# Patient Record
Sex: FEMALE | Race: WHITE | Employment: OTHER | URBAN - METROPOLITAN AREA
[De-identification: names, ages, dates, MRNs, and addresses within clinical notes are randomized per-mention and may not be internally consistent; named-entity substitution may affect disease eponyms.]

---

## 2017-03-16 ENCOUNTER — HOSPITAL ENCOUNTER (EMERGENCY)
Age: 82
Discharge: HOME | End: 2017-03-17
Attending: EMERGENCY MEDICINE
Payer: MEDICARE

## 2017-03-16 ENCOUNTER — APPOINTMENT (OUTPATIENT)
Dept: GENERAL RADIOLOGY | Age: 82
End: 2017-03-16
Attending: EMERGENCY MEDICINE
Payer: MEDICARE

## 2017-03-16 DIAGNOSIS — R42 DIZZINESS: ICD-10-CM

## 2017-03-16 DIAGNOSIS — R53.1 WEAKNESS: Primary | ICD-10-CM

## 2017-03-16 LAB
ANION GAP BLD CALC-SCNC: 9 MMOL/L (ref 3–18)
BASOPHILS # BLD AUTO: 0 K/UL (ref 0–0.06)
BASOPHILS # BLD: 1 % (ref 0–2)
BUN SERPL-MCNC: 14 MG/DL (ref 7–18)
BUN/CREAT SERPL: 13 (ref 12–20)
CALCIUM SERPL-MCNC: 8.7 MG/DL (ref 8.5–10.1)
CHLORIDE SERPL-SCNC: 104 MMOL/L (ref 100–108)
CK MB CFR SERPL CALC: NORMAL % (ref 0–4)
CK MB SERPL-MCNC: <1 NG/ML (ref 5–25)
CK SERPL-CCNC: 42 U/L (ref 26–192)
CO2 SERPL-SCNC: 27 MMOL/L (ref 21–32)
CREAT SERPL-MCNC: 1.05 MG/DL (ref 0.6–1.3)
DIFFERENTIAL METHOD BLD: ABNORMAL
EOSINOPHIL # BLD: 0 K/UL (ref 0–0.4)
EOSINOPHIL NFR BLD: 0 % (ref 0–5)
ERYTHROCYTE [DISTWIDTH] IN BLOOD BY AUTOMATED COUNT: 14.4 % (ref 11.6–14.5)
GLUCOSE SERPL-MCNC: 142 MG/DL (ref 74–99)
HCT VFR BLD AUTO: 36.4 % (ref 35–45)
HGB BLD-MCNC: 11.6 G/DL (ref 12–16)
LYMPHOCYTES # BLD AUTO: 36 % (ref 21–52)
LYMPHOCYTES # BLD: 2.5 K/UL (ref 0.9–3.6)
MAGNESIUM SERPL-MCNC: 1.9 MG/DL (ref 1.8–2.4)
MCH RBC QN AUTO: 29.2 PG (ref 24–34)
MCHC RBC AUTO-ENTMCNC: 31.9 G/DL (ref 31–37)
MCV RBC AUTO: 91.7 FL (ref 74–97)
MONOCYTES # BLD: 0.5 K/UL (ref 0.05–1.2)
MONOCYTES NFR BLD AUTO: 8 % (ref 3–10)
NEUTS SEG # BLD: 3.9 K/UL (ref 1.8–8)
NEUTS SEG NFR BLD AUTO: 55 % (ref 40–73)
PLATELET # BLD AUTO: 318 K/UL (ref 135–420)
PMV BLD AUTO: 9.4 FL (ref 9.2–11.8)
POTASSIUM SERPL-SCNC: 3.5 MMOL/L (ref 3.5–5.5)
RBC # BLD AUTO: 3.97 M/UL (ref 4.2–5.3)
SODIUM SERPL-SCNC: 140 MMOL/L (ref 136–145)
TROPONIN I SERPL-MCNC: <0.02 NG/ML (ref 0–0.04)
WBC # BLD AUTO: 6.9 K/UL (ref 4.6–13.2)

## 2017-03-16 PROCEDURE — 99285 EMERGENCY DEPT VISIT HI MDM: CPT

## 2017-03-16 PROCEDURE — 93005 ELECTROCARDIOGRAM TRACING: CPT

## 2017-03-16 PROCEDURE — 71010 XR CHEST PORT: CPT

## 2017-03-16 PROCEDURE — 85025 COMPLETE CBC W/AUTO DIFF WBC: CPT | Performed by: EMERGENCY MEDICINE

## 2017-03-16 PROCEDURE — 84484 ASSAY OF TROPONIN QUANT: CPT | Performed by: EMERGENCY MEDICINE

## 2017-03-16 PROCEDURE — 80048 BASIC METABOLIC PNL TOTAL CA: CPT | Performed by: EMERGENCY MEDICINE

## 2017-03-16 PROCEDURE — 81001 URINALYSIS AUTO W/SCOPE: CPT | Performed by: EMERGENCY MEDICINE

## 2017-03-16 PROCEDURE — 83735 ASSAY OF MAGNESIUM: CPT | Performed by: EMERGENCY MEDICINE

## 2017-03-17 ENCOUNTER — APPOINTMENT (OUTPATIENT)
Dept: CT IMAGING | Age: 82
End: 2017-03-17
Attending: EMERGENCY MEDICINE
Payer: MEDICARE

## 2017-03-17 VITALS
RESPIRATION RATE: 17 BRPM | HEART RATE: 80 BPM | DIASTOLIC BLOOD PRESSURE: 55 MMHG | OXYGEN SATURATION: 96 % | SYSTOLIC BLOOD PRESSURE: 166 MMHG | TEMPERATURE: 98.2 F

## 2017-03-17 LAB
APPEARANCE UR: CLEAR
ATRIAL RATE: 77 BPM
BACTERIA URNS QL MICRO: ABNORMAL /HPF
BILIRUB UR QL: NEGATIVE
CALCULATED P AXIS, ECG09: 69 DEGREES
CALCULATED R AXIS, ECG10: -6 DEGREES
CALCULATED T AXIS, ECG11: 42 DEGREES
COLOR UR: YELLOW
DIAGNOSIS, 93000: NORMAL
EPITH CASTS URNS QL MICRO: ABNORMAL /LPF (ref 0–5)
GLUCOSE UR STRIP.AUTO-MCNC: NEGATIVE MG/DL
HGB UR QL STRIP: ABNORMAL
KETONES UR QL STRIP.AUTO: NEGATIVE MG/DL
LEUKOCYTE ESTERASE UR QL STRIP.AUTO: ABNORMAL
NITRITE UR QL STRIP.AUTO: NEGATIVE
P-R INTERVAL, ECG05: 144 MS
PH UR STRIP: 6.5 [PH] (ref 5–8)
PROT UR STRIP-MCNC: NEGATIVE MG/DL
Q-T INTERVAL, ECG07: 416 MS
QRS DURATION, ECG06: 84 MS
QTC CALCULATION (BEZET), ECG08: 470 MS
RBC #/AREA URNS HPF: ABNORMAL /HPF (ref 0–5)
SP GR UR REFRACTOMETRY: 1.01 (ref 1–1.03)
UROBILINOGEN UR QL STRIP.AUTO: 0.2 EU/DL (ref 0.2–1)
VENTRICULAR RATE, ECG03: 77 BPM
WBC URNS QL MICRO: ABNORMAL /HPF (ref 0–4)

## 2017-03-17 PROCEDURE — 70450 CT HEAD/BRAIN W/O DYE: CPT

## 2017-03-17 NOTE — ROUTINE PROCESS
I have reviewed discharge instructions with the patient. The patient verbalized understanding. Patient armband removed and shredded. Pt. Rolled out of ED in wheelchair by Paulette Mukherjee RN. Police arrived to escort pt to her car at [place] on [address] [facility].

## 2017-03-17 NOTE — DISCHARGE INSTRUCTIONS
SPECIFIC PATIENT INSTRUCTIONS FROM THE PHYSICIAN WHO TREATED YOU IN THE ER TODAY:  1. Return if any concerns or worsening of condition(s). 2. FOLLOW UP APPOINTMENT: Your primary doctor in 1-2 days. 3. You will be contacted by a  to help with coordination of home care and transportation to doctor's office. Dizziness: Care Instructions  Your Care Instructions  Dizziness is the feeling of unsteadiness or fuzziness in your head. It is different than having vertigo, which is a feeling that the room is spinning or that you are moving or falling. It is also different from lightheadedness, which is the feeling that you are about to faint. It can be hard to know what causes dizziness. Some people feel dizzy when they have migraine headaches. Sometimes bouts of flu can make you feel dizzy. Some medical conditions, such as heart problems or high blood pressure, can make you feel dizzy. Many medicines can cause dizziness, including medicines for high blood pressure, pain, or anxiety. If a medicine causes your symptoms, your doctor may recommend that you stop or change the medicine. If it is a problem with your heart, you may need medicine to help your heart work better. If there is no clear reason for your symptoms, your doctor may suggest watching and waiting for a while to see if the dizziness goes away on its own. Follow-up care is a key part of your treatment and safety. Be sure to make and go to all appointments, and call your doctor if you are having problems. It's also a good idea to know your test results and keep a list of the medicines you take. How can you care for yourself at home? · If your doctor recommends or prescribes medicine, take it exactly as directed. Call your doctor if you think you are having a problem with your medicine. · Do not drive while you feel dizzy. · Try to prevent falls.  Steps you can take include:  ¨ Using nonskid mats, adding grab bars near the tub, and using night-lights. ¨ Clearing your home so that walkways are free of anything you might trip on. ¨ Letting family and friends know that you have been feeling dizzy. This will help them know how to help you. When should you call for help? Call 911 anytime you think you may need emergency care. For example, call if:  · You passed out (lost consciousness). · You have dizziness along with symptoms of a heart attack. These may include:  ¨ Chest pain or pressure, or a strange feeling in the chest.  ¨ Sweating. ¨ Shortness of breath. ¨ Nausea or vomiting. ¨ Pain, pressure, or a strange feeling in the back, neck, jaw, or upper belly or in one or both shoulders or arms. ¨ Lightheadedness or sudden weakness. ¨ A fast or irregular heartbeat. · You have symptoms of a stroke. These may include:  ¨ Sudden numbness, tingling, weakness, or loss of movement in your face, arm, or leg, especially on only one side of your body. ¨ Sudden vision changes. ¨ Sudden trouble speaking. ¨ Sudden confusion or trouble understanding simple statements. ¨ Sudden problems with walking or balance. ¨ A sudden, severe headache that is different from past headaches. Call your doctor now or seek immediate medical care if:  · You feel dizzy and have a fever, headache, or ringing in your ears. · You have new or increased nausea and vomiting. · Your dizziness does not go away or comes back. Watch closely for changes in your health, and be sure to contact your doctor if:  · You do not get better as expected. Where can you learn more? Go to http://zander-eden.info/. Enter Q229 in the search box to learn more about \"Dizziness: Care Instructions. \"  Current as of: May 27, 2016  Content Version: 11.1  © 8275-0136 [employer]. Care instructions adapted under license by [employer] (which disclaims liability or warranty for this information).  If you have questions about a medical condition or this instruction, always ask your healthcare professional. [address] any warranty or liability for your use of this information. Weakness: Care Instructions  Your Care Instructions  Weakness is a lack of physical or muscle strength. You may feel that you need to make extra effort to move your arms, legs, or other muscles. Generalized weakness means that you feel weak in most areas of your body. Another type of weakness may affect just one muscle or group of muscles. You may feel weak and tired after you have done too much activity, such as taking an extra-long hike. This is not a serious problem. It often goes away on its own. Feeling weak can also be caused by medical conditions like thyroid problems, depression, or a virus. Sometimes the cause can be serious. Your doctor may want to do more tests to try to find the cause of the weakness. The doctor has checked you carefully, but problems can develop later. If you notice any problems or new symptoms, get medical treatment right away. Follow-up care is a key part of your treatment and safety. Be sure to make and go to all appointments, and call your doctor if you are having problems. It's also a good idea to know your test results and keep a list of the medicines you take. How can you care for yourself at home? · Rest when you feel tired. · Be safe with medicines. If your doctor prescribed medicine, take it exactly as prescribed. Call your doctor if you think you are having a problem with your medicine. You will get more details on the specific medicines your doctor prescribes. · Do not skip meals. Eating a balanced diet may increase your energy level. · Get some physical activity every day, but do not get too tired. When should you call for help? Call your doctor now or seek immediate medical care if:  · You have new or worse weakness. · You are dizzy or lightheaded, or you feel like you may faint.   Watch closely for changes in your health, and be sure to contact your doctor if:  · You do not get better as expected. Where can you learn more? Go to http://zander-eden.info/. Enter 820 [id number] 5111 in the search box to learn more about \"Weakness: Care Instructions. \"  Current as of: May 27, 2016  Content Version: 11.1  © 3446-3283 [employer]. Care instructions adapted under license by [employer] (which disclaims liability or warranty for this information). If you have questions about a medical condition or this instruction, always ask your healthcare professional. [address] any warranty or liability for your use of this information. [employer] Activation    Thank you for requesting access to [employer]. Please follow the instructions below to securely access and download your online medical record. [employer] allows you to send messages to your doctor, view your test results, renew your prescriptions, schedule appointments, and more. How Do I Sign Up? 1. In your internet browser, go to [email or web address]. 2. Click on the First Time User? Click Here link in the Sign In box. You will see the New Member Sign Up page. 3. Enter your Z[employer]ocal Access Code exactly as it appears below. You will not need to use this code after youve completed the sign-up process. If you do not sign up before the expiration date, you must request a new code. Zipl[employer] Access Code: [email or web address]  Expires: 2017  9:36 PM (This is the date your Ziplocal access code will )    4. Enter the last four digits of your Social Security Number (xxxx) and Date of Birth (mm/dd/yyyy) as indicated and click Submit. You will be taken to the next sign-up page. 5. Create a Ziplocal ID. This will be your Zipl[employer] login ID and cannot be changed, so think of one that is secure and easy to remember. 6. Create a Ziplocal password. You can change your password at any time.   7. Enter your Password Reset Question and Answer. This can be used at a later time if you forget your password. 8. Enter your e-mail address. You will receive e-mail notification when new information is available in [address] 9. Click Sign Up. You can now view and download portions of your medical record. 10. Click the Download Summary menu link to download a portable copy of your medical information. Additional Information    If you have questions, please visit the Frequently Asked Questions section of the [facility] website at [email or web address] Remember, [facility] is NOT to be used for urgent needs. For medical emergencies, dial 911.

## 2017-03-17 NOTE — ED TRIAGE NOTES
Pt. Lisha Gant in by EMS from grocery store. Pt. Called EMS due to a dizziness and feeling weak and tired. Pt. States she has been feeling worse for last few weeks and has been trying not to drive but ran out of groceries and felt faint at the check out.

## 2017-03-17 NOTE — ED PROVIDER NOTES
[facility] EMERGENCY DEPT      80 y.o. female with noted past medical history who presents to the emergency department via EMS complaining of intermittent episodes of generalized weakness onset a year ago. Pt states The weakness has increased so much over the last 6 months that I cant walk much.  Pt was picked up from a local store after she became too weak to check out.  Pt did drive to the store. She lives home alone. She states her children and other family members are not local. Pt admits she has canceled her last few PCP appointments over the past several months. Pt also complains  dizziness described as feeling off balance.  She admits she has fallen several times over the past several months, but she denies any injuries. No other complaints. No current facility-administered medications for this encounter. Current Outpatient Prescriptions   Medication Sig    CHOLECALCIFEROL, VITAMIN D3, (VITAMIN D3 PO) Take  by mouth.  IRON, FERROUS SULFATE, PO Take  by mouth.  cyanocobalamin 1,000 mcg tablet Take 1,000 mcg by mouth daily.  raNITIdine (ZANTAC) 150 mg tablet Take 1 Tab by mouth two (2) times a day.  acetaminophen (TYLENOL EX STR ARTHRITIS PAIN) 500 mg tablet Take 1 Tab by mouth every six (6) hours as needed.  diphenhydrAMINE (BENADRYL ALLERGY) 25 mg tablet Take 1 Tab by mouth every six (6) hours as needed.  ondansetron hcl (ZOFRAN, AS HYDROCHLORIDE,) 4 mg tablet Take 2 Tabs by mouth every eight (8) hours as needed for Nausea.  albuterol (PROVENTIL, VENTOLIN) 90 mcg/actuation inhaler Take 2 Puffs by inhalation every six (6) hours as needed for Wheezing or Shortness of Breath (cough).  fluticasone (FLONASE) 50 mcg/actuation nasal spray 2 sprays in each nostril every night.  ergocalciferol (VITAMIN D2) 50,000 unit capsule Take 1 Cap by mouth every seven (7) days.  pravastatin (PRAVACHOL) 40 mg tablet Take 1 Tab by mouth nightly.        Past Medical History: Diagnosis Date    Anemia NEC     Asthma     Colitis     DDD (degenerative disc disease), cervical     Fibrocystic breast     GERD (gastroesophageal reflux disease)     Headache     HTN (hypertension)     Hyperlipidemia     OA (osteoarthritis)     Osteoporosis     Pneumonia     Thyroid nodule     Vitamin D deficiency 2/7/2011       Past Surgical History:   Procedure Laterality Date    BREAST SURGERY PROCEDURE UNLISTED      cysts removed    CARDIAC SURG PROCEDURE UNLIST      cardiac catherization    HX GYN      hysterectomy    HX HEENT      cataract surgery bilaterally    HX TOTAL ABDOMINAL HYSTERECTOMY         Family History   Problem Relation Age of Onset    Hypertension Other     Diabetes Other     Heart Disease Other     Cancer Other      stomach & colon    Diabetes Mother     Heart Attack Mother     Stroke Mother     Heart Attack Father     Heart Failure Father     Arthritis-rheumatoid Father        Social History     Social History    Marital status:      Spouse name: N/A    Number of children: N/A    Years of education: N/A     Occupational History    Not on file.      Social History Main Topics    Smoking status: Never Smoker    Smokeless tobacco: Never Used    Alcohol use No    Drug use: No    Sexual activity: Not on file     Other Topics Concern    Not on file     Social History Narrative       Allergies   Allergen Reactions    Latex, Natural Rubber Unknown (comments)    Asa-Acetaminophen-Caff-Potass Shortness of Breath    Erythromycin Other (comments)     bleeding    Iodine And Iodide Containing Products Unknown (comments)    Lemon Unknown (comments)    Other Medication Unknown (comments)     Pt not sure of allergies states \"I'm allergic to more but not sure of name\"    Pcn [Penicillins] Swelling    Shellfish Containing Products Unknown (comments)    Sulfa (Sulfonamide Antibiotics) Unknown (comments)       Patient's primary care provider (as noted in EPIC): Rocio Abbasi MD    REVIEW OF SYSTEMS:    Constitutional:  Negative for diaphoresis. HENT:  Negative for congestion. Respiratory:  Negative for cough and shortness of breath. Cardiovascular:  Negative for chest pain and palpitations. Gastrointestinal:  Negative for diarrhea. Genitourinary:  Negative for flank pain. Musculoskeletal:  Negative for back pain. Skin:  Negative for pallor. Neurological:  Negative for focal numbness or tingling. Negative for slurred speech. Visit Vitals    BP (!) 187/92 (BP Patient Position: Supine)    Temp 98.2 °F (36.8 °C)       PHYSICAL EXAM:    CONSTITUTIONAL:  Alert, in no apparent distress;  well developed;  well nourished. HEAD:  Normocephalic, atraumatic. EYES:  EOMI. Non-icteric sclera. Normal conjunctiva. ENTM:  Nose:  no rhinorrhea. Throat:  no erythema or exudate, mucous membranes moist.  NECK:  No JVD. Supple  RESPIRATORY:  Chest clear, equal breath sounds, good air movement. CARDIOVASCULAR:  Regular rate and rhythm. No murmurs, rubs, or gallops. GI:  Normal bowel sounds, abdomen soft and non-tender. No rebound or guarding. BACK:  Non-tender. UPPER EXT:  Normal inspection. LOWER EXT:  No edema, no calf tenderness. Distal pulses intact. NEURO:  Moves all four extremities. Normal motor exam and sensation in all four extremities. Normal CN II-XII exam.  Normal bilateral finger-to-nose exam.     SKIN:  No rashes;  Normal for age. PSYCH:  Alert and normal affect. DIFFERENTIAL DIAGNOSES/ MEDICAL DECISION MAKING:   Dehydration, hyperglycemia-induced weakness, electrolyte and/or endocrine imbalance, CVA, intracranial hemorrhage, sepsis, cardiac arrhythmia, central versus peripheral vertigo, illicit drug intoxication, alcohol intoxication, prescribed drug toxicity, pregnancy in females patients, anxiety disorder, versus other etiologies or a combination of the above.       ED COURSE:      Abnormal lab results from this emergency department encounter:  Labs Reviewed   CBC WITH AUTOMATED DIFF - Abnormal; Notable for the following:        Result Value    RBC 3.97 (*)     HGB 11.6 (*)     All other components within normal limits   METABOLIC PANEL, BASIC - Abnormal; Notable for the following:     Glucose 142 (*)     GFR est non-AA 50 (*)     All other components within normal limits   URINALYSIS W/ RFLX MICROSCOPIC - Abnormal; Notable for the following:     Blood SMALL (*)     Leukocyte Esterase LARGE (*)     All other components within normal limits   CARDIAC PANEL,(CK, CKMB & TROPONIN)   MAGNESIUM   URINE MICROSCOPIC ONLY       Lab values for this patient within approximately the last 12 hours:  Recent Results (from the past 12 hour(s))   CBC WITH AUTOMATED DIFF    Collection Time: 03/16/17  9:00 PM   Result Value Ref Range    WBC 6.9 4.6 - 13.2 K/uL    RBC 3.97 (L) 4.20 - 5.30 M/uL    HGB 11.6 (L) 12.0 - 16.0 g/dL    HCT 36.4 35.0 - 45.0 %    MCV 91.7 74.0 - 97.0 FL    MCH 29.2 24.0 - 34.0 PG    MCHC 31.9 31.0 - 37.0 g/dL    RDW 14.4 11.6 - 14.5 %    PLATELET 735 323 - 264 K/uL    MPV 9.4 9.2 - 11.8 FL    NEUTROPHILS 55 40 - 73 %    LYMPHOCYTES 36 21 - 52 %    MONOCYTES 8 3 - 10 %    EOSINOPHILS 0 0 - 5 %    BASOPHILS 1 0 - 2 %    ABS. NEUTROPHILS 3.9 1.8 - 8.0 K/UL    ABS. LYMPHOCYTES 2.5 0.9 - 3.6 K/UL    ABS. MONOCYTES 0.5 0.05 - 1.2 K/UL    ABS. EOSINOPHILS 0.0 0.0 - 0.4 K/UL    ABS.  BASOPHILS 0.0 0.0 - 0.06 K/UL    DF AUTOMATED     METABOLIC PANEL, BASIC    Collection Time: 03/16/17  9:00 PM   Result Value Ref Range    Sodium 140 136 - 145 mmol/L    Potassium 3.5 3.5 - 5.5 mmol/L    Chloride 104 100 - 108 mmol/L    CO2 27 21 - 32 mmol/L    Anion gap 9 3.0 - 18 mmol/L    Glucose 142 (H) 74 - 99 mg/dL    BUN 14 7.0 - 18 MG/DL    Creatinine 1.05 0.6 - 1.3 MG/DL    BUN/Creatinine ratio 13 12 - 20      GFR est AA >60 >60 ml/min/1.73m2    GFR est non-AA 50 (L) >60 ml/min/1.73m2    Calcium 8.7 8.5 - 10.1 MG/DL   CARDIAC PANEL,(CK, CKMB & TROPONIN)    Collection Time: 03/16/17  9:00 PM   Result Value Ref Range    CK 42 26 - 192 U/L    CK - MB <1.0 <3.6 ng/ml    CK-MB Index Cannot be calulated 0.0 - 4.0 %    Troponin-I, Qt. <0.02 0.0 - 0.045 NG/ML   MAGNESIUM    Collection Time: 03/16/17  9:00 PM   Result Value Ref Range    Magnesium 1.9 1.8 - 2.4 mg/dL   EKG, 12 LEAD, INITIAL    Collection Time: 03/16/17  9:11 PM   Result Value Ref Range    Ventricular Rate 77 BPM    Atrial Rate 77 BPM    P-R Interval 144 ms    QRS Duration 84 ms    Q-T Interval 416 ms    QTC Calculation (Bezet) 470 ms    Calculated P Axis 69 degrees    Calculated R Axis -6 degrees    Calculated T Axis 42 degrees    Diagnosis       Normal sinus rhythm  Minimal voltage criteria for LVH, may be normal variant  Nonspecific T wave abnormality  Prolonged QT  Abnormal ECG  When compared with ECG of 08-NOV-2016 17:09,  No significant change was found     URINALYSIS W/ RFLX MICROSCOPIC    Collection Time: 03/16/17 11:05 PM   Result Value Ref Range    Color YELLOW      Appearance CLEAR      Specific gravity 1.009 1.005 - 1.030      pH (UA) 6.5 5.0 - 8.0      Protein NEGATIVE  NEG mg/dL    Glucose NEGATIVE  NEG mg/dL    Ketone NEGATIVE  NEG mg/dL    Bilirubin NEGATIVE  NEG      Blood SMALL (A) NEG      Urobilinogen 0.2 0.2 - 1.0 EU/dL    Nitrites NEGATIVE  NEG      Leukocyte Esterase LARGE (A) NEG         Radiologist and cardiologist interpretations if available at time of this note:  XR CHEST PORT    (Results Pending)       Medication(s) ordered for patient during this emergency visit encounter:  Medications - No data to display    Initial EKG interpretation by attending emergency physician:  NSR about 75 bpm.    While unlikely, will assess the posterior fossa for hemorrhage with CT scan and will order routine labs seeking occult infection, metabolic derangement or evidence of anemia, acute worsening renal impairment, ACS/AMI (doubt), etc. My initial bedside impression is that this workup will likely be unremarkable and that the patient will ultimately be able to be discharged from the ED and treated for benign positional vertigo. No signs or complaints of vertigo component to patients weakness. CT head per radiologist's report:  XR CHEST PORT    (Results Pending)     CT head results:   IMPRESSION[de-identified]  1. No acute intracranial pathology. 2. Stable mild atrophy and presumed chronic small vessel ischemic changes. Portable (A-P view) CXR:  Preliminary review of x-rays by ED Physician. Interpretation of chest X-ray shows, no infiltrates, no pneumothorax, no CHF, no effusion. IMPRESSION AND MEDICAL DECISION MAKING:  Based upon the patient's presentation with noted HPI and PE, along with the work up done in the emergency department, I believe that the patient is having weakness of uncertain etiology. I am comfortable with discharge of the patient home and outpatient follow up with the patients primary care physician. DIAGNOSIS:  1. Weakness. 2. Dizziness. SPECIFIC PATIENT INSTRUCTIONS FROM THE PHYSICIAN WHO TREATED YOU IN THE ER TODAY:  1. Return if any concerns or worsening of condition(s). 2. FOLLOW UP APPOINTMENT:  Your primary doctor in 1-2 days. 3. You will be contacted by a  to help with coordination of home care and transportation to doctor's office. Virgel Ross L. Clifm Libman, M.D. Provider Attestation:  If a scribe was utilized in generation of this patient record, I personally performed the services described in the documentation, reviewed the documentation, as recorded by the scribe in my presence, and it accurately records the patient's history of presenting illness, review of systems, patient physical examination, and procedures performed by me as the attending physician. Virgel Ross L. Clifm Libman, M.D.   [facility] Board Certified Emergency Physician  3/16/2017.  12:13 AM    Scribe Attestation  Marquis Martin scribing for and in the presence of Judson Marie, MD (03/17/17/ 12:14 AM)    Physician Attestation  I personally performed the services described in this documentation, reviewed, and edited the documentation which was dictated to the scribe in my presence, and it accurately records my own words and actions.      Tin Hardwick MD (03/17/17/ 12:14 AM)    Signed by: Yuki Baumann, 03/17/17, 12:14 AM

## 2017-10-18 PROBLEM — D51.8 VITAMIN B12 DEFICIENCY (DIETARY) ANEMIA: Status: ACTIVE | Noted: 2017-10-18

## 2018-02-27 ENCOUNTER — OFFICE VISIT (OUTPATIENT)
Dept: FAMILY MEDICINE CLINIC | Age: 83
End: 2018-02-27

## 2018-02-27 VITALS
OXYGEN SATURATION: 96 % | HEIGHT: 61 IN | SYSTOLIC BLOOD PRESSURE: 133 MMHG | TEMPERATURE: 98.1 F | DIASTOLIC BLOOD PRESSURE: 72 MMHG | RESPIRATION RATE: 16 BRPM | WEIGHT: 129.6 LBS | HEART RATE: 86 BPM | BODY MASS INDEX: 24.47 KG/M2

## 2018-02-27 DIAGNOSIS — K92.0 HEMATEMESIS WITH NAUSEA: ICD-10-CM

## 2018-02-27 DIAGNOSIS — E78.5 HYPERLIPIDEMIA, UNSPECIFIED HYPERLIPIDEMIA TYPE: ICD-10-CM

## 2018-02-27 DIAGNOSIS — R73.09 ELEVATED GLUCOSE LEVEL: ICD-10-CM

## 2018-02-27 DIAGNOSIS — E55.9 VITAMIN D DEFICIENCY: ICD-10-CM

## 2018-02-27 DIAGNOSIS — K21.9 GASTROESOPHAGEAL REFLUX DISEASE, ESOPHAGITIS PRESENCE NOT SPECIFIED: ICD-10-CM

## 2018-02-27 DIAGNOSIS — R53.1 WEAKNESS GENERALIZED: Primary | ICD-10-CM

## 2018-02-27 DIAGNOSIS — H91.90 HEARING LOSS, UNSPECIFIED HEARING LOSS TYPE, UNSPECIFIED LATERALITY: ICD-10-CM

## 2018-02-27 DIAGNOSIS — Z00.00 LABORATORY EXAM ORDERED AS PART OF ROUTINE GENERAL MEDICAL EXAMINATION: ICD-10-CM

## 2018-02-27 DIAGNOSIS — D51.8 VITAMIN B12 DEFICIENCY (DIETARY) ANEMIA: ICD-10-CM

## 2018-02-27 DIAGNOSIS — R55 SYNCOPE AND COLLAPSE: ICD-10-CM
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
## 2018-02-27 NOTE — PATIENT INSTRUCTIONS
Preventing Falls: Care Instructions  Your Care Instructions    Getting around your home safely can be a challenge if you have injuries or health problems that make it easy for you to fall. Loose rugs and furniture in walkways are among the dangers for many older people who have problems walking or who have poor eyesight. People who have conditions such as arthritis, osteoporosis, or dementia also have to be careful not to fall. You can make your home safer with a few simple measures. Follow-up care is a key part of your treatment and safety. Be sure to make and go to all appointments, and call your doctor if you are having problems. It's also a good idea to know your test results and keep a list of the medicines you take. How can you care for yourself at home? Taking care of yourself  · You may get dizzy if you do not drink enough water. To prevent dehydration, drink plenty of fluids, enough so that your urine is light yellow or clear like water. Choose water and other caffeine-free clear liquids. If you have kidney, heart, or liver disease and have to limit fluids, talk with your doctor before you increase the amount of fluids you drink. · Exercise regularly to improve your strength, muscle tone, and balance. Walk if you can. Swimming may be a good choice if you cannot walk easily. · Have your vision and hearing checked each year or any time you notice a change. If you have trouble seeing and hearing, you might not be able to avoid objects and could lose your balance. · Know the side effects of the medicines you take. Ask your doctor or pharmacist whether the medicines you take can affect your balance. Sleeping pills or sedatives can affect your balance. · Limit the amount of alcohol you drink. Alcohol can impair your balance and other senses. · Ask your doctor whether calluses or corns on your feet need to be removed.  If you wear loose-fitting shoes because of calluses or corns, you can lose your balance and fall. · Talk to your doctor if you have numbness in your feet. Preventing falls at home  · Remove raised doorway thresholds, throw rugs, and clutter. Repair loose carpet or raised areas in the floor. · Move furniture and electrical cords to keep them out of walking paths. · Use nonskid floor wax, and wipe up spills right away, especially on ceramic tile floors. · If you use a walker or cane, put rubber tips on it. If you use crutches, clean the bottoms of them regularly with an abrasive pad, such as steel wool. · Keep your house well lit, especially [place], and outside walkways. Use night-lights in areas such as hallways and bathrooms. Add extra light switches or use remote switches (such as switches that go on or off when you clap your hands) to make it easier to turn lights on if you have to get up during the night. · Install sturdy handrails on stairways. · Move items in your cabinets so that the things you use a lot are on the lower shelves (about waist level). · Keep a cordless phone and a flashlight with new batteries by your bed. If possible, put a phone in each of the main rooms of your house, or carry a cell phone in case you fall and cannot reach a phone. Or, you can wear a device around your neck or wrist. You push a button that sends a signal for help. · Wear low-heeled shoes that fit well and give your feet good support. Use footwear with nonskid soles. Check the heels and soles of your shoes for wear. Repair or replace worn heels or soles. · Do not wear socks without shoes on wood floors. · Walk on the grass when the sidewalks are slippery. If you live in an area that gets snow and ice in the winter, sprinkle salt on slippery steps and sidewalks. Preventing falls in the bath  · Install grab bars and nonskid mats inside and outside your shower or tub and near the toilet and sinks. · Use shower chairs and bath benches.   · Use a hand-held shower head that will allow you to sit while showering. · Get into a tub or shower by putting the weaker leg in first. Get out of a tub or shower with your strong side first.  · Repair loose toilet seats and consider installing a raised toilet seat to make getting on and off the toilet easier. · Keep your bathroom door unlocked while you are in the shower. Where can you learn more? Go to http://zander-eden.info/. Enter [id number] [id number] in the search box to learn more about \"Preventing Falls: Care Instructions. \"  Current as of: May 12, 2017  Content Version: 11.4  © 5008-7906 [employer]. Care instructions adapted under license by [employer] (which disclaims liability or warranty for this information). If you have questions about a medical condition or this instruction, always ask your healthcare professional. [place] [address] any warranty or liability for your use of this information. How to Get Up Safely After a Fall: Care Instructions  Your Care Instructions    If you have injuries, health problems, or other reasons that may make it easy for you to fall at home, it is a good idea to learn how to get up safely after a fall. Learning how to get up correctly can help you avoid making an injury worse. Also, knowing what to do if you cannot get up can help you stay safe until help arrives. Follow-up care is a key part of your treatment and safety. Be sure to make and go to all appointments, and call your doctor if you are having problems. It's also a good idea to know your test results and keep a list of the medicines you take. How can you care for yourself after a fall? If you think you can get up  First lie still for a few minutes and think about how you feel. If your body feels okay and you think you can get up safely, follow the rest of the steps below:  1. Look for a chair or other piece of furniture that is close to you.   2. Roll onto your side and rest. Roll by turning your head in the direction you want to roll, move your shoulder and arm, then hip and leg in the same direction. 3. Lie still for a moment to let your blood pressure adjust.  4. Slowly push your upper body up, lift your head, and take a moment to rest.  5. Slowly get up on your hands and knees, and crawl to the chair or other stable piece of furniture. 6. Put your hands on the chair. 7. Move one foot forward, and place it flat on the floor. Your other leg should be bent with the knee on the floor. 8. Rise slowly, turn your body, and sit in the chair. Stay seated for a bit and think about how you feel. Call for help. Even if you feel okay, let someone know what happened to you. You might not know that you have a serious injury. If you cannot get up  1. If you think you are injured after a fall or you cannot get up, try not to panic. 2. Call out for help. 3. If you have a phone within reach or you have an emergency call device, use it to call for help. 4. If you do not have a phone within reach, try to slide yourself toward it. If you cannot get to the phone, try to slide toward a door or window or a place where you think you can be heard. 5. [place] or use an object to make noise so someone might hear you. 6. If you can reach something that you can use for a pillow, place it under your head. Try to stay warm by covering yourself with a blanket or clothing while you wait for help. When should you call for help? Call 911 anytime you think you may need emergency care. For example, call if:  ? · You passed out (lost consciousness). ? · You cannot get up after a fall. ? · You have severe pain. ?Call your doctor now or seek immediate medical care if:  ? · You have new or worse pain. ? · You are dizzy or lightheaded. ? · You hit your head. ? Watch closely for changes in your health, and be sure to contact your doctor if:  ? · You do not get better as expected. Where can you learn more?   Go to http://jennifer.info/. Enter V215 in the search box to learn more about \"How to Get Up Safely After a Fall: Care Instructions. \"  Current as of: May 12, 2017  Content Version: 11.4  © 2993-4411 [employer]. Care instructions adapted under license by [employer] (which disclaims liability or warranty for this information). If you have questions about a medical condition or this instruction, always ask your healthcare professional. [address] any warranty or liability for your use of this information. Fainting: Care Instructions  Your Care Instructions    When you faint, or pass out, you lose consciousness for a short time. A brief drop in blood flow to the brain often causes it. When you fall or lie down, more blood flows to your brain and you regain consciousness. Emotional stress, pain, or overheating-especially if you have been standing-can make you faint. In these cases, fainting is usually not serious. But fainting can be a sign of a more serious problem. Your doctor may want you to have more tests to rule out other causes. The treatment you need depends on the reason why you fainted. The doctor has checked you carefully, but problems can develop later. If you notice any problems or new symptoms, get medical treatment right away. Follow-up care is a key part of your treatment and safety. Be sure to make and go to all appointments, and call your doctor if you are having problems. It's also a good idea to know your test results and keep a list of the medicines you take. How can you care for yourself at home? · Drink plenty of fluids to prevent dehydration. If you have kidney, heart, or liver disease and have to limit fluids, talk with your doctor before you increase your fluid intake. When should you call for help? Call 911 anytime you think you may need emergency care.  For example, call if:  ? · You have symptoms of a heart problem. These may include:  ¨ Chest pain or pressure. ¨ Severe trouble breathing. ¨ A fast or irregular heartbeat. ¨ Lightheadedness or sudden weakness. ¨ Coughing up pink, foamy mucus. ¨ Passing out. After you call 911, the  may tell you to chew 1 adult-strength or 2 to 4 low-dose aspirin. Wait for an ambulance. Do not try to drive yourself. ? · You have symptoms of a stroke. These may include:  ¨ Sudden numbness, tingling, weakness, or loss of movement in your face, arm, or leg, especially on only one side of your body. ¨ Sudden vision changes. ¨ Sudden trouble speaking. ¨ Sudden confusion or trouble understanding simple statements. ¨ Sudden problems with walking or balance. ¨ A sudden, severe headache that is different from past headaches. ? · You passed out (lost consciousness) again. ? Watch closely for changes in your health, and be sure to contact your doctor if:  ? · You do not get better as expected. Where can you learn more? Go to http://zander-eden.info/. Enter U381 in the search box to learn more about \"Fainting: Care Instructions. \"  Current as of: March 20, 2017  Content Version: 11.4  © 7971-2585 [employer]. Care instructions adapted under license by [employer] (which disclaims liability or warranty for this information). If you have questions about a medical condition or this instruction, always ask your healthcare professional. [place] [address] any warranty or liability for your use of this information.

## 2018-02-27 NOTE — PROGRESS NOTES
Pt is here for to establish care but is not a new Pt. Last seen by Dr Kelly Vincent 11/16. Pt having near syncopal episodes & falls since August 2017. GERD    1. Have you been to the ER, urgent care clinic since your last visit? Hospitalized since your last visit? Yes When: [facility] ED Fall 3/16/17    2. Have you seen or consulted any other health care providers outside of the [address] [place] [address] since your last visit? Include any pap smears or colon screening.  Yes Dr Symone Boyd 1/18

## 2018-03-01 ENCOUNTER — TELEPHONE (OUTPATIENT)
Dept: CARDIOLOGY CLINIC | Age: 83
End: 2018-03-01

## 2018-03-06 ENCOUNTER — HOSPITAL ENCOUNTER (OUTPATIENT)
Dept: LAB | Age: 83
Discharge: HOME | End: 2018-03-06
Payer: MEDICARE

## 2018-03-06 ENCOUNTER — OFFICE VISIT (OUTPATIENT)
Dept: FAMILY MEDICINE CLINIC | Age: 83
End: 2018-03-06

## 2018-03-06 VITALS
WEIGHT: 127.6 LBS | HEART RATE: 74 BPM | SYSTOLIC BLOOD PRESSURE: 126 MMHG | OXYGEN SATURATION: 97 % | HEIGHT: 61 IN | TEMPERATURE: 98 F | BODY MASS INDEX: 24.09 KG/M2 | RESPIRATION RATE: 18 BRPM | DIASTOLIC BLOOD PRESSURE: 64 MMHG

## 2018-03-06 DIAGNOSIS — R68.2 MOUTH DRYNESS: ICD-10-CM

## 2018-03-06 DIAGNOSIS — K11.7 DROOLING: ICD-10-CM

## 2018-03-06 DIAGNOSIS — E55.9 VITAMIN D DEFICIENCY: ICD-10-CM

## 2018-03-06 DIAGNOSIS — R73.09 ELEVATED GLUCOSE LEVEL: ICD-10-CM

## 2018-03-06 DIAGNOSIS — R53.1 WEAKNESS GENERALIZED: ICD-10-CM

## 2018-03-06 DIAGNOSIS — D51.8 VITAMIN B12 DEFICIENCY (DIETARY) ANEMIA: ICD-10-CM

## 2018-03-06 DIAGNOSIS — Z00.00 LABORATORY EXAM ORDERED AS PART OF ROUTINE GENERAL MEDICAL EXAMINATION: ICD-10-CM

## 2018-03-06 DIAGNOSIS — E78.5 HYPERLIPIDEMIA, UNSPECIFIED HYPERLIPIDEMIA TYPE: ICD-10-CM

## 2018-03-06 DIAGNOSIS — Z00.00 INITIAL MEDICARE ANNUAL WELLNESS VISIT: Primary | ICD-10-CM

## 2018-03-06 LAB
ALBUMIN SERPL-MCNC: 3.9 G/DL (ref 3.4–5)
ALBUMIN/GLOB SERPL: 1.2 {RATIO} (ref 0.8–1.7)
ALP SERPL-CCNC: 119 U/L (ref 45–117)
ALT SERPL-CCNC: 13 U/L (ref 13–56)
ANION GAP SERPL CALC-SCNC: 9 MMOL/L (ref 3–18)
AST SERPL-CCNC: 11 U/L (ref 15–37)
BASOPHILS # BLD: 0.1 K/UL (ref 0–0.06)
BASOPHILS NFR BLD: 1 % (ref 0–2)
BILIRUB SERPL-MCNC: 0.2 MG/DL (ref 0.2–1)
BUN SERPL-MCNC: 29 MG/DL (ref 7–18)
BUN/CREAT SERPL: 29 (ref 12–20)
CALCIUM SERPL-MCNC: 9 MG/DL (ref 8.5–10.1)
CHLORIDE SERPL-SCNC: 103 MMOL/L (ref 100–108)
CHOLEST SERPL-MCNC: 238 MG/DL
CO2 SERPL-SCNC: 24 MMOL/L (ref 21–32)
CREAT SERPL-MCNC: 1 MG/DL (ref 0.6–1.3)
DIFFERENTIAL METHOD BLD: ABNORMAL
EOSINOPHIL # BLD: 0 K/UL (ref 0–0.4)
EOSINOPHIL NFR BLD: 0 % (ref 0–5)
ERYTHROCYTE [DISTWIDTH] IN BLOOD BY AUTOMATED COUNT: 14.4 % (ref 11.6–14.5)
GLOBULIN SER CALC-MCNC: 3.3 G/DL (ref 2–4)
GLUCOSE SERPL-MCNC: 97 MG/DL (ref 74–99)
HCT VFR BLD AUTO: 34.5 % (ref 35–45)
HDLC SERPL-MCNC: 48 MG/DL (ref 40–60)
HDLC SERPL: 5 {RATIO} (ref 0–5)
HGB BLD-MCNC: 10.4 G/DL (ref 12–16)
LDLC SERPL CALC-MCNC: 148.2 MG/DL (ref 0–100)
LIPID PROFILE,FLP: ABNORMAL
LYMPHOCYTES # BLD: 1.5 K/UL (ref 0.9–3.6)
LYMPHOCYTES NFR BLD: 21 % (ref 21–52)
MCH RBC QN AUTO: 28.2 PG (ref 24–34)
MCHC RBC AUTO-ENTMCNC: 30.1 G/DL (ref 31–37)
MCV RBC AUTO: 93.5 FL (ref 74–97)
MONOCYTES # BLD: 0.4 K/UL (ref 0.05–1.2)
MONOCYTES NFR BLD: 6 % (ref 3–10)
NEUTS SEG # BLD: 4.9 K/UL (ref 1.8–8)
NEUTS SEG NFR BLD: 72 % (ref 40–73)
PLATELET # BLD AUTO: 360 K/UL (ref 135–420)
PMV BLD AUTO: 9.7 FL (ref 9.2–11.8)
POTASSIUM SERPL-SCNC: 4.9 MMOL/L (ref 3.5–5.5)
PROT SERPL-MCNC: 7.2 G/DL (ref 6.4–8.2)
RBC # BLD AUTO: 3.69 M/UL (ref 4.2–5.3)
SODIUM SERPL-SCNC: 136 MMOL/L (ref 136–145)
TRIGL SERPL-MCNC: 209 MG/DL (ref ?–150)
TSH SERPL DL<=0.05 MIU/L-ACNC: 1.26 UIU/ML (ref 0.36–3.74)
VIT B12 SERPL-MCNC: 307 PG/ML (ref 211–911)
VLDLC SERPL CALC-MCNC: 41.8 MG/DL
WBC # BLD AUTO: 6.8 K/UL (ref 4.6–13.2)

## 2018-03-06 PROCEDURE — 82306 VITAMIN D 25 HYDROXY: CPT | Performed by: PHYSICIAN ASSISTANT

## 2018-03-06 PROCEDURE — 80061 LIPID PANEL: CPT | Performed by: PHYSICIAN ASSISTANT

## 2018-03-06 PROCEDURE — 82607 VITAMIN B-12: CPT | Performed by: PHYSICIAN ASSISTANT

## 2018-03-06 PROCEDURE — 36415 COLL VENOUS BLD VENIPUNCTURE: CPT | Performed by: PHYSICIAN ASSISTANT

## 2018-03-06 PROCEDURE — 83036 HEMOGLOBIN GLYCOSYLATED A1C: CPT | Performed by: PHYSICIAN ASSISTANT

## 2018-03-06 PROCEDURE — 84443 ASSAY THYROID STIM HORMONE: CPT | Performed by: PHYSICIAN ASSISTANT

## 2018-03-06 PROCEDURE — 85025 COMPLETE CBC W/AUTO DIFF WBC: CPT | Performed by: PHYSICIAN ASSISTANT

## 2018-03-06 PROCEDURE — 80053 COMPREHEN METABOLIC PANEL: CPT | Performed by: PHYSICIAN ASSISTANT

## 2018-03-06 NOTE — PATIENT INSTRUCTIONS
Gastroesophageal Reflux Disease (GERD): Care Instructions  Your Care Instructions    Gastroesophageal reflux disease (GERD) is the backward flow of stomach acid into the esophagus. The esophagus is the tube that leads from your throat to your stomach. A one-way valve prevents the stomach acid from moving up into this tube. When you have GERD, this valve does not close tightly enough. If you have mild GERD symptoms including heartburn, you may be able to control the problem with antacids or over-the-counter medicine. Changing your diet, losing weight, and making other lifestyle changes can also help reduce symptoms. Follow-up care is a key part of your treatment and safety. Be sure to make and go to all appointments, and call your doctor if you are having problems. It's also a good idea to know your test results and keep a list of the medicines you take. How can you care for yourself at home? · Take your medicines exactly as prescribed. Call your doctor if you think you are having a problem with your medicine. · Your doctor may recommend over-the-counter medicine. For mild or occasional indigestion, antacids, such as Tums, Gaviscon, Mylanta, or Maalox, may help. Your doctor also may recommend over-the-counter acid reducers, such as Pepcid AC, Tagamet HB, Zantac 75, or Prilosec. Read and follow all instructions on the label. If you use these medicines often, talk with your doctor. · Change your eating habits. ¨ It's best to eat several small meals instead of two or three large meals. ¨ After you eat, wait 2 to 3 hours before you lie down. ¨ Chocolate, mint, and alcohol can make GERD worse. ¨ Spicy foods, foods that have a lot of acid (like tomatoes and oranges), and coffee can make GERD symptoms worse in some people. If your symptoms are worse after you eat a certain food, you may want to stop eating that food to see if your symptoms get better.   · Do not smoke or chew tobacco. Smoking can make GERD worse. If you need help quitting, talk to your doctor about stop-smoking programs and medicines. These can increase your chances of quitting for good. · If you have GERD symptoms at night, raise the head of your bed 6 to 8 inches by putting the frame on blocks or placing a foam wedge under the head of your mattress. (Adding extra pillows does not work.)  · Do not wear tight clothing around your middle. · Lose weight if you need to. Losing just 5 to 10 pounds can help. When should you call for help? Call your doctor now or seek immediate medical care if:  ? · You have new or different belly pain. ? · Your stools are black and tarlike or have streaks of blood. ? Watch closely for changes in your health, and be sure to contact your doctor if:  ? · Your symptoms have not improved after 2 days. ? · Food seems to catch in your throat or chest.   Where can you learn more? Go to http://zander-eden.info/. Enter Q088 in the search box to learn more about \"Gastroesophageal Reflux Disease (GERD): Care Instructions. \"  Current as of: May 12, 2017  Content Version: 11.4  © 0194-1929 [employer]. Care instructions adapted under license by [employer] (which disclaims liability or warranty for this information). If you have questions about a medical condition or this instruction, always ask your healthcare professional. [place] [address] any warranty or liability for your use of this information. Medicare Wellness Visit, Female    The best way to live healthy is to have a healthy lifestyle by eating a well-balanced diet, exercising regularly, limiting alcohol and stopping smoking. Regular physical exams and screening tests are another way to keep healthy. Preventive exams provided by your health care provider can find health problems before they become diseases or illnesses.  Preventive services including immunizations, screening tests, monitoring and exams can help you take care of your own health. All people over age 72 should have a pneumovax  and and a prevnar shot to prevent pneumonia. These are once in a lifetime unless you and your provider decide differently. All people over 65 should have a yearly flu shot and a tetanus vaccine every 10 years. A bone mass density to screen for osteoporosis or thinning of the bones should be done every 2 years after 65. Screening for diabetes mellitus with a blood sugar test should be done every year. Glaucoma is a disease of the eye due to increased ocular pressure that can lead to blindness and it should be done every year by an eye professional.    Cardiovascular screening tests that check for elevated lipids (fatty part of blood) which can lead to heart disease and strokes should be done every 5 years. Colorectal screening that evaluates for blood or polyps in your colon should be done yearly as a stool test or every five years as a flexible sigmoidoscope or every 10 years as a colonoscopy up to age 76. Breast cancer screening with a mammogram is recommended biennially  for women age 54-69. Screening for cervical cancer with a pap smear and pelvic exam is recommended for women after age 72 years every 2 years up to age 79 or when the provider and patient decide to stop. If there is a history of cervical abnormalities or other increased risk for cancer then the test is recommended yearly. Hepatitis C screening is also recommended for anyone born between 80 through [address]. A shingles vaccine is also recommended once in a lifetime after age 61. Your Medicare Wellness Exam is recommended annually.     Here is a list of your current Health Maintenance items with a due date:  Health Maintenance Due   Topic Date Due    DTaP/Tdap/Td  (1 - Tdap) 11/22/1955    Shingles Vaccine  09/22/1994    Glaucoma Screening   11/22/1999    Pneumococcal Vaccine (1 of 2 - PCV13) 11/22/1999    Annual Well Visit  11/22/1999    Flu Vaccine  08/01/2017       Medicare Part B Preventive Services Limitations Recommendation Scheduled   Bone Mass Measurement  (age 72 & older, biennial) Requires diagnosis related to osteoporosis or estrogen deficiency. Biennial benefit unless patient has history of long-term glucocorticoid tx or baseline is needed because initial test was by other method Completed 8-5-2012    Cardiovascular Screening Blood Tests (every 5 years)  Total cholesterol, HDL, Triglycerides Order as a panel if possible 3-    Colorectal Cancer Screening  -Fecal occult blood test (annual)  -Flexible sigmoidoscopy (5y)  -Screening colonoscopy (10y)  -Barium Enema  N/A    Counseling to Prevent Tobacco Use (up to 8 sessions per year)  - Counseling greater than 3 and up to 10 minutes  - Counseling greater than 10 minutes Patients must be asymptomatic of tobacco-related conditions to receive as preventive service     Diabetes Screening Tests (at least every 3 years, Medicare covers annually or at 6-month intervals for prediabetic patients)    Fasting blood sugar (FBS) or glucose tolerance test (GTT) Patient must be diagnosed with one of the following:  -Hypertension, Dyslipidemia, obesity, previous impaired FBS or GTT  Or any two of the following: overweight, FH of diabetes, age ? 72, history of gestational diabetes, birth of baby weighing more than 9 pounds     Diabetes Self-Management Training (DSMT) (no USPSTF recommendation) Requires referral by treating physician for patient with diabetes or renal disease. 10 hours of initial DSMT session of no less than 30 minutes each in a continuous 12-month period. 2 hours of follow-up DSMT in subsequent years.      Glaucoma Screening (no USPSTF recommendation) Diabetes mellitus, family history, , age 48 or over,  American, age 72 or over 11-    Human Immunodeficiency Virus (HIV) Screening (annually for increased risk patients)  HIV-1 and HIV-2 by EIA, MICHELA, rapid antibody test, or oral mucosa transudate Patient must be at increased risk for HIV infection per USPSTF guidelines or pregnant. Tests covered annually for patients at increased risk. Pregnant patients may receive up to 3 test during pregnancy. Medical Nutrition Therapy (MNT) (for diabetes or renal disease not recommended schedule) Requires referral by treating physician for patient with diabetes or renal disease. Can be provided in same year as diabetes self-management training (DSMT), and CMS recommends medical nutrition therapy take place after DSMT. Up to 3 hours for initial year and 2 hours in subsequent years. Shingles Vaccination A shingles vaccine is also recommended once in a lifetime after age 61 Pt declined    Seasonal Influenza Vaccination (annually)  Pt. declined    Pneumococcal Vaccination (once after 72)  PT. declined    Hepatitis B Vaccinations (if medium/high risk) Medium/high risk factors:  End-stage renal disease,  Hemophiliacs who received Factor VIII or IX concentrates, Clients of institutions for the mentally retarded, Persons who live in the same house as a HepB virus carrier, Homosexual men, Illicit injectable drug abusers. Screening Mammography (biennial age 54-69) Annually (age 36 or over) N/A    Screening Pap Tests and Pelvic Examination (up to age 79 and after 79 if unknown history or abnormal study last 10 years) Every 25 months except high risk N/A    Ultrasound Screening for Abdominal Aortic Aneurysm (AAA) (once) Patient must be referred through [facility] and not have had a screening for abdominal aortic aneurysm before under Medicare.   Limited to patients who meet one of the following criteria:  - Men who are 73-68 years old and have smoked more than 100 cigarettes in their lifetime.  -Anyone with a FH of AAA  -Anyone recommended for screening by USPSTF           Medicare Wellness Visit, Female    The best way to live healthy is to have a healthy lifestyle by eating a well-balanced diet, exercising regularly, limiting alcohol and stopping smoking. Regular physical exams and screening tests are another way to keep healthy. Preventive exams provided by your health care provider can find health problems before they become diseases or illnesses. Preventive services including immunizations, screening tests, monitoring and exams can help you take care of your own health. All people over age 72 should have a pneumovax  and and a prevnar shot to prevent pneumonia. These are once in a lifetime unless you and your provider decide differently. All people over 65 should have a yearly flu shot and a tetanus vaccine every 10 years. A bone mass density to screen for osteoporosis or thinning of the bones should be done every 2 years after 65. Screening for diabetes mellitus with a blood sugar test should be done every year. Glaucoma is a disease of the eye due to increased ocular pressure that can lead to blindness and it should be done every year by an eye professional.    Cardiovascular screening tests that check for elevated lipids (fatty part of blood) which can lead to heart disease and strokes should be done every 5 years. Colorectal screening that evaluates for blood or polyps in your colon should be done yearly as a stool test or every five years as a flexible sigmoidoscope or every 10 years as a colonoscopy up to age 76. Breast cancer screening with a mammogram is recommended biennially  for women age 54-69. Screening for cervical cancer with a pap smear and pelvic exam is recommended for women after age 72 years every 2 years up to age 79 or when the provider and patient decide to stop. If there is a history of cervical abnormalities or other increased risk for cancer then the test is recommended yearly. Hepatitis C screening is also recommended for anyone born between [birth year] through [address].     A shingles vaccine is also recommended once in a lifetime after age 61. Your Medicare Wellness Exam is recommended annually.     Here is a list of your current Health Maintenance items with a due date:  Health Maintenance Due   Topic Date Due    Glaucoma Screening   11/22/1999

## 2018-03-06 NOTE — PROGRESS NOTES
Radha Ortiz is a  80 y.o. female presents today for office visit for a AWV. Patient still need labs drawn. Patient states that she drools although her mouth is dry and this has been going on for many years and she never went to see a neurologist.      1. Have you been to the ER, urgent care clinic or hospitalized since your last visit? NO     2. Have you seen or consulted any other health care providers outside of the [address] [place] [address] since your last visit (Include any pap smears or colon screening)? Yes Dr Osvaldo Gan 1/18     This is an Initial Medicare Annual Wellness Exam (AWV) (Performed 12 months after IPPE or effective date of Medicare Part B enrollment, Once in a lifetime)    I have reviewed the patient's medical history in detail and updated the computerized patient record. History     Past Medical History:   Diagnosis Date    Anemia NEC     Asthma     Colitis     DDD (degenerative disc disease), cervical     Fibrocystic breast     GERD (gastroesophageal reflux disease)     Headache     HTN (hypertension)     Hyperlipidemia     OA (osteoarthritis)     Osteoporosis     Pneumonia     Thyroid nodule     Vitamin D deficiency 2/7/2011      Past Surgical History:   Procedure Laterality Date    BREAST SURGERY PROCEDURE UNLISTED      cysts removed    CARDIAC SURG PROCEDURE UNLIST      cardiac catherization    HX GYN      hysterectomy    HX HEENT      cataract surgery bilaterally    HX TOTAL ABDOMINAL HYSTERECTOMY       Current Outpatient Prescriptions   Medication Sig Dispense Refill    raNITIdine (ZANTAC) 150 mg tablet Take 1 Tab by mouth two (2) times a day. 60 Tab 2    acetaminophen (TYLENOL EX STR ARTHRITIS PAIN) 500 mg tablet Take 1 Tab by mouth every six (6) hours as needed. 60 Tab 1    diphenhydrAMINE (BENADRYL ALLERGY) 25 mg tablet Take 1 Tab by mouth every six (6) hours as needed.  30 Tab 2     Allergies   Allergen Reactions    Latex, Natural Rubber Unknown (comments)    Asa-Acetaminophen-Caff-Potass Shortness of Breath    Erythromycin Other (comments)     bleeding    Iodine And Iodide Containing Products Unknown (comments)    Lemon Unknown (comments)    Other Medication Unknown (comments)     Pt not sure of allergies states \"I'm allergic to more but not sure of name\"    Pcn [Penicillins] Swelling    Shellfish Containing Products Unknown (comments)    Sulfa (Sulfonamide Antibiotics) Unknown (comments)     Family History   Problem Relation Age of Onset    Hypertension Other     Diabetes Other     Heart Disease Other     Cancer Other      stomach & colon    Diabetes Mother     Heart Attack Mother     Stroke Mother     Heart Attack Father     Heart Failure Father     Arthritis-rheumatoid Father      Social History   Substance Use Topics    Smoking status: Never Smoker    Smokeless tobacco: Never Used    Alcohol use No     Patient Active Problem List   Diagnosis Code    Thyroid nodule E04.1    Vitamin D deficiency E55.9    Hyperlipidemia E78.5    AR (allergic rhinitis) J30.9    GERD (gastroesophageal reflux disease) K21.9    SOB (shortness of breath) R06.02    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 Functional Ability and Level of Safety:     Hearing Loss    The patient needs further evaluation. Activities of Daily Living  The home contains: no safety equipment. Patient does total self care    Fall Risk  Fall Risk Assessment, last 12 mths 2/27/2018   Able to walk? Yes   Fall in past 12 months? Yes   Fall with injury?  No   Number of falls in past 12 months 7   Fall Risk Score 7       Abuse Screen  Patient is not abused    Cognitive Screening   Evaluation of Cognitive Function:  Has your family/caregiver stated any concerns about your memory: no  Normal    Patient Care Team   Patient Care Team:  Mallory Messer PA-C as PCP - General (Physician Assistant)  Kelvin Pisano RN as Nurse Navigator (Internal Medicine)      Assessment/Plan   Education and counseling provided:  Are appropriate based on today's review and evaluation    Diagnoses and all orders for this visit:    1. Initial Medicare annual wellness visit        Rio Jason MPA, PAYvonneC  [facility]    I reviewed the patient's medical history, the physician assistant's findings on physical examination, the patient's diagnoses, and treatment plan as documented in the progress note. I concur with the treatment plan as documented. There are no additional recommendations at this time.     Sangita Rico MD

## 2018-03-06 NOTE — PROGRESS NOTES
HPI:    Stan Tripathi  is a 80 y.o.  female  patient who comes in today for results of blood work, however, she did not complete labs. She adds new symptoms today of dry mouth and drooling x several years with patient stated history of TIAs. She states she has never had it worked up or seen a neurologist.  She denies facial droop, trouble with speech, numbness or tingling anywhere. Patient denies SOB, CP, dizziness, headache. Current Outpatient Prescriptions   Medication Sig Dispense Refill    raNITIdine (ZANTAC) 150 mg tablet Take 1 Tab by mouth two (2) times a day. 60 Tab 2    acetaminophen (TYLENOL EX STR ARTHRITIS PAIN) 500 mg tablet Take 1 Tab by mouth every six (6) hours as needed. 60 Tab 1    diphenhydrAMINE (BENADRYL ALLERGY) 25 mg tablet Take 1 Tab by mouth every six (6) hours as needed.  30 Tab 2      Allergies   Allergen Reactions    Latex, Natural Rubber Unknown (comments)    Asa-Acetaminophen-Caff-Potass Shortness of Breath    Erythromycin Other (comments)     bleeding    Iodine And Iodide Containing Products Unknown (comments)    Lemon Unknown (comments)    Other Medication Unknown (comments)     Pt not sure of allergies states \"I'm allergic to more but not sure of name\"    Pcn [Penicillins] Swelling    Shellfish Containing Products Unknown (comments)    Sulfa (Sulfonamide Antibiotics) Unknown (comments)      Past Medical History:   Diagnosis Date    Anemia NEC     Asthma     Colitis     DDD (degenerative disc disease), cervical     Fibrocystic breast     GERD (gastroesophageal reflux disease)     Headache     HTN (hypertension)     Hyperlipidemia     OA (osteoarthritis)     Osteoporosis     Pneumonia     Thyroid nodule     Vitamin D deficiency 2/7/2011      Family History   Problem Relation Age of Onset    Hypertension Other     Diabetes Other     Heart Disease Other     Cancer Other      stomach & colon    Diabetes Mother     Heart Attack Mother    [facility] Stroke Mother     Heart Attack Father     Heart Failure Father    Kyara Beauchamp Arthritis-rheumatoid Father       Patient Active Problem List   Diagnosis Code    Thyroid nodule E04.1    Vitamin D deficiency E55.9    Hyperlipidemia E78.5    AR (allergic rhinitis) J30.9    GERD (gastroesophageal reflux disease) K21.9    SOB (shortness of breath) R06.02    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 HENT:   Head: Normocephalic and atraumatic. Eyes: Conjunctivae are normal.   Neck: Normal range of motion. Neck supple. No thyromegaly present. Cardiovascular: Normal rate, regular rhythm, normal heart sounds and intact distal pulses. Pulmonary/Chest: Effort normal and breath sounds normal.   Abdominal: Soft. Bowel sounds are normal. She exhibits no distension and no mass. There is tenderness (left lumbar abdomen). Musculoskeletal: She exhibits no edema. Lymphadenopathy:     She has no cervical adenopathy. Neurological: She is alert and oriented to person, place, and time. She has normal sensation, normal strength, normal reflexes and intact cranial nerves. Gait normal.   Vitals reviewed. Health Maintenance Due   Topic Date Due    GLAUCOMA SCREENING Q2Y  11/22/1999       Assessment/Plan:    Diagnoses and all orders for this visit:    1. Initial Medicare annual wellness visit    2. Drooling follow up with GI, will schedule neuro visit. Beverley Markham Neuro ref [facility]    3.  Mouth dryness - may use biotene  Diagnoses and all orders for this visit:       Follow-up Disposition:  Return in about 3 weeks (around 3/27/2018) for lab work needed. Patient to get labs. Referrals were made but patient didn't answer the phone to schedule any. I have given the patient phone numbers to schedule these appointments, she agrees. Additional Notes: Discussed today's diagnosis, treatment plans. Discussed medication indications and side effects. After Visit Summary: Provided and discussed printed patient instructions. Answered all questions and patient acknowledged understanding. Fer Benavides PA-C   I reviewed the patient's medical history, the physician assistant's findings on physical examination, the patient's diagnoses, and treatment plan as documented in the progress note. I concur with the treatment plan as documented. There are no additional recommendations at this time.     Loida Krishna MD

## 2018-03-07 LAB
25(OH)D3 SERPL-MCNC: 20.8 NG/ML (ref 30–100)
HBA1C MFR BLD: 5.4 % (ref 4.2–5.6)

## 2018-03-20 ENCOUNTER — TELEPHONE (OUTPATIENT)
Dept: CARDIOLOGY CLINIC | Age: 83
End: 2018-03-20

## 2018-04-03 ENCOUNTER — OFFICE VISIT (OUTPATIENT)
Dept: FAMILY MEDICINE CLINIC | Age: 83
End: 2018-04-03

## 2018-04-03 VITALS
DIASTOLIC BLOOD PRESSURE: 60 MMHG | HEIGHT: 61 IN | RESPIRATION RATE: 18 BRPM | HEART RATE: 55 BPM | SYSTOLIC BLOOD PRESSURE: 158 MMHG | OXYGEN SATURATION: 95 % | WEIGHT: 127 LBS | TEMPERATURE: 98.3 F | BODY MASS INDEX: 23.98 KG/M2

## 2018-04-03 DIAGNOSIS — Z12.39 BREAST CANCER SCREENING: ICD-10-CM

## 2018-04-03 DIAGNOSIS — E78.5 HYPERLIPIDEMIA, UNSPECIFIED HYPERLIPIDEMIA TYPE: Primary | ICD-10-CM

## 2018-04-03 DIAGNOSIS — R03.0 ELEVATED BLOOD PRESSURE READING WITHOUT DIAGNOSIS OF HYPERTENSION: ICD-10-CM

## 2018-04-03 DIAGNOSIS — E04.1 THYROID NODULE: ICD-10-CM

## 2018-04-03 DIAGNOSIS — D51.8 VITAMIN B12 DEFICIENCY (DIETARY) ANEMIA: ICD-10-CM

## 2018-04-03 DIAGNOSIS — E55.9 VITAMIN D DEFICIENCY: ICD-10-CM

## 2018-04-03 DIAGNOSIS — K21.9 GASTROESOPHAGEAL REFLUX DISEASE, ESOPHAGITIS PRESENCE NOT SPECIFIED: ICD-10-CM

## 2018-04-03 PROBLEM — D64.9 ANEMIA: Status: ACTIVE | Noted: 2018-04-03

## 2018-04-03 RX ORDER — SIMVASTATIN 5 MG/1
5 TABLET, FILM COATED ORAL
Qty: 30 TAB | Refills: 2 | Status: SHIPPED | OUTPATIENT
Start: 2018-04-03 | End: 2018-09-27

## 2018-04-03 RX ORDER — ACETAMINOPHEN 500 MG
2000 TABLET ORAL DAILY
Qty: 90 CAP | Refills: 1 | Status: SHIPPED | OUTPATIENT
Start: 2018-04-03 | End: 2018-09-27

## 2018-04-03 RX ORDER — LANOLIN ALCOHOL/MO/W.PET/CERES
500 CREAM (GRAM) TOPICAL DAILY
Qty: 30 TAB | Refills: 2 | Status: SHIPPED | OUTPATIENT
Start: 2018-04-03 | End: 2018-09-27

## 2018-04-03 NOTE — PATIENT INSTRUCTIONS
Learning About Vitamin D  Why is it important to get enough vitamin D? Your body needs vitamin D to absorb calcium. Calcium keeps your bones and muscles, including your heart, healthy and strong. If your muscles don't get enough calcium, they can cramp, hurt, or feel weak. You may have long-term (chronic) muscle aches and pains. If you don't get enough vitamin D throughout life, you have an increased chance of having thin and brittle bones (osteoporosis) in your later years. Children who don't get enough vitamin D may not grow as much as others their age. They also have a chance of getting a rare disease called rickets. It causes weak bones. Vitamin D and calcium are added to many foods. And your body uses sunshine to make its own vitamin D. How much vitamin D do you need? The [place] [employer] recommends that people ages 3 through 79 get 600 IU (international units) every day. Adults 71 and older need 800 IU every day. Blood tests for vitamin D can check your vitamin D level. But there is no standard normal range used by all laboratories. The [place] [employer] recommends a blood level of 20 ng/mL of vitamin D for healthy bones. And most people in the [place] and [facility] meet this goal.  How can you get more vitamin D? Foods that contain vitamin D include:  · [place], tuna, and mackerel. These are some of the best foods to eat when you need to get more vitamin D.  · Cheese, egg yolks, and beef liver. These foods have vitamin D in small amounts. · Milk, soy drinks, orange juice, yogurt, margarine, and some kinds of cereal have vitamin D added to them. Some people don't make vitamin D as well as others. They may have to take extra care in getting enough vitamin D. Things that reduce how much vitamin D your body makes include:  · Dark skin, such as many  Americans have. · Age, especially if you are older than 72. · Digestive problems, such as Crohn's or celiac disease.   · Liver and kidney disease. Some people who do not get enough vitamin D may need supplements. Are there any risks from taking vitamin D?  · Too much vitamin D:  ¨ Can damage your kidneys. ¨ Can cause nausea and vomiting, constipation, and weakness. ¨ Raises the amount of calcium in your blood. If this happens, you can get confused or have an irregular heart rhythm. · Vitamin D may interact with other medicines. Tell your doctor about all of the medicines you take, including over-the-counter drugs, herbs, and pills. Tell your doctor about all of your current medical problems. Where can you learn more? Go to http://zanderSybarieden.info/. Enter [id number] in the search box to learn more about \"Learning About Vitamin D.\"  Current as of: May 12, 2017  Content Version: 11.4  © 0877-6035 [employer]. Care instructions adapted under license by [employer] (which disclaims liability or warranty for this information). If you have questions about a medical condition or this instruction, always ask your healthcare professional. [place] [address] any warranty or liability for your use of this information. Learning About the [address] Diet  What is the Mediterranean diet? The Mediterranean diet is a style of eating rather than a diet plan. It features foods eaten in [place], [place], [place] and [place], and other countries along the [facility]. It emphasizes eating foods like fish, fruits, vegetables, beans, high-fiber breads and whole grains, nuts, and olive oil. This style of eating includes limited red meat, cheese, and sweets. Why choose the Mediterranean diet? A Mediterranean-style diet may improve heart health. It contains more fat than other heart-healthy diets. But the fats are mainly from nuts, unsaturated oils (such as fish oils and olive oil), and certain nut or seed oils (such as canola, soybean, or flaxseed oil).  These fats may help protect the heart and blood vessels. How can you get started on the Mediterranean diet? Here are some things you can do to switch to a more Mediterranean way of eating. What to eat  · Eat a variety of fruits and vegetables each day, such as grapes, blueberries, tomatoes, broccoli, peppers, figs, olives, spinach, eggplant, beans, lentils, and chickpeas. · Eat a variety of whole-grain foods each day, such as oats, brown rice, and whole wheat bread, pasta, and couscous. · Eat fish at least 2 times a week. Try tuna, salmon, mackerel, lake trout, herring, or sardines. · Eat moderate amounts of low-fat dairy products, such as milk, cheese, or yogurt. · Eat moderate amounts of poultry and eggs. · Choose healthy (unsaturated) fats, such as nuts, olive oil, and certain nut or seed oils like canola, soybean, and flaxseed. · Limit unhealthy (saturated) fats, such as butter, palm oil, and coconut oil. And limit fats found in animal products, such as meat and dairy products made with whole milk. Try to eat red meat only a few times a month in very small amounts. · Limit sweets and desserts to only a few times a week. This includes sugar-sweetened drinks like soda. The Mediterranean diet may also include red wine with your meal-1 glass each day for women and up to 2 glasses a day for men. Tips for eating at home  · Use herbs, spices, garlic, lemon zest, and citrus juice instead of salt to add flavor to foods. · Add avocado slices to your sandwich instead of lin. · Have fish for lunch or dinner instead of red meat. Brush the fish with olive oil, and broil or grill it. · Sprinkle your salad with seeds or nuts instead of cheese. · Cook with olive or canola oil instead of butter or oils that are high in saturated fat. · Switch from 2% milk or whole milk to 1% or fat-free milk.   · Dip raw vegetables in a vinaigrette dressing or hummus instead of dips made from mayonnaise or sour cream.  · Have a piece of fruit for dessert instead of a piece of cake. Try baked apples, or have some dried fruit. Tips for eating out  · Try broiled, grilled, baked, or poached fish instead of having it fried or breaded. · Ask your  to have your meals prepared with olive oil instead of butter. · Order dishes made with marinara sauce or sauces made from olive oil. Avoid sauces made from cream or mayonnaise. · Choose whole-grain breads, whole wheat pasta and pizza crust, brown rice, beans, and lentils. · Cut back on butter or margarine on bread. Instead, you can dip your bread in a small amount of olive oil. · Ask for a side salad or grilled vegetables instead of french fries or chips. Where can you learn more? Go to http://zander-eden.info/. Enter [phone] in the search box to learn more about \"Learning About the Mediterranean Diet. \"  Current as of: May 12, 2017  Content Version: 11.4  © 1184-1303 [employer], [employer]. Care instructions adapted under license by [employer] (which disclaims liability or warranty for this information). If you have questions about a medical condition or this instruction, always ask your healthcare professional. [address] any warranty or liability for your use of this information.

## 2018-04-03 NOTE — PROGRESS NOTES
Patient is in the office today for a 3 week follow up. 1. Have you been to the ER, urgent care clinic since your last visit? Hospitalized since your last visit? No    2. Have you seen or consulted any other health care providers outside of the [address] [place] [address] since your last visit? Include any pap smears or colon screening.  No

## 2018-04-03 NOTE — PROGRESS NOTES
HPI:    Mee Roman  is a 80 y.o.  female  patient who comes in today for routine care and results of lab work. She states that she feels much better and is now drinking coffee with each meal which helps to give her energy and focus. She states that because of the coffee, she has had no further falls. She goes on May 8th for hearing aid fitting. 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 Patient states [place] exercise regularly, she states that she dances regularly. Patient states that  She does not follow a particular diet plan but she recently has started to eat healthier with fish and increasing vegetables and fruits. She states that she eats chocolate which doesn't increase GERD symptoms. She states the Zantac helps. She did not schedule visit with GI. She states she lost her phone and will need to re-schedule. Patient states that She is compliant with medications. She states that she is having chest pain at night but when she wakes up, it is gone. She holds her left breast when she denotes location of chest pain. She reports history of fibrocystic disease with history of cyst removal. She has had no recent MMG. Patient denies SOB, CP, dizziness, headache. Current Outpatient Prescriptions   Medication Sig Dispense Refill    cyanocobalamin (VITAMIN B12) 500 mcg tablet Take 1 Tab by mouth daily. Indications: PREVENTION OF VITAMIN B12 DEFICIENCY 30 Tab 2    simvastatin (ZOCOR) 5 mg tablet Take 1 Tab by mouth nightly. Indications: hypercholesterolemia 30 Tab 2    Cholecalciferol, Vitamin D3, (VITAMIN D3) 2,000 unit cap capsule Take 2,000 Units by mouth daily. Indications: Vitamin D Deficiency 90 Cap 1    raNITIdine (ZANTAC) 150 mg tablet Take 1 Tab by mouth two (2) times a day. 60 Tab 2    acetaminophen (TYLENOL EX STR ARTHRITIS PAIN) 500 mg tablet Take 1 Tab by mouth every six (6) hours as needed.  60 Tab 1    diphenhydrAMINE (BENADRYL ALLERGY) 25 mg tablet Take 1 Tab by mouth every six (6) hours as needed. 30 Tab 2      Allergies   Allergen Reactions    Latex, Natural Rubber Unknown (comments)    Asa-Acetaminophen-Caff-Potass Shortness of Breath     Patient is only allergic to ASA    Aspirin Shortness of Breath and Other (comments)     Patient states this caused her stomach to bleed internal    Erythromycin Other (comments)     bleeding    Iodine And Iodide Containing Products Unknown (comments)    Lemon Unknown (comments)    Other Medication Unknown (comments)     Pt not sure of allergies states \"I'm allergic to more but not sure of name\"    Pcn [Penicillins] Swelling    Shellfish Containing Products Unknown (comments)    Sulfa (Sulfonamide Antibiotics) Unknown (comments)      Past Medical History:   Diagnosis Date    Anemia NEC     Asthma     Colitis     DDD (degenerative disc disease), cervical     Fibrocystic breast     GERD (gastroesophageal reflux disease)     Headache     HTN (hypertension)     Hyperlipidemia     OA (osteoarthritis)     Osteoporosis     Pneumonia     Thyroid nodule     Vitamin D deficiency 2/7/2011      Family History   Problem Relation Age of Onset    Hypertension Other     Diabetes Other     Heart Disease Other     Cancer Other      stomach & colon    Diabetes Mother     Heart Attack Mother     Stroke Mother     Heart Attack Father     Heart Failure Father     Arthritis-rheumatoid Father       Patient Active Problem List   Diagnosis Code    Thyroid nodule E04.1    Vitamin D deficiency E55.9    Hyperlipidemia E78.5    AR (allergic rhinitis) J30.9    GERD (gastroesophageal reflux disease) K21.9    SOB (shortness of breath) R06.02    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     Respiratory: Negative for shortness of breath. Cardiovascular: Positive for PND. Negative for chest pain, palpitations and leg swelling. Gastrointestinal: Positive for heartburn and nausea. Negative for abdominal pain, constipation, diarrhea and vomiting. Neurological: Negative for dizziness, tingling and headaches. Visit Vitals    /60 (BP 1 Location: Left arm, BP Patient Position: Sitting)    Pulse (!) 55    Temp 98.3 °F (36.8 °C) (Oral)    Resp 18    Ht 5' 1\" (1.549 m)    Wt 127 lb (57.6 kg)    SpO2 95%    BMI 24 kg/m2        Physical Exam   Constitutional: She is oriented to person, place, and time and well-developed, well-nourished, and in no distress. HENT:   Head: Normocephalic and atraumatic. Neck: Normal range of motion. Neck supple. Thyromegaly present. Cardiovascular: Normal rate, regular rhythm and normal heart sounds. Pulmonary/Chest: Effort normal and breath sounds normal.   Abdominal: Soft. Bowel sounds are normal. She exhibits no distension and no mass. There is no tenderness. Musculoskeletal: She exhibits no edema. Lymphadenopathy:     She has no cervical adenopathy. Neurological: She is alert and oriented to person, place, and time. Gait normal.   Vitals reviewed. Health Maintenance Due   Topic Date Due    GLAUCOMA SCREENING Q2Y  11/22/1999       Assessment/Plan:    Diagnoses and all orders for this visit:    1. Hyperlipidemia, unspecified hyperlipidemia type- patient to start  simvastatin (ZOCOR) 5 mg tablet; Take 1 Tab by mouth nightly. Indications: hypercholesterolemia    2. Thyroid nodule - TSH normal patient states history of multiple nodules. Will get ultrasound. 3. Vitamin D deficiency  -     Cholecalciferol, Vitamin D3, (VITAMIN D3) 2,000 unit cap capsule; Take 2,000 Units by mouth daily. Indications: Vitamin D Deficiency    4. Vitamin B12 deficiency (dietary) anemia  -     cyanocobalamin (VITAMIN B12) 500 mcg tablet; Take 1 Tab by mouth daily.  Indications: PREVENTION OF VITAMIN B12 DEFICIENCY    5. Gastroesophageal reflux disease, esophagitis presence not specified - patient to re-schedule appointment    6. Breast cancer screening  -     [facility] MAMMO BI SCREENING INCL CAD; Future    7. Elevated blood pressure reading without diagnosis of hypertension - will see back in 2 weeks. Patient to keep appointment with cardiology. Follow-up Disposition:  Return in about 2 weeks (around 4/17/2018), or if symptoms worsen or fail to improve, for fu elevated blood pressure. Additional Notes: Discussed today's diagnosis, treatment plans. Discussed medication indications and side effects. Preventive Medicine:   Weight Management:  Mediterranean diet recommended as well as exercise for 30 minutes daily most days of the week. DASH diet info given. After Visit Summary: Provided and discussed printed patient instructions. Answered all questions and patient acknowledged understanding. Jcarlos Delacruz PA-C   [facility]    I reviewed the patient's medical history, the physician assistant's findings on physical examination, the patient's diagnoses, and treatment plan as documented in the progress note. I concur with the treatment plan as documented. There are no additional recommendations at this time.     Natali Galvan MD

## 2018-04-03 NOTE — MR AVS SNAPSHOT
[facility] 
 
 
 [address] [address] [address] [facility] [address] 
[phone] Patient: Colton Ma MRN: [id number] :1934 Visit Information Date & Time Provider Department Dept. Phone Encounter #  
 4/3/2018  3:30 PM [facility] [phone] [id number] Follow-up Instructions Return in about 2 weeks (around 2018), or if symptoms worsen or fail to improve, for fu elevated blood pressure. Your Appointments 2018  3:20 PM  
New Patient with Erik Mchugh MD  
[facility] ([address]) Appt Note: .; Rescheduling Appointment From 2018;  ref by Suzi Hogue;  dx  near syncope // notes in cc  
 [facility] [id number] [phone] [address] Upcoming Health Maintenance Date Due  
 GLAUCOMA SCREENING Q2Y 1999 Pneumococcal 65+ Low/Medium Risk (2 of 2 - PPSV23) 3/6/2019 MEDICARE YEARLY EXAM 3/7/2019 DTaP/Tdap/Td series (2 - Td) 3/6/2028 Allergies as of 4/3/2018  Review Complete On: 4/3/2018 By: Narcisa Rodriguez PA-C Severity Noted Reaction Type Reactions Latex, Natural Rubber  2012    Unknown (comments) Asa-acetaminophen-caff-potass  2011    Shortness of Breath Patient is only allergic to ASA Aspirin  2018    Shortness of Breath, Other (comments) Patient states this caused her stomach to bleed internal  
 Erythromycin  2011    Other (comments) bleeding Iodine And Iodide Containing Products  2012    Unknown (comments) Lemon  2012   Not Verified Unknown (comments) Other Medication  2012    Unknown (comments) Pt not sure of allergies states \"I'm allergic to more but not sure of name\" Pcn [Penicillins]  2011    Swelling Shellfish Containing Products  09/17/2012    Unknown (comments) Sulfa (Sulfonamide Antibiotics)  02/07/2011    Unknown (comments) Current Immunizations  Reviewed on 9/5/2012 No immunizations on file. Not reviewed this visit You Were Diagnosed With   
  
 Codes Comments Hyperlipidemia, unspecified hyperlipidemia type    -  Primary ICD-10-CM: E78.5 ICD-9-CM: 272.4 Thyroid nodule     ICD-10-CM: E04.1 ICD-9-CM: 241.0 Vitamin D deficiency     ICD-10-CM: E55.9 ICD-9-CM: 268.9 Vitamin B12 deficiency (dietary) anemia     ICD-10-CM: D51.8 ICD-9-CM: 281.1 Anemia, unspecified type     ICD-10-CM: D64.9 ICD-9-CM: 285.9 Gastroesophageal reflux disease, esophagitis presence not specified     ICD-10-CM: K21.9 ICD-9-CM: 530.81 Breast cancer screening     ICD-10-CM: Z12.31 
ICD-9-CM: V76.10 Elevated blood pressure reading without diagnosis of hypertension     ICD-10-CM: R03.0 ICD-9-CM: 796.2 Vitals BP Pulse Temp Resp Height(growth percentile) Weight(growth percentile) 158/60 (BP 1 Location: Left arm, BP Patient Position: Sitting) (!) 55 98.3 °F (36.8 °C) (Oral) 18 5' 1\" (1.549 m) 127 lb (57.6 kg) SpO2 BMI OB Status Smoking Status 95% 24 kg/m2 Hysterectomy Never Smoker BMI and BSA Data Body Mass Index Body Surface Area  
 24 kg/m 2 1.57 m 2 Preferred Pharmacy Pharmacy Name Phone [address], [address] [place] [address] [facility] [phone] Your Updated Medication List  
  
   
This list is accurate as of 4/3/18  4:32 PM.  Always use your most recent med list.  
  
  
  
  
 acetaminophen 500 mg tablet Commonly known as:  TYLENOL EX STR ARTHRITIS PAIN Take 1 Tab by mouth every six (6) hours as needed. Cholecalciferol (Vitamin D3) 2,000 unit Cap capsule Commonly known as:  VITAMIN D3 Take 2,000 Units by mouth daily. Indications: Vitamin D Deficiency cyanocobalamin 500 mcg tablet Commonly known as:  VITAMIN B12 Take 1 Tab by mouth daily. Indications: PREVENTION OF VITAMIN B12 DEFICIENCY  
  
 diphenhydrAMINE 25 mg tablet Commonly known as:  BENADRYL ALLERGY Take 1 Tab by mouth every six (6) hours as needed. raNITIdine 150 mg tablet Commonly known as:  ZANTAC Take 1 Tab by mouth two (2) times a day. simvastatin 5 mg tablet Commonly known as:  ZOCOR Take 1 Tab by mouth nightly. Indications: hypercholesterolemia Prescriptions Sent to Pharmacy Refills  
 cyanocobalamin (VITAMIN B12) 500 mcg tablet 2 Sig: Take 1 Tab by mouth daily. Indications: PREVENTION OF VITAMIN B12 DEFICIENCY Class: Normal  
 Pharmacy: [address] [place] [address] Ph #: [phone] Route: Oral  
 simvastatin (ZOCOR) 5 mg tablet 2 Sig: Take 1 Tab by mouth nightly. Indications: hypercholesterolemia Class: Normal  
 Pharmacy: [address] [place] [address] Ph #: [phone] Route: Oral  
 Cholecalciferol, Vitamin D3, (VITAMIN D3) 2,000 unit cap capsule 1 Sig: Take 2,000 Units by mouth daily. Indications: Vitamin D Deficiency Class: Normal  
 Pharmacy: [address] [place] [address] Ph #: [phone] Route: Oral  
  
Follow-up Instructions Return in about 2 weeks (around 4/17/2018), or if symptoms worsen or fail to improve, for fu elevated blood pressure. To-Do List   
 04/03/2018 Imaging:  [facility] MAMMO BI SCREENING INCL CAD   
  
 04/03/2018 Imaging:  US THYROID/PARATHYROID/SOFT TISS Patient Instructions Learning About Vitamin D Why is it important to get enough vitamin D? Your body needs vitamin D to absorb calcium.  Calcium keeps your bones and muscles, including your heart, healthy and strong. If your muscles don't get enough calcium, they can cramp, hurt, or feel weak. You may have long-term (chronic) muscle aches and pains. If you don't get enough vitamin D throughout life, you have an increased chance of having thin and brittle bones (osteoporosis) in your later years. Children who don't get enough vitamin D may not grow as much as others their age. They also have a chance of getting a rare disease called rickets. It causes weak bones. Vitamin D and calcium are added to many foods. And your body uses sunshine to make its own vitamin D. How much vitamin D do you need? The [employer] of [employer] recommends that people ages 3 through 79 get 600 IU (international units) every day. Adults 71 and older need 800 IU every day. Blood tests for vitamin D can check your vitamin D level. But there is no standard normal range used by all laboratories. The [employer] of [employer] recommends a blood level of 20 ng/mL of vitamin D for healthy bones. And most people in the [place] and [facility]) meet this goal. 
How can you get more vitamin D? Foods that contain vitamin D include: 
· [place], tuna, and mackerel. These are some of the best foods to eat when you need to get more vitamin D. 
· Cheese, egg yolks, and beef liver. These foods have vitamin D in small amounts. · Milk, soy drinks, orange juice, yogurt, margarine, and some kinds of cereal have vitamin D added to them. Some people don't make vitamin D as well as others. They may have to take extra care in getting enough vitamin D. Things that reduce how much vitamin D your body makes include: · Dark skin, such as many  Americans have. · Age, especially if you are older than 72. · Digestive problems, such as Crohn's or celiac disease. · Liver and kidney disease. Some people who do not get enough vitamin D may need supplements.  
Are there any risks from taking vitamin D? 
· Too much vitamin D: 
 ¨ Can damage your kidneys. ¨ Can cause nausea and vomiting, constipation, and weakness. ¨ Raises the amount of calcium in your blood. If this happens, you can get confused or have an irregular heart rhythm. · Vitamin D may interact with other medicines. Tell your doctor about all of the medicines you take, including over-the-counter drugs, herbs, and pills. Tell your doctor about all of your current medical problems. Where can you learn more? Go to http://zander-eden.info/. Enter [id number] in the search box to learn more about \"Learning About Vitamin D.\" 
Current as of: May 12, 2017 Content Version: 11.4 © 3951-1182 [employer]. Care instructions adapted under license by [employer] (which disclaims liability or warranty for this information). If you have questions about a medical condition or this instruction, always ask your healthcare professional. [address] any warranty or liability for your use of this information. Learning About the [address] Diet What is the Mediterranean diet? The Mediterranean diet is a style of eating rather than a diet plan. It features foods eaten in [place], [place], [place] and [place], and other countries along the [facility]. It emphasizes eating foods like fish, fruits, vegetables, beans, high-fiber breads and whole grains, nuts, and olive oil. This style of eating includes limited red meat, cheese, and sweets. Why choose the Mediterranean diet? A Mediterranean-style diet may improve heart health. It contains more fat than other heart-healthy diets. But the fats are mainly from nuts, unsaturated oils (such as fish oils and olive oil), and certain nut or seed oils (such as canola, soybean, or flaxseed oil). These fats may help protect the heart and blood vessels. How can you get started on the Mediterranean diet?  
Here are some things you can do to switch to a more Mediterranean way of eating. What to eat · Eat a variety of fruits and vegetables each day, such as grapes, blueberries, tomatoes, broccoli, peppers, figs, olives, spinach, eggplant, beans, lentils, and chickpeas. · Eat a variety of whole-grain foods each day, such as oats, brown rice, and whole wheat bread, pasta, and couscous. · Eat fish at least 2 times a week. Try tuna, salmon, mackerel, lake trout, herring, or sardines. · Eat moderate amounts of low-fat dairy products, such as milk, cheese, or yogurt. · Eat moderate amounts of poultry and eggs. · Choose healthy (unsaturated) fats, such as nuts, olive oil, and certain nut or seed oils like canola, soybean, and flaxseed. · Limit unhealthy (saturated) fats, such as butter, palm oil, and coconut oil. And limit fats found in animal products, such as meat and dairy products made with whole milk. Try to eat red meat only a few times a month in very small amounts. · Limit sweets and desserts to only a few times a week. This includes sugar-sweetened drinks like soda. The Mediterranean diet may also include red wine with your meal-1 glass each day for women and up to 2 glasses a day for men. Tips for eating at home · Use herbs, spices, garlic, lemon zest, and citrus juice instead of salt to add flavor to foods. · Add avocado slices to your sandwich instead of lin. · Have fish for lunch or dinner instead of red meat. Brush the fish with olive oil, and broil or grill it. · Sprinkle your salad with seeds or nuts instead of cheese. · Cook with olive or canola oil instead of butter or oils that are high in saturated fat. · Switch from 2% milk or whole milk to 1% or fat-free milk. · Dip raw vegetables in a vinaigrette dressing or hummus instead of dips made from mayonnaise or sour cream. 
· Have a piece of fruit for dessert instead of a piece of cake. Try baked apples, or have some dried fruit. Tips for eating out · Try broiled, grilled, baked, or poached fish instead of having it fried or breaded. · Ask your  to have your meals prepared with olive oil instead of butter. · Order dishes made with marinara sauce or sauces made from olive oil. Avoid sauces made from cream or mayonnaise. · Choose whole-grain breads, whole wheat pasta and pizza crust, brown rice, beans, and lentils. · Cut back on butter or margarine on bread. Instead, you can dip your bread in a small amount of olive oil. · Ask for a side salad or grilled vegetables instead of french fries or chips. Where can you learn more? Go to http://zander-eden.info/. Enter [phone] in the search box to learn more about \"Learning About the Mediterranean Diet. \" Current as of: May 12, 2017 Content Version: 11.4 © 8410-3739 [employer]. Care instructions adapted under license by [employer] (which disclaims liability or warranty for this information). If you have questions about a medical condition or this instruction, always ask your healthcare professional. [address] [address] any warranty or liability for your use of this information. Introducing [facility] [place] [facility]! [facility] introduces Cuyana patient portal. Now you can access parts of your medical record, email your doctor's office, and request medication refills online. 1. In your internet browser, go to [email or web address] 2. Click on the First Time User? Click Here link in the Sign In box. You will see the New Member Sign Up page. 3. Enter your Cuyana Access Code exactly as it appears below. You will not need to use this code after youve completed the sign-up process. If you do not sign up before the expiration date, you must request a new code. · Cuyana Access Code: [email or web address] Expires: 5/28/2018  2:38 PM 
 
4.  Enter the last four digits of your Social Security Number (xxxx) and Date of Birth (mm/dd/yyyy) as indicated and click Submit. You will be taken to the next sign-up page. 5. Create a Enterra Feed ID. This will be your Enterra Feed login ID and cannot be changed, so think of one that is secure and easy to remember. 6. Create a Enterra Feed password. You can change your password at any time. 7. Enter your Password Reset Question and Answer. This can be used at a later time if you forget your password. 8. Enter your e-mail address. You will receive e-mail notification when new information is available in [address] 9. Click Sign Up. You can now view and download portions of your medical record. 10. Click the Download Summary menu link to download a portable copy of your medical information. If you have questions, please visit the Frequently Asked Questions section of the Enterra Feed website. Remember, Enterra Feed is NOT to be used for urgent needs. For medical emergencies, dial 911. Now available from your iPhone and Android! Please provide this summary of care documentation to your next provider. Your primary care clinician is listed as Mateus Johnson. If you have any questions after today's visit, please call [phone].

## 2018-04-05 ENCOUNTER — HOSPITAL ENCOUNTER (OUTPATIENT)
Dept: MAMMOGRAPHY | Age: 83
Discharge: HOME | End: 2018-04-05
Payer: MEDICARE

## 2018-04-05 DIAGNOSIS — Z12.31 ENCOUNTER FOR SCREENING MAMMOGRAM FOR BREAST CANCER: ICD-10-CM

## 2018-04-05 PROCEDURE — 77067 SCR MAMMO BI INCL CAD: CPT

## 2018-04-06 NOTE — PROGRESS NOTES
Please let patient know that I have reviewed her mammogram and there are no suspicious findings at this time.

## 2018-04-10 ENCOUNTER — HOSPITAL ENCOUNTER (OUTPATIENT)
Dept: ULTRASOUND IMAGING | Age: 83
Discharge: HOME | End: 2018-04-10
Attending: PHYSICIAN ASSISTANT
Payer: MEDICARE

## 2018-04-10 DIAGNOSIS — E04.1 THYROID NODULE: ICD-10-CM

## 2018-04-10 PROCEDURE — 76536 US EXAM OF HEAD AND NECK: CPT

## 2018-04-17 ENCOUNTER — OFFICE VISIT (OUTPATIENT)
Dept: FAMILY MEDICINE CLINIC | Age: 83
End: 2018-04-17

## 2018-04-17 VITALS
RESPIRATION RATE: 16 BRPM | WEIGHT: 124.3 LBS | DIASTOLIC BLOOD PRESSURE: 61 MMHG | TEMPERATURE: 98.2 F | HEART RATE: 69 BPM | HEIGHT: 61 IN | OXYGEN SATURATION: 98 % | BODY MASS INDEX: 23.47 KG/M2 | SYSTOLIC BLOOD PRESSURE: 127 MMHG

## 2018-04-17 DIAGNOSIS — E04.1 THYROID NODULE: Primary | ICD-10-CM

## 2018-04-17 DIAGNOSIS — R03.0 ELEVATED BLOOD PRESSURE READING WITHOUT DIAGNOSIS OF HYPERTENSION: ICD-10-CM

## 2018-04-17 DIAGNOSIS — E78.5 HYPERLIPIDEMIA, UNSPECIFIED HYPERLIPIDEMIA TYPE: ICD-10-CM

## 2018-04-17 NOTE — MR AVS SNAPSHOT
[address] [facility] 
 
 
 [address] [place], [place] [address] [address] 
[phone] Patient: Deana Jain MRN: [id number] :1934 Visit Information Date & Time Provider Department Dept. Phone Encounter #  
 2018  1:30 PM Maritza Brewster PA-C [facility] [phone] [id number] Follow-up Instructions Return in about 4 months (around 2018) for labs one week prior. Your Appointments 2018  3:20 PM  
New Patient with Jacqueline Ballesteros MD  
[facility] Appt Note: .; Rescheduling Appointment From 2018;  ref by Julio You;  dx  near syncope // notes in cc; r/s provider out of office patient req appt before May. mg  
 [address] [facility] [address] [id number] [address] [address] [phone] [address]  
  
    
 2018  3:00 PM  
Office Visit with Maritza Brewster PA-C [facility] ([facility]) Appt Note: 1 m fu bp  
 [address], [facility] [address] [address]  
[phone]  
  
   
 [address] [place] [address] 5/15/2018  2:00 PM  
New Patient with Dmitriy Moore MD  
[facility] [address] at [address] [facility]) Appt Note: REHAN Haro/Aris/Ref and notes in cc  
 [address] [address] [address] [facility] 630 [facility] [address] 200 [facility] Upcoming Health Maintenance Date Due  
 GLAUCOMA SCREENING Q2Y 1999 Pneumococcal 65+ Low/Medium Risk (2 of 2 - PPSV23) 3/6/2019 MEDICARE YEARLY EXAM 3/7/2019 DTaP/Tdap/Td series (2 - Td) 3/6/2028 Allergies as of 2018  Review Complete On: 2018 By: Maritza Brewster PA-C Severity Noted Reaction Type Reactions Latex, Natural Rubber  2012    Unknown (comments) Asa-acetaminophen-caff-potass  02/07/2011    Shortness of Breath Patient is only allergic to ASA Aspirin  04/03/2018    Shortness of Breath, Other (comments) Patient states this caused her stomach to bleed internal  
 Erythromycin  02/07/2011    Other (comments) bleeding Iodine And Iodide Containing Products  08/05/2012    Unknown (comments) Lemon  09/17/2012   Not Verified Unknown (comments) Other Medication  08/05/2012    Unknown (comments) Pt not sure of allergies states \"I'm allergic to more but not sure of name\" Pcn [Penicillins]  02/07/2011    Swelling Shellfish Containing Products  09/17/2012    Unknown (comments) Sulfa (Sulfonamide Antibiotics)  02/07/2011    Unknown (comments) Current Immunizations  Reviewed on 9/5/2012 No immunizations on file. Not reviewed this visit You Were Diagnosed With   
  
 Codes Comments Thyroid nodule    -  Primary ICD-10-CM: E04.1 ICD-9-CM: 241.0 Elevated blood pressure reading without diagnosis of hypertension     ICD-10-CM: R03.0 ICD-9-CM: 796.2 Hyperlipidemia, unspecified hyperlipidemia type     ICD-10-CM: E78.5 ICD-9-CM: 272.4 Vitals BP Pulse Temp Resp Height(growth percentile) Weight(growth percentile) 127/61 (BP 1 Location: Left arm) 69 98.2 °F (36.8 °C) (Oral) 16 5' 1\" (1.549 m) 124 lb 4.8 oz (56.4 kg) SpO2 BMI OB Status Smoking Status 98% 23.49 kg/m2 Hysterectomy Never Smoker BMI and BSA Data Body Mass Index Body Surface Area  
 23.49 kg/m 2 1.56 m 2 Preferred Pharmacy Pharmacy Name Phone [address], [address] [place] [address] 22 [phone] [facility] [phone] Your Updated Medication List  
  
   
This list is accurate as of 4/17/18  2:14 PM.  Always use your most recent med list.  
  
  
  
  
 acetaminophen 500 mg tablet Commonly known as:  TYLENOL EX STR ARTHRITIS PAIN  
 Take 1 Tab by mouth every six (6) hours as needed. Cholecalciferol (Vitamin D3) 2,000 unit Cap capsule Commonly known as:  VITAMIN D3 Take 2,000 Units by mouth daily. Indications: Vitamin D Deficiency  
  
 cyanocobalamin 500 mcg tablet Commonly known as:  VITAMIN B12 Take 1 Tab by mouth daily. Indications: PREVENTION OF VITAMIN B12 DEFICIENCY  
  
 diphenhydrAMINE 25 mg tablet Commonly known as:  BENADRYL ALLERGY Take 1 Tab by mouth every six (6) hours as needed. raNITIdine 150 mg tablet Commonly known as:  ZANTAC Take 1 Tab by mouth two (2) times a day. simvastatin 5 mg tablet Commonly known as:  ZOCOR Take 1 Tab by mouth nightly. Indications: hypercholesterolemia We Performed the Following REFERRAL TO SURGERY [XJF533 Custom] Follow-up Instructions Return in about 4 months (around 8/17/2018) for labs one week prior. Referral Information Referral ID Referred By Referred To  
  
 [id number] KARINA, 607 Mani Jade MD   
   [address] [facility]   
   [address] [facility] [place], [address] Phone: [phone] Fax: [phone] Visits Status Start Date End Date 1 New Request 4/17/18 4/17/19 If your referral has a status of pending review or denied, additional information will be sent to support the outcome of this decision. Patient Instructions High Blood Pressure: Care Instructions Your Care Instructions If your blood pressure is usually above 140/90, you have high blood pressure, or hypertension. That means the top number is 140 or higher or the bottom number is 90 or higher, or both. Despite what a lot of people think, high blood pressure usually doesn't cause headaches or make you feel dizzy or lightheaded. It usually has no symptoms. But it does increase your risk for heart attack, stroke, and kidney or eye damage.  The higher your blood pressure, the more your risk increases. Your doctor will give you a goal for your blood pressure. Your goal will be based on your health and your age. An example of a goal is to keep your blood pressure below 140/90. Lifestyle changes, such as eating healthy and being active, are always important to help lower blood pressure. You might also take medicine to reach your blood pressure goal. 
Follow-up care is a key part of your treatment and safety. Be sure to make and go to all appointments, and call your doctor if you are having problems. It's also a good idea to know your test results and keep a list of the medicines you take. How can you care for yourself at home? Medical treatment · If you stop taking your medicine, your blood pressure will go back up. You may take one or more types of medicine to lower your blood pressure. Be safe with medicines. Take your medicine exactly as prescribed. Call your doctor if you think you are having a problem with your medicine. · Talk to your doctor before you start taking aspirin every day. Aspirin can help certain people lower their risk of a heart attack or stroke. But taking aspirin isn't right for everyone, because it can cause serious bleeding. · See your doctor regularly. You may need to see the doctor more often at first or until your blood pressure comes down. · If you are taking blood pressure medicine, talk to your doctor before you take decongestants or anti-inflammatory medicine, such as ibuprofen. Some of these medicines can raise blood pressure. · Learn how to check your blood pressure at home. Lifestyle changes · Stay at a healthy weight. This is especially important if you put on weight around the waist. Losing even 10 pounds can help you lower your blood pressure. · If your doctor recommends it, get more exercise. Walking is a good choice. Bit by bit, increase the amount you walk every day. Try for at least 30 minutes on most days of the week.  You also may want to swim, bike, or do other activities. · Avoid or limit alcohol. Talk to your doctor about whether you can drink any alcohol. · Try to limit how much sodium you eat to less than 2,300 milligrams (mg) a day. Your doctor may ask you to try to eat less than 1,500 mg a day. · Eat plenty of fruits (such as bananas and oranges), vegetables, legumes, whole grains, and low-fat dairy products. · Lower the amount of saturated fat in your diet. Saturated fat is found in animal products such as milk, cheese, and meat. Limiting these foods may help you lose weight and also lower your risk for heart disease. · Do not smoke. Smoking increases your risk for heart attack and stroke. If you need help quitting, talk to your doctor about stop-smoking programs and medicines. These can increase your chances of quitting for good. When should you call for help? Call 911 anytime you think you may need emergency care. This may mean having symptoms that suggest that your blood pressure is causing a serious heart or blood vessel problem. Your blood pressure may be over 180/110. ? For example, call 911 if: 
? · You have symptoms of a heart attack. These may include: ¨ Chest pain or pressure, or a strange feeling in the chest. 
¨ Sweating. ¨ Shortness of breath. ¨ Nausea or vomiting. ¨ Pain, pressure, or a strange feeling in the back, neck, jaw, or upper belly or in one or both shoulders or arms. ¨ Lightheadedness or sudden weakness. ¨ A fast or irregular heartbeat. ? · You have symptoms of a stroke. These may include: 
¨ Sudden numbness, tingling, weakness, or loss of movement in your face, arm, or leg, especially on only one side of your body. ¨ Sudden vision changes. ¨ Sudden trouble speaking. ¨ Sudden confusion or trouble understanding simple statements. ¨ Sudden problems with walking or balance. ¨ A sudden, severe headache that is different from past headaches. ? · You have severe back or belly pain. ?Do not wait until your blood pressure comes down on its own. Get help right away. ?Call your doctor now or seek immediate care if: 
? · Your blood pressure is much higher than normal (such as 180/110 or higher), but you don't have symptoms. ? · You think high blood pressure is causing symptoms, such as: ¨ Severe headache. ¨ Blurry vision. ? Watch closely for changes in your health, and be sure to contact your doctor if: 
? · Your blood pressure measures 140/90 or higher at least 2 times. That means the top number is 140 or higher or the bottom number is 90 or higher, or both. ? · You think you may be having side effects from your blood pressure medicine. ? · Your blood pressure is usually normal, but it goes above normal at least 2 times. Where can you learn more? Go to http://zander-eden.info/. Enter T488 in the search box to learn more about \"High Blood Pressure: Care Instructions. \" Current as of: September 21, 2016 Content Version: 11.4 © 0562-1847 [employer]. Care instructions adapted under license by [employer] (which disclaims liability or warranty for this information). If you have questions about a medical condition or this instruction, always ask your healthcare professional. [place] [address] any warranty or liability for your use of this information. DASH Diet: Care Instructions Your Care Instructions The DASH diet is an eating plan that can help lower your blood pressure. DASH stands for Dietary Approaches to Stop Hypertension. Hypertension is high blood pressure. The DASH diet focuses on eating foods that are high in calcium, potassium, and magnesium. These nutrients can lower blood pressure. The foods that are highest in these nutrients are fruits, vegetables, low-fat dairy products, nuts, seeds, and legumes.  But taking calcium, potassium, and magnesium supplements instead of eating foods that are high in those nutrients does not have the same effect. The DASH diet also includes whole grains, fish, and poultry. The DASH diet is one of several lifestyle changes your doctor may recommend to lower your high blood pressure. Your doctor may also want you to decrease the amount of sodium in your diet. Lowering sodium while following the DASH diet can lower blood pressure even further than just the DASH diet alone. Follow-up care is a key part of your treatment and safety. Be sure to make and go to all appointments, and call your doctor if you are having problems. It's also a good idea to know your test results and keep a list of the medicines you take. How can you care for yourself at home? Following the DASH diet · Eat 4 to 5 servings of fruit each day. A serving is 1 medium-sized piece of fruit, ½ cup chopped or canned fruit, 1/4 cup dried fruit, or 4 ounces (½ cup) of fruit juice. Choose fruit more often than fruit juice. · Eat 4 to 5 servings of vegetables each day. A serving is 1 cup of lettuce or raw leafy vegetables, ½ cup of chopped or cooked vegetables, or 4 ounces (½ cup) of vegetable juice. Choose vegetables more often than vegetable juice. · Get 2 to 3 servings of low-fat and fat-free dairy each day. A serving is 8 ounces of milk, 1 cup of yogurt, or 1 ½ ounces of cheese. · Eat 6 to 8 servings of grains each day. A serving is 1 slice of bread, 1 ounce of dry cereal, or ½ cup of cooked rice, pasta, or cooked cereal. Try to choose whole-grain products as much as possible. · Limit lean meat, poultry, and fish to 2 servings each day. A serving is 3 ounces, about the size of a deck of cards. · Eat 4 to 5 servings of nuts, seeds, and legumes (cooked dried beans, lentils, and split peas) each week. A serving is 1/3 cup of nuts, 2 tablespoons of seeds, or ½ cup of cooked beans or peas. · Limit fats and oils to 2 to 3 servings each day. A serving is 1 teaspoon of vegetable oil or 2 tablespoons of salad dressing. · Limit sweets and added sugars to 5 servings or less a week. A serving is 1 tablespoon jelly or jam, ½ cup sorbet, or 1 cup of lemonade. · Eat less than 2,300 milligrams (mg) of sodium a day. If you limit your sodium to 1,500 mg a day, you can lower your blood pressure even more. Tips for success · Start small. Do not try to make dramatic changes to your diet all at once. You might feel that you are missing out on your favorite foods and then be more likely to not follow the plan. Make small changes, and stick with them. Once those changes become habit, add a few more changes. · Try some of the following: ¨ Make it a goal to eat a fruit or vegetable at every meal and at snacks. This will make it easy to get the recommended amount of fruits and vegetables each day. ¨ Try yogurt topped with fruit and nuts for a snack or healthy dessert. ¨ Add lettuce, tomato, cucumber, and onion to sandwiches. ¨ Combine a ready-made pizza crust with low-fat mozzarella cheese and lots of vegetable toppings. Try using tomatoes, squash, spinach, broccoli, carrots, cauliflower, and onions. ¨ Have a variety of cut-up vegetables with a low-fat dip as an appetizer instead of chips and dip. ¨ Sprinkle sunflower seeds or chopped almonds over salads. Or try adding chopped walnuts or almonds to cooked vegetables. ¨ Try some vegetarian meals using beans and peas. Add garbanzo or kidney beans to salads. Make burritos and tacos with mashed plata beans or black beans. Where can you learn more? Go to http://zander-eden.info/. Enter B256 in the search box to learn more about \"DASH Diet: Care Instructions. \" Current as of: September 21, 2016 Content Version: 11.4 © 5567-6862 LC Style.[employer]. Care instructions adapted under license by [employer] (which disclaims liability or warranty for this information).  If you have questions about a medical condition or this instruction, always ask your healthcare professional. Danilo Goode any warranty or liability for your use of this information. Learning About the [address] Diet What is the Mediterranean diet? The Mediterranean diet is a style of eating rather than a diet plan. It features foods eaten in [place], [place], [place] and [place], and other countries along the [facility]. It emphasizes eating foods like fish, fruits, vegetables, beans, high-fiber breads and whole grains, nuts, and olive oil. This style of eating includes limited red meat, cheese, and sweets. Why choose the Mediterranean diet? A Mediterranean-style diet may improve heart health. It contains more fat than other heart-healthy diets. But the fats are mainly from nuts, unsaturated oils (such as fish oils and olive oil), and certain nut or seed oils (such as canola, soybean, or flaxseed oil). These fats may help protect the heart and blood vessels. How can you get started on the Mediterranean diet? Here are some things you can do to switch to a more Mediterranean way of eating. What to eat · Eat a variety of fruits and vegetables each day, such as grapes, blueberries, tomatoes, broccoli, peppers, figs, olives, spinach, eggplant, beans, lentils, and chickpeas. · Eat a variety of whole-grain foods each day, such as oats, brown rice, and whole wheat bread, pasta, and couscous. · Eat fish at least 2 times a week. Try tuna, salmon, mackerel, lake trout, herring, or sardines. · Eat moderate amounts of low-fat dairy products, such as milk, cheese, or yogurt. · Eat moderate amounts of poultry and eggs. · Choose healthy (unsaturated) fats, such as nuts, olive oil, and certain nut or seed oils like canola, soybean, and flaxseed. · Limit unhealthy (saturated) fats, such as butter, palm oil, and coconut oil.  And limit fats found in animal products, such as meat and dairy products made with whole milk. Try to eat red meat only a few times a month in very small amounts. · Limit sweets and desserts to only a few times a week. This includes sugar-sweetened drinks like soda. The Mediterranean diet may also include red wine with your meal-1 glass each day for women and up to 2 glasses a day for men. Tips for eating at home · Use herbs, spices, garlic, lemon zest, and citrus juice instead of salt to add flavor to foods. · Add avocado slices to your sandwich instead of lin. · Have fish for lunch or dinner instead of red meat. Brush the fish with olive oil, and broil or grill it. · Sprinkle your salad with seeds or nuts instead of cheese. · Cook with olive or canola oil instead of butter or oils that are high in saturated fat. · Switch from 2% milk or whole milk to 1% or fat-free milk. · Dip raw vegetables in a vinaigrette dressing or hummus instead of dips made from mayonnaise or sour cream. 
· Have a piece of fruit for dessert instead of a piece of cake. Try baked apples, or have some dried fruit. Tips for eating out · Try broiled, grilled, baked, or poached fish instead of having it fried or breaded. · Ask your  to have your meals prepared with olive oil instead of butter. · Order dishes made with marinara sauce or sauces made from olive oil. Avoid sauces made from cream or mayonnaise. · Choose whole-grain breads, whole wheat pasta and pizza crust, brown rice, beans, and lentils. · Cut back on butter or margarine on bread. Instead, you can dip your bread in a small amount of olive oil. · Ask for a side salad or grilled vegetables instead of french fries or chips. Where can you learn more? Go to http://zander-eden.info/. Enter 273-843-2819 in the search box to learn more about \"Learning About the Mediterranean Diet. \" Current as of: May 12, 2017 Content Version: 11.4 © 7627-2453 Healthwise, Incorporated.  Care instructions adapted under license by [address] (which disclaims liability or warranty for this information). If you have questions about a medical condition or this instruction, always ask your healthcare professional. [address] any warranty or liability for your use of this information. Introducing [facility]! [employer] introduces QikServe patient portal. Now you can access parts of your medical record, email your doctor's office, and request medication refills online. 1. In your internet browser, go to [email or web address] 2. Click on the First Time User? Click Here link in the Sign In box. You will see the New Member Sign Up page. 3. Enter your QikServe Access Code exactly as it appears below. You will not need to use this code after youve completed the sign-up process. If you do not sign up before the expiration date, you must request a new code. · QikServe Access Code: [email or web address] Expires: 5/28/2018  2:38 PM 
 
4. Enter the last four digits of your Social Security Number (xxxx) and Date of Birth (mm/dd/yyyy) as indicated and click Submit. You will be taken to the next sign-up page. 5. Create a QikServe ID. This will be your QikServe login ID and cannot be changed, so think of one that is secure and easy to remember. 6. Create a QikServe password. You can change your password at any time. 7. Enter your Password Reset Question and Answer. This can be used at a later time if you forget your password. 8. Enter your e-mail address. You will receive e-mail notification when new information is available in [address] 9. Click Sign Up. You can now view and download portions of your medical record. 10. Click the Download Summary menu link to download a portable copy of your medical information. If you have questions, please visit the Frequently Asked Questions section of the QikServe website.  Remember, QikServe is NOT to be used for urgent needs. For medical emergencies, dial 911. Now available from your iPhone and Android! Please provide this summary of care documentation to your next provider. Your primary care clinician is listed as Santiago Simon. If you have any questions after today's visit, please call [phone].

## 2018-04-17 NOTE — PROGRESS NOTES
Pt is here for 2 week F/U on BP. 1. Have you been to the ER, urgent care clinic since your last visit? Hospitalized since your last visit? No    2. Have you seen or consulted any other health care providers outside of the [address] [place] [address] since your last visit? Include any pap smears or colon screening.  No

## 2018-04-17 NOTE — PROGRESS NOTES
HPI:  Josefina Marrero. Don Needs   a 80 y.o. female   patient who comes in today for BP f/u. She does not check BPs at home. She states that she feels well today and has been sleeping better. States compliance with prescribed medications but admits to noncompliance with diet and activity. She is also here for results of thyroid ultrasound. She states that she has been taking Zocor as recommended. She is drinking caffeine three times daily, which she states helps with alertness but has not had any today. Patient denies SOB, CP, dizziness, headache. ROS:  taking medications as instructed, no medication side effects noted, no TIAs, no chest pain on exertion, no dyspnea on exertion, no swelling of ankles      Current Outpatient Prescriptions:     cyanocobalamin (VITAMIN B12) 500 mcg tablet, Take 1 Tab by mouth daily. Indications: PREVENTION OF VITAMIN B12 DEFICIENCY, Disp: 30 Tab, Rfl: 2    simvastatin (ZOCOR) 5 mg tablet, Take 1 Tab by mouth nightly. Indications: hypercholesterolemia, Disp: 30 Tab, Rfl: 2    Cholecalciferol, Vitamin D3, (VITAMIN D3) 2,000 unit cap capsule, Take 2,000 Units by mouth daily. Indications: Vitamin D Deficiency, Disp: 90 Cap, Rfl: 1    raNITIdine (ZANTAC) 150 mg tablet, Take 1 Tab by mouth two (2) times a day., Disp: 60 Tab, Rfl: 2    acetaminophen (TYLENOL EX STR ARTHRITIS PAIN) 500 mg tablet, Take 1 Tab by mouth every six (6) hours as needed. , Disp: 60 Tab, Rfl: 1    diphenhydrAMINE (BENADRYL ALLERGY) 25 mg tablet, Take 1 Tab by mouth every six (6) hours as needed.  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    Vitamin B12 deficiency (dietary) anemia D51.8    Anemia D64.9    Elevated blood pressure reading without diagnosis of hypertension R03.0     Past Medical History:   Diagnosis Date    Anemia NEC     Asthma     Colitis     DDD (degenerative disc disease), cervical     Fibrocystic breast     GERD (gastroesophageal reflux disease)     Headache     HTN (hypertension)     Hyperlipidemia     OA (osteoarthritis)     Osteoporosis     Pneumonia     Thyroid nodule     Vitamin D deficiency 2/7/2011         Physical Exam:  Visit Vitals    /61 (BP 1 Location: Left arm)    Pulse 69    Temp 98.2 °F (36.8 °C) (Oral)    Resp 16    Ht 5' 1\" (1.549 m)    Wt 124 lb 4.8 oz (56.4 kg)    SpO2 98%    BMI 23.49 kg/m2       General: a, a & o x 3, afebrile, well-nourished, interacting appropriately, in no acute distress  BP noted to be well controlled today in office, S1, S2 normal, no gallop, no murmur, chest clear, no JVD, no HSM, no edema  Abdomen: non-distended, normoactive bowel sounds x 4 quadrants, soft, non-tender to palpation, no guarding or rigidity, no organomegaly, no palpable mass, no abdominal bruits, no CVA tenderness  Psychiatry: a, a & o x 3, appropriate mood and affect, no thought disorder      Assessment/Plan:  Diagnoses and all orders for this visit:    1. Thyroid nodule  -     [facility]    2. Elevated blood pressure reading without diagnosis of hypertension - well controlled in office  3. Hyperlipidemia, unspecified hyperlipidemia type -continue with zocor as she denies side effects. Additional Notes: Discussed today's diagnosis, treatment plans. Discussed medication indications and side effects. Preventive Medicine:   Weight Management:information given  DASH diet: information given  After Visit Summary: Medication and side effects, including risk and signs of allergy were discussed. Patient given printed information with diagnoses and medication information.   Answered all questions and patient acknowledged understanding. Patient agrees to follow up with PCP as suggested or sooner if symptoms worsen. Follow-up Disposition:  Return in about 4 months (around 8/17/2018) for labs one week prior. Suzi Hogue MPA, PA-C  [facility] [place]    I reviewed the patient's medical history, the physician assistant's findings on physical examination, the patient's diagnoses, and treatment plan as documented in the progress note. I concur with the treatment plan as documented. There are no additional recommendations at this time.     Madiha Booth MD

## 2018-04-17 NOTE — PATIENT INSTRUCTIONS
High Blood Pressure: Care Instructions  Your Care Instructions    If your blood pressure is usually above 140/90, you have high blood pressure, or hypertension. That means the top number is 140 or higher or the bottom number is 90 or higher, or both. Despite what a lot of people think, high blood pressure usually doesn't cause headaches or make you feel dizzy or lightheaded. It usually has no symptoms. But it does increase your risk for heart attack, stroke, and kidney or eye damage. The higher your blood pressure, the more your risk increases. Your doctor will give you a goal for your blood pressure. Your goal will be based on your health and your age. An example of a goal is to keep your blood pressure below 140/90. Lifestyle changes, such as eating healthy and being active, are always important to help lower blood pressure. You might also take medicine to reach your blood pressure goal.  Follow-up care is a key part of your treatment and safety. Be sure to make and go to all appointments, and call your doctor if you are having problems. It's also a good idea to know your test results and keep a list of the medicines you take. How can you care for yourself at home? Medical treatment  · If you stop taking your medicine, your blood pressure will go back up. You may take one or more types of medicine to lower your blood pressure. Be safe with medicines. Take your medicine exactly as prescribed. Call your doctor if you think you are having a problem with your medicine. · Talk to your doctor before you start taking aspirin every day. Aspirin can help certain people lower their risk of a heart attack or stroke. But taking aspirin isn't right for everyone, because it can cause serious bleeding. · See your doctor regularly. You may need to see the doctor more often at first or until your blood pressure comes down.   · If you are taking blood pressure medicine, talk to your doctor before you take decongestants or anti-inflammatory medicine, such as ibuprofen. Some of these medicines can raise blood pressure. · Learn how to check your blood pressure at home. Lifestyle changes  · Stay at a healthy weight. This is especially important if you put on weight around the waist. Losing even 10 pounds can help you lower your blood pressure. · If your doctor recommends it, get more exercise. Walking is a good choice. Bit by bit, increase the amount you walk every day. Try for at least 30 minutes on most days of the week. You also may want to swim, bike, or do other activities. · Avoid or limit alcohol. Talk to your doctor about whether you can drink any alcohol. · Try to limit how much sodium you eat to less than 2,300 milligrams (mg) a day. Your doctor may ask you to try to eat less than 1,500 mg a day. · Eat plenty of fruits (such as bananas and oranges), vegetables, legumes, whole grains, and low-fat dairy products. · Lower the amount of saturated fat in your diet. Saturated fat is found in animal products such as milk, cheese, and meat. Limiting these foods may help you lose weight and also lower your risk for heart disease. · Do not smoke. Smoking increases your risk for heart attack and stroke. If you need help quitting, talk to your doctor about stop-smoking programs and medicines. These can increase your chances of quitting for good. When should you call for help? Call 911 anytime you think you may need emergency care. This may mean having symptoms that suggest that your blood pressure is causing a serious heart or blood vessel problem. Your blood pressure may be over 180/110. ? For example, call 911 if:  ? · You have symptoms of a heart attack. These may include:  ¨ Chest pain or pressure, or a strange feeling in the chest.  ¨ Sweating. ¨ Shortness of breath. ¨ Nausea or vomiting.   ¨ Pain, pressure, or a strange feeling in the back, neck, jaw, or upper belly or in one or both shoulders or arms.  ¨ Lightheadedness or sudden weakness. ¨ A fast or irregular heartbeat. ? · You have symptoms of a stroke. These may include:  ¨ Sudden numbness, tingling, weakness, or loss of movement in your face, arm, or leg, especially on only one side of your body. ¨ Sudden vision changes. ¨ Sudden trouble speaking. ¨ Sudden confusion or trouble understanding simple statements. ¨ Sudden problems with walking or balance. ¨ A sudden, severe headache that is different from past headaches. ? · You have severe back or belly pain. ?Do not wait until your blood pressure comes down on its own. Get help right away. ?Call your doctor now or seek immediate care if:  ? · Your blood pressure is much higher than normal (such as 180/110 or higher), but you don't have symptoms. ? · You think high blood pressure is causing symptoms, such as:  ¨ Severe headache. ¨ Blurry vision. ? Watch closely for changes in your health, and be sure to contact your doctor if:  ? · Your blood pressure measures 140/90 or higher at least 2 times. That means the top number is 140 or higher or the bottom number is 90 or higher, or both. ? · You think you may be having side effects from your blood pressure medicine. ? · Your blood pressure is usually normal, but it goes above normal at least 2 times. Where can you learn more? Go to http://zander-eden.info/. Enter P573 in the search box to learn more about \"High Blood Pressure: Care Instructions. \"  Current as of: September 21, 2016  Content Version: 11.4  © 9044-1080 [employer]. Care instructions adapted under license by [employer] (which disclaims liability or warranty for this information). If you have questions about a medical condition or this instruction, always ask your healthcare professional. [place] [address] any warranty or liability for your use of this information.        DASH Diet: Care Instructions  Your Care Instructions    The DASH diet is an eating plan that can help lower your blood pressure. DASH stands for Dietary Approaches to Stop Hypertension. Hypertension is high blood pressure. The DASH diet focuses on eating foods that are high in calcium, potassium, and magnesium. These nutrients can lower blood pressure. The foods that are highest in these nutrients are fruits, vegetables, low-fat dairy products, nuts, seeds, and legumes. But taking calcium, potassium, and magnesium supplements instead of eating foods that are high in those nutrients does not have the same effect. The DASH diet also includes whole grains, fish, and poultry. The DASH diet is one of several lifestyle changes your doctor may recommend to lower your high blood pressure. Your doctor may also want you to decrease the amount of sodium in your diet. Lowering sodium while following the DASH diet can lower blood pressure even further than just the DASH diet alone. Follow-up care is a key part of your treatment and safety. Be sure to make and go to all appointments, and call your doctor if you are having problems. It's also a good idea to know your test results and keep a list of the medicines you take. How can you care for yourself at home? Following the DASH diet  · Eat 4 to 5 servings of fruit each day. A serving is 1 medium-sized piece of fruit, ½ cup chopped or canned fruit, 1/4 cup dried fruit, or 4 ounces (½ cup) of fruit juice. Choose fruit more often than fruit juice. · Eat 4 to 5 servings of vegetables each day. A serving is 1 cup of lettuce or raw leafy vegetables, ½ cup of chopped or cooked vegetables, or 4 ounces (½ cup) of vegetable juice. Choose vegetables more often than vegetable juice. · Get 2 to 3 servings of low-fat and fat-free dairy each day. A serving is 8 ounces of milk, 1 cup of yogurt, or 1 ½ ounces of cheese. · Eat 6 to 8 servings of grains each day.  A serving is 1 slice of bread, 1 ounce of dry cereal, or ½ cup of cooked rice, pasta, or cooked cereal. Try to choose whole-grain products as much as possible. · Limit lean meat, poultry, and fish to 2 servings each day. A serving is 3 ounces, about the size of a deck of cards. · Eat 4 to 5 servings of nuts, seeds, and legumes (cooked dried beans, lentils, and split peas) each week. A serving is 1/3 cup of nuts, 2 tablespoons of seeds, or ½ cup of cooked beans or peas. · Limit fats and oils to 2 to 3 servings each day. A serving is 1 teaspoon of vegetable oil or 2 tablespoons of salad dressing. · Limit sweets and added sugars to 5 servings or less a week. A serving is 1 tablespoon jelly or jam, ½ cup sorbet, or 1 cup of lemonade. · Eat less than 2,300 milligrams (mg) of sodium a day. If you limit your sodium to 1,500 mg a day, you can lower your blood pressure even more. Tips for success  · Start small. Do not try to make dramatic changes to your diet all at once. You might feel that you are missing out on your favorite foods and then be more likely to not follow the plan. Make small changes, and stick with them. Once those changes become habit, add a few more changes. · Try some of the following:  ¨ Make it a goal to eat a fruit or vegetable at every meal and at snacks. This will make it easy to get the recommended amount of fruits and vegetables each day. ¨ Try yogurt topped with fruit and nuts for a snack or healthy dessert. ¨ Add lettuce, tomato, cucumber, and onion to sandwiches. ¨ Combine a ready-made pizza crust with low-fat mozzarella cheese and lots of vegetable toppings. Try using tomatoes, squash, spinach, broccoli, carrots, cauliflower, and onions. ¨ Have a variety of cut-up vegetables with a low-fat dip as an appetizer instead of chips and dip. ¨ Sprinkle sunflower seeds or chopped almonds over salads. Or try adding chopped walnuts or almonds to cooked vegetables. ¨ Try some vegetarian meals using beans and peas. Add garbanzo or kidney beans to salads. Make burritos and tacos with mashed plata beans or black beans. Where can you learn more? Go to http://zander-eden.info/. Enter U571 in the search box to learn more about \"DASH Diet: Care Instructions. \"  Current as of: September 21, 2016  Content Version: 11.4  © 3872-6047 [employer]. Care instructions adapted under license by [employer] (which disclaims liability or warranty for this information). If you have questions about a medical condition or this instruction, always ask your healthcare professional. [address] any warranty or liability for your use of this information. Learning About the [address] Diet  What is the Mediterranean diet? The Mediterranean diet is a style of eating rather than a diet plan. It features foods eaten in [place], [place], [place] and [place], and other countries along the [facility]. It emphasizes eating foods like fish, fruits, vegetables, beans, high-fiber breads and whole grains, nuts, and olive oil. This style of eating includes limited red meat, cheese, and sweets. Why choose the Mediterranean diet? A Mediterranean-style diet may improve heart health. It contains more fat than other heart-healthy diets. But the fats are mainly from nuts, unsaturated oils (such as fish oils and olive oil), and certain nut or seed oils (such as canola, soybean, or flaxseed oil). These fats may help protect the heart and blood vessels. How can you get started on the Mediterranean diet? Here are some things you can do to switch to a more Mediterranean way of eating. What to eat  · Eat a variety of fruits and vegetables each day, such as grapes, blueberries, tomatoes, broccoli, peppers, figs, olives, spinach, eggplant, beans, lentils, and chickpeas. · Eat a variety of whole-grain foods each day, such as oats, brown rice, and whole wheat bread, pasta, and couscous. · Eat fish at least 2 times a week. Try tuna, salmon, mackerel, lake trout, herring, or sardines. · Eat moderate amounts of low-fat dairy products, such as milk, cheese, or yogurt. · Eat moderate amounts of poultry and eggs. · Choose healthy (unsaturated) fats, such as nuts, olive oil, and certain nut or seed oils like canola, soybean, and flaxseed. · Limit unhealthy (saturated) fats, such as butter, palm oil, and coconut oil. And limit fats found in animal products, such as meat and dairy products made with whole milk. Try to eat red meat only a few times a month in very small amounts. · Limit sweets and desserts to only a few times a week. This includes sugar-sweetened drinks like soda. The Mediterranean diet may also include red wine with your meal-1 glass each day for women and up to 2 glasses a day for men. Tips for eating at home  · Use herbs, spices, garlic, lemon zest, and citrus juice instead of salt to add flavor to foods. · Add avocado slices to your sandwich instead of lin. · Have fish for lunch or dinner instead of red meat. Brush the fish with olive oil, and broil or grill it. · Sprinkle your salad with seeds or nuts instead of cheese. · Cook with olive or canola oil instead of butter or oils that are high in saturated fat. · Switch from 2% milk or whole milk to 1% or fat-free milk. · Dip raw vegetables in a vinaigrette dressing or hummus instead of dips made from mayonnaise or sour cream.  · Have a piece of fruit for dessert instead of a piece of cake. Try baked apples, or have some dried fruit. Tips for eating out  · Try broiled, grilled, baked, or poached fish instead of having it fried or breaded. · Ask your  to have your meals prepared with olive oil instead of butter. · Order dishes made with marinara sauce or sauces made from olive oil. Avoid sauces made from cream or mayonnaise. · Choose whole-grain breads, whole wheat pasta and pizza crust, brown rice, beans, and lentils.   · Cut back on butter or margarine on bread. Instead, you can dip your bread in a small amount of olive oil. · Ask for a side salad or grilled vegetables instead of french fries or chips. Where can you learn more? Go to http://zander-eden.info/. Enter 440-713-7585 in the search box to learn more about \"Learning About the Mediterranean Diet. \"  Current as of: May 12, 2017  Content Version: 11.4  © 0195-1702 [employer]. Care instructions adapted under license by [employer] (which disclaims liability or warranty for this information). If you have questions about a medical condition or this instruction, always ask your healthcare professional. [address] any warranty or liability for your use of this information.

## 2018-04-26 ENCOUNTER — OFFICE VISIT (OUTPATIENT)
Dept: CARDIOLOGY CLINIC | Age: 83
End: 2018-04-26

## 2018-04-26 VITALS
BODY MASS INDEX: 23.6 KG/M2 | HEART RATE: 67 BPM | WEIGHT: 125 LBS | HEIGHT: 61 IN | OXYGEN SATURATION: 95 % | DIASTOLIC BLOOD PRESSURE: 60 MMHG | SYSTOLIC BLOOD PRESSURE: 140 MMHG

## 2018-04-26 DIAGNOSIS — R07.9 CHEST PAIN, UNSPECIFIED TYPE: Primary | ICD-10-CM

## 2018-04-26 DIAGNOSIS — R06.02 SOB (SHORTNESS OF BREATH): ICD-10-CM

## 2018-04-26 DIAGNOSIS — I48.0 PAF (PAROXYSMAL ATRIAL FIBRILLATION) (HCC): ICD-10-CM

## 2018-04-26 DIAGNOSIS — R00.2 PALPITATIONS: ICD-10-CM

## 2018-04-26 DIAGNOSIS — R03.0 ELEVATED BLOOD PRESSURE READING WITHOUT DIAGNOSIS OF HYPERTENSION: ICD-10-CM

## 2018-04-26 NOTE — MR AVS SNAPSHOT
[address] [address] [address] [id number] [phone] Patient: Maria Esther Field MRN: [id number] :1934 Visit Information Date & Time Provider Department Dept. Phone Encounter #  
 2018  3:20 PM Augusto Little MD [facility] [address] [id number] Your Appointments 5/15/2018  2:00 PM  
New Patient with Alexsander Lantigua MD  
[facility] at [address] [facility]) Appt Note: REHAN Haro/Drooling/Ref and notes in cc  
 [address] [address] [address] [facility] 630 [facility] [address] 200 [facility] 2018  2:00 PM  
Office Visit with Carlos Tinsley MD  
[address] [place] [facility]) Appt Note: ref from Yas Carrillo PA-C-Thyroid Nodule-pt offered sooner appt-wants late afternoon appt [address] [facility] [address]  
[phone]  
  
   
 [address] [facility] [address] 2018  2:30 PM  
Office Visit with Yas Carrillo PA-C [facility] (JAMES Lawson) Appt Note: 1 m fu bp; 4 m fu bp  
 [address], [place] [address] [address]  
[phone]  
  
   
 [address] Upcoming Health Maintenance Date Due  
 GLAUCOMA SCREENING Q2Y 1999 Influenza Age 5 to Adult 2018 Pneumococcal 65+ Low/Medium Risk (2 of 2 - PPSV23) 3/6/2019 MEDICARE YEARLY EXAM 3/7/2019 DTaP/Tdap/Td series (2 - Td) 3/6/2028 Allergies as of 2018  Review Complete On: 2018 By: Yas Carrillo PA-C Severity Noted Reaction Type Reactions Latex, Natural Rubber  2012    Unknown (comments) Asa-acetaminophen-caff-potass  2011    Shortness of Breath Patient is only allergic to ASA Aspirin  04/03/2018    Shortness of Breath, Other (comments) Patient states this caused her stomach to bleed internal  
 Erythromycin  02/07/2011    Other (comments) bleeding Iodine And Iodide Containing Products  08/05/2012    Unknown (comments) Lemon  09/17/2012   Not Verified Unknown (comments) Other Medication  08/05/2012    Unknown (comments) Pt not sure of allergies states \"I'm allergic to more but not sure of name\" Pcn [Penicillins]  02/07/2011    Swelling Shellfish Containing Products  09/17/2012    Unknown (comments) Sulfa (Sulfonamide Antibiotics)  02/07/2011    Unknown (comments) Current Immunizations  Reviewed on 9/5/2012 No immunizations on file. Not reviewed this visit You Were Diagnosed With   
  
 Codes Comments Elevated blood pressure reading without diagnosis of hypertension    -  Primary ICD-10-CM: R03.0 ICD-9-CM: 796.2 Palpitations     ICD-10-CM: R00.2 ICD-9-CM: 785.1 PAF (paroxysmal atrial fibrillation) (HCC)     ICD-10-CM: I48.0 ICD-9-CM: 427.31   
 SOB (shortness of breath)     ICD-10-CM: R06.02 
ICD-9-CM: 786.05 Chest pain, unspecified type     ICD-10-CM: R07.9 ICD-9-CM: 786.50 Vitals BP Pulse Height(growth percentile) Weight(growth percentile) SpO2 BMI  
 140/60 67 5' 1\" (1.549 m) 125 lb (56.7 kg) 95% 23.62 kg/m2 OB Status Smoking Status Hysterectomy Never Smoker BMI and BSA Data Body Mass Index Body Surface Area  
 23.62 kg/m 2 1.56 m 2 Preferred Pharmacy Pharmacy Name Phone [address] [place] [address] [phone] [facility] [phone] Your Updated Medication List  
  
   
This list is accurate as of 4/26/18  4:39 PM.  Always use your most recent med list.  
  
  
  
  
 acetaminophen 500 mg tablet Commonly known as:  TYLENOL EX STR ARTHRITIS PAIN Take 1 Tab by mouth every six (6) hours as needed. Cholecalciferol (Vitamin D3) 2,000 unit Cap capsule Commonly known as:  VITAMIN D3 Take 2,000 Units by mouth daily. Indications: Vitamin D Deficiency  
  
 cyanocobalamin 500 mcg tablet Commonly known as:  VITAMIN B12 Take 1 Tab by mouth daily. Indications: PREVENTION OF VITAMIN B12 DEFICIENCY  
  
 diphenhydrAMINE 25 mg tablet Commonly known as:  BENADRYL ALLERGY Take 1 Tab by mouth every six (6) hours as needed. raNITIdine 150 mg tablet Commonly known as:  ZANTAC Take 1 Tab by mouth two (2) times a day. simvastatin 5 mg tablet Commonly known as:  ZOCOR Take 1 Tab by mouth nightly. Indications: hypercholesterolemia We Performed the Following AMB POC EKG ROUTINE W/ 12 LEADS, INTER & REP [70613 CPT(R)] To-Do List   
 04/26/2018 ECHO:  2D ECHO COMPLETE ADULT (TTE) W OR WO CONTR   
  
 04/26/2018 ECG:  CARDIAC SPECT REST AND STRESS   
  
 04/26/2018 ECG:  ECG HOLTER MONITOR, UP TO 48 HRS   
  
 04/26/2018 ECG:  NUCLEAR STRESS TEST Introducing [facility]! Heydi Alfredo introduces [employer] patient portal. Now you can access parts of your medical record, email your doctor's office, and request medication refills online. 1. In your internet browser, go to [email or web address] 2. Click on the First Time User? Click Here link in the Sign In box. You will see the New Member Sign Up page. 3. Enter your [employer] Access Code exactly as it appears below. You will not need to use this code after youve completed the sign-up process. If you do not sign up before the expiration date, you must request a new code. · [employer] Access Code: [email or web address] Expires: 5/28/2018  2:38 PM 
 
4. Enter the last four digits of your Social Security Number (xxxx) and Date of Birth (mm/dd/yyyy) as indicated and click Submit. You will be taken to the next sign-up page. 5. Create a [employer] ID. This will be your [employer] login ID and cannot be changed, so think of one that is secure and easy to remember. 6. Create a [employer] password. You can change your password at any time. 7. Enter your Password Reset Question and Answer. This can be used at a later time if you forget your password. 8. Enter your e-mail address. You will receive e-mail notification when new information is available in [address] 9. Click Sign Up. You can now view and download portions of your medical record. 10. Click the Download Summary menu link to download a portable copy of your medical information. If you have questions, please visit the Frequently Asked Questions section of the [employer] website. Remember, [employer] is NOT to be used for urgent needs. For medical emergencies, dial 911. Now available from your iPhone and Android! Please provide this summary of care documentation to your next provider. Your primary care clinician is listed as Ross Ramos. If you have any questions after today's visit, please call [phone].

## 2018-04-26 NOTE — PROGRESS NOTES
HISTORY OF PRESENT ILLNESS  Laura Arriaga is a 80 y.o. female. HPI    Patient presents for a new office visit. She has a remote history of paroxysmal atrial fibrillation greater than 20 years ago. She was following with another cardiologist, but has not been seen in over 7-8 years. She also has a history of borderline hypertension, dyslipidemia, and GERD. Patient reports that she did not see a physician and nearly 8-9 years and recently got reestablished with a primary care provider. She was referred here for her prior history of atrial fibrillation, but also evaluation of intermittent chest pain, shortness of breath, and heart palpitations. The patient states that she notices the chest pain most days of the week and this can be worse with exertion, but is also present at rest often. She describes it as a burning and pressure sensation in the middle of her chest and over the epigastric area. She also complains of daily heart palpitations which are worse when she is exerting herself. She denies any dizziness associated with these symptoms. She does report syncope in the past but nothing recent over the past several years. She also complains of shortness of breath with any exertional activity as well. This has been present for several years and she does not feel this is particularly worse. No leg swelling, no orthopnea, no PND. Past Medical History:   Diagnosis Date    Anemia NEC     Asthma     Colitis     DDD (degenerative disc disease), cervical     Fibrocystic breast     GERD (gastroesophageal reflux disease)     Headache     HTN (hypertension)     Hyperlipidemia     OA (osteoarthritis)     Osteoporosis     PAF [facility]oxysmal [facility] fibrillation) ([facility]) 1995    Pneumonia     Thyroid nodule     Vitamin D deficiency 2/7/2011     Current Outpatient Prescriptions   Medication Sig Dispense Refill    cyanocobalamin (VITAMIN B12) 500 mcg tablet Take 1 Tab by mouth daily.  Indications: PREVENTION OF VITAMIN B12 DEFICIENCY 30 Tab 2    simvastatin (ZOCOR) 5 mg tablet Take 1 Tab by mouth nightly. Indications: hypercholesterolemia 30 Tab 2    Cholecalciferol, Vitamin D3, (VITAMIN D3) 2,000 unit cap capsule Take 2,000 Units by mouth daily. Indications: Vitamin D Deficiency 90 Cap 1    raNITIdine (ZANTAC) 150 mg tablet Take 1 Tab by mouth two (2) times a day. 60 Tab 2    acetaminophen (TYLENOL EX STR ARTHRITIS PAIN) 500 mg tablet Take 1 Tab by mouth every six (6) hours as needed. (Patient taking differently: Take 500 mg by mouth every eight (8) hours as needed.) 60 Tab 1    diphenhydrAMINE (BENADRYL ALLERGY) 25 mg tablet Take 1 Tab by mouth every six (6) hours as needed.  (Patient taking differently: Take 50 mg by mouth every six (6) hours as needed.) 30 Tab 2     Allergies   Allergen Reactions    Latex, Natural Rubber Unknown (comments)    Asa-Acetaminophen-Caff-Potass Shortness of Breath     Patient is only allergic to ASA    Aspirin Shortness of Breath and Other (comments)     Patient states this caused her stomach to bleed internal    Erythromycin Other (comments)     bleeding    Iodine And Iodide Containing Products Unknown (comments)    Lemon Unknown (comments)    Other Medication Unknown (comments)     Pt not sure of allergies states \"I'm allergic to more but not sure of name\"    Pcn [Penicillins] Swelling    Shellfish Containing Products Unknown (comments)    Sulfa (Sulfonamide Antibiotics) Unknown (comments)      Social History   Substance Use Topics    Smoking status: Never Smoker    Smokeless tobacco: Never Used    Alcohol use No     Family History   Problem Relation Age of Onset    Hypertension Other     Diabetes Other     Heart Disease Other     Cancer Other      stomach & colon    Diabetes Mother     Heart Attack Mother     Stroke Mother     Heart Attack Father     Heart Failure Father     Arthritis-rheumatoid Father        Review of Systems   Constitutional: Negative for chills, fever and weight loss. HENT: Negative for nosebleeds. Eyes: Negative for blurred vision and double vision. Respiratory: Positive for shortness of breath. Negative for cough and wheezing. Cardiovascular: Positive for chest pain and palpitations. Negative for orthopnea, claudication, leg swelling and PND. Gastrointestinal: Negative for abdominal pain, heartburn, nausea and vomiting. Genitourinary: Negative for dysuria and hematuria. Musculoskeletal: Negative for falls and myalgias. Skin: Negative for rash. Neurological: Negative for dizziness, focal weakness and headaches. Endo/Heme/Allergies: Does not bruise/bleed easily. Psychiatric/Behavioral: Negative for substance abuse. Visit Vitals    /60    Pulse 67    Ht 5' 1\" (1.549 m)    Wt 56.7 kg (125 lb)    SpO2 95%    BMI 23.62 kg/m2       Physical Exam   Constitutional: She is oriented to person, place, and time. She appears well-developed and well-nourished. HENT:   Head: Normocephalic and atraumatic. Eyes: Conjunctivae are normal.   Neck: Neck supple. No JVD present. Carotid bruit is not present. Cardiovascular: Normal rate, regular rhythm, S1 normal, S2 normal and normal pulses. Exam reveals distant heart sounds. Exam reveals no gallop. No murmur heard. Pulmonary/Chest: Effort normal and breath sounds normal. She has no wheezes. She has no rales. Abdominal: Soft. Bowel sounds are normal. There is no tenderness. Musculoskeletal: She exhibits no edema, tenderness or deformity. Neurological: She is alert and oriented to person, place, and time. Skin: Skin is warm and dry. Psychiatric: She has a normal mood and affect. Her behavior is normal. Thought content normal.     EKG: Normal sinus rhythm, normal axis, borderline voltage criteria for LVH, no ST or T-wave abnormalities concerning for ischemia. No change compared to previous EKG from March 2017.     ASSESSMENT and PLAN  Encounter Diagnoses   Name Primary?  Chest pain, unspecified type Yes    Elevated blood pressure reading without diagnosis of hypertension     Palpitations     PAF (paroxysmal atrial fibrillation) (HCC)     SOB (shortness of breath)      Chest pain. Both typical and atypical features for angina. I have recommended a pharmacologic nuclear stress test for further risk stratification. Borderline hypertension. Patient's blood pressure is upper limits of normal today in the office. For now I would focus on lifestyle modification. Heart palpitations. Since his occurring daily, I recommended a 48 hour Holter monitor to evaluate for any significant arrhythmias. She does have a remote history of atrial fibrillation. Paroxysmal atrial fibrillation. This was diagnosed over 20 years ago. She is in sinus rhythm today. A Holter monitor will be arranged as described above. At this point I do not think she is been on any anticoagulation unless she has any documented episodes. I would also like to eval for any structural heart disease with an echocardiogram.    Shortness of breath. This will be evaluated with the echocardiogram.    Follow-up in 2-3 months, sooner if needed.

## 2018-05-10 ENCOUNTER — HOSPITAL ENCOUNTER (OUTPATIENT)
Dept: NON INVASIVE DIAGNOSTICS | Age: 83
Discharge: HOME | End: 2018-05-10
Attending: INTERNAL MEDICINE
Payer: MEDICARE

## 2018-05-10 DIAGNOSIS — R00.2 PALPITATIONS: ICD-10-CM

## 2018-05-10 DIAGNOSIS — R07.9 CHEST PAIN, UNSPECIFIED TYPE: ICD-10-CM

## 2018-05-10 DIAGNOSIS — I48.0 PAF (PAROXYSMAL ATRIAL FIBRILLATION) (HCC): ICD-10-CM

## 2018-05-10 DIAGNOSIS — R06.02 SOB (SHORTNESS OF BREATH): ICD-10-CM

## 2018-05-10 PROCEDURE — 93306 TTE W/DOPPLER COMPLETE: CPT

## 2018-05-10 PROCEDURE — 93017 CV STRESS TEST TRACING ONLY: CPT | Performed by: INTERNAL MEDICINE

## 2018-05-10 PROCEDURE — 74011250636 HC RX REV CODE- 250/636: Performed by: INTERNAL MEDICINE

## 2018-05-10 PROCEDURE — A9500 TC99M SESTAMIBI: HCPCS

## 2018-05-10 PROCEDURE — 74011000258 HC RX REV CODE- 258: Performed by: INTERNAL MEDICINE

## 2018-05-10 PROCEDURE — 93225 XTRNL ECG REC<48 HRS REC: CPT | Performed by: INTERNAL MEDICINE

## 2018-05-10 PROCEDURE — 78452 HT MUSCLE IMAGE SPECT MULT: CPT | Performed by: INTERNAL MEDICINE

## 2018-05-10 RX ORDER — SODIUM CHLORIDE 9 MG/ML
100 INJECTION, SOLUTION INTRAVENOUS ONCE
Status: COMPLETED | OUTPATIENT
Start: 2018-05-10 | End: 2018-05-10

## 2018-05-10 RX ADMIN — SODIUM CHLORIDE 100 ML/HR: 900 INJECTION, SOLUTION INTRAVENOUS at 09:55

## 2018-05-10 RX ADMIN — REGADENOSON 0.4 MG: 0.08 INJECTION, SOLUTION INTRAVENOUS at 09:55

## 2018-05-10 NOTE — PROGRESS NOTES
Patient was given 11.0 milliCuries of 99mTc-Sestamibi for the resting images. Patient was also given 33.0 milliCuries of 99mTc-Sestamibi for the stress images. Injected with 0.4mg Lexiscan while swinging legs.     Patient's armband was left on and sent to echo

## 2018-05-10 NOTE — PROCEDURES
[address]  MR#: [id number]  : 1934  ACCOUNT #: [de-identified]   DATE OF SERVICE: 05/10/2018    INDICATION:  Chest pain. Resting heart rate 69, blood pressure 152/60. Resting EKG shows sinus rhythm with nonspecific T-wave abnormality. PHARMACOLOGICAL STRESS PORTION:  The patient underwent Lexiscan infusion 0.4 mg intravenously per protocol via left antecubital IV and then swung her legs. The patient remained in sinus rhythm with the nonspecific T-wave changes making this a nondiagnostic EKG portion of the stress test.    PERFUSION IMAGING:  The patient received intravenous technetium 99 mTc sestamibi, 11.0 mCi intravenously per protocol via the left antecubital IV for resting images and then 33.3 mCi of the same IV an hour later for stress images. Tomographic views of the left ventricle obtained. Cavity size was normal during both rest and stress imaging, there is no transient ischemic dilatation present. There was a smaller size, mild perfusion imaging abnormality involving the distal portion of the inferior wall that did demonstrate some reversibility when comparing rest to stress imaging. There were no fixed perfusion defects concerning for myocardial infarction. The reversible defect was somewhat concerning for a small area of ischemia. Gated analysis demonstrates normal LV size, normal systolic function, normal LV systolic function, normal regional wall motion. Ejection fraction calculated at greater than 75%. CONCLUSIONS:   1.  Mildly abnormal, low to intermediate risk pharmacological nuclear stress test.  2.  Small area of mild ischemia involving the distal inferior wall. 3.  No fixed perfusion defects to indicate prior myocardial infarction. 4.  Normal left ventricular size, normal systolic function, normal left ventricular ejection fraction, EF calculated greater than 75%.       Shamar Valiente MD       MJR / HANG  D: 05/10/2018 13:00     T: 05/10/2018 13:51  JOB #: [id number]  CC: Anahi VAN

## 2018-05-11 LAB
ATTENDING PHYSICIAN, CST07: NORMAL
DIAGNOSIS, 93000: NORMAL
DUKE TM SCORE RESULT, CST14: NORMAL
DUKE TREADMILL SCORE, CST13: NORMAL
ECG INTERP BEFORE EX, CST11: NORMAL
ECG INTERP DURING EX, CST12: NORMAL
FUNCTIONAL CAPACITY, CST17: NORMAL
KNOWN CARDIAC CONDITION, CST08: NORMAL
MAX. DIASTOLIC BP, CST04: 60 MMHG
MAX. HEART RATE, CST05: 92 BPM
MAX. SYSTOLIC BP, CST03: 152 MMHG
OVERALL BP RESPONSE TO EXERCISE, CST16: NORMAL
OVERALL HR RESPONSE TO EXERCISE, CST15: NORMAL
PEAK EX METS, CST10: 1 METS
PROTOCOL NAME, CST01: NORMAL
TEST INDICATION, CST09: NORMAL

## 2018-05-11 NOTE — PROCEDURES
[address]  MR#: [id number]  : 1934  ACCOUNT #: [de-identified]   DATE OF SERVICE: 05/10/2018    INDICATION:  Chest pain. Resting heart rate 69, blood pressure 152/60. Resting EKG shows sinus rhythm with nonspecific T-wave abnormality. PHARMACOLOGICAL STRESS PORTION:  The patient underwent Lexiscan infusion 0.4 mg intravenously per protocol via left antecubital IV and then swung her legs. The patient remained in sinus rhythm with the nonspecific T-wave changes making this a nondiagnostic EKG portion of the stress test.    PERFUSION IMAGING:  The patient received intravenous technetium 99 mTc sestamibi, 11.0 mCi intravenously per protocol via the left antecubital IV for resting images and then 33.3 mCi of the same IV an hour later for stress images. Tomographic views of the left ventricle obtained. Cavity size was normal during both rest and stress imaging, there is no transient ischemic dilatation present. There was a smaller size, mild perfusion imaging abnormality involving the distal portion of the inferior wall that did demonstrate some reversibility when comparing rest to stress imaging. There were no fixed perfusion defects concerning for myocardial infarction. The reversible defect was somewhat concerning for a small area of ischemia. Gated analysis demonstrates normal LV size, normal systolic function, normal LV systolic function, normal regional wall motion. Ejection fraction calculated at greater than 75%. CONCLUSIONS:   1.  Mildly abnormal, low to intermediate risk pharmacological nuclear stress test.  2.  Small area of mild ischemia involving the distal inferior wall. 3.  No fixed perfusion defects to indicate prior myocardial infarction. 4.  Normal left ventricular size, normal systolic function, normal left ventricular ejection fraction, EF calculated greater than 75%.       MD BINU Sanchez / HANG  D: 05/10/2018 13:00     T: 05/10/2018 13:51  JOB #: [id number]  CC: Saad VAN

## 2018-05-11 NOTE — PROCEDURES
[address]  MR#: [id number]  : 1934  ACCOUNT #: [de-identified]   DATE OF SERVICE: 05/10/2018    INDICATION:  Chest pain. Resting heart rate 69, blood pressure 152/60. Resting EKG shows sinus rhythm with nonspecific T-wave abnormality. PHARMACOLOGICAL STRESS PORTION:  The patient underwent Lexiscan infusion 0.4 mg intravenously per protocol via left antecubital IV and then swung her legs. The patient remained in sinus rhythm with the nonspecific T-wave changes making this a nondiagnostic EKG portion of the stress test.    PERFUSION IMAGING:  The patient received intravenous technetium 99 mTc sestamibi, 11.0 mCi intravenously per protocol via the left antecubital IV for resting images and then 33.3 mCi of the same IV an hour later for stress images. Tomographic views of the left ventricle obtained. Cavity size was normal during both rest and stress imaging, there is no transient ischemic dilatation present. There was a smaller size, mild perfusion imaging abnormality involving the distal portion of the inferior wall that did demonstrate some reversibility when comparing rest to stress imaging. There were no fixed perfusion defects concerning for myocardial infarction. The reversible defect was somewhat concerning for a small area of ischemia. Gated analysis demonstrates normal LV size, normal systolic function, normal LV systolic function, normal regional wall motion. Ejection fraction calculated at greater than 75%. CONCLUSIONS:   1.  Mildly abnormal, low to intermediate risk pharmacological nuclear stress test.  2.  Small area of mild ischemia involving the distal inferior wall. 3.  No fixed perfusion defects to indicate prior myocardial infarction. 4.  Normal left ventricular size, normal systolic function, normal left ventricular ejection fraction, EF calculated greater than 75%.       MD BINU Collins / HANG  D: 05/10/2018 13:00     T: 05/10/2018 13:51  JOB #: [id number]  CC: Yariel VAN

## 2018-05-14 NOTE — PROGRESS NOTES
Paroxysmal atrial fibrillation. This was diagnosed over 20 years ago. She is in sinus rhythm today. A Holter monitor will be arranged as described above. At this point I do not think she is been on any anticoagulation unless she has any documented episodes.   I would also like to eval for any structural heart disease with an echocardiogram.

## 2018-05-14 NOTE — PROGRESS NOTES
Chest pain. Both typical and atypical features for angina.   I have recommended a pharmacologic nuclear stress test for further risk stratification.

## 2018-05-15 ENCOUNTER — OFFICE VISIT (OUTPATIENT)
Dept: NEUROLOGY | Age: 83
End: 2018-05-15

## 2018-05-15 VITALS
BODY MASS INDEX: 23.26 KG/M2 | DIASTOLIC BLOOD PRESSURE: 74 MMHG | SYSTOLIC BLOOD PRESSURE: 122 MMHG | HEART RATE: 75 BPM | RESPIRATION RATE: 17 BRPM | OXYGEN SATURATION: 98 % | WEIGHT: 123.2 LBS | HEIGHT: 61 IN | TEMPERATURE: 97.9 F

## 2018-05-15 DIAGNOSIS — R55 VASOVAGAL SYNCOPE: ICD-10-CM

## 2018-05-15 DIAGNOSIS — R26.9 GAIT ABNORMALITY: Primary | ICD-10-CM

## 2018-05-15 DIAGNOSIS — R41.89 COGNITIVE DECLINE: ICD-10-CM

## 2018-05-15 NOTE — MR AVS SNAPSHOT
[address] [facility] 
 
 
 [address] [facility] 
[phone] Patient: Wilbert Courser MRN: [id number] :1934 Visit Information Date & Time Provider Department Dept. Phone Encounter #  
 5/15/2018  3:40 PM Cindy Ng MD [facility] at [address] [facility] [id number] Follow-up Instructions Return in about 4 weeks (around 2018). Follow-up and Disposition History Your Appointments 2018  2:00 PM  
Office Visit with Yusuf Walton MD  
[address] [place] [facility]) Appt Note: ref from Damon Pena PA-C-Thyroid Nodule-pt offered sooner appt-wants late afternoon appt [address] [facility] [address] [phone]  
  
   
 [address] [place] [address]  
  
    
 7/10/2018  9:00 AM  
Follow Up with Cindy Ng MD  
[facility] at [address] [facility]) [address] [facility] [address] Count includes the [facility] [address] [facility] [address] [facility] [address] [address] [address] [address]  
  
    
 2018  3:40 PM  
Follow Up with Cale Francois MD  
[facility] ([facility]) Appt Note: 2-3 month follow up [place] [address] [address] [address] [phone] 12[address] [facility] [address] 2018  2:30 PM  
Office Visit with Damon Pena PA-C [facility] ([facility]) Appt Note: 1 m fu bp; 4 m fu bp  
 [address], [place] [address] [address]  
[phone]  
  
   
 [address] Upcoming Health Maintenance Date Due  
 GLAUCOMA SCREENING Q2Y 1999 Influenza Age 5 to Adult 2018 Pneumococcal 65+ Low/Medium Risk (2 of 2 - PPSV23) 3/6/2019 MEDICARE YEARLY EXAM 3/7/2019 DTaP/Tdap/Td series (2 - Td) 3/6/2028 Allergies as of 5/15/2018  Review Complete On: 5/15/2018 By: Nadir Holder LPN Severity Noted Reaction Type Reactions Latex, Natural Rubber  09/17/2012    Unknown (comments) Asa-acetaminophen-caff-potass  02/07/2011    Shortness of Breath Patient is only allergic to ASA Aspirin  04/03/2018    Shortness of Breath, Other (comments) Patient states this caused her stomach to bleed internal  
 Erythromycin  02/07/2011    Other (comments) bleeding Iodine And Iodide Containing Products  08/05/2012    Unknown (comments) Lemon  09/17/2012   Not Verified Unknown (comments) Other Medication  08/05/2012    Unknown (comments) Pt not sure of allergies states \"I'm allergic to more but not sure of name\" Pcn [Penicillins]  02/07/2011    Swelling Shellfish Containing Products  09/17/2012    Unknown (comments) Sulfa (Sulfonamide Antibiotics)  02/07/2011    Unknown (comments) Current Immunizations  Reviewed on 9/5/2012 No immunizations on file. Not reviewed this visit You Were Diagnosed With   
  
 Codes Comments Gait abnormality    -  Primary ICD-10-CM: R26.9 ICD-9-CM: 386. 2 Vasovagal syncope     ICD-10-CM: R55 
ICD-9-CM: 780.2 Cognitive decline     ICD-10-CM: R41.89 ICD-9-CM: 294.9 Vitals BP Pulse Temp Resp Height(growth percentile) Weight(growth percentile) 122/74 (BP 1 Location: Right arm, BP Patient Position: Sitting) 75 97.9 °F (36.6 °C) (Oral) 17 5' 1\" (1.549 m) 123 lb 3.2 oz (55.9 kg) SpO2 BMI OB Status Smoking Status 98% 23.28 kg/m2 Hysterectomy Never Smoker Vitals History BMI and BSA Data Body Mass Index Body Surface Area  
 23.28 kg/m 2 1.55 m 2 Preferred Pharmacy Pharmacy Name Phone [address], Benny Otero [address] [facility] 22 [address] [facility] [phone] Your Updated Medication List  
  
   
 This list is accurate as of 5/15/18  4:46 PM.  Always use your most recent med list.  
  
  
  
  
 acetaminophen 500 mg tablet Commonly known as:  TYLENOL EX STR ARTHRITIS PAIN Take 1 Tab by mouth every six (6) hours as needed. Cholecalciferol (Vitamin D3) 2,000 unit Cap capsule Commonly known as:  VITAMIN D3 Take 2,000 Units by mouth daily. Indications: Vitamin D Deficiency  
  
 cyanocobalamin 500 mcg tablet Commonly known as:  VITAMIN B12 Take 1 Tab by mouth daily. Indications: PREVENTION OF VITAMIN B12 DEFICIENCY  
  
 diphenhydrAMINE 25 mg tablet Commonly known as:  BENADRYL ALLERGY Take 1 Tab by mouth every six (6) hours as needed. raNITIdine 150 mg tablet Commonly known as:  ZANTAC Take 1 Tab by mouth two (2) times a day. simvastatin 5 mg tablet Commonly known as:  ZOCOR Take 1 Tab by mouth nightly. Indications: hypercholesterolemia We Performed the Following REFERRAL TO PHYSICAL THERAPY [GMK42 Custom] Follow-up Instructions Return in about 4 weeks (around 6/12/2018). To-Do List   
 05/15/2018 Neurology:  EEG AWAKE AND ASLEEP   
  
 05/15/2018 Imaging:  MRI BRAIN WO CONT Referral Information Referral ID Referred By Referred To  
  
 [id number] PAN GIB[facility]ONS IN [facility].   
   [address] [place] [address] [address] [place] [address] Phone: [phone] Visits Status Start Date End Date 1 Authorized 5/15/18 5/15/19 If your referral has a status of pending review or denied, additional information will be sent to support the outcome of this decision. Introducing [facility]! Ligia Dewey introduces [employer] patient portal. Now you can access parts of your medical record, email your doctor's office, and request medication refills online. 1. In your internet browser, go to [email or web address] 2. Click on the First Time User? Click Here link in the Sign In box. You will see the New Member Sign Up page. 3. Enter your [employer] Access Code exactly as it appears below. You will not need to use this code after youve completed the sign-up process. If you do not sign up before the expiration date, you must request a new code. · [employer] Access Code: [email or web address] Expires: 5/28/2018  2:38 PM 
 
4. Enter the last four digits of your Social Security Number (xxxx) and Date of Birth (mm/dd/yyyy) as indicated and click Submit. You will be taken to the next sign-up page. 5. Create a [employer] ID. This will be your [employer] login ID and cannot be changed, so think of one that is secure and easy to remember. 6. Create a [employer] password. You can change your password at any time. 7. Enter your Password Reset Question and Answer. This can be used at a later time if you forget your password. 8. Enter your e-mail address. You will receive e-mail notification when new information is available in [address] 9. Click Sign Up. You can now view and download portions of your medical record. 10. Click the Download Summary menu link to download a portable copy of your medical information. If you have questions, please visit the Frequently Asked Questions section of the [employer] website. Remember, [employer] is NOT to be used for urgent needs. For medical emergencies, dial 911. Now available from your iPhone and Android! Please provide this summary of care documentation to your next provider. Your primary care clinician is listed as Alba Pate. If you have any questions after today's visit, please call [phone].

## 2018-05-15 NOTE — PROGRESS NOTES
Patient is here for a new patient appt. 1. Have you been to the ER, urgent care clinic since your last visit? Hospitalized since your last visit?no    2. Have you seen or consulted any other health care providers outside of the [facility] since your last visit? Include any pap smears or colon screening. No     PHQ over the last two weeks 5/15/2018   Little interest or pleasure in doing things Not at all   Feeling down, depressed or hopeless Not at all   Total Score PHQ 2 0     Fall Risk Assessment, last 12 mths 5/15/2018   Able to walk? Yes   Fall in past 12 months? Yes   Fall with injury? No   Number of falls in past 12 months 1   Fall Risk Score 1               Abuse Screening Questionnaire 5/15/2018   Do you ever feel afraid of your partner? N   Are you in a relationship with someone who physically or mentally threatens you? N   Is it safe for you to go home? Y       .

## 2018-05-15 NOTE — COMMUNICATION BODY
Laura Arriaga is a 80 y.o., right handed female, with no history of diabetes or hypertension, who comes in with difficulty with walking. Please note she's a very difficult historian. She's fallen 3-4 times at home and in public 3 times. She seems to get a warm feeling around her waist while she's walking and then she gets a warm feeling in her head. She then feels as if she's going to pass out and go down to the ground. She has no recollection of passing out, but has found herself on the floor being attended to. She's also had 2 other episodes where she feels as if she's going to pass out. She doesn't realy go out, she just feels as if might pass out. She does continue to drive in spite of these spells. Her last spell was back in January. At home she's noticed she tends to fall after getting off the commode. She's been on the floor and has had to drag herself around her house in order to get herself up. She's had weakness episodes when she just can't walk. She had a period of time between 2017 and 2018 when she couldn't walk. She was vomiting on a regular basis and couldn't walk well enough to even get to the mailbox. During this time she was ordering food into the house and she was essentially house bound. Of note is that she was having significant nausea and vomiting during the time that she couldn't walk. She's had much less in the way of vomiting and has felt stronger and hd less falling. She's been having chronic vomiting since 2004. She's been feeling weak chronically for decades. She also has noticed some intermittent drooling. She's done this for many years, but it seems worse recently. Social History; she's , lives alone. She doesn't smoke, drink nor use illicit drugs. Worked as a teenager, never as a adult. Family History; mother  from heart disease, had diabetes. Father had heart disease.   Siblings had aneurysm, Parkinson's, traumatic falls, many had some type of walking trouble. Current Outpatient Prescriptions   Medication Sig Dispense Refill    cyanocobalamin (VITAMIN B12) 500 mcg tablet Take 1 Tab by mouth daily. Indications: PREVENTION OF VITAMIN B12 DEFICIENCY 30 Tab 2    simvastatin (ZOCOR) 5 mg tablet Take 1 Tab by mouth nightly. Indications: hypercholesterolemia 30 Tab 2    Cholecalciferol, Vitamin D3, (VITAMIN D3) 2,000 unit cap capsule Take 2,000 Units by mouth daily. Indications: Vitamin D Deficiency 90 Cap 1    raNITIdine (ZANTAC) 150 mg tablet Take 1 Tab by mouth two (2) times a day. 60 Tab 2    acetaminophen (TYLENOL EX STR ARTHRITIS PAIN) 500 mg tablet Take 1 Tab by mouth every six (6) hours as needed. (Patient taking differently: Take 500 mg by mouth every eight (8) hours as needed.) 60 Tab 1    diphenhydrAMINE (BENADRYL ALLERGY) 25 mg tablet Take 1 Tab by mouth every six (6) hours as needed.  (Patient taking differently: Take 50 mg by mouth every six (6) hours as needed.) 30 Tab 2       Past Medical History:   Diagnosis Date    Anemia NEC     Asthma     Colitis     DDD (degenerative disc disease), cervical     Fibrocystic breast     GERD (gastroesophageal reflux disease)     Headache     HTN (hypertension)     Hyperlipidemia     OA (osteoarthritis)     Osteoporosis     PAF (paroxysmal atrial fibrillation) ([facility] [address] 1995    Pneumonia     Thyroid nodule     Vitamin D deficiency 2/7/2011       Past Surgical History:   Procedure Laterality Date    BREAST SURGERY PROCEDURE UNLISTED      cysts removed    CARDIAC SURG PROCEDURE UNLIST      cardiac catherization    HX GYN      hysterectomy    HX HEENT      cataract surgery bilaterally    HX TOTAL ABDOMINAL HYSTERECTOMY         Allergies   Allergen Reactions    Latex, Natural Rubber Unknown (comments)    Asa-Acetaminophen-Caff-Potass Shortness of Breath     Patient is only allergic to ASA    Aspirin Shortness of Breath and Other (comments) Patient states this caused her stomach to bleed internal    Erythromycin Other (comments)     bleeding    Iodine And Iodide Containing Products Unknown (comments)    Lemon Unknown (comments)    Other Medication Unknown (comments)     Pt not sure of allergies states \"I'm allergic to more but not sure of name\"    Pcn [Penicillins] Swelling    Shellfish Containing Products Unknown (comments)    Sulfa (Sulfonamide Antibiotics) Unknown (comments)       Patient Active Problem List   Diagnosis Code    Thyroid nodule E04.1    Vitamin D deficiency E55.9    Hyperlipidemia E78.5    AR (allergic rhinitis) J30.9    GERD (gastroesophageal reflux disease) K21.9    SOB (shortness of breath) R06.02    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 She says that her optometrist says she has some sort of tumor blocking her vision in her eyes  Respiratory denies shortness of breath  Cardiovascular denies chest pain, dyspnea on exertion  Gastrointestinal she has chronic nausea and vomiting for years.   Genitourinary denies incontinence  Musculoskeletal denies joint pain or swelling  Endocrine she has had significant recent weight loss from her vomiting  Hematology denies easy bruising or bleeding   Neurological as above in HPI      PHYSICAL EXAMINATION:      VITAL SIGNS:    Visit Vitals    /74 (BP 1 Location: Right arm, BP Patient Position: Sitting)    Pulse 75    Temp 97.9 °F (36.6 °C) (Oral)    Resp 17    Ht 5' 1\" (1.549 m)    Wt 55.9 kg (123 lb 3.2 oz)    SpO2 98%    BMI 23.28 kg/m2    BP laying down was 150/74    BP standing was 180/70    GENERAL: The patient is well developed, well nourished, and in no apparent distress. EXTREMITIES: No clubbing, cyanosis, or edema is identified. Pulses 2+ and symmetrical.  Muscle tone is normal.  HEAD:   Ear, nose, and throat appear to be without trauma. The patient is normocephalic. NEUROLOGIC EXAMINATION    MENTAL STATUS: The patient is awake, alert, and oriented x 4. Fund of knowledge is adequate. Speech is fluent and memory appears to be intact, both long and short term. She gives a very disjointed history, thorn from subject to subject in a very loose fashion  CRANIAL NERVES: II - Visual fields are full to confrontation. Funduscopic examination reveals flat disks bilaterally. Pupils are both 3 mm and briskly reactive to light and accommodation. III, IV, VI - Extraocular movements are intact and there is no nystagmus. V - Facial sensation is intact to pinprick and light touch. VII - Face is symmetrical.   VIII - Hearing is depressed but present. IX, X, XII- Palate rises symmetrically. Gag is present. Tongue is in the midline. XI - Shoulder shrugging and head turning intact  MOTOR:  The patient is 5-/5 in all four limbs without any drift. Fine finger movements are symmetrical.  Isolated motor group testing reveals no focal abnormalities. Tone is normal.  Sensory examination is intact to pinprick, light touch and position sense testing. Reflexes are 2+ and symmetrical. Plantars are down going. Cerebellar examination reveals no gross ataxia or dysmetria. Gait is abnormal, she has a narrow based gait, at times she staggers and almost falls. She doesn't use an assist device.        CBC:   Lab Results   Component Value Date/Time    WBC 6.8 03/06/2018 12:15 PM    RBC 3.69 (L) 03/06/2018 12:15 PM    HGB 10.4 (L) 03/06/2018 12:15 PM    HCT 34.5 (L) 03/06/2018 12:15 PM    PLATELET 679 66/96/2186 12:15 PM     BMP:   Lab Results   Component Value Date/Time    Glucose 97 03/06/2018 12:15 PM    Sodium 136 03/06/2018 12:15 PM    Potassium 4.9 03/06/2018 12:15 PM    Chloride 103 03/06/2018 12:15 PM    CO2 24 03/06/2018 12:15 PM    BUN 29 (H) 03/06/2018 12:15 PM    Creatinine 1.00 03/06/2018 12:15 PM    Calcium 9.0 03/06/2018 12:15 PM     CMP:   Lab Results   Component Value Date/Time    Glucose 97 03/06/2018 12:15 PM    Sodium 136 03/06/2018 12:15 PM    Potassium 4.9 03/06/2018 12:15 PM    Chloride 103 03/06/2018 12:15 PM    CO2 24 03/06/2018 12:15 PM    BUN 29 (H) 03/06/2018 12:15 PM    Creatinine 1.00 03/06/2018 12:15 PM    Calcium 9.0 03/06/2018 12:15 PM    Anion gap 9 03/06/2018 12:15 PM    BUN/Creatinine ratio 29 (H) 03/06/2018 12:15 PM    Alk. phosphatase 119 (H) 03/06/2018 12:15 PM    Protein, total 7.2 03/06/2018 12:15 PM    Albumin 3.9 03/06/2018 12:15 PM    Globulin 3.3 03/06/2018 12:15 PM    A-G Ratio 1.2 03/06/2018 12:15 PM     Coagulation:   Lab Results   Component Value Date/Time    Prothrombin time 11.9 10/08/2011 02:05 AM    INR 0.9 10/08/2011 02:05 AM    aPTT 29.3 10/08/2011 02:05 AM          Impression: Very complicated situation in this patient who has a clear gait abnormality, recent severe nausea, episodes of vomiting, falling when she is feeling weak, near syncope, true syncope. Additionally she complains of some drooling recently and some cognitive problems that I cannot corroborate with testing at this time. Her history is all over the place! Is impossible to tell what her acute problems are and what is chronic. At one point to get a history that she has been vomiting for decades. It is virtually impossible to say what is acute but I think she is having a new gait disorder as well as some syncopal and near syncopal spells. This would be her most pressing problem. Also concerned that she is not competent to drive a motor vehicle considering her syncopal episodes, near syncopal episodes and her gait instability.   I advised her that she should not be driving a motor vehicle, she insisted that she is able to at this time. 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 She is 80 seems to have some cognitive problems and I think that is where she is just staggering around. She also has severe hearing loss which probably is adding to this problem. She has had some near syncope does not display any orthostasis. Did she have some seizure phenomena? Is her cognitive problems as a result of stroke? Is her gait instability as a result of the stroke? At this time am going to require an EEG to make sure she is not having any seizure activity    Plan: . I am going to send her to physical therapy in an attempt to make her safe for ambulation an MRI scan of the brain will also be obtained looking for stroke. I once again advised her that she should not be operating a motor vehicle but she insists that she is safe to do this. I reminded her that the [address] does not want anyone to examine alteration of consciousness drive vehicle for 6 months after that episode. She understands. I will see her back here in about 4 weeks. PLEASE NOTE:   This document has been produced using voice recognition software. Unrecognized errors in transcription may be present.

## 2018-05-15 NOTE — PROGRESS NOTES
Timi Guajardo is a 80 y.o., right handed female, with no history of diabetes or hypertension, who comes in with difficulty with walking. Please note she's a very difficult historian. She's fallen 3-4 times at home and in public 3 times. She seems to get a warm feeling around her waist while she's walking and then she gets a warm feeling in her head. She then feels as if she's going to pass out and go down to the ground. She has no recollection of passing out, but has found herself on the floor being attended to. She's also had 2 other episodes where she feels as if she's going to pass out. She doesn't realy go out, she just feels as if might pass out. She does continue to drive in spite of these spells. Her last spell was back in January. At home she's noticed she tends to fall after getting off the commode. She's been on the floor and has had to drag herself around her house in order to get herself up. She's had weakness episodes when she just can't walk. She had a period of time between 2017 and 2018 when she couldn't walk. She was vomiting on a regular basis and couldn't walk well enough to even get to the mailbox. During this time she was ordering food into the house and she was essentially house bound. Of note is that she was having significant nausea and vomiting during the time that she couldn't walk. She's had much less in the way of vomiting and has felt stronger and hd less falling. She's been having chronic vomiting since . She's been feeling weak chronically for decades. She also has noticed some intermittent drooling. She's done this for many years, but it seems worse recently. Social History; she's , lives alone. She doesn't smoke, drink nor use illicit drugs. Worked as a teenager, never as a adult. Family History; mother  from heart disease, had diabetes. Father had heart disease.   Siblings had aneurysm, Parkinson's, traumatic falls, many had some type of walking trouble. Current Outpatient Prescriptions   Medication Sig Dispense Refill    cyanocobalamin (VITAMIN B12) 500 mcg tablet Take 1 Tab by mouth daily. Indications: PREVENTION OF VITAMIN B12 DEFICIENCY 30 Tab 2    simvastatin (ZOCOR) 5 mg tablet Take 1 Tab by mouth nightly. Indications: hypercholesterolemia 30 Tab 2    Cholecalciferol, Vitamin D3, (VITAMIN D3) 2,000 unit cap capsule Take 2,000 Units by mouth daily. Indications: Vitamin D Deficiency 90 Cap 1    raNITIdine (ZANTAC) 150 mg tablet Take 1 Tab by mouth two (2) times a day. 60 Tab 2    acetaminophen (TYLENOL EX STR ARTHRITIS PAIN) 500 mg tablet Take 1 Tab by mouth every six (6) hours as needed. (Patient taking differently: Take 500 mg by mouth every eight (8) hours as needed.) 60 Tab 1    diphenhydrAMINE (BENADRYL ALLERGY) 25 mg tablet Take 1 Tab by mouth every six (6) hours as needed.  (Patient taking differently: Take 50 mg by mouth every six (6) hours as needed.) 30 Tab 2       Past Medical History:   Diagnosis Date    Anemia NEC     Asthma     Colitis     DDD (degenerative disc disease), cervical     Fibrocystic breast     GERD (gastroesophageal reflux disease)     Headache     HTN (hypertension)     Hyperlipidemia     OA (osteoarthritis)     Osteoporosis     PAF (paroxysmal atrial fibrillation) ([facility] [address] 1995    Pneumonia     Thyroid nodule     Vitamin D deficiency 2/7/2011       Past Surgical History:   Procedure Laterality Date    BREAST SURGERY PROCEDURE UNLISTED      cysts removed    CARDIAC SURG PROCEDURE UNLIST      cardiac catherization    HX GYN      hysterectomy    HX HEENT      cataract surgery bilaterally    HX TOTAL ABDOMINAL HYSTERECTOMY         Allergies   Allergen Reactions    Latex, Natural Rubber Unknown (comments)    Asa-Acetaminophen-Caff-Potass Shortness of Breath     Patient is only allergic to ASA    Aspirin Shortness of Breath and Other (comments) Patient states this caused her stomach to bleed internal    Erythromycin Other (comments)     bleeding    Iodine And Iodide Containing Products Unknown (comments)    Lemon Unknown (comments)    Other Medication Unknown (comments)     Pt not sure of allergies states \"I'm allergic to more but not sure of name\"    Pcn [Penicillins] Swelling    Shellfish Containing Products Unknown (comments)    Sulfa (Sulfonamide Antibiotics) Unknown (comments)       Patient Active Problem List   Diagnosis Code    Thyroid nodule E04.1    Vitamin D deficiency E55.9    Hyperlipidemia E78.5    AR (allergic rhinitis) J30.9    GERD (gastroesophageal reflux disease) K21.9    SOB (shortness of breath) R06.02    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 She says that her optometrist says she has some sort of tumor blocking her vision in her eyes  Respiratory denies shortness of breath  Cardiovascular denies chest pain, dyspnea on exertion  Gastrointestinal she has chronic nausea and vomiting for years.   Genitourinary denies incontinence  Musculoskeletal denies joint pain or swelling  Endocrine she has had significant recent weight loss from her vomiting  Hematology denies easy bruising or bleeding   Neurological as above in HPI      PHYSICAL EXAMINATION:      VITAL SIGNS:    Visit Vitals    /74 (BP 1 Location: Right arm, BP Patient Position: Sitting)    Pulse 75    Temp 97.9 °F (36.6 °C) (Oral)    Resp 17    Ht 5' 1\" (1.549 m)    Wt 55.9 kg (123 lb 3.2 oz)    SpO2 98%    BMI 23.28 kg/m2    BP laying down was 150/74    BP standing was 180/70    GENERAL: The patient is well developed, well nourished, and in no apparent distress. EXTREMITIES: No clubbing, cyanosis, or edema is identified. Pulses 2+ and symmetrical.  Muscle tone is normal.  HEAD:   Ear, nose, and throat appear to be without trauma. The patient is normocephalic. NEUROLOGIC EXAMINATION    MENTAL STATUS: The patient is awake, alert, and oriented x 4. Fund of knowledge is adequate. Speech is fluent and memory appears to be intact, both long and short term. She gives a very disjointed history, thorn from subject to subject in a very loose fashion  CRANIAL NERVES: II - Visual fields are full to confrontation. Funduscopic examination reveals flat disks bilaterally. Pupils are both 3 mm and briskly reactive to light and accommodation. III, IV, VI - Extraocular movements are intact and there is no nystagmus. V - Facial sensation is intact to pinprick and light touch. VII - Face is symmetrical.   VIII - Hearing is depressed but present. IX, X, XII- Palate rises symmetrically. Gag is present. Tongue is in the midline. XI - Shoulder shrugging and head turning intact  MOTOR:  The patient is 5-/5 in all four limbs without any drift. Fine finger movements are symmetrical.  Isolated motor group testing reveals no focal abnormalities. Tone is normal.  Sensory examination is intact to pinprick, light touch and position sense testing. Reflexes are 2+ and symmetrical. Plantars are down going. Cerebellar examination reveals no gross ataxia or dysmetria. Gait is abnormal, she has a narrow based gait, at times she staggers and almost falls. She doesn't use an assist device.        CBC:   Lab Results   Component Value Date/Time    WBC 6.8 03/06/2018 12:15 PM    RBC 3.69 (L) 03/06/2018 12:15 PM    HGB 10.4 (L) 03/06/2018 12:15 PM    HCT 34.5 (L) 03/06/2018 12:15 PM    PLATELET 145 88/12/5734 12:15 PM     BMP:   Lab Results   Component Value Date/Time    Glucose 97 03/06/2018 12:15 PM    Sodium 136 03/06/2018 12:15 PM    Potassium 4.9 03/06/2018 12:15 PM    Chloride 103 03/06/2018 12:15 PM    CO2 24 03/06/2018 12:15 PM    BUN 29 (H) 03/06/2018 12:15 PM    Creatinine 1.00 03/06/2018 12:15 PM    Calcium 9.0 03/06/2018 12:15 PM     CMP:   Lab Results   Component Value Date/Time    Glucose 97 03/06/2018 12:15 PM    Sodium 136 03/06/2018 12:15 PM    Potassium 4.9 03/06/2018 12:15 PM    Chloride 103 03/06/2018 12:15 PM    CO2 24 03/06/2018 12:15 PM    BUN 29 (H) 03/06/2018 12:15 PM    Creatinine 1.00 03/06/2018 12:15 PM    Calcium 9.0 03/06/2018 12:15 PM    Anion gap 9 03/06/2018 12:15 PM    BUN/Creatinine ratio 29 (H) 03/06/2018 12:15 PM    Alk. phosphatase 119 (H) 03/06/2018 12:15 PM    Protein, total 7.2 03/06/2018 12:15 PM    Albumin 3.9 03/06/2018 12:15 PM    Globulin 3.3 03/06/2018 12:15 PM    A-G Ratio 1.2 03/06/2018 12:15 PM     Coagulation:   Lab Results   Component Value Date/Time    Prothrombin time 11.9 10/08/2011 02:05 AM    INR 0.9 10/08/2011 02:05 AM    aPTT 29.3 10/08/2011 02:05 AM          Impression: Very complicated situation in this patient who has a clear gait abnormality, recent severe nausea, episodes of vomiting, falling when she is feeling weak, near syncope, true syncope. Additionally she complains of some drooling recently and some cognitive problems that I cannot corroborate with testing at this time. Her history is all over the place! Is impossible to tell what her acute problems are and what is chronic. At one point to get a history that she has been vomiting for decades. It is virtually impossible to say what is acute but I think she is having a new gait disorder as well as some syncopal and near syncopal spells. This would be her most pressing problem. Also concerned that she is not competent to drive a motor vehicle considering her syncopal episodes, near syncopal episodes and her gait instability.   I advised her that she should not be driving a motor vehicle, she insisted that she is able to at this time. 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 She is 80 seems to have some cognitive problems and I think that is where she is just staggering around. She also has severe hearing loss which probably is adding to this problem. She has had some near syncope does not display any orthostasis. Did she have some seizure phenomena? Is her cognitive problems as a result of stroke? Is her gait instability as a result of the stroke? At this time am going to require an EEG to make sure she is not having any seizure activity    Plan: . I am going to send her to physical therapy in an attempt to make her safe for ambulation an MRI scan of the brain will also be obtained looking for stroke. I once again advised her that she should not be operating a motor vehicle but she insists that she is safe to do this. I reminded her that the [address] does not want anyone to examine alteration of consciousness drive vehicle for 6 months after that episode. She understands. I will see her back here in about 4 weeks. PLEASE NOTE:   This document has been produced using voice recognition software. Unrecognized errors in transcription may be present.

## 2018-05-17 ENCOUNTER — TELEPHONE (OUTPATIENT)
Dept: CARDIOLOGY CLINIC | Age: 83
End: 2018-05-17

## 2018-05-17 NOTE — TELEPHONE ENCOUNTER
----- Message from Macey Kline MD sent at 5/14/2018  8:31 AM EDT -----  Please let the patient know that her echocardiogram was normal.  Her stress test was mildly abnormal, but still not very concerning. She can see me back as scheduled in a few months.  ----- Message -----     From: Hector Kc RN     Sent: 5/14/2018   7:05 AM       To: Macey Kline MD         Paroxysmal atrial fibrillation. This was diagnosed over 20 years ago. She is in sinus rhythm today. A Holter monitor will be arranged as described above. At this point I do not think she is been on any anticoagulation unless she has any documented episodes.   I would also like to eval for any structural heart disease with an echocardiogram.

## 2018-05-18 ENCOUNTER — OFFICE VISIT (OUTPATIENT)
Dept: SURGERY | Age: 83
End: 2018-05-18

## 2018-05-18 VITALS
RESPIRATION RATE: 18 BRPM | SYSTOLIC BLOOD PRESSURE: 125 MMHG | TEMPERATURE: 97.9 F | WEIGHT: 123 LBS | DIASTOLIC BLOOD PRESSURE: 76 MMHG | HEIGHT: 61 IN | OXYGEN SATURATION: 98 % | BODY MASS INDEX: 23.22 KG/M2 | HEART RATE: 75 BPM

## 2018-05-18 DIAGNOSIS — E04.1 THYROID NODULE: Primary | ICD-10-CM

## 2018-05-18 NOTE — MR AVS SNAPSHOT
[address] [facility] [address] 
 
 
 [address] [facility] [address] [facility] [address] 
[phone] Patient: Cynthia Levy MRN: [id number] :1934 Visit Information Date & Time Provider Department Dept. Phone Encounter #  
 2018  2:00 PM Andres Kilpatrick [facility] [phone] [id number] Your Appointments 7/10/2018  9:00 AM  
Follow Up with Stefanie Joyce MD  
[employer] at [address] [facility] [address]) [address] [facility] [address] [facility] [address] [place] [address]  
  
    
 2018  3:40 PM  
Follow Up with Kat Saenz MD  
[facility] ([address]) Appt Note: 2-3 month follow up [facility] [place] [phone] [facility] [address] 2018  2:30 PM  
Office Visit with Rashid Nagel PA-C [facility] ([place]) Appt Note: 1 m fu bp; 4 m fu bp  
 [address], [place] [address] [address]  
[phone]  
  
   
 [address] Upcoming Health Maintenance Date Due  
 GLAUCOMA SCREENING Q2Y 1999 Influenza Age 5 to Adult 2018 Pneumococcal 65+ Low/Medium Risk (2 of 2 - PPSV23) 3/6/2019 MEDICARE YEARLY EXAM 3/7/2019 DTaP/Tdap/Td series (2 - Td) 3/6/2028 Allergies as of 2018  Review Complete On: 2018 By: Vernon Biggs LPN Severity Noted Reaction Type Reactions Latex, Natural Rubber  2012    Unknown (comments) Asa-acetaminophen-caff-potass  2011    Shortness of Breath Patient is only allergic to ASA Aspirin  2018    Shortness of Breath, Other (comments)  Patient states this caused her stomach to bleed internal  
 Erythromycin  02/07/2011    Other (comments) bleeding Iodine And Iodide Containing Products  08/05/2012    Unknown (comments) Lemon  09/17/2012   Not Verified Unknown (comments) Other Medication  08/05/2012    Unknown (comments) Pt not sure of allergies states \"I'm allergic to more but not sure of name\" Pcn [Penicillins]  02/07/2011    Swelling Shellfish Containing Products  09/17/2012    Unknown (comments) Sulfa (Sulfonamide Antibiotics)  02/07/2011    Unknown (comments) Current Immunizations  Reviewed on 9/5/2012 No immunizations on file. Not reviewed this visit Vitals BP Pulse Temp Resp Height(growth percentile) Weight(growth percentile) 125/76 75 97.9 °F (36.6 °C) (Oral) 18 5' 1\" (1.549 m) 123 lb (55.8 kg) SpO2 BMI OB Status Smoking Status 98% 23.24 kg/m2 Hysterectomy Never Smoker Vitals History BMI and BSA Data Body Mass Index Body Surface Area  
 23.24 kg/m 2 1.55 m 2 Preferred Pharmacy Pharmacy Name Phone [address] [place] [address] [place] [address] [phone] [facility] [phone] Your Updated Medication List  
  
   
This list is accurate as of 5/18/18  2:40 PM.  Always use your most recent med list.  
  
  
  
  
 acetaminophen 500 mg tablet Commonly known as:  TYLENOL EX STR ARTHRITIS PAIN Take 1 Tab by mouth every six (6) hours as needed. Cholecalciferol (Vitamin D3) 2,000 unit Cap capsule Commonly known as:  VITAMIN D3 Take 2,000 Units by mouth daily. Indications: Vitamin D Deficiency  
  
 cyanocobalamin 500 mcg tablet Commonly known as:  VITAMIN B12 Take 1 Tab by mouth daily. Indications: PREVENTION OF VITAMIN B12 DEFICIENCY  
  
 diphenhydrAMINE 25 mg tablet Commonly known as:  BENADRYL ALLERGY Take 1 Tab by mouth every six (6) hours as needed. raNITIdine 150 mg tablet Commonly known as:  ZANTAC Take 1 Tab by mouth two (2) times a day. simvastatin 5 mg tablet Commonly known as:  ZOCOR Take 1 Tab by mouth nightly. Indications: hypercholesterolemia Introducing [facility]! [employer] introduces [facility] patient portal. Now you can access parts of your medical record, email your doctor's office, and request medication refills online. 1. In your internet browser, go to [email or web address] 2. Click on the First Time User? Click Here link in the Sign In box. You will see the New Member Sign Up page. 3. Enter your [facility] Access Code exactly as it appears below. You will not need to use this code after youve completed the sign-up process. If you do not sign up before the expiration date, you must request a new code. · [facility] Access Code: [email or web address]- Expires: 5/28/2018  2:38 PM 
 
4. Enter the last four digits of your Social Security Number (xxxx) and Date of Birth (mm/dd/yyyy) as indicated and click Submit. You will be taken to the next sign-up page. 5. Create a [facility] ID. This will be your [facility] login ID and cannot be changed, so think of one that is secure and easy to remember. 6. Create a [facility] password. You can change your password at any time. 7. Enter your Password Reset Question and Answer. This can be used at a later time if you forget your password. 8. Enter your e-mail address. You will receive e-mail notification when new information is available in [address]. 9. Click Sign Up. You can now view and download portions of your medical record. 10. Click the Download Summary menu link to download a portable copy of your medical information. If you have questions, please visit the Frequently Asked Questions section of the [facility] website. Remember, [facility] is NOT to be used for urgent needs. For medical emergencies, dial 911. Now available from your iPhone and Android! Please provide this summary of care documentation to your next provider. Your primary care clinician is listed as Malia Brown. If you have any questions after today's visit, please call [phone].

## 2018-05-18 NOTE — PROGRESS NOTES
Progress Note    Patient: Girard Babinski  MRN: [id number]  SSN: [id number]   YOB: 1934  Age: 80 y.o. Sex: female     Chief Complaint   Patient presents with    Thyroid Problem       HPI    Ms. Michael Lowe is an 80-year-old woman who presents with multiple bilateral thyroid nodules all of which are under a centimeter. She thinks she has had these for a long time but there is no documentation of that. She has never had head neck radiation or relatives with thyroid cancer. One of the nodules is rated TiRads 5 however it is below a centimeter in size.   She is completely asymptomatic from this    Past Medical History:   Diagnosis Date    Anemia NEC     Asthma     Colitis     DDD (degenerative disc disease), cervical     Fibrocystic breast     GERD (gastroesophageal reflux disease)     Headache     HTN (hypertension)     Hyperlipidemia     OA (osteoarthritis)     Osteoporosis     PAF (paroxysmal atrial fibrillation) ([facility] [address] 1995    Pneumonia     Thyroid nodule     Vitamin D deficiency 2/7/2011     Past Surgical History:   Procedure Laterality Date    BREAST SURGERY PROCEDURE UNLISTED      cysts removed    CARDIAC SURG PROCEDURE UNLIST      cardiac catherization    HX GYN      hysterectomy    HX HEENT      cataract surgery bilaterally    HX TOTAL ABDOMINAL HYSTERECTOMY       Allergies   Allergen Reactions    Latex, Natural Rubber Unknown (comments)    Asa-Acetaminophen-Caff-Potass Shortness of Breath     Patient is only allergic to ASA    Aspirin Shortness of Breath and Other (comments)     Patient states this caused her stomach to bleed internal    Erythromycin Other (comments)     bleeding    Iodine And Iodide Containing Products Unknown (comments)    Lemon Unknown (comments)    Other Medication Unknown (comments)     Pt not sure of allergies states \"I'm allergic to more but not sure of name\"    Pcn [Penicillins] Swelling    Shellfish Containing Products Unknown (comments)  Sulfa (Sulfonamide Antibiotics) Unknown (comments)     Current Outpatient Prescriptions   Medication Sig Dispense Refill    cyanocobalamin (VITAMIN B12) 500 mcg tablet Take 1 Tab by mouth daily. Indications: PREVENTION OF VITAMIN B12 DEFICIENCY 30 Tab 2    simvastatin (ZOCOR) 5 mg tablet Take 1 Tab by mouth nightly. Indications: hypercholesterolemia 30 Tab 2    Cholecalciferol, Vitamin D3, (VITAMIN D3) 2,000 unit cap capsule Take 2,000 Units by mouth daily. Indications: Vitamin D Deficiency 90 Cap 1    raNITIdine (ZANTAC) 150 mg tablet Take 1 Tab by mouth two (2) times a day. 60 Tab 2    acetaminophen (TYLENOL EX STR ARTHRITIS PAIN) 500 mg tablet Take 1 Tab by mouth every six (6) hours as needed. (Patient taking differently: Take 500 mg by mouth every eight (8) hours as needed.) 60 Tab 1    diphenhydrAMINE (BENADRYL ALLERGY) 25 mg tablet Take 1 Tab by mouth every six (6) hours as needed. (Patient taking differently: Take 50 mg by mouth every six (6) hours as needed.) 30 Tab 2     Social History     Social History    Marital status:      Spouse name: N/A    Number of children: N/A    Years of education: N/A     Occupational History    Not on file.      Social History Main Topics    Smoking status: Never Smoker    Smokeless tobacco: Never Used    Alcohol use No    Drug use: No    Sexual activity: Not on file     Other Topics Concern    Not on file     Social History Narrative     Family History   Problem Relation Age of Onset    Hypertension Other     Diabetes Other     Heart Disease Other     Cancer Other      stomach & colon    Diabetes Mother     Heart Attack Mother     Stroke Mother     Heart Attack Father     Heart Failure Father     Arthritis-rheumatoid Father          Review of systems:  Patient denies any reflux, emesis, abdominal pain, change in bowel habits, hematochezia, melena, fever, weight loss, fatigue chills, dermatitis, abnormal moles, change in vision, vertigo, epistaxis, dysphagia, hoarseness, chest pain, palpitations, hypertension, edema, cough, shortness of breath, wheezing, hemoptysis, snoring, hematuria, diabetes, thyroid disease, anemia, bruising, history of blood transfusion, dizziness, headache, or fainting. Physical Examination    Well developed well nourished female in no apparent distress  Visit Vitals    /76    Pulse 75    Temp 97.9 °F (36.6 °C) (Oral)    Resp 18    Ht 5' 1\" (1.549 m)    Wt 55.8 kg (123 lb)    SpO2 98%    BMI 23.24 kg/m2      Head: normocephalic, atraumatic  Mouth: Clear, no overt lesions, oral mucosa pink and moist  Neck: supple, no masses, no adenopathy or carotid bruits, trachea midline  Resp: clear to auscultation bilaterally, no wheeze, rhonchi or rales, excursions normal and symmetrical  Cardio: Regular rate and rhythm, no murmurs, clicks, gallops or rubs, no edema or varicosities  Abdomen: soft, nontender, nondistended, normoactive bowel sounds, no hernias, no hepatosplenomegaly,   Back: Deferred  Extremeties: warm, well-perfused, no tenderness or swelling, normal gait/station  Neuro: sensation and strength grossly intact and symmetrical  Psych: alert and oriented to person, place and time  Breast exam deferred    IMPRESSION  Multiple small bilateral thyroid nodules with mixed cysts    PLAN  No orders of the defined types were placed in this encounter.     Follow-up in 6 months with ultrasound  Corine Becker MD

## 2018-05-21 NOTE — PROGRESS NOTES
Per your last note\" Heart palpitations. Since his occurring daily, I recommended a 48 hour Holter monitor to evaluate for any significant arrhythmias. She does have a remote history of atrial fibrillation.

## 2018-05-24 ENCOUNTER — TELEPHONE (OUTPATIENT)
Dept: CARDIOLOGY CLINIC | Age: 83
End: 2018-05-24

## 2018-05-24 NOTE — TELEPHONE ENCOUNTER
----- Message from Margi Nguyen MD sent at 5/21/2018 11:45 AM EDT -----  Please let the patient and her that her Holter monitor was normal  ----- Message -----     From: Deepak Gamez RN     Sent: 5/21/2018   8:31 AM       To: Margi Nguyen MD    Per your last note\" Heart palpitations. Since his occurring daily, I recommended a 48 hour Holter monitor to evaluate for any significant arrhythmias.   She does have a remote history of atrial fibrillation.

## 2018-07-12 ENCOUNTER — HOSPITAL ENCOUNTER (OUTPATIENT)
Dept: MRI IMAGING | Age: 83
Discharge: HOME | End: 2018-07-12
Attending: PSYCHIATRY & NEUROLOGY
Payer: MEDICARE

## 2018-07-12 ENCOUNTER — HOSPITAL ENCOUNTER (OUTPATIENT)
Dept: NEUROLOGY | Age: 83
Discharge: HOME | End: 2018-07-12
Attending: PSYCHIATRY & NEUROLOGY
Payer: MEDICARE

## 2018-07-12 DIAGNOSIS — R26.9 GAIT ABNORMALITY: ICD-10-CM

## 2018-07-12 DIAGNOSIS — R41.89 COGNITIVE DECLINE: ICD-10-CM

## 2018-07-12 DIAGNOSIS — R55 VASOVAGAL SYNCOPE: ICD-10-CM

## 2018-07-12 PROCEDURE — 70551 MRI BRAIN STEM W/O DYE: CPT

## 2018-07-12 PROCEDURE — 95816 EEG AWAKE AND DROWSY: CPT

## 2018-07-12 PROCEDURE — 95819 EEG AWAKE AND ASLEEP: CPT

## 2018-07-13 NOTE — PROCEDURES
[address]  MR#: [id number]  : 1934  ACCOUNT #: [de-identified]   DATE OF SERVICE: 2018    REFERRING PHYSICIAN:  Diamante Conway. Kamala Sneed MD    EEG MICHAELJuliocesar Sharma     CLINICAL:  This is an apparently wakeful, drowsy and sleep EEG on this 80year-old patient who has had spells of nausea, vomiting, falling, feeling weak, near syncope and true syncope. She is also complaining of cognitive problems. MEDICATIONS:  Include Zocor, Zantac and Benadryl. ELECTROENCEPHALOGRAPHY REPORT:  The predominant apparently wakeful background consists of 10-15 microvolt, irregular but symmetrical, 10-11 Hz waves which attenuate well with eye opening. Bifrontal 3-5 microvolt, 16-20 Hz waves are seen that spread somewhat into the central and temporal head regions. Step flash photic stimulation was performed that caused symmetrical driving between 3 and 18 flashes per second. IMPRESSION:  This was a normal wakeful electroencephalogram.  No epileptiform or focal abnormality was identified.       MD REJI Ortiz / SN  D: 2018 16:59     T: 2018 19:24  JOB #: [id number]

## 2018-09-21 ENCOUNTER — OFFICE VISIT (OUTPATIENT)
Dept: FAMILY MEDICINE CLINIC | Age: 83
End: 2018-09-21

## 2018-09-21 VITALS
HEIGHT: 61 IN | DIASTOLIC BLOOD PRESSURE: 79 MMHG | SYSTOLIC BLOOD PRESSURE: 109 MMHG | WEIGHT: 127 LBS | BODY MASS INDEX: 23.98 KG/M2 | OXYGEN SATURATION: 98 % | TEMPERATURE: 98.4 F | HEART RATE: 84 BPM | RESPIRATION RATE: 20 BRPM

## 2018-09-21 DIAGNOSIS — E78.00 PURE HYPERCHOLESTEROLEMIA: Primary | ICD-10-CM

## 2018-09-21 DIAGNOSIS — F41.9 ANXIETY: ICD-10-CM

## 2018-09-21 DIAGNOSIS — G47.00 INSOMNIA, UNSPECIFIED TYPE: ICD-10-CM

## 2018-09-21 DIAGNOSIS — K21.9 GASTROESOPHAGEAL REFLUX DISEASE, ESOPHAGITIS PRESENCE NOT SPECIFIED: ICD-10-CM

## 2018-09-21 RX ORDER — HYDROXYZINE PAMOATE 25 MG/1
25 CAPSULE ORAL
Qty: 45 CAP | Refills: 0 | OUTPATIENT
Start: 2018-09-21 | End: 2021-01-05

## 2018-09-21 RX ORDER — SIMVASTATIN 5 MG/1
5 TABLET, FILM COATED ORAL
Qty: 30 TAB | Refills: 2 | Status: CANCELLED | OUTPATIENT
Start: 2018-09-21

## 2018-09-21 NOTE — PATIENT INSTRUCTIONS

## 2018-09-21 NOTE — PROGRESS NOTES
Chief Complaint   Patient presents with    Hypertension    Cholesterol Problem     1. Have you been to the ER, urgent care clinic since your last visit? Hospitalized since your last visit? No    2. Have you seen or consulted any other health care providers outside of the [address] [place] [address] since your last visit? Include any pap smears or colon screening.  No

## 2018-09-21 NOTE — PROGRESS NOTES
Subjective:   Jamar Limon is a 80 y.o. female with hyperlipidemia presents for 3 months follow up. 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 Indications: PREVENTION OF VITAMIN B12 DEFICIENCY 30 Tab 2    simvastatin (ZOCOR) 5 mg tablet Take 1 Tab by mouth nightly. Indications: hypercholesterolemia 30 Tab 2    Cholecalciferol, Vitamin D3, (VITAMIN D3) 2,000 unit cap capsule Take 2,000 Units by mouth daily. Indications: Vitamin D Deficiency 90 Cap 1    raNITIdine (ZANTAC) 150 mg tablet Take 1 Tab by mouth two (2) times a day. 60 Tab 2    acetaminophen (TYLENOL EX STR ARTHRITIS PAIN) 500 mg tablet Take 1 Tab by mouth every six (6) hours as needed. (Patient taking differently: Take 500 mg by mouth every eight (8) hours as needed.) 60 Tab 1    diphenhydrAMINE (BENADRYL ALLERGY) 25 mg tablet Take 1 Tab by mouth every six (6) hours as needed.  (Patient taking differently: Take 50 mg by mouth every six (6) hours as needed.) 30 Tab 2      Allergies   Allergen Reactions    Latex, Natural Rubber Unknown (comments)    Asa-Acetaminophen-Caff-Potass Shortness of Breath     Patient is only allergic to ASA    Aspirin Shortness of Breath and Other (comments)     Patient states this caused her stomach to bleed internal    Erythromycin Other (comments)     bleeding    Iodine And Iodide Containing Products Unknown (comments)    Lemon Unknown (comments)    Other Medication Unknown (comments)     Pt not sure of allergies states \"I'm allergic to more but not sure of name\"    Pcn [Penicillins] Swelling    Shellfish Containing Products Unknown (comments)    Sulfa (Sulfonamide Antibiotics) Unknown (comments)     Past Surgical History:   Procedure Laterality Date    BREAST SURGERY PROCEDURE UNLISTED      cysts removed    CARDIAC SURG PROCEDURE UNLIST      cardiac catherization    HX GYN      hysterectomy    HX HEENT      cataract surgery bilaterally    HX TOTAL ABDOMINAL HYSTERECTOMY       Family History   Problem Relation Age of Onset    Hypertension Other     Diabetes Other     Heart Disease Other     Cancer Other      stomach & colon    Diabetes Mother     Heart Attack Mother     Stroke Mother     Heart Attack Father     Heart Failure Father     Arthritis-rheumatoid Father       Lab Results   Component Value Date/Time    Cholesterol, total 238 (H) 03/06/2018 12:15 PM    HDL Cholesterol 48 03/06/2018 12:15 PM    LDL, calculated 148.2 (H) 03/06/2018 12:15 PM    VLDL, calculated 41.8 03/06/2018 12:15 PM    Triglyceride 209 (H) 03/06/2018 12:15 PM    CHOL/HDL Ratio 5.0 03/06/2018 12:15 PM     Lab Results   Component Value Date/Time    Sodium 136 03/06/2018 12:15 PM    Potassium 4.9 03/06/2018 12:15 PM    Chloride 103 03/06/2018 12:15 PM    CO2 24 03/06/2018 12:15 PM    Anion gap 9 03/06/2018 12:15 PM    Glucose 97 03/06/2018 12:15 PM    BUN 29 (H) 03/06/2018 12:15 PM    Creatinine 1.00 03/06/2018 12:15 PM    BUN/Creatinine ratio 29 (H) 03/06/2018 12:15 PM    GFR est AA >60 03/06/2018 12:15 PM    GFR est non-AA 53 (L) 03/06/2018 12:15 PM    Calcium 9.0 03/06/2018 12:15 PM    Bilirubin, total 0.2 03/06/2018 12:15 PM    AST (SGOT) 11 (L) 03/06/2018 12:15 PM    Alk.  phosphatase 119 (H) 03/06/2018 12:15 PM    Protein, total 7.2 03/06/2018 12:15 PM    Albumin 3.9 03/06/2018 12:15 PM    Globulin 3.3 03/06/2018 12:15 PM    A-G Ratio 1.2 03/06/2018 12:15 PM    ALT (SGPT) 13 03/06/2018 12:15 PM     Lab Results   Component Value Date/Time    WBC 6.8 03/06/2018 12:15 PM    HGB 10.4 (L) 03/06/2018 12:15 PM    HCT 34.5 (L) 03/06/2018 12:15 PM    PLATELET 911 37/71/2360 12:15 PM    MCV 93.5 03/06/2018 12:15 PM     Wt Readings from Last 3 Encounters:   09/21/18 127 lb (57.6 kg)   05/18/18 123 lb (55.8 kg)   05/15/18 123 lb 3.2 oz (55.9 kg)     Last Point of Care HGB A1C  No results found for: [id number]   BP Readings from Last 3 Encounters:   09/21/18 109/79   05/18/18 125/76   05/15/18 122/74       Hypertension ROS: no medication side effects noted, no TIA's, no chest pain on exertion, no dyspnea on exertion, no swelling of ankles. Review of Systems - General ROS: negative  Neurological ROS: no TIA or stroke symptoms  positive for - dizziness and gait disturbance    New concerns: patient states she continues to have trouble with sleeping, memory and balance. Comments symptoms have been present at least a year. states she does have a neurologist and will get results from neurologist  On 9/27/18. Patient states she would like to have something for her 'nerves' comments she would like to have something to help her as she is also not sleeping and feels this is also causing her increased anxiety.     Objective:   Visit Vitals    /79 (BP 1 Location: Left arm, BP Patient Position: Sitting)    Pulse 84    Temp 98.4 °F (36.9 °C) (Oral)    Resp 20    Ht 5' 1\" (1.549 m)    Wt 127 lb (57.6 kg)    SpO2 98%    BMI 24 kg/m2      General appearance - alert, well appearing, and in no distress  Neck - supple, no significant adenopathy, carotids upstroke normal bilaterally, no bruits  Chest - clear to auscultation, no wheezes, rales or rhonchi, symmetric air entry  Heart - normal rate, regular rhythm, normal S1, S2, no murmurs, rubs, clicks or gallops  Extremities - peripheral pulses normal, no pedal edema, no clubbing or cyanosis      Assessment/Plan:      ICD-10-CM ICD-9-CM    1. Pure hypercholesterolemia E78.00 272.0 simvastatin (ZOCOR) 5 mg tablet   2. Gastroesophageal reflux disease, esophagitis presence not specified K21.9 530.81    3. Anxiety F41.9 300.00    4. Insomnia, unspecified type G47.00 780.52      I have discussed the diagnosis with the patient and the intended plan as seen in the above orders. The patient has received an after-visit summary and questions were answered concerning future plans. I have discussed medication side effects and warnings with the patient as well. Patient agreeable with above plan and verbalizes understanding. Follow-up Disposition:  Return in about 4 weeks (around 10/19/2018) for anxiety.

## 2018-09-21 NOTE — MR AVS SNAPSHOT
[address] [facility] [address] 
 
 
 [address] [address] 
[phone] Patient: Sugey Oakley MRN: [id number] :1934 Visit Information Date & Time Provider Department Dept. Phone Encounter #  
 2018  2:20 PM Amanda Burciaga, [address] [facility] [address] [id number] Follow-up Instructions Return in about 4 weeks (around 10/19/2018) for anxiety. Your Appointments 2018  3:40 PM  
Follow Up with Leonidas Salvador MD  
[facility] Appt Note: F/up after ED and testing//see [address] [facility] [address] [id number] [address] [address] [phone] [address] [facility] [address] [address]  
  
    
 2018  1:30 PM  
Follow Up with Boy Amin MD  
[address] [facility]: 6 MONTH FU  
 [address] [place] [id number]  
[phone]  
  
   
 [address] [facility] [address] Upcoming Health Maintenance Date Due  
 GLAUCOMA SCREENING Q2Y 1999 Influenza Age 5 to Adult 2018 Pneumococcal 65+ Low/Medium Risk (2 of 2 - PPSV23) 3/6/2019 MEDICARE YEARLY EXAM 3/7/2019 DTaP/Tdap/Td series (2 - Td) 3/6/2028 Allergies as of 2018  Review Complete On: 2018 By: Amanda Burciaga NP Severity Noted Reaction Type Reactions Latex, Natural Rubber  2012    Unknown (comments) Asa-acetaminophen-caff-potass  2011    Shortness of Breath Patient is only allergic to ASA Aspirin  2018    Shortness of Breath, Other (comments) Patient states this caused her stomach to bleed internal  
 Erythromycin  2011    Other (comments) bleeding Iodine And Iodide Containing Products  2012    Unknown (comments) Lemon  2012   Not Verified Unknown (comments) Other Medication  08/05/2012    Unknown (comments) Pt not sure of allergies states \"I'm allergic to more but not sure of name\" Pcn [Penicillins]  02/07/2011    Swelling Shellfish Containing Products  09/17/2012    Unknown (comments) Sulfa (Sulfonamide Antibiotics)  02/07/2011    Unknown (comments) Current Immunizations  Reviewed on 9/5/2012 No immunizations on file. Not reviewed this visit You Were Diagnosed With   
  
 Codes Comments Pure hypercholesterolemia    -  Primary ICD-10-CM: E78.00 ICD-9-CM: 272.0 Gastroesophageal reflux disease, esophagitis presence not specified     ICD-10-CM: K21.9 ICD-9-CM: 530.81 Anxiety     ICD-10-CM: F41.9 ICD-9-CM: 300.00 Insomnia, unspecified type     ICD-10-CM: G47.00 ICD-9-CM: 780.52 Vitals BP Pulse Temp Resp Height(growth percentile) Weight(growth percentile) 109/79 (BP 1 Location: Left arm, BP Patient Position: Sitting) 84 98.4 °F (36.9 °C) (Oral) 20 5' 1\" (1.549 m) 127 lb (57.6 kg) SpO2 BMI OB Status Smoking Status 98% 24 kg/m2 Hysterectomy Never Smoker BMI and BSA Data Body Mass Index Body Surface Area  
 24 kg/m 2 1.57 m 2 Preferred Pharmacy Pharmacy Name Phone [address] [place] [phone] [facility] [phone] Your Updated Medication List  
  
   
This list is accurate as of 9/21/18  4:20 PM.  Always use your most recent med list.  
  
  
  
  
 acetaminophen 500 mg tablet Commonly known as:  TYLENOL EX STR ARTHRITIS PAIN Take 1 Tab by mouth every six (6) hours as needed. Cholecalciferol (Vitamin D3) 2,000 unit Cap capsule Commonly known as:  VITAMIN D3 Take 2,000 Units by mouth daily. Indications: Vitamin D Deficiency  
  
 cyanocobalamin 500 mcg tablet Commonly known as:  VITAMIN B12 Take 1 Tab by mouth daily. Indications: PREVENTION OF VITAMIN B12 DEFICIENCY  
  
 diphenhydrAMINE 25 mg tablet Commonly known as:  BENADRYL ALLERGY Take 1 Tab by mouth every six (6) hours as needed. hydrOXYzine pamoate 25 mg capsule Commonly known as:  VISTARIL Take 1 Cap by mouth three (3) times daily as needed for Anxiety. raNITIdine 150 mg tablet Commonly known as:  ZANTAC Take 1 Tab by mouth two (2) times a day. simvastatin 5 mg tablet Commonly known as:  ZOCOR Take 1 Tab by mouth nightly. Indications: hypercholesterolemia Prescriptions Sent to Pharmacy Refills  
 hydrOXYzine pamoate (VISTARIL) 25 mg capsule 0 Sig: Take 1 Cap by mouth three (3) times daily as needed for Anxiety. Class: Normal  
 Pharmacy: [address] [place] [address] #: [phone] Route: Oral  
  
Follow-up Instructions Return in about 4 weeks (around 10/19/2018) for anxiety. To-Do List   
 11/14/2018 4:00 PM  
  Appointment with [facility] 1 at [address] [place] [address] ([phone]) OUTSIDE FILMS  - Any outside films related to the study being scheduled should be brought with you on the day of the exam.  If this cannot be done there may be a delay in the reading of the study. MEDICATIONS  - Patient must bring a complete list of all medications currently taking to include prescriptions, over-the-counter meds, herbals, vitamins & any dietary supplements Patient Instructions Learning About Anxiety Disorders What are anxiety disorders? Anxiety disorders are a type of medical problem. They cause severe anxiety. When you feel anxious, you feel that something bad is about to happen. This feeling interferes with your life. These disorders include: · Generalized anxiety disorder. You feel worried and stressed about many everyday events and activities. This goes on for several months and disrupts your life on most days. · Panic disorder. You have repeated panic attacks. A panic attack is a sudden, intense fear or anxiety. It may make you feel short of breath. Your heart may pound. · Social anxiety disorder. You feel very anxious about what you will say or do in front of people. For example, you may be scared to talk or eat in public. This problem affects your daily life. · Phobias. You are very scared of a specific object, situation, or activity. For example, you may fear spiders, high places, or small spaces. What are the symptoms? Generalized anxiety disorder Symptoms may include: · Feeling worried and stressed about many things almost every day. · Feeling tired or irritable. You may have a hard time concentrating. · Having headaches or muscle aches. · Having a hard time getting to sleep or staying asleep. Panic disorder You may have repeated panic attacks when there is no reason for feeling afraid. You may change your daily activities because you worry that you will have another attack. Symptoms may include: 
· Intense fear, terror, or anxiety. · Trouble breathing or very fast breathing. · Chest pain or tightness. · A heartbeat that races or is not regular. Social anxiety disorder Symptoms may include: · Fear about a social situation, such as eating in front of others or speaking in public. You may worry a lot. Or you may be afraid that something bad will happen. · Anxiety that can cause you to blush, sweat, and feel shaky. · A heartbeat that is faster than normal. 
· A hard time focusing. Phobias Symptoms may include: · More fear than most people of being around an object, being in a situation, or doing an activity. You might also be stressed about the chance of being around the thing you fear. · Worry about losing control, panicking, fainting, or having physical symptoms like a faster heartbeat when you are around the situation or object. How are these disorders treated? Anxiety disorders can be treated with medicines or counseling. A combination of both may be used. Medicines may include: · Antidepressants. These may help your symptoms by keeping chemicals in your brain in balance. · Benzodiazepines. These may give you short-term relief of your symptoms. Some people use cognitive-behavioral therapy. A therapist helps you learn to change stressful or bad thoughts into helpful thoughts. Lead a healthy lifestyle A healthy lifestyle may help you feel better. · Get at least 30 minutes of exercise on most days of the week. Walking is a good choice. · Eat a healthy diet. Include fruits, vegetables, lean proteins, and whole grains in your diet each day. · Try to go to bed at the same time every night. Try for 8 hours of sleep a night. · Find ways to manage stress. Try relaxation exercises. · Avoid alcohol and illegal drugs. Follow-up care is a key part of your treatment and safety. Be sure to make and go to all appointments, and call your doctor if you are having problems. It's also a good idea to know your test results and keep a list of the medicines you take. Where can you learn more? Go to http://zander-eden.info/. Enter K273 in the search box to learn more about \"Learning About Anxiety Disorders. \" Current as of: December 7, 2017 Content Version: 11.7 © 4379-1897 [employer]. Care instructions adapted under license by [employer] (which disclaims liability or warranty for this information). If you have questions about a medical condition or this instruction, always ask your healthcare professional. [place] [address] any warranty or liability for your use of this information. Please provide this summary of care documentation to your next provider. Your primary care clinician is listed as Joaquina Lane. If you have any questions after today's visit, please call [phone].

## 2018-09-27 ENCOUNTER — OFFICE VISIT (OUTPATIENT)
Dept: CARDIOLOGY CLINIC | Age: 83
End: 2018-09-27

## 2018-09-27 VITALS
BODY MASS INDEX: 23.98 KG/M2 | HEIGHT: 61 IN | WEIGHT: 127 LBS | OXYGEN SATURATION: 96 % | DIASTOLIC BLOOD PRESSURE: 72 MMHG | SYSTOLIC BLOOD PRESSURE: 124 MMHG | HEART RATE: 63 BPM

## 2018-09-27 DIAGNOSIS — R00.2 PALPITATIONS: Primary | ICD-10-CM

## 2018-09-27 DIAGNOSIS — R03.0 ELEVATED BLOOD PRESSURE READING WITHOUT DIAGNOSIS OF HYPERTENSION: ICD-10-CM

## 2018-09-27 DIAGNOSIS — R07.9 CHEST PAIN, UNSPECIFIED TYPE: ICD-10-CM

## 2018-09-27 NOTE — PROGRESS NOTES
HISTORY OF PRESENT ILLNESS  Sugey Oakley is a 80 y.o. female. Follow-up   Associated symptoms include chest pain. Pertinent negatives include no abdominal pain, no headaches and no shortness of breath. Patient presents for a follow-up office visit. She has a remote history of paroxysmal atrial fibrillation greater than 20 years ago. She was following with another cardiologist, but has not been seen in over 7-8 years. She also has a history of borderline hypertension, dyslipidemia, and GERD. The patient was last seen in our office approximately 5 months ago. Since last visit she underwent a Holter monitor which was essentially normal in May 2018. She also underwent an echocardiogram in May 2018 which showed preserved LV systolic function, EF 52-36% with severe left atrial enlargement. Lastly, her pharmacologic nuclear stress test from the month showed a normal left ventricular ejection fraction with a very mild distal inferior reversible defect which could represent branch vessel disease, but was overall a low risk study. Since last visit, she has been feeling better than she was last time. She does notice intermittent heart palpitations which feel like her heart is racing but the episodes are short-lived lasting less than a minute in duration. She also complains of intermittent chest pain which is difficult to describe can occur with both rest and exertion and usually last for minutes at most in duration. She states her activity level is better than it was at last visit.     Past Medical History:   Diagnosis Date    Anemia NEC     Asthma     Colitis     DDD (degenerative disc disease), cervical     Fibrocystic breast     GERD (gastroesophageal reflux disease)     Headache     HTN (hypertension)     Hyperlipidemia     OA (osteoarthritis)     Osteoporosis     PA[facility]ox[facility]) [facility]    Pneumonia     Thyroid nodule     Vitamin D deficiency 2/7/2011 Current Outpatient Prescriptions   Medication Sig Dispense Refill    raNITIdine (ZANTAC) 150 mg tablet Take 1 Tab by mouth two (2) times a day. 60 Tab 2    acetaminophen (TYLENOL EX STR ARTHRITIS PAIN) 500 mg tablet Take 1 Tab by mouth every six (6) hours as needed. (Patient taking differently: Take 500 mg by mouth every eight (8) hours as needed.) 60 Tab 1    diphenhydrAMINE (BENADRYL ALLERGY) 25 mg tablet Take 1 Tab by mouth every six (6) hours as needed. (Patient taking differently: Take 50 mg by mouth every six (6) hours as needed.) 30 Tab 2    hydrOXYzine pamoate (VISTARIL) 25 mg capsule Take 1 Cap by mouth three (3) times daily as needed for Anxiety. 45 Cap 0     Allergies   Allergen Reactions    Latex, Natural Rubber Unknown (comments)    Asa-Acetaminophen-Caff-Potass Shortness of Breath     Patient is only allergic to ASA    Aspirin Shortness of Breath and Other (comments)     Patient states this caused her stomach to bleed internal    Erythromycin Other (comments)     bleeding    Iodine And Iodide Containing Products Unknown (comments)    Lemon Unknown (comments)    Other Medication Unknown (comments)     Pt not sure of allergies states \"I'm allergic to more but not sure of name\"    Pcn [Penicillins] Swelling    Shellfish Containing Products Unknown (comments)    Sulfa (Sulfonamide Antibiotics) Unknown (comments)      Social History   Substance Use Topics    Smoking status: Never Smoker    Smokeless tobacco: Never Used    Alcohol use No     Family History   Problem Relation Age of Onset    Hypertension Other     Diabetes Other     Heart Disease Other     Cancer Other      stomach & colon    Diabetes Mother     Heart Attack Mother     Stroke Mother     Heart Attack Father     Heart Failure Father     Arthritis-rheumatoid Father        Review of Systems   Constitutional: Negative for chills, fever and weight loss. HENT: Negative for nosebleeds.     Eyes: Negative for blurred vision and double vision. Respiratory: Negative for cough, sputum production, shortness of breath and wheezing. Cardiovascular: Positive for chest pain and palpitations. Negative for orthopnea, claudication, leg swelling and PND. Gastrointestinal: Negative for abdominal pain, heartburn, nausea and vomiting. Genitourinary: Negative for dysuria and hematuria. Musculoskeletal: Negative for falls and myalgias. Skin: Negative for rash. Neurological: Negative for dizziness, focal weakness and headaches. Endo/Heme/Allergies: Does not bruise/bleed easily. Psychiatric/Behavioral: Negative for substance abuse. Visit Vitals    /72    Pulse 63    Ht 5' 1\" (1.549 m)    Wt 57.6 kg (127 lb)    SpO2 96%    BMI 24 kg/m2       Physical Exam   Constitutional: She is oriented to person, place, and time. She appears well-developed and well-nourished. HENT:   Head: Normocephalic and atraumatic. Eyes: Conjunctivae are normal.   Neck: Neck supple. No JVD present. Carotid bruit is not present. Cardiovascular: Normal rate, regular rhythm, S1 normal, S2 normal and normal pulses. Exam reveals distant heart sounds. Exam reveals no gallop. No murmur heard. Pulmonary/Chest: Effort normal and breath sounds normal. She has no wheezes. She has no rales. Abdominal: Soft. Bowel sounds are normal. There is no tenderness. Musculoskeletal: She exhibits no edema, tenderness or deformity. Neurological: She is alert and oriented to person, place, and time. Skin: Skin is warm and dry. Psychiatric: She has a normal mood and affect. Her behavior is normal. Thought content normal.     EKG: Normal sinus rhythm, normal axis, borderline voltage criteria for LVH, no ST or T-wave abnormalities concerning for ischemia. No change compared to previous EKG. ASSESSMENT and PLAN    Chest pain. This is not significantly changed in character since last visit.   She underwent a low risk pharmacologic nuclear stress test in May 2018. No further workup needed at this time. Borderline hypertension. Patient's blood pressure now appears well-controlled without therapy. Heart palpitations. Patient was found to have occasional atrial premature contractions on her Holter monitor from May 2018. No significant change in her symptoms. Paroxysmal atrial fibrillation. This was diagnosed over 20 years ago. She is in sinus rhythm today. A Holter monitor will be arranged as described above. At this point I do not think she is been on any anticoagulation unless she has any documented episodes. Normal left ventricular ejection fraction without valvular heart disease on an echocardiogram earlier this year. Follow-up annually, sooner if needed.

## 2018-09-27 NOTE — MR AVS SNAPSHOT
[facility] [address] [facility] [address] 
[phone] Patient: Britt Ferguson MRN: [id number] Visit Information Date & Time Provider Department Dept. Phone Encounter #  
 2018  1:40 PM Zakiya Lizarraga MD [facility] [address] [id number] Your Appointments 10/22/2018  1:40 PM  
Office Visit with Willian Fletcher NP [facility] (JAMES Lawson) Appt Note: f/u  
 [address], [facility] [address] [address]  
[phone]  
  
   
 [address], [place]  
  
    
 2018  1:30 PM  
Follow Up with Anders Marks MD  
[address] [facility] ([facility]) Appt Note: 6 MONTH FU  
 [address] [facility] [address] [facility] [address] [phone]  
  
   
 [address] [facility] [address] [facility] [address] Upcoming Health Maintenance Date Due Shingrix Vaccine Age 50> (1 of 2) 1984 GLAUCOMA SCREENING Q2Y 1999 Influenza Age 5 to Adult 2018 Pneumococcal 65+ Low/Medium Risk (2 of 2 - PPSV23) 3/6/2019 MEDICARE YEARLY EXAM 3/7/2019 DTaP/Tdap/Td series (2 - Td) 3/6/2028 Allergies as of 2018  Review Complete On: 2018 By: Willian Fletcher NP Severity Noted Reaction Type Reactions Latex, Natural Rubber  2012    Unknown (comments) Asa-acetaminophen-caff-potass  2011    Shortness of Breath Patient is only allergic to ASA Aspirin  2018    Shortness of Breath, Other (comments) Patient states this caused her stomach to bleed internal  
 Erythromycin  2011    Other (comments) bleeding Iodine And Iodide Containing Products  2012    Unknown (comments) Lemon  2012   Not Verified Unknown (comments) Other Medication  2012    Unknown (comments) Pt not sure of allergies states \"I'm allergic to more but not sure of name\" Pcn [Penicillins]  02/07/2011    Swelling Shellfish Containing Products  09/17/2012    Unknown (comments) Sulfa (Sulfonamide Antibiotics)  02/07/2011    Unknown (comments) Current Immunizations  Reviewed on 9/5/2012 No immunizations on file. Not reviewed this visit You Were Diagnosed With   
  
 Codes Comments Elevated blood pressure reading without diagnosis of hypertension    -  Primary ICD-10-CM: R03.0 ICD-9-CM: 796.2 Vitals BP Pulse Height(growth percentile) Weight(growth percentile) SpO2 BMI  
 124/72 63 5' 1\" (1.549 m) 127 lb (57.6 kg) 96% 24 kg/m2 OB Status Smoking Status Hysterectomy Never Smoker Vitals History BMI and BSA Data Body Mass Index Body Surface Area  
 24 kg/m 2 1.57 m 2 Preferred Pharmacy Pharmacy Name Phone [address], Benny Otero 17 [facility] 22 1700 [facility] [phone] Your Updated Medication List  
  
   
This list is accurate as of 9/27/18  4:07 PM.  Always use your most recent med list.  
  
  
  
  
 acetaminophen 500 mg tablet Commonly known as:  TYLENOL EX STR ARTHRITIS PAIN Take 1 Tab by mouth every six (6) hours as needed. diphenhydrAMINE 25 mg tablet Commonly known as:  BENADRYL ALLERGY Take 1 Tab by mouth every six (6) hours as needed. hydrOXYzine pamoate 25 mg capsule Commonly known as:  VISTARIL Take 1 Cap by mouth three (3) times daily as needed for Anxiety. raNITIdine 150 mg tablet Commonly known as:  ZANTAC Take 1 Tab by mouth two (2) times a day. We Performed the Following AMB POC EKG ROUTINE W/ 12 LEADS, INTER & REP [04666 CPT(R)] To-Do List   
 11/14/2018 4:00 PM  
  Appointment with [facility] RM 1 at [address] [place] [address] ([phone]) OUTSIDE FILMS  - Any outside films related to the study being scheduled should be brought with you on the day of the exam.  If this cannot be done there may be a delay in the reading of the study. MEDICATIONS  - Patient must bring a complete list of all medications currently taking to include prescriptions, over-the-counter meds, herbals, vitamins & any dietary supplements Please provide this summary of care documentation to your next provider. Your primary care clinician is listed as Rekha Clayton. If you have any questions after today's visit, please call [phone].

## 2018-09-27 NOTE — PROGRESS NOTES
Lizbeth Villa presents today for   Chief Complaint   Patient presents with    Follow-up     f/u after testing        Lizbeth Villa preferred language for health care discussion is english/other. Is someone accompanying this pt? no    Is the patient using any DME equipment during OV? no    Depression Screening:  PHQ over the last two weeks 9/27/2018   Little interest or pleasure in doing things Several days   Feeling down, depressed, irritable, or hopeless Several days   Total Score PHQ 2 2       Learning Assessment:  Learning Assessment 9/27/2018   PRIMARY LEARNER Patient   HIGHEST LEVEL OF EDUCATION - PRIMARY LEARNER  GRADUATED HIGH SCHOOL OR GED   BARRIERS PRIMARY LEARNER NONE   CO-LEARNER CAREGIVER No   PRIMARY LANGUAGE ENGLISH   LEARNER PREFERENCE PRIMARY READING   ANSWERED BY Patient   RELATIONSHIP SELF       Abuse Screening:  Abuse Screening Questionnaire 5/15/2018   Do you ever feel afraid of your partner? N   Are you in a relationship with someone who physically or mentally threatens you? N   Is it safe for you to go home? Y       Fall Risk  Fall Risk Assessment, last 12 mths 9/27/2018   Able to walk? Yes   Fall in past 12 months? No   Fall with injury? -   Number of falls in past 12 months -   Fall Risk Score -       Pt currently taking Anticoagulant therapy? no    Coordination of Care:  1. Have you been to the ER, urgent care clinic since your last visit? Hospitalized since your last visit? no    2. Have you seen or consulted any other health care providers outside of the [facility] since your last visit? Include any pap smears or colon screening.  no

## 2019-06-24 ENCOUNTER — HOSPITAL ENCOUNTER (EMERGENCY)
Age: 84
Discharge: HOME | End: 2019-06-25
Attending: EMERGENCY MEDICINE
Payer: MEDICARE

## 2019-06-24 DIAGNOSIS — R10.13 ABDOMINAL PAIN, EPIGASTRIC: Primary | ICD-10-CM

## 2019-06-24 PROCEDURE — 99283 EMERGENCY DEPT VISIT LOW MDM: CPT

## 2019-06-25 ENCOUNTER — APPOINTMENT (OUTPATIENT)
Dept: GENERAL RADIOLOGY | Age: 84
End: 2019-06-25
Attending: EMERGENCY MEDICINE
Payer: MEDICARE

## 2019-06-25 VITALS
RESPIRATION RATE: 18 BRPM | SYSTOLIC BLOOD PRESSURE: 154 MMHG | HEART RATE: 74 BPM | TEMPERATURE: 98 F | DIASTOLIC BLOOD PRESSURE: 59 MMHG | OXYGEN SATURATION: 100 %

## 2019-06-25 LAB
ALBUMIN SERPL-MCNC: 4.2 G/DL (ref 3.4–5)
ALBUMIN/GLOB SERPL: 1.1 {RATIO} (ref 0.8–1.7)
ALP SERPL-CCNC: 102 U/L (ref 45–117)
ALT SERPL-CCNC: 19 U/L (ref 13–56)
ANION GAP SERPL CALC-SCNC: 7 MMOL/L (ref 3–18)
AST SERPL-CCNC: 19 U/L (ref 15–37)
ATRIAL RATE: 85 BPM
BASOPHILS # BLD: 0 K/UL (ref 0–0.1)
BASOPHILS NFR BLD: 0 % (ref 0–2)
BILIRUB SERPL-MCNC: 0.4 MG/DL (ref 0.2–1)
BUN SERPL-MCNC: 24 MG/DL (ref 7–18)
BUN/CREAT SERPL: 28 (ref 12–20)
CALCIUM SERPL-MCNC: 9.4 MG/DL (ref 8.5–10.1)
CALCULATED P AXIS, ECG09: 54 DEGREES
CALCULATED R AXIS, ECG10: -4 DEGREES
CALCULATED T AXIS, ECG11: 45 DEGREES
CHLORIDE SERPL-SCNC: 105 MMOL/L (ref 100–108)
CK MB CFR SERPL CALC: 1.7 % (ref 0–4)
CK MB CFR SERPL CALC: 1.9 % (ref 0–4)
CK MB SERPL-MCNC: 3.2 NG/ML (ref 5–25)
CK MB SERPL-MCNC: 4.5 NG/ML (ref 5–25)
CK SERPL-CCNC: 185 U/L (ref 26–192)
CK SERPL-CCNC: 232 U/L (ref 26–192)
CO2 SERPL-SCNC: 27 MMOL/L (ref 21–32)
CREAT SERPL-MCNC: 0.87 MG/DL (ref 0.6–1.3)
DIAGNOSIS, 93000: NORMAL
DIFFERENTIAL METHOD BLD: ABNORMAL
EOSINOPHIL # BLD: 0 K/UL (ref 0–0.4)
EOSINOPHIL NFR BLD: 0 % (ref 0–5)
ERYTHROCYTE [DISTWIDTH] IN BLOOD BY AUTOMATED COUNT: 13.3 % (ref 11.6–14.5)
GLOBULIN SER CALC-MCNC: 3.8 G/DL (ref 2–4)
GLUCOSE SERPL-MCNC: 111 MG/DL (ref 74–99)
HCT VFR BLD AUTO: 32.8 % (ref 35–45)
HGB BLD-MCNC: 10.6 G/DL (ref 12–16)
LYMPHOCYTES # BLD: 1.2 K/UL (ref 0.9–3.6)
LYMPHOCYTES NFR BLD: 15 % (ref 21–52)
MCH RBC QN AUTO: 29.2 PG (ref 24–34)
MCHC RBC AUTO-ENTMCNC: 32.3 G/DL (ref 31–37)
MCV RBC AUTO: 90.4 FL (ref 74–97)
MONOCYTES # BLD: 0.3 K/UL (ref 0.05–1.2)
MONOCYTES NFR BLD: 4 % (ref 3–10)
NEUTS SEG # BLD: 6.2 K/UL (ref 1.8–8)
NEUTS SEG NFR BLD: 81 % (ref 40–73)
P-R INTERVAL, ECG05: 132 MS
PLATELET # BLD AUTO: 336 K/UL (ref 135–420)
PMV BLD AUTO: 9.2 FL (ref 9.2–11.8)
POTASSIUM SERPL-SCNC: 4.3 MMOL/L (ref 3.5–5.5)
PROT SERPL-MCNC: 8 G/DL (ref 6.4–8.2)
Q-T INTERVAL, ECG07: 400 MS
QRS DURATION, ECG06: 72 MS
QTC CALCULATION (BEZET), ECG08: 476 MS
RBC # BLD AUTO: 3.63 M/UL (ref 4.2–5.3)
SODIUM SERPL-SCNC: 139 MMOL/L (ref 136–145)
TROPONIN I SERPL-MCNC: <0.02 NG/ML (ref 0–0.04)
TROPONIN I SERPL-MCNC: <0.02 NG/ML (ref 0–0.04)
VENTRICULAR RATE, ECG03: 85 BPM
WBC # BLD AUTO: 7.7 K/UL (ref 4.6–13.2)

## 2019-06-25 PROCEDURE — 71045 X-RAY EXAM CHEST 1 VIEW: CPT

## 2019-06-25 PROCEDURE — 85025 COMPLETE CBC W/AUTO DIFF WBC: CPT

## 2019-06-25 PROCEDURE — 74011250637 HC RX REV CODE- 250/637: Performed by: EMERGENCY MEDICINE

## 2019-06-25 PROCEDURE — 80053 COMPREHEN METABOLIC PANEL: CPT

## 2019-06-25 PROCEDURE — 82550 ASSAY OF CK (CPK): CPT

## 2019-06-25 PROCEDURE — 93005 ELECTROCARDIOGRAM TRACING: CPT

## 2019-06-25 RX ADMIN — ALUMINUM HYDROXIDE AND MAGNESIUM HYDROXIDE 15 ML: 200; 200 SUSPENSION ORAL at 05:52

## 2019-06-25 NOTE — ED PROVIDER NOTES
EMERGENCY DEPARTMENT HISTORY AND PHYSICAL EXAM  This was created with voice recognition software and transcription errors may be present. 3:30 AM  Date: 6/24/2019  Patient Name: Erasto Smith    History of Presenting Illness     Chief Complaint:    History Provided By:     HPI: Erasto Smith is a 80 y.o. female with a past medical history of anemia asthma colitis reflux headache hypertension hyperlipidemia paroxysmal A. fib and pneumonia who presents with 3 days of decreased p.o. intake. She states she has not had a cough no diarrhea she did vomit x1 followed by some mild epigastric pain. She has a history of acid reflux as well. That she thinks is exacerbated. She has chronic sinus congestion is blind in her left eye.     PCP: Ace Martin NP      Past History     Past Medical History:  Past Medical History:   Diagnosis Date    Anemia NEC     Asthma     Colitis     DDD (degenerative disc disease), cervical     Fibrocystic breast     GERD (gastroesophageal reflux disease)     Headache     HTN (hypertension)     Hyperlipidemia     OA (osteoarthritis)     Osteoporosis     PAF (paroxysmal atrial fibrillation) ([facility] [address] 1995    Pneumonia     Thyroid nodule     Vitamin D deficiency 2/7/2011       Past Surgical History:  Past Surgical History:   Procedure Laterality Date    BREAST SURGERY PROCEDURE UNLISTED      cysts removed    CARDIAC SURG PROCEDURE UNLIST      cardiac catherization    HX GYN      hysterectomy    HX HEENT      cataract surgery bilaterally    HX TOTAL ABDOMINAL HYSTERECTOMY         Family History:  Family History   Problem Relation Age of Onset    Hypertension Other     Diabetes Other     Heart Disease Other     Cancer Other         stomach & colon    Diabetes Mother     Heart Attack Mother     Stroke Mother     Heart Attack Father     Heart Failure Father    Meza Arthritis-rheumatoid Father        Social History:  Social History     Tobacco Use    Smoking status: Never Smoker    Smokeless tobacco: Never Used   Substance Use Topics    Alcohol use: No    Drug use: No       Allergies: Allergies   Allergen Reactions    Latex, Natural Rubber Unknown (comments)    Asa-Acetaminophen-Caff-Potass Shortness of Breath     Patient is only allergic to ASA    Aspirin Shortness of Breath and Other (comments)     Patient states this caused her stomach to bleed internal    Erythromycin Other (comments)     bleeding    Iodine And Iodide Containing Products Unknown (comments)    Lemon Unknown (comments)    Other Medication Unknown (comments)     Pt not sure of allergies states \"I'm allergic to more but not sure of name\"    Pcn [Penicillins] Swelling    Shellfish Containing Products Unknown (comments)    Sulfa (Sulfonamide Antibiotics) Unknown (comments)       Review of Systems     Review of Systems   All other systems reviewed and are negative. 10 point review of systems otherwise negative unless noted in HPI. Physical Exam       Physical Exam   Constitutional: She is oriented to person, place, and time. She appears well-developed. HENT:   Head: Normocephalic and atraumatic. Eyes: Pupils are equal, round, and reactive to light. EOM are normal.   Neck: Normal range of motion. Neck supple. Cardiovascular: Normal rate, regular rhythm and normal heart sounds. Exam reveals no friction rub. No murmur heard. Pulmonary/Chest: Effort normal and breath sounds normal. No respiratory distress. She has no wheezes. Abdominal: Soft. She exhibits no distension. There is no tenderness. There is no rebound and no guarding. Musculoskeletal: Normal range of motion. Neurological: She is alert and oriented to person, place, and time. Skin: Skin is warm and dry. Psychiatric: She has a normal mood and affect.  Her behavior is normal. Thought content normal.       Diagnostic Study Results     Vital Signs   Visit Vitals  /43   Pulse 84   Temp 98 °F (36.7 °C)   Resp 18 SpO2 100%      EKG: EKG shows sinus at 85 with a axis and normal intervals. There is no ST elevation or depression and no hypertrophy  Labs: CBC is normal mild anemia chemistries normal, troponin negative  Imaging: cxr napd. Medical Decision Making     ED Course: Progress Notes, Reevaluation, and Consults:      Provider Notes (Medical Decision Making):   70-year-old female presents with general weakness x3 days. Had one episode of vomiting. She is feeling much better on arrival.  Denies any cough denies any burning when she pees. She does note that she has had some burning since vomiting which happens quite frequently. We will check a second troponin if negative likely okay for outpatient    Troponin negative x2. Patient states is typical of her reflux         Diagnosis     Clinical Impression: No diagnosis found. Disposition:    Patient's Medications   Start Taking    No medications on file   Continue Taking    ACETAMINOPHEN (TYLENOL EX STR ARTHRITIS PAIN) 500 MG TABLET    Take 1 Tab by mouth every six (6) hours as needed. DIPHENHYDRAMINE (BENADRYL ALLERGY) 25 MG TABLET    Take 1 Tab by mouth every six (6) hours as needed. HYDROXYZINE PAMOATE (VISTARIL) 25 MG CAPSULE    Take 1 Cap by mouth three (3) times daily as needed for Anxiety. RANITIDINE (ZANTAC) 150 MG TABLET    Take 1 Tab by mouth two (2) times a day.    These Medications have changed    No medications on file   Stop Taking    No medications on file

## 2019-06-25 NOTE — DISCHARGE INSTRUCTIONS
Patient Education        Abdominal Pain: Care Instructions  Your Care Instructions    Abdominal pain has many possible causes. Some aren't serious and get better on their own in a few days. Others need more testing and treatment. If your pain continues or gets worse, you need to be rechecked and may need more tests to find out what is wrong. You may need surgery to correct the problem. Don't ignore new symptoms, such as fever, nausea and vomiting, urination problems, pain that gets worse, and dizziness. These may be signs of a more serious problem. Your doctor may have recommended a follow-up visit in the next 8 to 12 hours. If you are not getting better, you may need more tests or treatment. The doctor has checked you carefully, but problems can develop later. If you notice any problems or new symptoms, get medical treatment right away. Follow-up care is a key part of your treatment and safety. Be sure to make and go to all appointments, and call your doctor if you are having problems. It's also a good idea to know your test results and keep a list of the medicines you take. How can you care for yourself at home? · Rest until you feel better. · To prevent dehydration, drink plenty of fluids, enough so that your urine is light yellow or clear like water. Choose water and other caffeine-free clear liquids until you feel better. If you have kidney, heart, or liver disease and have to limit fluids, talk with your doctor before you increase the amount of fluids you drink. · If your stomach is upset, eat mild foods, such as rice, dry toast or crackers, bananas, and applesauce. Try eating several small meals instead of two or three large ones. · Wait until 48 hours after all symptoms have gone away before you have spicy foods, alcohol, and drinks that contain caffeine. · Do not eat foods that are high in fat. · Avoid anti-inflammatory medicines such as aspirin, ibuprofen (Advil, Motrin), and naproxen (Aleve). These can cause stomach upset. Talk to your doctor if you take daily aspirin for another health problem. When should you call for help? Call 911 anytime you think you may need emergency care. For example, call if:    · You passed out (lost consciousness).     · You pass maroon or very bloody stools.     · You vomit blood or what looks like coffee grounds.     · You have new, severe belly pain.    Call your doctor now or seek immediate medical care if:    · Your pain gets worse, especially if it becomes focused in one area of your belly.     · You have a new or higher fever.     · Your stools are black and look like tar, or they have streaks of blood.     · You have unexpected vaginal bleeding.     · You have symptoms of a urinary tract infection. These may include:  ? Pain when you urinate. ? Urinating more often than usual.  ? Blood in your urine.     · You are dizzy or lightheaded, or you feel like you may faint.    Watch closely for changes in your health, and be sure to contact your doctor if:    · You are not getting better after 1 day (24 hours). Where can you learn more? Go to http://zander-eden.info/. Enter M097 in the search box to learn more about \"Abdominal Pain: Care Instructions. \"  Current as of: September 23, 2018  Content Version: 11.9  © 0883-8610 [employer]. Care instructions adapted under license by [employer] (which disclaims liability or warranty for this information). If you have questions about a medical condition or this instruction, always ask your healthcare professional. [place] [address] any warranty or liability for your use of this information. Patient Education        Indigestion (Dyspepsia or Heartburn): Care Instructions  Your Care Instructions  Sometimes it can be hard to pinpoint the cause of indigestion.  (It is also called dyspepsia or heartburn.) Most cases of an upset stomach with bloating, burning, burping, and nausea are minor and go away within several hours. Home treatment and over-the-counter medicine often are able to control symptoms. But if you take medicine to relieve your indigestion without making diet and lifestyle changes, your symptoms are likely to return again and again. If you get indigestion often, it may be a sign of a more serious medical problem. Be sure to follow up with your doctor, who may want to do tests to be sure of the cause of your indigestion. Follow-up care is a key part of your treatment and safety. Be sure to make and go to all appointments, and call your doctor if you are having problems. It's also a good idea to know your test results and keep a list of the medicines you take. How can you care for yourself at home? · Your doctor may recommend over-the-counter medicine. For mild or occasional indigestion, antacids such as Gaviscon, Mylanta, Maalox, or Tums, may help. Be safe with medicines. Be careful when you take over-the-counter antacid medicines. Many of these medicines have aspirin in them. Read the label to make sure that you are not taking more than the recommended dose. Too much aspirin can be harmful. · Your doctor also may recommend over-the-counter acid reducers, such as Pepcid AC, Tagamet HB, Zantac 75, or Prilosec. Read and follow all instructions on the label. If you use these medicines often, talk with your doctor. · Change your eating habits. ? It's best to eat several small meals instead of two or three large meals. ? After you eat, wait 2 to 3 hours before you lie down. ? Chocolate, mint, and alcohol can make GERD worse. ? Spicy foods, foods that have a lot of acid (like tomatoes and oranges), and coffee can make GERD symptoms worse in some people. If your symptoms are worse after you eat a certain food, you may want to stop eating that food to see if your symptoms get better. · Do not smoke or chew tobacco. Smoking can make GERD worse.  If you need help quitting, talk to your doctor about stop-smoking programs and medicines. These can increase your chances of quitting for good. · If you have GERD symptoms at night, raise the head of your bed 6 to 8 inches. You can do this by putting the frame on blocks or placing a foam wedge under the head of your mattress. (Adding extra pillows does not work.)  · Do not wear tight clothing around your middle. · Lose weight if you need to. Losing just 5 to 10 pounds can help. · Do not take anti-inflammatory medicines, such as aspirin, ibuprofen (Advil, Motrin), or naproxen (Aleve). These can irritate the stomach. If you need a pain medicine, try acetaminophen (Tylenol), which does not cause stomach upset. When should you call for help? Call your doctor now or seek immediate medical care if:    · You have new or worse belly pain.     · You are vomiting.    Watch closely for changes in your health, and be sure to contact your doctor if:    · You have new or worse symptoms of indigestion.     · You have trouble or pain swallowing.     · You are losing weight.     · You do not get better as expected. Where can you learn more? Go to http://zander-eden.info/. Enter H734 in the search box to learn more about \"Indigestion (Dyspepsia or Heartburn): Care Instructions. \"  Current as of: March 27, 2018  Content Version: 11.9  © 5441-0564 [employer]. Care instructions adapted under license by [employer] (which disclaims liability or warranty for this information). If you have questions about a medical condition or this instruction, always ask your healthcare professional. [address] any warranty or liability for your use of this information.

## 2019-06-25 NOTE — ED TRIAGE NOTES
Pt brought in by [facility] from home. Per EMS pt has been really stressed out and felt dizzy and vomited one time right before they were called. Upon arrival pt states she has been worked up by a family and now she just not feel well. Pt is extremely talkative, denies any pain, follows commands, and admits to being [facility].

## 2019-11-17 ENCOUNTER — APPOINTMENT (OUTPATIENT)
Dept: GENERAL RADIOLOGY | Age: 84
End: 2019-11-17
Attending: EMERGENCY MEDICINE
Payer: MEDICARE

## 2019-11-17 ENCOUNTER — HOSPITAL ENCOUNTER (EMERGENCY)
Age: 84
Discharge: HOME | End: 2019-11-17
Attending: EMERGENCY MEDICINE | Admitting: EMERGENCY MEDICINE
Payer: MEDICARE

## 2019-11-17 VITALS
SYSTOLIC BLOOD PRESSURE: 109 MMHG | HEART RATE: 85 BPM | DIASTOLIC BLOOD PRESSURE: 90 MMHG | TEMPERATURE: 98.4 F | OXYGEN SATURATION: 100 % | RESPIRATION RATE: 15 BRPM

## 2019-11-17 DIAGNOSIS — R25.1 OCCASIONAL TREMORS: ICD-10-CM

## 2019-11-17 DIAGNOSIS — R53.83 LETHARGY: Primary | ICD-10-CM

## 2019-11-17 LAB
ALBUMIN SERPL-MCNC: 4.2 G/DL (ref 3.4–5)
ALBUMIN/GLOB SERPL: 1.2 {RATIO} (ref 0.8–1.7)
ALP SERPL-CCNC: 87 U/L (ref 45–117)
ALT SERPL-CCNC: 13 U/L (ref 13–56)
ANION GAP SERPL CALC-SCNC: 12 MMOL/L (ref 3–18)
APPEARANCE UR: CLEAR
AST SERPL-CCNC: 19 U/L (ref 10–38)
ATRIAL RATE: 89 BPM
BACTERIA URNS QL MICRO: ABNORMAL /HPF
BASOPHILS # BLD: 0 K/UL (ref 0–0.1)
BASOPHILS NFR BLD: 0 % (ref 0–2)
BILIRUB SERPL-MCNC: 0.5 MG/DL (ref 0.2–1)
BILIRUB UR QL: NEGATIVE
BUN SERPL-MCNC: 14 MG/DL (ref 7–18)
BUN/CREAT SERPL: 16 (ref 12–20)
CALCIUM SERPL-MCNC: 9.3 MG/DL (ref 8.5–10.1)
CALCULATED P AXIS, ECG09: 72 DEGREES
CALCULATED R AXIS, ECG10: 53 DEGREES
CALCULATED T AXIS, ECG11: 175 DEGREES
CHLORIDE SERPL-SCNC: 98 MMOL/L (ref 100–111)
CK MB CFR SERPL CALC: 2.6 % (ref 0–4)
CK MB SERPL-MCNC: 1.4 NG/ML (ref 5–25)
CK SERPL-CCNC: 53 U/L (ref 26–192)
CO2 SERPL-SCNC: 26 MMOL/L (ref 21–32)
COLOR UR: YELLOW
CREAT SERPL-MCNC: 0.9 MG/DL (ref 0.6–1.3)
DIAGNOSIS, 93000: NORMAL
DIFFERENTIAL METHOD BLD: ABNORMAL
EOSINOPHIL # BLD: 0 K/UL (ref 0–0.4)
EOSINOPHIL NFR BLD: 0 % (ref 0–5)
EPITH CASTS URNS QL MICRO: ABNORMAL /LPF (ref 0–5)
ERYTHROCYTE [DISTWIDTH] IN BLOOD BY AUTOMATED COUNT: 14.2 % (ref 11.6–14.5)
GLOBULIN SER CALC-MCNC: 3.6 G/DL (ref 2–4)
GLUCOSE SERPL-MCNC: 94 MG/DL (ref 74–99)
GLUCOSE UR STRIP.AUTO-MCNC: NEGATIVE MG/DL
HCT VFR BLD AUTO: 39.5 % (ref 35–45)
HGB BLD-MCNC: 12.8 G/DL (ref 12–16)
HGB UR QL STRIP: ABNORMAL
KETONES UR QL STRIP.AUTO: 15 MG/DL
LEUKOCYTE ESTERASE UR QL STRIP.AUTO: ABNORMAL
LYMPHOCYTES # BLD: 1.4 K/UL (ref 0.9–3.6)
LYMPHOCYTES NFR BLD: 17 % (ref 21–52)
MCH RBC QN AUTO: 29.3 PG (ref 24–34)
MCHC RBC AUTO-ENTMCNC: 32.4 G/DL (ref 31–37)
MCV RBC AUTO: 90.4 FL (ref 74–97)
MONOCYTES # BLD: 0.5 K/UL (ref 0.05–1.2)
MONOCYTES NFR BLD: 6 % (ref 3–10)
MUCOUS THREADS URNS QL MICRO: ABNORMAL /LPF
NEUTS SEG # BLD: 6.4 K/UL (ref 1.8–8)
NEUTS SEG NFR BLD: 77 % (ref 40–73)
NITRITE UR QL STRIP.AUTO: NEGATIVE
P-R INTERVAL, ECG05: 136 MS
PH UR STRIP: 5.5 [PH] (ref 5–8)
PLATELET # BLD AUTO: 310 K/UL (ref 135–420)
PMV BLD AUTO: 9.9 FL (ref 9.2–11.8)
POTASSIUM SERPL-SCNC: 3.8 MMOL/L (ref 3.5–5.5)
PROT SERPL-MCNC: 7.8 G/DL (ref 6.4–8.2)
PROT UR STRIP-MCNC: NEGATIVE MG/DL
Q-T INTERVAL, ECG07: 346 MS
QRS DURATION, ECG06: 82 MS
QTC CALCULATION (BEZET), ECG08: 420 MS
RBC # BLD AUTO: 4.37 M/UL (ref 4.2–5.3)
RBC #/AREA URNS HPF: ABNORMAL /HPF (ref 0–5)
SODIUM SERPL-SCNC: 136 MMOL/L (ref 136–145)
SP GR UR REFRACTOMETRY: 1.01 (ref 1–1.03)
TROPONIN I SERPL-MCNC: <0.02 NG/ML (ref 0–0.04)
UROBILINOGEN UR QL STRIP.AUTO: 0.2 EU/DL (ref 0.2–1)
VENTRICULAR RATE, ECG03: 89 BPM
WBC # BLD AUTO: 8.3 K/UL (ref 4.6–13.2)
WBC URNS QL MICRO: ABNORMAL /HPF (ref 0–4)

## 2019-11-17 PROCEDURE — 74011250636 HC RX REV CODE- 250/636: Performed by: EMERGENCY MEDICINE

## 2019-11-17 PROCEDURE — 93005 ELECTROCARDIOGRAM TRACING: CPT

## 2019-11-17 PROCEDURE — 82550 ASSAY OF CK (CPK): CPT

## 2019-11-17 PROCEDURE — 85025 COMPLETE CBC W/AUTO DIFF WBC: CPT

## 2019-11-17 PROCEDURE — 99284 EMERGENCY DEPT VISIT MOD MDM: CPT

## 2019-11-17 PROCEDURE — 80053 COMPREHEN METABOLIC PANEL: CPT

## 2019-11-17 PROCEDURE — 71045 X-RAY EXAM CHEST 1 VIEW: CPT

## 2019-11-17 PROCEDURE — 81001 URINALYSIS AUTO W/SCOPE: CPT

## 2019-11-17 RX ADMIN — SODIUM CHLORIDE 1000 ML: 900 INJECTION, SOLUTION INTRAVENOUS at 13:49

## 2019-11-17 NOTE — DISCHARGE INSTRUCTIONS

## 2019-11-17 NOTE — ED PROVIDER NOTES
EMERGENCY DEPARTMENT HISTORY AND PHYSICAL EXAM    1:43 PM      Date: 11/17/2019  Patient Name: Edgar Driver    History of Presenting Illness     Chief Complaint   Patient presents with    Lethargy         History Provided By: Patient and EMS    Additional History (Context): Edgar Driver is a 80 y.o. female with hypertension, hyperlipidemia and malignancy who presents with tremors and inability to ambulate for 1 week. Patient states unable to walk at this morning. Patient states she has had tremors for about a week. She denies any chest pain, nausea, vomiting, cough or diarrhea. Patient denies smoking alcohol or recreational drug use. PCP: Fady Stephenson NP      Current Outpatient Medications   Medication Sig Dispense Refill    hydrOXYzine pamoate (VISTARIL) 25 mg capsule Take 1 Cap by mouth three (3) times daily as needed for Anxiety. 45 Cap 0    raNITIdine (ZANTAC) 150 mg tablet Take 1 Tab by mouth two (2) times a day. 60 Tab 2    acetaminophen (TYLENOL EX STR ARTHRITIS PAIN) 500 mg tablet Take 1 Tab by mouth every six (6) hours as needed. (Patient taking differently: Take 500 mg by mouth every eight (8) hours as needed.) 60 Tab 1    diphenhydrAMINE (BENADRYL ALLERGY) 25 mg tablet Take 1 Tab by mouth every six (6) hours as needed.  (Patient taking differently: Take 50 mg by mouth every six (6) hours as needed.) 30 Tab 2       Past History     Past Medical History:  Past Medical History:   Diagnosis Date    Anemia NEC     Asthma     Colitis     DDD (degenerative disc disease), cervical     Fibrocystic breast     GERD (gastroesophageal reflux disease)     Headache     HTN (hypertension)     Hyperlipidemia     OA (osteoarthritis)     Osteoporosis     PAF (paroxysmal atrial fibrillation) ([facility] [address] 1995    Pneumonia     Thyroid nodule     Vitamin D deficiency 2/7/2011       Past Surgical History:  Past Surgical History:   Procedure Laterality Date    BREAST SURGERY PROCEDURE UNLISTED      cysts removed    CARDIAC SURG PROCEDURE UNLIST      cardiac catherization    HX GYN      hysterectomy    HX HEENT      cataract surgery bilaterally    HX TOTAL ABDOMINAL HYSTERECTOMY         Family History:  Family History   Problem Relation Age of Onset    Hypertension Other     Diabetes Other     Heart Disease Other     Cancer Other         stomach & colon    Diabetes Mother     Heart Attack Mother     Stroke Mother     Heart Attack Father     Heart Failure Father    Meza Arthritis-rheumatoid Father        Social History:  Social History     Tobacco Use    Smoking status: Never Smoker    Smokeless tobacco: Never Used   Substance Use Topics    Alcohol use: No    Drug use: No       Allergies: Allergies   Allergen Reactions    Latex, Natural Rubber Unknown (comments)    Asa-Acetaminophen-Caff-Potass Shortness of Breath     Patient is only allergic to ASA    Aspirin Shortness of Breath and Other (comments)     Patient states this caused her stomach to bleed internal    Erythromycin Other (comments)     bleeding    Iodine And Iodide Containing Products Unknown (comments)    Lemon Unknown (comments)    Other Medication Unknown (comments)     Pt not sure of allergies states \"I'm allergic to more but not sure of name\"    Pcn [Penicillins] Swelling    Shellfish Containing Products Unknown (comments)    Sulfa (Sulfonamide Antibiotics) Unknown (comments)         Review of Systems       Review of Systems   Constitutional: Negative. Negative for chills, diaphoresis and fever. HENT: Negative. Negative for congestion, rhinorrhea and sore throat. Eyes: Negative. Negative for pain, discharge and redness. Respiratory: Negative. Negative for cough, chest tightness, shortness of breath and wheezing. Cardiovascular: Negative. Negative for chest pain. Gastrointestinal: Negative. Negative for abdominal pain, constipation, diarrhea, nausea and vomiting. Genitourinary: Negative. Negative for dysuria, flank pain, frequency, hematuria and urgency. Musculoskeletal: Negative. Negative for back pain and neck pain. Skin: Negative. Negative for rash. Neurological: Negative. Negative for syncope, weakness, numbness and headaches. Psychiatric/Behavioral: Negative. All other systems reviewed and are negative. Physical Exam     Visit Vitals  /90   Pulse 85   Temp 98.4 °F (36.9 °C)   Resp 15   SpO2 100%         Physical Exam   Constitutional: She is oriented to person, place, and time. She appears well-developed and well-nourished. Non-toxic appearance. She does not have a sickly appearance. She does not appear ill. No distress. Resting tremors mostly right arm. HENT:   Head: Normocephalic and atraumatic. Mouth/Throat: Oropharynx is clear and moist. No oropharyngeal exudate. Eyes: Pupils are equal, round, and reactive to light. Conjunctivae and EOM are normal. No scleral icterus. Neck: Normal range of motion. Neck supple. No hepatojugular reflux and no JVD present. No tracheal deviation present. No thyromegaly present. Cardiovascular: Normal rate, regular rhythm, S1 normal, S2 normal, normal heart sounds, intact distal pulses and normal pulses. Exam reveals no gallop, no S3 and no S4. No murmur heard. Pulses:       Radial pulses are 2+ on the right side, and 2+ on the left side. Dorsalis pedis pulses are 2+ on the right side, and 2+ on the left side. Pulmonary/Chest: Effort normal and breath sounds normal. No respiratory distress. She has no decreased breath sounds. She has no wheezes. She has no rhonchi. She has no rales. Abdominal: Soft. Normal appearance and bowel sounds are normal. She exhibits no distension and no mass. There is no hepatosplenomegaly. There is no tenderness. There is no rigidity, no rebound, no guarding, no CVA tenderness, no tenderness at McBurney's point and negative Tripathi's sign. Musculoskeletal: Normal range of motion. She exhibits no edema or tenderness. Strength 3 out of 5 throughout. Lymphadenopathy:        Head (right side): No submental, no submandibular, no preauricular and no occipital adenopathy present. Head (left side): No submental, no submandibular, no preauricular and no occipital adenopathy present. She has no cervical adenopathy. Right: No supraclavicular adenopathy present. Left: No supraclavicular adenopathy present. Neurological: She is alert and oriented to person, place, and time. She has normal strength and normal reflexes. She is not disoriented. No cranial nerve deficit or sensory deficit. Coordination and gait normal. GCS eye subscore is 4. GCS verbal subscore is 5. GCS motor subscore is 6. Grossly intact. Skin: Skin is warm, dry and intact. No rash noted. She is not diaphoretic. Psychiatric: She has a normal mood and affect. Her speech is normal and behavior is normal. Judgment and thought content normal. Cognition and memory are normal.   Nursing note and vitals reviewed. Diagnostic Study Results     Labs -  Recent Results (from the past 12 hour(s))   CBC WITH AUTOMATED DIFF    Collection Time: 11/17/19  1:43 PM   Result Value Ref Range    WBC 8.3 4.6 - 13.2 K/uL    RBC 4.37 4.20 - 5.30 M/uL    HGB 12.8 12.0 - 16.0 g/dL    HCT 39.5 35.0 - 45.0 %    MCV 90.4 74.0 - 97.0 FL    MCH 29.3 24.0 - 34.0 PG    MCHC 32.4 31.0 - 37.0 g/dL    RDW 14.2 11.6 - 14.5 %    PLATELET 012 710 - 948 K/uL    MPV 9.9 9.2 - 11.8 FL    NEUTROPHILS 77 (H) 40 - 73 %    LYMPHOCYTES 17 (L) 21 - 52 %    MONOCYTES 6 3 - 10 %    EOSINOPHILS 0 0 - 5 %    BASOPHILS 0 0 - 2 %    ABS. NEUTROPHILS 6.4 1.8 - 8.0 K/UL    ABS. LYMPHOCYTES 1.4 0.9 - 3.6 K/UL    ABS. MONOCYTES 0.5 0.05 - 1.2 K/UL    ABS. EOSINOPHILS 0.0 0.0 - 0.4 K/UL    ABS.  BASOPHILS 0.0 0.0 - 0.1 K/UL    DF AUTOMATED     METABOLIC PANEL, COMPREHENSIVE    Collection Time: 11/17/19  1:43 PM   Result Value Ref Range    Sodium 136 136 - 145 mmol/L    Potassium 3.8 3.5 - 5.5 mmol/L    Chloride 98 (L) 100 - 111 mmol/L    CO2 26 21 - 32 mmol/L    Anion gap 12 3.0 - 18 mmol/L    Glucose 94 74 - 99 mg/dL    BUN 14 7.0 - 18 MG/DL    Creatinine 0.90 0.6 - 1.3 MG/DL    BUN/Creatinine ratio 16 12 - 20      GFR est AA >60 >60 ml/min/1.73m2    GFR est non-AA 60 (L) >60 ml/min/1.73m2    Calcium 9.3 8.5 - 10.1 MG/DL    Bilirubin, total 0.5 0.2 - 1.0 MG/DL    ALT (SGPT) 13 13 - 56 U/L    AST (SGOT) 19 10 - 38 U/L    Alk.  phosphatase 87 45 - 117 U/L    Protein, total 7.8 6.4 - 8.2 g/dL    Albumin 4.2 3.4 - 5.0 g/dL    Globulin 3.6 2.0 - 4.0 g/dL    A-G Ratio 1.2 0.8 - 1.7     CARDIAC PANEL,(CK, CKMB & TROPONIN)    Collection Time: 11/17/19  1:43 PM   Result Value Ref Range    CK 53 26 - 192 U/L    CK - MB 1.4 <3.6 ng/ml    CK-MB Index 2.6 0.0 - 4.0 %    Troponin-I, QT <0.02 0.0 - 0.045 NG/ML   URINALYSIS W/ RFLX MICROSCOPIC    Collection Time: 11/17/19  1:43 PM   Result Value Ref Range    Color YELLOW      Appearance CLEAR      Specific gravity 1.009 1.005 - 1.030      pH (UA) 5.5 5.0 - 8.0      Protein NEGATIVE  NEG mg/dL    Glucose NEGATIVE  NEG mg/dL    Ketone 15 (A) NEG mg/dL    Bilirubin NEGATIVE  NEG      Blood MODERATE (A) NEG      Urobilinogen 0.2 0.2 - 1.0 EU/dL    Nitrites NEGATIVE  NEG      Leukocyte Esterase SMALL (A) NEG     URINE MICROSCOPIC ONLY    Collection Time: 11/17/19  1:43 PM   Result Value Ref Range    WBC 0 to 3 0 - 4 /hpf    RBC 0 to 3 0 - 5 /hpf    Epithelial cells 1+ 0 - 5 /lpf    Bacteria FEW (A) NEG /hpf    Mucus 1+ (A) NEG /lpf   EKG, 12 LEAD, INITIAL    Collection Time: 11/17/19  1:53 PM   Result Value Ref Range    Ventricular Rate 89 BPM    Atrial Rate 89 BPM    P-R Interval 136 ms    QRS Duration 82 ms    Q-T Interval 346 ms    QTC Calculation (Bezet) 420 ms    Calculated P Axis 72 degrees    Calculated R Axis 53 degrees    Calculated T Axis 175 degrees    Diagnosis       Normal sinus rhythm with sinus arrhythmia  T wave abnormality, consider inferolateral ischemia  Abnormal ECG  When compared with ECG of 25-JUN-2019 00:30,  Questionable change in QRS axis  T wave inversion now evident in Inferior leads  T wave inversion now evident in Anterior leads         Radiologic Studies -   XR CHEST PORT   Final Result   Impression:      No acute cardiopulmonary abnormalities. No change from 6/25/2019. Medical Decision Making   Provider Notes (Medical Decision Making):  MDM  Number of Diagnoses or Management Options  Lethargy:   Occasional tremors:   Diagnosis management comments: ACS  Electrolyte imbalance  Neoplasm         I am the first provider for this patient. I reviewed the vital signs, available nursing notes, past medical history, past surgical history, family history and social history. Vital Signs-Reviewed the patient's vital signs. Records Reviewed: Nursing Notes (Time of Review: 1:43 PM)    ED Course: Progress Notes, Reevaluation, and Consults:    Labs essentially normal.  Chest X-Ray showed No acute process. EKG showed NSR with rate of 89 bpm. With no ST elevations or depression and non specific T wave changes. 3:43 PM 11/17/2019        Diagnosis       I have reassessed the patient. Patient is feeling well. Able to ambulate. Patient was discharged in stable condition. Patient is to return to emergency department if any new or worsening condition. Clinical Impression:   1. Lethargy    2.  Occasional tremors        Disposition: Discharged home     Follow-up Information     Follow up With Specialties Details Why Contact Info    Nayana Kat NP Nurse Practitioner In 2 days  [address]  [address] [place]  [phone]  [address] [place] [facility]        Provider Attestation:     I personally performed the services described in the documentation, reviewed the documentation and it accurately and completely records my words and actions utilizing the [facility] November 17, 2019 at 3:50 PM - Justine Gonzales DO    Disclaimer. It is dictated using utilizing voice recognition software. Unfortunately this leads to occasional typographical errors. I apologize in advance if the situation occurs. If questions arise please do not hesitate to contact me or call our department.

## 2019-12-26 ENCOUNTER — APPOINTMENT (OUTPATIENT)
Dept: CT IMAGING | Age: 84
End: 2019-12-26
Attending: EMERGENCY MEDICINE
Payer: MEDICARE

## 2019-12-26 ENCOUNTER — HOSPITAL ENCOUNTER (EMERGENCY)
Age: 84
Discharge: HOME | End: 2019-12-27
Attending: EMERGENCY MEDICINE
Payer: MEDICARE

## 2019-12-26 ENCOUNTER — HOSPITAL ENCOUNTER (EMERGENCY)
Age: 84
Discharge: LEFT BEFORE DISCHARGE | End: 2019-12-26
Payer: MEDICARE

## 2019-12-26 ENCOUNTER — APPOINTMENT (OUTPATIENT)
Dept: GENERAL RADIOLOGY | Age: 84
End: 2019-12-26
Attending: EMERGENCY MEDICINE
Payer: MEDICARE

## 2019-12-26 VITALS
WEIGHT: 125 LBS | RESPIRATION RATE: 12 BRPM | BODY MASS INDEX: 23.6 KG/M2 | SYSTOLIC BLOOD PRESSURE: 170 MMHG | TEMPERATURE: 98.3 F | DIASTOLIC BLOOD PRESSURE: 84 MMHG | HEIGHT: 61 IN | OXYGEN SATURATION: 96 % | HEART RATE: 87 BPM

## 2019-12-26 DIAGNOSIS — R25.1 TREMOR: Primary | ICD-10-CM

## 2019-12-26 DIAGNOSIS — R07.89 ATYPICAL CHEST PAIN: ICD-10-CM

## 2019-12-26 DIAGNOSIS — R06.09 DOE (DYSPNEA ON EXERTION): ICD-10-CM

## 2019-12-26 DIAGNOSIS — R63.4 WEIGHT LOSS: ICD-10-CM

## 2019-12-26 LAB
ALBUMIN SERPL-MCNC: 3.6 G/DL (ref 3.4–5)
ALBUMIN/GLOB SERPL: 1 {RATIO} (ref 0.8–1.7)
ALP SERPL-CCNC: 80 U/L (ref 45–117)
ALT SERPL-CCNC: 12 U/L (ref 13–56)
ANION GAP SERPL CALC-SCNC: 10 MMOL/L (ref 3–18)
APPEARANCE UR: CLEAR
AST SERPL-CCNC: 12 U/L (ref 10–38)
BACTERIA URNS QL MICRO: ABNORMAL /HPF
BASOPHILS # BLD: 0 K/UL (ref 0–0.1)
BASOPHILS NFR BLD: 0 % (ref 0–2)
BILIRUB SERPL-MCNC: 0.4 MG/DL (ref 0.2–1)
BILIRUB UR QL: NEGATIVE
BUN SERPL-MCNC: 10 MG/DL (ref 7–18)
BUN/CREAT SERPL: 14 (ref 12–20)
CALCIUM SERPL-MCNC: 9 MG/DL (ref 8.5–10.1)
CHLORIDE SERPL-SCNC: 103 MMOL/L (ref 100–111)
CK MB CFR SERPL CALC: 1.7 % (ref 0–4)
CK MB SERPL-MCNC: 1.1 NG/ML (ref 5–25)
CK SERPL-CCNC: 64 U/L (ref 26–192)
CO2 SERPL-SCNC: 27 MMOL/L (ref 21–32)
COLOR UR: YELLOW
CREAT SERPL-MCNC: 0.72 MG/DL (ref 0.6–1.3)
DIFFERENTIAL METHOD BLD: ABNORMAL
EOSINOPHIL # BLD: 0 K/UL (ref 0–0.4)
EOSINOPHIL NFR BLD: 0 % (ref 0–5)
EPITH CASTS URNS QL MICRO: ABNORMAL /LPF (ref 0–5)
ERYTHROCYTE [DISTWIDTH] IN BLOOD BY AUTOMATED COUNT: 13.5 % (ref 11.6–14.5)
GLOBULIN SER CALC-MCNC: 3.6 G/DL (ref 2–4)
GLUCOSE SERPL-MCNC: 93 MG/DL (ref 74–99)
GLUCOSE UR STRIP.AUTO-MCNC: NEGATIVE MG/DL
HCT VFR BLD AUTO: 37 % (ref 35–45)
HGB BLD-MCNC: 11.6 G/DL (ref 12–16)
HGB UR QL STRIP: ABNORMAL
KETONES UR QL STRIP.AUTO: 15 MG/DL
LEUKOCYTE ESTERASE UR QL STRIP.AUTO: NEGATIVE
LYMPHOCYTES # BLD: 1.5 K/UL (ref 0.9–3.6)
LYMPHOCYTES NFR BLD: 21 % (ref 21–52)
MAGNESIUM SERPL-MCNC: 2 MG/DL (ref 1.6–2.6)
MCH RBC QN AUTO: 28.8 PG (ref 24–34)
MCHC RBC AUTO-ENTMCNC: 31.4 G/DL (ref 31–37)
MCV RBC AUTO: 91.8 FL (ref 74–97)
MONOCYTES # BLD: 0.4 K/UL (ref 0.05–1.2)
MONOCYTES NFR BLD: 6 % (ref 3–10)
NEUTS SEG # BLD: 5.2 K/UL (ref 1.8–8)
NEUTS SEG NFR BLD: 73 % (ref 40–73)
NITRITE UR QL STRIP.AUTO: NEGATIVE
PH UR STRIP: 6 [PH] (ref 5–8)
PLATELET # BLD AUTO: 301 K/UL (ref 135–420)
PMV BLD AUTO: 9.7 FL (ref 9.2–11.8)
POTASSIUM SERPL-SCNC: 3.6 MMOL/L (ref 3.5–5.5)
PROT SERPL-MCNC: 7.2 G/DL (ref 6.4–8.2)
PROT UR STRIP-MCNC: NEGATIVE MG/DL
RBC # BLD AUTO: 4.03 M/UL (ref 4.2–5.3)
RBC #/AREA URNS HPF: ABNORMAL /HPF (ref 0–5)
SODIUM SERPL-SCNC: 140 MMOL/L (ref 136–145)
SP GR UR REFRACTOMETRY: 1.01 (ref 1–1.03)
TROPONIN I SERPL-MCNC: <0.02 NG/ML (ref 0–0.04)
UROBILINOGEN UR QL STRIP.AUTO: 1 EU/DL (ref 0.2–1)
WBC # BLD AUTO: 7.1 K/UL (ref 4.6–13.2)
WBC URNS QL MICRO: ABNORMAL /HPF (ref 0–4)

## 2019-12-26 PROCEDURE — 99284 EMERGENCY DEPT VISIT MOD MDM: CPT

## 2019-12-26 PROCEDURE — 75810000275 HC EMERGENCY DEPT VISIT NO LEVEL OF CARE

## 2019-12-26 PROCEDURE — 93005 ELECTROCARDIOGRAM TRACING: CPT

## 2019-12-26 PROCEDURE — 71045 X-RAY EXAM CHEST 1 VIEW: CPT

## 2019-12-26 PROCEDURE — 74011250636 HC RX REV CODE- 250/636: Performed by: EMERGENCY MEDICINE

## 2019-12-26 PROCEDURE — 80053 COMPREHEN METABOLIC PANEL: CPT

## 2019-12-26 PROCEDURE — 85025 COMPLETE CBC W/AUTO DIFF WBC: CPT

## 2019-12-26 PROCEDURE — 70450 CT HEAD/BRAIN W/O DYE: CPT

## 2019-12-26 PROCEDURE — 82550 ASSAY OF CK (CPK): CPT

## 2019-12-26 PROCEDURE — 81001 URINALYSIS AUTO W/SCOPE: CPT

## 2019-12-26 PROCEDURE — 83735 ASSAY OF MAGNESIUM: CPT

## 2019-12-26 RX ADMIN — SODIUM CHLORIDE 500 ML: 900 INJECTION, SOLUTION INTRAVENOUS at 21:10

## 2019-12-26 NOTE — ED TRIAGE NOTES
The patient started to have tremors in November and have been getting worse today. I performed a brief evaluation, including history and physical, of the patient here in triage and I have determined that pt will need further treatment and evaluation from the main side ER physician. I have placed initial orders to help in expediting patients care.      December 26, 2019 at 5:52 PM - Eva Fermin PA-C        Visit Vitals  /84 (BP 1 Location: Left arm, BP Patient Position: At rest)   Pulse 87   Temp 98.3 °F (36.8 °C)   Resp 12   Ht 5' 1\" (1.549 m)   Wt 56.7 kg (125 lb)   SpO2 96%   BMI 23.62 kg/m²

## 2019-12-27 VITALS
TEMPERATURE: 97.3 F | SYSTOLIC BLOOD PRESSURE: 157 MMHG | HEART RATE: 75 BPM | RESPIRATION RATE: 16 BRPM | OXYGEN SATURATION: 99 % | DIASTOLIC BLOOD PRESSURE: 72 MMHG

## 2019-12-27 LAB
ATRIAL RATE: 81 BPM
CALCULATED P AXIS, ECG09: 80 DEGREES
CALCULATED R AXIS, ECG10: 61 DEGREES
CALCULATED T AXIS, ECG11: 76 DEGREES
DIAGNOSIS, 93000: NORMAL
P-R INTERVAL, ECG05: 140 MS
Q-T INTERVAL, ECG07: 406 MS
QRS DURATION, ECG06: 84 MS
QTC CALCULATION (BEZET), ECG08: 471 MS
VENTRICULAR RATE, ECG03: 81 BPM

## 2019-12-27 NOTE — ROUTINE PROCESS
Claudette Ruiz is a 80 y.o. female that was discharged in stable. Pt was accompanied by friend. Pt is not driving. The patients diagnosis, condition and treatment were explained to  patient and aftercare instructions were given. The patient verbalized understanding. Patient armband removed and shredded.

## 2019-12-27 NOTE — ED PROVIDER NOTES
Wing Kearney is a 80 y.o. female with a past medical history of osteoarthritis, hypertension, hyperlipidemia, asthma, and remote paroxysmal atrial fibrillation coming in complaining of shaking and tremors. Patient states that since November she has been having increasing intermittent tremors in bilateral hands and teeth. Patient states it seems to be more prominent in the right hand. She states is both at rest and with intention when she is trying to brush her teeth or drink water. Patient states that she is lost a significant weight over the last couple months. She states she has generalized weakness. Denies any falls or trauma. She denies any visual change difficulty. Denies any unilateral weakness or numbness. Patient and family deny any seizure activity, syncope, or loss of bowel or bladder function. Patient states that she has had decreased appetite over the last weeks to month as well. Denies any fevers. Patient does state that she had a tightness in her left lower lateral chest earlier this morning that lasted for a couple seconds at a time. She also states that she has been more short of breath when ambulating today. Denies any shortness of breath currently or at rest.  Denies any current chest pain. Denies any vomiting or abdominal pain. Patient denies any neck pain. No other complaints at this time.            Past Medical History:   Diagnosis Date    Anemia NEC     Asthma     Colitis     DDD (degenerative disc disease), cervical     Fibrocystic breast     GERD (gastroesophageal reflux disease)     Headache     HTN (hypertension)     Hyperlipidemia     OA (osteoarthritis)     Osteoporosis     PAF [facility]paroxysmal atrial fibrillation) ([facility] [address] 1995    Pneumonia     Thyroid nodule     Vitamin D deficiency 2/7/2011       Past Surgical History:   Procedure Laterality Date    BREAST SURGERY PROCEDURE UNLISTED      cysts removed    CARDIAC SURG PROCEDURE UNLIST      cardiac catherization    HX GYN      hysterectomy    HX HEENT      cataract surgery bilaterally    HX TOTAL ABDOMINAL HYSTERECTOMY           Family History:   Problem Relation Age of Onset    Hypertension Other     Diabetes Other     Heart Disease Other     Cancer Other         stomach & colon    Diabetes Mother     Heart Attack Mother     Stroke Mother     Heart Attack Father     Heart Failure Father     Arthritis-rheumatoid Father        Social History     Socioeconomic History    Marital status:      Spouse name: Not on file    Number of children: Not on file    Years of education: Not on file    Highest education level: Not on file   Occupational History    Not on file   Social Needs    Financial resource strain: Not on file    Food insecurity:     Worry: Not on file     Inability: Not on file    Transportation needs:     Medical: Not on file     Non-medical: Not on file   Tobacco Use    Smoking status: Never Smoker    Smokeless tobacco: Never Used   Substance and Sexual Activity    Alcohol use: No    Drug use: No    Sexual activity: Not on file   Lifestyle    Physical activity:     Days per week: Not on file     Minutes per session: Not on file    Stress: Not on file   Relationships    Social connections:     Talks on phone: Not on file     Gets together: Not on file     Attends Hindu service: Not on file     Active member of club or organization: Not on file     Attends meetings of clubs or organizations: Not on file     Relationship status: Not on file    Intimate partner violence:     Fear of current or ex partner: Not on file     Emotionally abused: Not on file     Physically abused: Not on file     Forced sexual activity: Not on file   Other Topics Concern    Not on file   Social History Narrative    Not on file         ALLERGIES: Latex, natural rubber; Asa-acetaminophen-caff-potass; Aspirin; Erythromycin; Iodine and iodide containing products; Lemon;  Other medication; Pcn [penicillins]; Shellfish containing products; and Sulfa (sulfonamide antibiotics)    Review of Systems   Constitutional: Positive for appetite change, fatigue and unexpected weight change. Negative for fever. HENT: Negative. Negative for congestion. Eyes: Negative. Negative for visual disturbance. Respiratory: Positive for shortness of breath. Negative for cough. Cardiovascular: Positive for chest pain. Negative for palpitations and leg swelling. Gastrointestinal: Negative. Negative for abdominal pain, diarrhea, nausea and vomiting. Genitourinary: Negative. Negative for dysuria. Musculoskeletal: Negative. Negative for back pain and neck pain. Skin: Negative. Negative for rash. Neurological: Positive for tremors and weakness. Negative for dizziness, seizures, syncope, facial asymmetry, speech difficulty, light-headedness, numbness and headaches. Psychiatric/Behavioral: Negative. All other systems reviewed and are negative. Vitals:    12/26/19 2039   BP: 157/71   Pulse: 77   Resp: 16   Temp: 97.3 °F (36.3 °C)   SpO2: 100%            Physical Exam  Vitals signs reviewed. Constitutional:       General: She is not in acute distress. Appearance: She is well-developed. Comments: Thin and frail. HENT:      Head: Normocephalic and atraumatic. Eyes:      Extraocular Movements: Extraocular movements intact. Conjunctiva/sclera: Conjunctivae normal.      Pupils: Pupils are equal, round, and reactive to light. Neck:      Musculoskeletal: Normal range of motion and neck supple. Thyroid: No thyromegaly. Vascular: No JVD. Trachea: Trachea normal.   Cardiovascular:      Rate and Rhythm: Normal rate and regular rhythm. Heart sounds: S1 normal and S2 normal. No murmur. No friction rub. No gallop. Pulmonary:      Effort: Pulmonary effort is normal. No accessory muscle usage or respiratory distress. Breath sounds: Normal breath sounds.    Abdominal: General: There is no distension. Palpations: Abdomen is soft. Abdomen is not rigid. Tenderness: There is no tenderness. There is no guarding. Musculoskeletal: Normal range of motion. General: No tenderness. Skin:     General: Skin is warm. Findings: No rash. Neurological:      General: No focal deficit present. Mental Status: She is alert and oriented to person, place, and time. Cranial Nerves: No cranial nerve deficit. Sensory: No sensory deficit. Coordination: Coordination normal.      Comments: Patient has intermittent resting and intention tremor. More prominent in the right upper extremity. Patient is equal  strength and lower extremity strength. 4/5 throughout all 4 extremities. No facial asymmetry or dysarthria. No cerebellar signs or ataxia. Psychiatric:         Speech: Speech normal.         Behavior: Behavior normal.          MDM  Number of Diagnoses or Management Options  Atypical chest pain:   QUIJANO (dyspnea on exertion):   Tremor:   Weight loss:   Diagnosis management comments: Darrin Flores is a 80 y.o. female coming in with tremors. Patient also reports dyspnea on exertion today as well as a few episodes of chest tightness that lasted a couple seconds at a time earlier this morning. The tremors are very atypical and appear to be intermittent during history and physical exam.  Will get CT head, blood work, urine, and chest x-ray. Unclear etiology of symptoms. Patient's troponin is negative, no evidence of ACS. No tachycardia, tachypnea, hypoxia, or increased work of breathing suggest infiltrate or pulmonary embolism. Patient's tremor does seem to start and stop it is very atypical.  CT head unremarkable without any evidence of mass or other brain lesion. Patient needs further work-up as outpatient such as MRI brain and neurology consultation.   This does not need to be done emergently given the duration of symptoms and stable appearance. I discussed this with patient and family and they verbalized understanding agree to follow-up as an outpatient for further testing. Procedures      Vitals:  Patient Vitals for the past 12 hrs:   Temp Pulse Resp BP SpO2   12/26/19 2039 97.3 °F (36.3 °C) 77 16 157/71 100 %       Medications ordered:   Medications   sodium chloride 0.9 % bolus infusion 500 mL (0 mL IntraVENous IV Completed 12/26/19 2237)         Lab findings:  Recent Results (from the past 12 hour(s))   EKG, 12 LEAD, INITIAL    Collection Time: 12/26/19  9:01 PM   Result Value Ref Range    Ventricular Rate 81 BPM    Atrial Rate 81 BPM    P-R Interval 140 ms    QRS Duration 84 ms    Q-T Interval 406 ms    QTC Calculation (Bezet) 471 ms    Calculated P Axis 80 degrees    Calculated R Axis 61 degrees    Calculated T Axis 76 degrees    Diagnosis       Normal sinus rhythm  Nonspecific T wave abnormality  Prolonged QT  Abnormal ECG  When compared with ECG of 17-NOV-2019 13:53,  Non-specific change in ST segment in Anterior leads  T wave inversion no longer evident in Inferior leads  T wave inversion no longer evident in Anterior leads     CBC WITH AUTOMATED DIFF    Collection Time: 12/26/19  9:10 PM   Result Value Ref Range    WBC 7.1 4.6 - 13.2 K/uL    RBC 4.03 (L) 4.20 - 5.30 M/uL    HGB 11.6 (L) 12.0 - 16.0 g/dL    HCT 37.0 35.0 - 45.0 %    MCV 91.8 74.0 - 97.0 FL    MCH 28.8 24.0 - 34.0 PG    MCHC 31.4 31.0 - 37.0 g/dL    RDW 13.5 11.6 - 14.5 %    PLATELET 652 262 - 398 K/uL    MPV 9.7 9.2 - 11.8 FL    NEUTROPHILS 73 40 - 73 %    LYMPHOCYTES 21 21 - 52 %    MONOCYTES 6 3 - 10 %    EOSINOPHILS 0 0 - 5 %    BASOPHILS 0 0 - 2 %    ABS. NEUTROPHILS 5.2 1.8 - 8.0 K/UL    ABS. LYMPHOCYTES 1.5 0.9 - 3.6 K/UL    ABS. MONOCYTES 0.4 0.05 - 1.2 K/UL    ABS. EOSINOPHILS 0.0 0.0 - 0.4 K/UL    ABS.  BASOPHILS 0.0 0.0 - 0.1 K/UL    DF AUTOMATED     METABOLIC PANEL, COMPREHENSIVE    Collection Time: 12/26/19  9:10 PM   Result Value Ref Range Sodium 140 136 - 145 mmol/L    Potassium 3.6 3.5 - 5.5 mmol/L    Chloride 103 100 - 111 mmol/L    CO2 27 21 - 32 mmol/L    Anion gap 10 3.0 - 18 mmol/L    Glucose 93 74 - 99 mg/dL    BUN 10 7.0 - 18 MG/DL    Creatinine 0.72 0.6 - 1.3 MG/DL    BUN/Creatinine ratio 14 12 - 20      GFR est AA >60 >60 ml/min/1.73m2    GFR est non-AA >60 >60 ml/min/1.73m2    Calcium 9.0 8.5 - 10.1 MG/DL    Bilirubin, total 0.4 0.2 - 1.0 MG/DL    ALT (SGPT) 12 (L) 13 - 56 U/L    AST (SGOT) 12 10 - 38 U/L    Alk. phosphatase 80 45 - 117 U/L    Protein, total 7.2 6.4 - 8.2 g/dL    Albumin 3.6 3.4 - 5.0 g/dL    Globulin 3.6 2.0 - 4.0 g/dL    A-G Ratio 1.0 0.8 - 1.7     MAGNESIUM    Collection Time: 12/26/19  9:10 PM   Result Value Ref Range    Magnesium 2.0 1.6 - 2.6 mg/dL   CARDIAC PANEL,(CK, CKMB & TROPONIN)    Collection Time: 12/26/19  9:10 PM   Result Value Ref Range    CK 64 26 - 192 U/L    CK - MB 1.1 <3.6 ng/ml    CK-MB Index 1.7 0.0 - 4.0 %    Troponin-I, QT <0.02 0.0 - 0.045 NG/ML   URINALYSIS W/ RFLX MICROSCOPIC    Collection Time: 12/26/19 11:00 PM   Result Value Ref Range    Color YELLOW      Appearance CLEAR      Specific gravity 1.009 1.005 - 1.030      pH (UA) 6.0 5.0 - 8.0      Protein NEGATIVE  NEG mg/dL    Glucose NEGATIVE  NEG mg/dL    Ketone 15 (A) NEG mg/dL    Bilirubin NEGATIVE  NEG      Blood SMALL (A) NEG      Urobilinogen 1.0 0.2 - 1.0 EU/dL    Nitrites NEGATIVE  NEG      Leukocyte Esterase NEGATIVE  NEG     URINE MICROSCOPIC ONLY    Collection Time: 12/26/19 11:00 PM   Result Value Ref Range    WBC 0 to 3 0 - 4 /hpf    RBC 4 to 10 0 - 5 /hpf    Epithelial cells 2+ 0 - 5 /lpf    Bacteria FEW (A) NEG /hpf       EKG interpretation by ED Physician: Sinus rhythm rate of 81 bpm.  No acute ischemic changes. X-Ray, CT or other radiology findings or impressions:  CT HEAD WO CONT   Final Result   IMPRESSION:             No acute intracranial process.          All CT scans at this facility are performed using dose optimization technique as   appropriate to the performed exam, to include automated exposure control,   adjustment of the mA and/or kV according to patient's size (Including   appropriate matching for site-specific examinations), or use of iterative   reconstruction technique. XR CHEST PORT    (Results Pending)       Disposition:  Diagnosis:   1. Tremor    2. Weight loss    3. Atypical chest pain    4. QUIJANO (dyspnea on exertion)        Disposition: Discharge    Follow-up Information     Follow up With Specialties Details Why Contact Info    Reba Dyer NP Nurse Practitioner Call in 1 day for office follow up [address] [facility]  [address]  [address]  [phone] [address] [facility] EMERGENCY DEPT Emergency Medicine  As needed, If symptoms worsen [address] [facility]  [phone]           Patient's Medications   Start Taking    No medications on file   Continue Taking    ACETAMINOPHEN (TYLENOL EX STR ARTHRITIS PAIN) 500 MG TABLET    Take 1 Tab by mouth every six (6) hours as needed. DIPHENHYDRAMINE (BENADRYL ALLERGY) 25 MG TABLET    Take 1 Tab by mouth every six (6) hours as needed. HYDROXYZINE PAMOATE (VISTARIL) 25 MG CAPSULE    Take 1 Cap by mouth three (3) times daily as needed for Anxiety. RANITIDINE (ZANTAC) 150 MG TABLET    Take 1 Tab by mouth two (2) times a day.    These Medications have changed    No medications on file   Stop Taking    No medications on file

## 2019-12-27 NOTE — ED TRIAGE NOTES
Patient c/o involuntary tremoes to hands, arms and mouth chattering since November. She states this has been going off and on except for her right arm, which has been constant. She denies going to her PCP. Today, she states it has been constant so she went to [facility] but left due to long wait time.

## 2019-12-27 NOTE — DISCHARGE INSTRUCTIONS
Patient Education        Chest Pain: Care Instructions  Your Care Instructions    There are many things that can cause chest pain. Some are not serious and will get better on their own in a few days. But some kinds of chest pain need more testing and treatment. Your doctor may have recommended a follow-up visit in the next 8 to 12 hours. If you are not getting better, you may need more tests or treatment. Even though your doctor has released you, you still need to watch for any problems. The doctor carefully checked you, but sometimes problems can develop later. If you have new symptoms or if your symptoms do not get better, get medical care right away. If you have worse or different chest pain or pressure that lasts more than 5 minutes or you passed out (lost consciousness), call 911 or seek other emergency help right away. A medical visit is only one step in your treatment. Even if you feel better, you still need to do what your doctor recommends, such as going to all suggested follow-up appointments and taking medicines exactly as directed. This will help you recover and help prevent future problems. How can you care for yourself at home? · Rest until you feel better. · Take your medicine exactly as prescribed. Call your doctor if you think you are having a problem with your medicine. · Do not drive after taking a prescription pain medicine. When should you call for help? Call 911 if:    · You passed out (lost consciousness).     · You have severe difficulty breathing.     · You have symptoms of a heart attack. These may include:  ? Chest pain or pressure, or a strange feeling in your chest.  ? Sweating. ? Shortness of breath. ? Nausea or vomiting. ? Pain, pressure, or a strange feeling in your back, neck, jaw, or upper belly or in one or both shoulders or arms. ? Lightheadedness or sudden weakness. ? A fast or irregular heartbeat.   After you call 911, the  may tell you to chew 1 adult-strength or 2 to 4 low-dose aspirin. Wait for an ambulance. Do not try to drive yourself.    Call your doctor today if:    · You have any trouble breathing.     · Your chest pain gets worse.     · You are dizzy or lightheaded, or you feel like you may faint.     · You are not getting better as expected.     · You are having new or different chest pain. Where can you learn more? Go to http://zander-eden.info/. Enter A120 in the search box to learn more about \"Chest Pain: Care Instructions. \"  Current as of: June 26, 2019  Content Version: 12.2  © 8937-5616 [employer]. Care instructions adapted under license by [employer] (which disclaims liability or warranty for this information). If you have questions about a medical condition or this instruction, always ask your healthcare professional. [place] [address] any warranty or liability for your use of this information. Patient Education        Shortness of Breath: Care Instructions  Your Care Instructions  Shortness of breath has many causes. Sometimes conditions such as anxiety can lead to shortness of breath. Some people get mild shortness of breath when they exercise. Trouble breathing also can be a symptom of a serious problem, such as asthma, lung disease, emphysema, heart problems, and pneumonia. If your shortness of breath continues, you may need tests and treatment. Watch for any changes in your breathing and other symptoms. Follow-up care is a key part of your treatment and safety. Be sure to make and go to all appointments, and call your doctor if you are having problems. It's also a good idea to know your test results and keep a list of the medicines you take. How can you care for yourself at home? · Do not smoke or allow others to smoke around you. If you need help quitting, talk to your doctor about stop-smoking programs and medicines.  These can increase your chances of quitting for good. · Get plenty of rest and sleep. · Take your medicines exactly as prescribed. Call your doctor if you think you are having a problem with your medicine. · Find healthy ways to deal with stress. ? Exercise daily. ? Get plenty of sleep. ? Eat regularly and well. When should you call for help? Call 911 anytime you think you may need emergency care. For example, call if:    · You have severe shortness of breath.     · You have symptoms of a heart attack. These may include:  ? Chest pain or pressure, or a strange feeling in the chest.  ? Sweating. ? Shortness of breath. ? Nausea or vomiting. ? Pain, pressure, or a strange feeling in the back, neck, jaw, or upper belly or in one or both shoulders or arms. ? Lightheadedness or sudden weakness. ? A fast or irregular heartbeat. After you call 911, the  may tell you to chew 1 adult-strength or 2 to 4 low-dose aspirin. Wait for an ambulance. Do not try to drive yourself.    Call your doctor now or seek immediate medical care if:    · Your shortness of breath gets worse or you start to wheeze. Wheezing is a high-pitched sound when you breathe.     · You wake up at night out of breath or have to prop your head up on several pillows to breathe.     · You are short of breath after only light activity or while at rest.    Watch closely for changes in your health, and be sure to contact your doctor if:    · You do not get better over the next 1 to 2 days. Where can you learn more? Go to http://zander-eden.info/. Enter S780 in the search box to learn more about \"Shortness of Breath: Care Instructions. \"  Current as of: June 9, 2019  Content Version: 12.2  © 1654-7765 [employer]. Care instructions adapted under license by [employer] (which disclaims liability or warranty for this information).  If you have questions about a medical condition or this instruction, always ask your healthcare professional. [address] any warranty or liability for your use of this information.

## 2020-01-09 ENCOUNTER — OFFICE VISIT (OUTPATIENT)
Dept: FAMILY MEDICINE CLINIC | Age: 85
End: 2020-01-09

## 2020-01-09 VITALS
DIASTOLIC BLOOD PRESSURE: 52 MMHG | TEMPERATURE: 97.8 F | RESPIRATION RATE: 18 BRPM | BODY MASS INDEX: 19.83 KG/M2 | SYSTOLIC BLOOD PRESSURE: 137 MMHG | HEIGHT: 61 IN | WEIGHT: 105 LBS | HEART RATE: 100 BPM

## 2020-01-09 DIAGNOSIS — R13.10 DYSPHAGIA, UNSPECIFIED TYPE: ICD-10-CM

## 2020-01-09 DIAGNOSIS — R68.2 MOUTH DRYNESS: ICD-10-CM

## 2020-01-09 DIAGNOSIS — G25.2 COARSE TREMORS: Primary | ICD-10-CM

## 2020-01-09 NOTE — PROGRESS NOTES
HISTORY OF PRESENT ILLNESS    Ankit Ledbetter is a 80y.o. year old female comes in today for ED follow up:  Tremors    Patient was seen in the ED on 11/17/19 and 12/26/19 for tremors. 11/17/19:Annalee Tabares is a 80 y.o. female with hypertension, hyperlipidemia and malignancy who presents with tremors and inability to ambulate for 1 week. Patient states unable to walk at this morning. Patient states she has had tremors for about a week. She denies any chest pain, nausea, vomiting, cough or diarrhea. Patient denies smoking alcohol or recreational drug use. 12/26/19: Billie Torrez is a 80 y.o. female with a past medical history of osteoarthritis, hypertension, hyperlipidemia, asthma, and remote paroxysmal atrial fibrillation coming in complaining of shaking and tremors. Patient states that since November she has been having increasing intermittent tremors in bilateral hands and teeth. Patient states it seems to be more prominent in the right hand. She states is both at rest and with intention when she is trying to brush her teeth or drink water. Patient states that she is lost a significant weight over the last couple months. She states she has generalized weakness. Denies any falls or trauma. She denies any visual change difficulty. Denies any unilateral weakness or numbness. Patient and family deny any seizure activity, syncope, or loss of bowel or bladder function. Patient states that she has had decreased appetite over the last weeks to month as well. Denies any fevers. Patient does state that she had a tightness in her left lower lateral chest earlier this morning that lasted for a couple seconds at a time. She also states that she has been more short of breath when ambulating today. Denies any shortness of breath currently or at rest.  Denies any current chest pain. Denies any vomiting or abdominal pain. Patient denies any neck pain. No other complaints at this time.     Patient reports she was seen in the ED twice since Nov for tremors. Comments tremors are all over but the tremors in her right hand/arm are the worse. States tremors seem to be worse in the morning. Patient reports at times she has had some difficulty swallowing. Reports her mouth and throat are so dry this causes her difficulty swallowing. Comments she feels like her throat has some swelling. Per patient during her one of her ED visits she was informed she should be referred to a neurologist for further evaluation and tremors could be the early onset of Parkinson's. Patient states her brother was dx with Parkinson's approx. 3 years prior to his passing at 79 y/o. Allergies   Allergen Reactions    Latex, Natural Rubber Unknown (comments)    Asa-Acetaminophen-Caff-Potass Shortness of Breath     Patient is only allergic to ASA    Aspirin Shortness of Breath and Other (comments)     Patient states this caused her stomach to bleed internal    Erythromycin Other (comments)     bleeding    Iodine And Iodide Containing Products Unknown (comments)    Lemon Unknown (comments)    Other Medication Unknown (comments)     Pt not sure of allergies states \"I'm allergic to more but not sure of name\"    Pcn [Penicillins] Swelling    Shellfish Containing Products Unknown (comments)    Sulfa (Sulfonamide Antibiotics) Unknown (comments)     Current Outpatient Medications   Medication Sig Dispense Refill    hydrOXYzine pamoate (VISTARIL) 25 mg capsule Take 1 Cap by mouth three (3) times daily as needed for Anxiety. 45 Cap 0    acetaminophen (TYLENOL EX STR ARTHRITIS PAIN) 500 mg tablet Take 1 Tab by mouth every six (6) hours as needed. (Patient taking differently: Take 500 mg by mouth every eight (8) hours as needed.) 60 Tab 1    diphenhydrAMINE (BENADRYL ALLERGY) 25 mg tablet Take 1 Tab by mouth every six (6) hours as needed.  (Patient taking differently: Take 50 mg by mouth every six (6) hours as needed.) 30 Tab 2    raNITIdine (ZANTAC) 150 mg tablet Take 1 Tab by mouth two (2) times a day. 61 Tab 2     Past Medical History:   Diagnosis Date    Anemia NEC     Asthma     Colitis     DDD (degenerative disc disease), cervical     Fibrocystic breast     GERD (gastroesophageal reflux disease)     Headache     HTN (hypertension)     Hyperlipidemia     OA (osteoarthritis)     Osteoporosis     PAF (paroxysmal atrial fibrillation) ([facility] [address] 1995    Pneumonia     Thyroid nodule     Vitamin D deficiency 2/7/2011       No visits with results within 1 Week(s) from this visit.    Latest known visit with results is:   Admission on 12/26/2019, Discharged on 12/27/2019   Component Date Value Ref Range Status    Ventricular Rate 12/26/2019 81  BPM Final    Atrial Rate 12/26/2019 81  BPM Final    P-R Interval 12/26/2019 140  ms Final    QRS Duration 12/26/2019 84  ms Final    Q-T Interval 12/26/2019 406  ms Final    QTC Calculation (Bezet) 12/26/2019 471  ms Final    Calculated P Axis 12/26/2019 80  degrees Final    Calculated R Axis 12/26/2019 61  degrees Final    Calculated T Axis 12/26/2019 76  degrees Final    Diagnosis 12/26/2019    Final                    Value:Normal sinus rhythm  Nonspecific T wave abnormality  Prolonged QT  Abnormal ECG    Confirmed by Manny Reed MD, El ([id number]) on 12/27/2019 10:53:30 AM      WBC 12/26/2019 7.1  4.6 - 13.2 K/uL Final    RBC 12/26/2019 4.03* 4.20 - 5.30 M/uL Final    HGB 12/26/2019 11.6* 12.0 - 16.0 g/dL Final    HCT 12/26/2019 37.0  35.0 - 45.0 % Final    MCV 12/26/2019 91.8  74.0 - 97.0 FL Final    MCH 12/26/2019 28.8  24.0 - 34.0 PG Final    MCHC 12/26/2019 31.4  31.0 - 37.0 g/dL Final    RDW 12/26/2019 13.5  11.6 - 14.5 % Final    PLATELET 32/06/3581 776  135 - 420 K/uL Final    MPV 12/26/2019 9.7  9.2 - 11.8 FL Final    NEUTROPHILS 12/26/2019 73  40 - 73 % Final    LYMPHOCYTES 12/26/2019 21  21 - 52 % Final    MONOCYTES 12/26/2019 6  3 - 10 % Final    EOSINOPHILS 12/26/2019 0  0 - 5 % Final    BASOPHILS 12/26/2019 0  0 - 2 % Final    ABS. NEUTROPHILS 12/26/2019 5.2  1.8 - 8.0 K/UL Final    ABS. LYMPHOCYTES 12/26/2019 1.5  0.9 - 3.6 K/UL Final    ABS. MONOCYTES 12/26/2019 0.4  0.05 - 1.2 K/UL Final    ABS. EOSINOPHILS 12/26/2019 0.0  0.0 - 0.4 K/UL Final    ABS. BASOPHILS 12/26/2019 0.0  0.0 - 0.1 K/UL Final    DF 12/26/2019 AUTOMATED    Final    Sodium 12/26/2019 140  136 - 145 mmol/L Final    Potassium 12/26/2019 3.6  3.5 - 5.5 mmol/L Final    Chloride 12/26/2019 103  100 - 111 mmol/L Final    CO2 12/26/2019 27  21 - 32 mmol/L Final    Anion gap 12/26/2019 10  3.0 - 18 mmol/L Final    Glucose 12/26/2019 93  74 - 99 mg/dL Final    BUN 12/26/2019 10  7.0 - 18 MG/DL Final    Creatinine 12/26/2019 0.72  0.6 - 1.3 MG/DL Final    BUN/Creatinine ratio 12/26/2019 14  12 - 20   Final    GFR est AA 12/26/2019 >60  >60 ml/min/1.73m2 Final    GFR est non-AA 12/26/2019 >60  >60 ml/min/1.73m2 Final    Comment: (NOTE)  Estimated GFR is calculated using the Modification of Diet in Renal   Disease (MDRD) Study equation, reported for both  Americans   (GFRAA) and non- Americans (GFRNA), and normalized to 1.73m2   body surface area. The physician must decide which value applies to   the patient. The MDRD study equation should only be used in   individuals age 25 or older. It has not been validated for the   following: pregnant women, patients with serious comorbid conditions,   or on certain medications, or persons with extremes of body size,   muscle mass, or nutritional status.  Calcium 12/26/2019 9.0  8.5 - 10.1 MG/DL Final    Bilirubin, total 12/26/2019 0.4  0.2 - 1.0 MG/DL Final    ALT (SGPT) 12/26/2019 12* 13 - 56 U/L Final    AST (SGOT) 12/26/2019 12  10 - 38 U/L Final    Alk.  phosphatase 12/26/2019 80  45 - 117 U/L Final    Protein, total 12/26/2019 7.2  6.4 - 8.2 g/dL Final    Albumin 12/26/2019 3.6  3.4 - 5.0 g/dL Final    Globulin 12/26/2019 3.6  2.0 - 4.0 g/dL Final    A-G Ratio 12/26/2019 1.0  0.8 - 1.7   Final    Magnesium 12/26/2019 2.0  1.6 - 2.6 mg/dL Final    CK 12/26/2019 64  26 - 192 U/L Final    CK - MB 12/26/2019 1.1  <3.6 ng/ml Final    CK-MB Index 12/26/2019 1.7  0.0 - 4.0 % Final    Troponin-I, QT 12/26/2019 <0.02  0.0 - 0.045 NG/ML Final    Comment: The presence of detectable troponin above the reference range indicates myocardial injury which may be due to ischemia, myocarditis, trauma, etc.  Clinical correlation is necessary to establish the significance of this finding. Sequential testing is recommended to determine if the typical rise and fall of cTnI is demonstrated. Note:  Cardiac troponin I has a relatively long half life and may be present well after the CK MB has returned to baseline. The reference range is based on the 99th percentile of the referent population.  Color 12/26/2019 YELLOW    Final    Appearance 12/26/2019 CLEAR    Final    Specific gravity 12/26/2019 1.009  1.005 - 1.030   Final    pH (UA) 12/26/2019 6.0  5.0 - 8.0   Final    Protein 12/26/2019 NEGATIVE   NEG mg/dL Final    Glucose 12/26/2019 NEGATIVE   NEG mg/dL Final    Ketone 12/26/2019 15* NEG mg/dL Final    Bilirubin 12/26/2019 NEGATIVE   NEG   Final    Blood 12/26/2019 SMALL* NEG   Final    Urobilinogen 12/26/2019 1.0  0.2 - 1.0 EU/dL Final    Nitrites 12/26/2019 NEGATIVE   NEG   Final    Leukocyte Esterase 12/26/2019 NEGATIVE   NEG   Final    WBC 12/26/2019 0 to 3  0 - 4 /hpf Final    RBC 12/26/2019 4 to 10  0 - 5 /hpf Final    Epithelial cells 12/26/2019 2+  0 - 5 /lpf Final    Bacteria 12/26/2019 FEW* NEG /hpf Final       XR Results (most recent):  Results from [place] encounter on 12/26/19   XR CHEST PORT    Narrative Portable Frontal Chest.    CPT CODE: 83760, 46723    CLINICAL HISTORY: Involuntary tremors in the hands and arms and mouth since  November.     TECHNIQUE: Single frontal view of the chest, obtained portably. COMPARISONS: 11/17/2019. FINDINGS:     There is peribronchial thickening around the vladimir. Lungs are hyperinflated. The  lungs are clear. The cardiomediastinal silhouette is unremarkable. Pulmonary  vascularity is normal.  There are no effusions. Patient is osteopenic. Impression IMPRESSION:    Peribronchial cuffing suggesting bronchitis. CT Results (maximum last 3): Results from [place] [facility] encounter on 12/26/19   CT HEAD WO CONT    Impression IMPRESSION:         No acute intracranial process. All CT scans at this facility are performed using dose optimization technique as  appropriate to the performed exam, to include automated exposure control,  adjustment of the mA and/or kV according to patient's size (Including  appropriate matching for site-specific examinations), or use of iterative  reconstruction technique. Results from [facility] encounter on 03/16/17   CT HEAD WO CONT    Impression IMPRESSION[de-identified]  1.  No acute intracranial pathology. 2. Stable mild atrophy and presumed chronic small vessel ischemic changes. Thank you for this referral.   Results from [facility] encounter on 10/12/16   CT HEAD WO CONT    Impression IMPRESSION:    No evidence of an acute intracranial process. Atrophy and chronic small vessel ischemic disease         MRI Results (maximum last 3): Results from [facility] encounter on 07/12/18   MRI BRAIN WO CONT    Impression IMPRESSION:    1. Findings of mild global volume loss and hemispheric small vessel disease  change as above. 2. No hemorrhage. No evidence of acute infarct by imaging. ROS:  Review of Systems   Constitutional: Negative for chills and fever. HENT:        Difficulty swallowing   Respiratory: Negative for shortness of breath. Cardiovascular: Negative for chest pain and palpitations. Neurological: Positive for tremors. Negative for dizziness and headaches. /52 (BP 1 Location: Right arm, BP Patient Position: Sitting)   Pulse 100   Temp 97.8 °F (36.6 °C) (Oral)   Resp 18   Ht 5' 1\" (1.549 m)   Wt 105 lb (47.6 kg)   BMI 19.84 kg/m²     Objective:  Physical Exam  HENT:      Head: Normocephalic and atraumatic. Mouth/Throat:      Mouth: Mucous membranes are moist. Oral lesions (tender lesion to right lower lip mucosa) present. Dentition: Dental caries present. Pharynx: Oropharynx is clear. Neck:      Musculoskeletal: Normal range of motion and neck supple. Cardiovascular:      Rate and Rhythm: Normal rate and regular rhythm. Heart sounds: Normal heart sounds. No murmur. No friction rub. No gallop. Pulmonary:      Effort: Pulmonary effort is normal.      Breath sounds: Normal breath sounds. No wheezing, rhonchi or rales. Lymphadenopathy:      Cervical: No cervical adenopathy. Skin:     General: Skin is warm and dry. Neurological:      Mental Status: She is alert and oriented to person, place, and time. Assessment/Plan:     ICD-10-CM ICD-9-CM    1. Coarse tremors G25.2 781.0    2. Mouth dryness R68.2 527.7    3. Dysphagia, unspecified type R13.10 787.20      Orders Placed This Encounter    magic mouthwash solution       Patient's son reports she would be moving to [id number] [facility] with him and his wife  Informed patient and son she will need to be seen by the neurologist for tremors  I have discussed the diagnosis with the patient and the intended plan as seen in the above orders. The patient has received an after-visit summary and questions were answered concerning future plans. I have discussed medication side effects and warnings with the patient as well. Patient agreeable with above plan and verbalizes understanding. Follow-up and Dispositions    · Return if symptoms worsen or fail to improve.

## 2020-01-09 NOTE — PROGRESS NOTES
Chief Complaint   Patient presents with    ED Follow-up    Mouth Pain     1. Have you been to the ER, urgent care clinic since your last visit? Hospitalized since your last visit? Yes here today to follow up    2. Have you seen or consulted any other health care providers outside of the [address] [place] [address] since your last visit? Include any pap smears or colon screening.   no

## 2020-09-25 NOTE — MR AVS SNAPSHOT
[address] [facility], [place] 
 
 
 [address] [address] [address] [address] 
[phone] Patient: Rozina Cortes MRN: [id number] :1934 Visit Information Date & Time Provider Department Dept. Phone Encounter #  
 3/6/2018 11:00 AM [facility] [phone] [id number] Follow-up Instructions Return in about 3 weeks (around 3/27/2018) for lab work needed. Upcoming Health Maintenance Date Due  
 GLAUCOMA SCREENING Q2Y 1999 Pneumococcal 65+ Low/Medium Risk (2 of 2 - PPSV23) 3/6/2019 MEDICARE YEARLY EXAM 3/7/2019 DTaP/Tdap/Td series (2 - Td) 3/6/2028 Allergies as of 3/6/2018  Review Complete On: 3/6/2018 By: Valentine Bellamy PA-C Severity Noted Reaction Type Reactions Latex, Natural Rubber  2012    Unknown (comments) Asa-acetaminophen-caff-potass  2011    Shortness of Breath Erythromycin  2011    Other (comments) bleeding Iodine And Iodide Containing Products  2012    Unknown (comments) Lemon  2012   Not Verified Unknown (comments) Other Medication  2012    Unknown (comments) Pt not sure of allergies states \"I'm allergic to more but not sure of name\" Pcn [Penicillins]  2011    Swelling Shellfish Containing Products  2012    Unknown (comments) Sulfa (Sulfonamide Antibiotics)  2011    Unknown (comments) Current Immunizations  Reviewed on 2012 No immunizations on file. Not reviewed this visit You Were Diagnosed With   
  
 Codes Comments Initial Medicare annual wellness visit    -  Primary ICD-10-CM: Z00.00 ICD-9-CM: V70.0 Vitals BP Pulse Temp Resp Height(growth percentile) Weight(growth percentile) 126/64 (BP 1 Location: Left arm, BP Patient Position: Sitting) 74 98 °F (36.7 °C) (Oral) 18 5' 1\" (1.549 m) 127 lb 9.6 oz (57.9 kg) SpO2 BMI OB Status Smoking Status 97% 24.11 kg/m2 Hysterectomy Never Smoker BMI and BSA Data Body Mass Index Body Surface Area  
 24.11 kg/m 2 1.58 m 2 Preferred Pharmacy Pharmacy Name Phone [address], [address] [facility] [address] [address]  [facility] [phone] Your Updated Medication List  
  
   
This list is accurate as of 3/6/18 11:52 AM.  Always use your most recent med list.  
  
  
  
  
 acetaminophen 500 mg tablet Commonly known as:  TYLENOL EX STR ARTHRITIS PAIN Take 1 Tab by mouth every six (6) hours as needed. diphenhydrAMINE 25 mg tablet Commonly known as:  BENADRYL ALLERGY Take 1 Tab by mouth every six (6) hours as needed. raNITIdine 150 mg tablet Commonly known as:  ZANTAC Take 1 Tab by mouth two (2) times a day. Follow-up Instructions Return in about 3 weeks (around 3/27/2018) for lab work needed. Patient Instructions Gastroesophageal Reflux Disease (GERD): Care Instructions Your Care Instructions Gastroesophageal reflux disease (GERD) is the backward flow of stomach acid into the esophagus. The esophagus is the tube that leads from your throat to your stomach. A one-way valve prevents the stomach acid from moving up into this tube. When you have GERD, this valve does not close tightly enough. If you have mild GERD symptoms including heartburn, you may be able to control the problem with antacids or over-the-counter medicine. Changing your diet, losing weight, and making other lifestyle changes can also help reduce symptoms. Follow-up care is a key part of your treatment and safety. Be sure to make and go to all appointments, and call your doctor if you are having problems. It's also a good idea to know your test results and keep a list of the medicines you take. How can you care for yourself at home? · Take your medicines exactly as prescribed.  Call your doctor if you think you are having a problem with your medicine. · Your doctor may recommend over-the-counter medicine. For mild or occasional indigestion, antacids, such as Tums, Gaviscon, Mylanta, or Maalox, may help. Your doctor also may recommend over-the-counter acid reducers, such as Pepcid AC, Tagamet HB, Zantac 75, or Prilosec. Read and follow all instructions on the label. If you use these medicines often, talk with your doctor. · Change your eating habits. ¨ It's best to eat several small meals instead of two or three large meals. ¨ After you eat, wait 2 to 3 hours before you lie down. ¨ Chocolate, mint, and alcohol can make GERD worse. ¨ Spicy foods, foods that have a lot of acid (like tomatoes and oranges), and coffee can make GERD symptoms worse in some people. If your symptoms are worse after you eat a certain food, you may want to stop eating that food to see if your symptoms get better. · Do not smoke or chew tobacco. Smoking can make GERD worse. If you need help quitting, talk to your doctor about stop-smoking programs and medicines. These can increase your chances of quitting for good. · If you have GERD symptoms at night, raise the head of your bed 6 to 8 inches by putting the frame on blocks or placing a foam wedge under the head of your mattress. (Adding extra pillows does not work.) · Do not wear tight clothing around your middle. · Lose weight if you need to. Losing just 5 to 10 pounds can help. When should you call for help? Call your doctor now or seek immediate medical care if: 
? · You have new or different belly pain. ? · Your stools are black and tarlike or have streaks of blood. ? Watch closely for changes in your health, and be sure to contact your doctor if: 
? · Your symptoms have not improved after 2 days. ? · Food seems to catch in your throat or chest.  
Where can you learn more? Go to http://zander-eden.info/. Enter E619 in the search box to learn more about \"Gastroesophageal Reflux Disease (GERD): Care Instructions. \" Current as of: May 12, 2017 Content Version: 11.4 © 6508-8263 [employer]. Care instructions adapted under license by [employer] (which disclaims liability or warranty for this information). If you have questions about a medical condition or this instruction, always ask your healthcare professional. [place] [address] any warranty or liability for your use of this information. Medicare Wellness Visit, Female The best way to live healthy is to have a healthy lifestyle by eating a well-balanced diet, exercising regularly, limiting alcohol and stopping smoking. Regular physical exams and screening tests are another way to keep healthy. Preventive exams provided by your health care provider can find health problems before they become diseases or illnesses. Preventive services including immunizations, screening tests, monitoring and exams can help you take care of your own health. All people over age 72 should have a pneumovax  and and a prevnar shot to prevent pneumonia. These are once in a lifetime unless you and your provider decide differently. All people over 65 should have a yearly flu shot and a tetanus vaccine every 10 years. A bone mass density to screen for osteoporosis or thinning of the bones should be done every 2 years after 65. Screening for diabetes mellitus with a blood sugar test should be done every year. Glaucoma is a disease of the eye due to increased ocular pressure that can lead to blindness and it should be done every year by an eye professional. 
 
Cardiovascular screening tests that check for elevated lipids (fatty part of blood) which can lead to heart disease and strokes should be done every 5 years.  
 
Colorectal screening that evaluates for blood or polyps in your colon should be done yearly as a stool test or every five years as a flexible sigmoidoscope or every 10 years as a colonoscopy up to age 76. Breast cancer screening with a mammogram is recommended biennially  for women age 54-69. Screening for cervical cancer with a pap smear and pelvic exam is recommended for women after age 72 years every 2 years up to age 79 or when the provider and patient decide to stop. If there is a history of cervical abnormalities or other increased risk for cancer then the test is recommended yearly. Hepatitis C screening is also recommended for anyone born between 80 through [address]. A shingles vaccine is also recommended once in a lifetime after age 61. Your Medicare Wellness Exam is recommended annually. Here is a list of your current Health Maintenance items with a due date: 
Health Maintenance Due Topic Date Due  
 DTaP/Tdap/Td  (1 - Tdap) 11/22/1955  Shingles Vaccine  09/22/1994  Glaucoma Screening   11/22/1999  Pneumococcal Vaccine (1 of 2 - PCV13) 11/22/1999 Stephan Huynh Annual Well Visit  11/22/1999  Flu Vaccine  08/01/2017 Medicare Part B Preventive Services Limitations Recommendation Scheduled Bone Mass Measurement 
(age 72 & older, biennial) Requires diagnosis related to osteoporosis or estrogen deficiency. Biennial benefit unless patient has history of long-term glucocorticoid tx or baseline is needed because initial test was by other method Completed 8-5-2012 Cardiovascular Screening Blood Tests (every 5 years) Total cholesterol, HDL, Triglycerides Order as a panel if possible 3- Colorectal Cancer Screening 
-Fecal occult blood test (annual) -Flexible sigmoidoscopy (5y) 
-Screening colonoscopy (10y) -Barium Enema  N/A Counseling to Prevent Tobacco Use (up to 8 sessions per year) - Counseling greater than 3 and up to 10 minutes - Counseling greater than 10 minutes Patients must be asymptomatic of tobacco-related conditions to receive as preventive service Diabetes Screening Tests (at least every 3 years, Medicare covers annually or at 6-month intervals for prediabetic patients) Fasting blood sugar (FBS) or glucose tolerance test (GTT) Patient must be diagnosed with one of the following: 
-Hypertension, Dyslipidemia, obesity, previous impaired FBS or GTT 
Or any two of the following: overweight, FH of diabetes, age ? 72, history of gestational diabetes, birth of baby weighing more than 9 pounds Diabetes Self-Management Training (DSMT) (no USPSTF recommendation) Requires referral by treating physician for patient with diabetes or renal disease. 10 hours of initial DSMT session of no less than 30 minutes each in a continuous 12-month period. 2 hours of follow-up DSMT in subsequent years. Glaucoma Screening (no USPSTF recommendation) Diabetes mellitus, family history, , age 48 or over,  American, age 72 or over 11- Human Immunodeficiency Virus (HIV) Screening (annually for increased risk patients) HIV-1 and HIV-2 by EIA, MICHELA, rapid antibody test, or oral mucosa transudate Patient must be at increased risk for HIV infection per USPSTF guidelines or pregnant. Tests covered annually for patients at increased risk. Pregnant patients may receive up to 3 test during pregnancy. Medical Nutrition Therapy (MNT) (for diabetes or renal disease not recommended schedule) Requires referral by treating physician for patient with diabetes or renal disease. Can be provided in same year as diabetes self-management training (DSMT), and CMS recommends medical nutrition therapy take place after DSMT. Up to 3 hours for initial year and 2 hours in subsequent years. Shingles Vaccination A shingles vaccine is also recommended once in a lifetime after age 61 Pt declined Seasonal Influenza Vaccination (annually)  Pt. declined Pneumococcal Vaccination (once after 65)  PT. declined Hepatitis B Vaccinations (if medium/high risk) Medium/high risk factors:  End-stage renal disease, Hemophiliacs who received Factor VIII or IX concentrates, Clients of institutions for the mentally retarded, Persons who live in the same house as a HepB virus carrier, Homosexual men, Illicit injectable drug abusers. Screening Mammography (biennial age 54-69) Annually (age 36 or over) N/A Screening Pap Tests and Pelvic Examination (up to age 79 and after 79 if unknown history or abnormal study last 10 years) Every 24 months except high risk N/A Ultrasound Screening for Abdominal Aortic Aneurysm (AAA) (once) Patient must be referred through IPPE and not have had a screening for abdominal aortic aneurysm before under Medicare. Limited to patients who meet one of the following criteria: 
- Men who are 73-68 years old and have smoked more than 100 cigarettes in their lifetime. 
-Anyone with a FH of AAA 
-Anyone recommended [facility] by [facility] Introducing [facility]! Reggie Paul introduces [employer] patient portal. Now you can access parts of your medical record, email your doctor's office, and request medication refills online. 1. In your internet browser, go to [email or web address] 2. Click on the First Time User? Click Here link in the Sign In box. You will see the New Member Sign Up page. 3. Enter your [employer] Access Code exactly as it appears below. You will not need to use this code after youve completed the sign-up process. If you do not sign up before the expiration date, you must request a new code. · [employer] Access Code: [email or web address] Expires: 5/28/2018  1:38 PM 
 
4. Enter the last four digits of your Social Security Number (xxxx) and Date of Birth (mm/dd/yyyy) as indicated and click Submit. You will be taken to the next sign-up page. 5. Create a [employer] ID. This will be your [employer] login ID and cannot be changed, so think of one that is secure and easy to remember. 6. Create a [employer] password. You can change your password at any time. 7. Enter your Password Reset Question and Answer. This can be used at a later time if you forget your password. 8. Enter your e-mail address. You will receive e-mail notification when new information is available in [address] 9. Click Sign Up. You can now view and download portions of your medical record. 10. Click the Download Summary menu link to download a portable copy of your medical information. If you have questions, please visit the Frequently Asked Questions section of the [employer] website. Remember, [employer] is NOT to be used for urgent needs. For medical emergencies, dial 911. Now available from your iPhone and Android! Please provide this summary of care documentation to your next provider. Your primary care clinician is listed as Precious Lamb. If you have any questions after today's visit, please call [phone]. no cough/no hemoptysis/no wheezing/no dyspnea

## 2020-12-12 ENCOUNTER — APPOINTMENT (OUTPATIENT)
Dept: CT IMAGING | Age: 85
End: 2020-12-12
Attending: STUDENT IN AN ORGANIZED HEALTH CARE EDUCATION/TRAINING PROGRAM
Payer: MEDICARE

## 2020-12-12 ENCOUNTER — HOSPITAL ENCOUNTER (EMERGENCY)
Age: 85
Discharge: HOME | End: 2020-12-12
Attending: EMERGENCY MEDICINE
Payer: MEDICARE

## 2020-12-12 VITALS
DIASTOLIC BLOOD PRESSURE: 75 MMHG | SYSTOLIC BLOOD PRESSURE: 156 MMHG | OXYGEN SATURATION: 98 % | HEART RATE: 96 BPM | TEMPERATURE: 98.9 F | RESPIRATION RATE: 16 BRPM

## 2020-12-12 DIAGNOSIS — K59.00 CONSTIPATION, UNSPECIFIED CONSTIPATION TYPE: Primary | ICD-10-CM

## 2020-12-12 LAB
ALBUMIN SERPL-MCNC: 3.9 G/DL (ref 3.4–5)
ALBUMIN/GLOB SERPL: 1.1 {RATIO} (ref 0.8–1.7)
ALP SERPL-CCNC: 65 U/L (ref 45–117)
ALT SERPL-CCNC: 17 U/L (ref 13–56)
ANION GAP SERPL CALC-SCNC: 7 MMOL/L (ref 3–18)
APPEARANCE UR: CLEAR
AST SERPL-CCNC: 21 U/L (ref 10–38)
BACTERIA URNS QL MICRO: NEGATIVE /HPF
BASOPHILS # BLD: 0 K/UL (ref 0–0.1)
BASOPHILS NFR BLD: 0 % (ref 0–2)
BILIRUB SERPL-MCNC: 0.5 MG/DL (ref 0.2–1)
BILIRUB UR QL: NEGATIVE
BUN SERPL-MCNC: 13 MG/DL (ref 7–18)
BUN/CREAT SERPL: 17 (ref 12–20)
CALCIUM SERPL-MCNC: 9.6 MG/DL (ref 8.5–10.1)
CHLORIDE SERPL-SCNC: 101 MMOL/L (ref 100–111)
CO2 SERPL-SCNC: 28 MMOL/L (ref 21–32)
COLOR UR: YELLOW
CREAT SERPL-MCNC: 0.77 MG/DL (ref 0.6–1.3)
DIFFERENTIAL METHOD BLD: NORMAL
EOSINOPHIL # BLD: 0 K/UL (ref 0–0.4)
EOSINOPHIL NFR BLD: 0 % (ref 0–5)
EPITH CASTS URNS QL MICRO: ABNORMAL /LPF (ref 0–5)
ERYTHROCYTE [DISTWIDTH] IN BLOOD BY AUTOMATED COUNT: 12.9 % (ref 11.6–14.5)
GLOBULIN SER CALC-MCNC: 3.4 G/DL (ref 2–4)
GLUCOSE SERPL-MCNC: 84 MG/DL (ref 74–99)
GLUCOSE UR STRIP.AUTO-MCNC: NEGATIVE MG/DL
HCT VFR BLD AUTO: 37.9 % (ref 35–45)
HGB BLD-MCNC: 12.3 G/DL (ref 12–16)
HGB UR QL STRIP: ABNORMAL
KETONES UR QL STRIP.AUTO: 40 MG/DL
LEUKOCYTE ESTERASE UR QL STRIP.AUTO: NEGATIVE
LYMPHOCYTES # BLD: 1.6 K/UL (ref 0.9–3.6)
LYMPHOCYTES NFR BLD: 22 % (ref 21–52)
MCH RBC QN AUTO: 29.2 PG (ref 24–34)
MCHC RBC AUTO-ENTMCNC: 32.5 G/DL (ref 31–37)
MCV RBC AUTO: 90 FL (ref 74–97)
MONOCYTES # BLD: 0.5 K/UL (ref 0.05–1.2)
MONOCYTES NFR BLD: 6 % (ref 3–10)
MUCOUS THREADS URNS QL MICRO: ABNORMAL /LPF
NEUTS SEG # BLD: 5.3 K/UL (ref 1.8–8)
NEUTS SEG NFR BLD: 72 % (ref 40–73)
NITRITE UR QL STRIP.AUTO: NEGATIVE
PH UR STRIP: 5.5 [PH] (ref 5–8)
PLATELET # BLD AUTO: 277 K/UL (ref 135–420)
PMV BLD AUTO: 9.4 FL (ref 9.2–11.8)
POTASSIUM SERPL-SCNC: 3.4 MMOL/L (ref 3.5–5.5)
PROT SERPL-MCNC: 7.3 G/DL (ref 6.4–8.2)
PROT UR STRIP-MCNC: NEGATIVE MG/DL
RBC # BLD AUTO: 4.21 M/UL (ref 4.2–5.3)
RBC #/AREA URNS HPF: ABNORMAL /HPF (ref 0–5)
SODIUM SERPL-SCNC: 136 MMOL/L (ref 136–145)
SP GR UR REFRACTOMETRY: 1.01 (ref 1–1.03)
TSH SERPL DL<=0.05 MIU/L-ACNC: 1.48 UIU/ML (ref 0.36–3.74)
UROBILINOGEN UR QL STRIP.AUTO: 0.2 EU/DL (ref 0.2–1)
WBC # BLD AUTO: 7.5 K/UL (ref 4.6–13.2)
WBC URNS QL MICRO: ABNORMAL /HPF (ref 0–4)

## 2020-12-12 PROCEDURE — 81001 URINALYSIS AUTO W/SCOPE: CPT

## 2020-12-12 PROCEDURE — 99284 EMERGENCY DEPT VISIT MOD MDM: CPT

## 2020-12-12 PROCEDURE — 85025 COMPLETE CBC W/AUTO DIFF WBC: CPT

## 2020-12-12 PROCEDURE — 74176 CT ABD & PELVIS W/O CONTRAST: CPT

## 2020-12-12 PROCEDURE — 84443 ASSAY THYROID STIM HORMONE: CPT

## 2020-12-12 PROCEDURE — 80053 COMPREHEN METABOLIC PANEL: CPT

## 2020-12-12 RX ORDER — POLYETHYLENE GLYCOL 3350 17 G/17G
17 POWDER, FOR SOLUTION ORAL 2 TIMES DAILY
Qty: 1700 G | Refills: 0 | OUTPATIENT
Start: 2020-12-12 | End: 2021-01-05

## 2020-12-12 NOTE — ED PROVIDER NOTES
EMERGENCY DEPARTMENT HISTORY AND PHYSICAL EXAM    4:18 PM      Date: 12/12/2020  Patient Name: Sylwia Alston    History of Presenting Illness     Chief Complaint   Patient presents with    Constipation         History Provided By: Patient  Location/Duration/Severity/Modifying factors   Ms. Samara Marie is an 79 yo F with a PMHx of OA, HLD, GERD, HTN, Asthma, and Atrial fibrillation who presented to the [facility] ED with 2 weeks of constipation. Patient states that she does not usually go this long without a bowel movement, normally having a bowel movement every day. Patient denies any change in diet, medications, activity, or illness preceding this constipation. Patient states that she feels that she has the urge to have a bowel movement, but is unable to have one. She additionally reports some mild constant lower abdominal pain over the past 2 weeks, with no exacerbating or palliating factors. She denies any fever, chills, chest pain, dysuria, shortness of breath. PCP: Ayah Marie NP    Current Outpatient Medications   Medication Sig Dispense Refill    magic mouthwash solution Magic mouth wash   Maalox  Lidocaine 2% viscous   Diphenhydramine oral solution     Pharmacy to mix equal portions of ingredients to a total volume as indicated in the dispense amount. 240 mL 1    hydrOXYzine pamoate (VISTARIL) 25 mg capsule Take 1 Cap by mouth three (3) times daily as needed for Anxiety. 45 Cap 0    raNITIdine (ZANTAC) 150 mg tablet Take 1 Tab by mouth two (2) times a day. 60 Tab 2    acetaminophen (TYLENOL EX STR ARTHRITIS PAIN) 500 mg tablet Take 1 Tab by mouth every six (6) hours as needed. (Patient taking differently: Take 500 mg by mouth every eight (8) hours as needed.) 60 Tab 1    diphenhydrAMINE (BENADRYL ALLERGY) 25 mg tablet Take 1 Tab by mouth every six (6) hours as needed.  (Patient taking differently: Take 50 mg by mouth every six (6) hours as needed.) 30 Tab 2       Past History     Past Medical History:  Past Medical History:   Diagnosis Date    Anemia NEC     Asthma     Colitis     DDD (degenerative disc disease), cervical     Fibrocystic breast     GERD (gastroesophageal reflux disease)     Headache     HTN (hypertension)     Hyperlipidemia     OA (osteoarthritis)     Osteoporosis     PAF (paroxysmal atrial fibrillation) ([facility] [address] 1995    Pneumonia     Thyroid nodule     Vitamin D deficiency 2/7/2011       Past Surgical History:  Past Surgical History:   Procedure Laterality Date    BREAST SURGERY PROCEDURE UNLISTED      cysts removed    CARDIAC SURG PROCEDURE UNLIST      cardiac catherization    HX GYN      hysterectomy    HX HEENT      cataract surgery bilaterally    HX TOTAL ABDOMINAL HYSTERECTOMY         Family History:  Family History   Problem Relation Age of Onset    Hypertension Other     Diabetes Other     Heart Disease Other     Cancer Other         stomach & colon    Diabetes Mother     Heart Attack Mother     Stroke Mother     Heart Attack Father     Heart Failure Father    August.Gathers Arthritis-rheumatoid Father     Other Brother         aneuysm-ruptured    Parkinson's Disease Brother        Social History:  Social History     Tobacco Use    Smoking status: Never Smoker    Smokeless tobacco: Never Used   Substance Use Topics    Alcohol use: No    Drug use: No       Allergies:   Allergies   Allergen Reactions    Latex, Natural Rubber Unknown (comments)    Asa-Acetaminophen-Caff-Potass Shortness of Breath     Patient is only allergic to ASA    Aspirin Shortness of Breath and Other (comments)     Patient states this caused her stomach to bleed internal    Erythromycin Other (comments)     bleeding    Iodine And Iodide Containing Products Unknown (comments)    Lemon Unknown (comments)    Other Medication Unknown (comments)     Pt not sure of allergies states \"I'm allergic to more but not sure of name\"    Pcn [Penicillins] Swelling    Shellfish Containing Products Unknown (comments)    Sulfa (Sulfonamide Antibiotics) Unknown (comments)         Review of Systems       Review of Systems   Constitutional: Negative for chills, fatigue and fever. HENT: Negative for postnasal drip, rhinorrhea, sinus pressure, sinus pain, sneezing and sore throat. Respiratory: Negative for cough, chest tightness and shortness of breath. Cardiovascular: Negative for chest pain, palpitations and leg swelling. Gastrointestinal: Positive for abdominal pain and constipation. Negative for abdominal distention, anal bleeding, blood in stool, diarrhea, nausea, rectal pain and vomiting. Genitourinary: Negative for decreased urine volume, dysuria, frequency, hematuria, urgency and vaginal bleeding. Musculoskeletal: Positive for gait problem. Patient with long standing gait abnormality secondary to unspecified neurologic disorder   Neurological: Negative for dizziness, syncope, facial asymmetry, weakness, light-headedness, numbness and headaches. All other systems reviewed and are negative. Physical Exam     Visit Vitals  BP (!) 156/75 (BP 1 Location: Left arm, BP Patient Position: At rest)   Pulse 96   Temp 98.9 °F (37.2 °C)   Resp 16   SpO2 98%         Physical Exam  Vitals signs and nursing note reviewed. Constitutional:       General: She is not in acute distress. Appearance: Normal appearance. She is normal weight. She is not ill-appearing, toxic-appearing or diaphoretic. HENT:      Head: Normocephalic and atraumatic. Mouth/Throat:      Mouth: Mucous membranes are moist.      Pharynx: Oropharynx is clear. No oropharyngeal exudate or posterior oropharyngeal erythema. Eyes:      General: No scleral icterus. Right eye: No discharge. Left eye: No discharge. Extraocular Movements: Extraocular movements intact. Conjunctiva/sclera: Conjunctivae normal.      Pupils: Pupils are equal, round, and reactive to light.    Cardiovascular:      Rate and Rhythm: Normal rate and regular rhythm. Pulses: Normal pulses. Heart sounds: Normal heart sounds. No murmur. No friction rub. No gallop. Pulmonary:      Effort: Pulmonary effort is normal. No respiratory distress. Breath sounds: Normal breath sounds. No stridor. No wheezing, rhonchi or rales. Chest:      Chest wall: No tenderness. Abdominal:      General: Abdomen is flat. There is no distension. Palpations: Abdomen is soft. There is no mass. Tenderness: There is abdominal tenderness. There is no guarding or rebound. Hernia: No hernia is present. Comments: Mild tenderness to palpation globally   Skin:     General: Skin is warm and dry. Capillary Refill: Capillary refill takes 2 to 3 seconds. Neurological:      General: No focal deficit present. Mental Status: She is alert and oriented to person, place, and time. Psychiatric:         Mood and Affect: Mood normal.         Behavior: Behavior normal.         Thought Content: Thought content normal.         Judgment: Judgment normal.           Diagnostic Study Results     Labs -  No results found for this or any previous visit (from the past 12 hour(s)). Radiologic Studies -   CT ABD PELV WO CONT    (Results Pending)         Medical Decision Making   I am the first provider for this patient. I reviewed the vital signs, available nursing notes, past medical history, past surgical history, family history and social history. Vital Signs-Reviewed the patient's vital signs. Records Reviewed: Old Medical Records (Time of Review: 4:18 PM)    ED Course: Progress Notes, Reevaluation, and Consults:    ED Course as of Dec 12 2114   Sat Dec 12, 2020   1636 Saw and evaluated the patient in results waiting, due to severe emergency department crowding.   Benign abdominal exam says she was pain-free when she first arrived, minimal pain at the time of my exam nontoxic appearance proceed with scan.    [CB]      ED Course User Index  [CB] Lucía Mcclain MD       Provider Notes (Medical Decision Making):   MDM  Number of Diagnoses or Management Options  Constipation, unspecified constipation type:   Diagnosis management comments: Ms. Rosa Maria Hubbard is an 80-year-old female with a past medical history of osteoarthritis, hyperlipidemia, GERD, hypertension, asthma, A. fib who presented to the emergency department with 2 weeks of constipation, and mild lower abdominal pain. Initial differential diagnosis including not limited to small bowel obstruction, large bowel obstruction, electrolyte abnormality, diverticulitis. Vital signs stable without elevation in temperature, thus low likelihood for infection at this time. Elevation in leukocytosis or white blood cells as well. Urine revealing no evidence of urinary tract infection. CMP revealing no evidence of electrolyte abnormality that could explain her chronic constipation. Patient endorsed that she does not drink an adequate amount of water. Fecal manual disimpaction was performed which did in removal of a large amount of dense compacted stool from the rectal vault. Patient tolerated the manual disimpaction well. Patient's CAT scan CT scan showing evidence of a large amount of stool throughout the colon, and a large stool ball in the rectal vault concerning for stool impaction. Patient therefore safe to be discharged with MiraLAX twice daily at this time, and recommendation to follow-up with her PCM as well as to drink more water. Patient was given return precautions. Patient understood and agree with the plan moving forward and had all questions answered. Procedures    Critical Care Time:       Diagnosis     Clinical Impression: No diagnosis found.     Disposition: Discharged    Follow-up Information    None          Patient's Medications   Start Taking    No medications on file   Continue Taking    ACETAMINOPHEN (TYLENOL EX STR ARTHRITIS PAIN) 500 MG TABLET    Take 1 Tab by mouth every six (6) hours as needed. DIPHENHYDRAMINE (BENADRYL ALLERGY) 25 MG TABLET    Take 1 Tab by mouth every six (6) hours as needed. HYDROXYZINE PAMOATE (VISTARIL) 25 MG CAPSULE    Take 1 Cap by mouth three (3) times daily as needed for Anxiety. MAGIC MOUTHWASH SOLUTION    Magic mouth wash   Maalox  Lidocaine 2% viscous   Diphenhydramine oral solution     Pharmacy to mix equal portions of ingredients to a total volume as indicated in the dispense amount. RANITIDINE (ZANTAC) 150 MG TABLET    Take 1 Tab by mouth two (2) times a day. These Medications have changed    No medications on file   Stop Taking    No medications on file     Disclaimer: Sections of this note are dictated using utilizing voice recognition software. Minor typographical errors may be present. If questions arise, please do not hesitate to contact me or call our department.

## 2020-12-13 NOTE — ED NOTES
I have reviewed discharge instructions and medications with the patient. The patient verbalized understanding. patient escorted to lobby awaiting medicaid cab.

## 2020-12-13 NOTE — DISCHARGE INSTRUCTIONS
We have determined that you are safe for discharge at this time. Please follow-up with your doctor, Tanisha Simon as soon as possible to discuss ways that you can prevent constipation in the future. Please use the medication, MiraLAX, as prescribed. Please take it twice a day. Please also see the instruction provided about how to prevent constipation, specifically please try to drink more water throughout the day as we discussed. Please return to the emergency department if you experience severe abdominal pain, fevers, or chills.

## 2021-01-05 ENCOUNTER — HOSPITAL ENCOUNTER (EMERGENCY)
Age: 86
Discharge: HOME | End: 2021-01-05
Attending: EMERGENCY MEDICINE | Admitting: EMERGENCY MEDICINE
Payer: MEDICARE

## 2021-01-05 ENCOUNTER — APPOINTMENT (OUTPATIENT)
Dept: GENERAL RADIOLOGY | Age: 86
End: 2021-01-05
Attending: PHYSICIAN ASSISTANT
Payer: MEDICARE

## 2021-01-05 ENCOUNTER — APPOINTMENT (OUTPATIENT)
Dept: GENERAL RADIOLOGY | Age: 86
End: 2021-01-05
Attending: EMERGENCY MEDICINE
Payer: MEDICARE

## 2021-01-05 VITALS
HEART RATE: 88 BPM | OXYGEN SATURATION: 99 % | RESPIRATION RATE: 18 BRPM | TEMPERATURE: 98.7 F | SYSTOLIC BLOOD PRESSURE: 127 MMHG | DIASTOLIC BLOOD PRESSURE: 50 MMHG

## 2021-01-05 DIAGNOSIS — R25.1 TREMOR: ICD-10-CM

## 2021-01-05 DIAGNOSIS — R41.3 MEMORY LOSS: ICD-10-CM

## 2021-01-05 DIAGNOSIS — N30.00 ACUTE CYSTITIS WITHOUT HEMATURIA: Primary | ICD-10-CM

## 2021-01-05 DIAGNOSIS — K59.00 CONSTIPATION, UNSPECIFIED CONSTIPATION TYPE: ICD-10-CM

## 2021-01-05 DIAGNOSIS — E86.0 DEHYDRATION: ICD-10-CM

## 2021-01-05 DIAGNOSIS — F41.1 ANXIETY STATE: ICD-10-CM

## 2021-01-05 LAB
ALBUMIN SERPL-MCNC: 3 G/DL (ref 3.4–5)
ALBUMIN/GLOB SERPL: 0.9 {RATIO} (ref 0.8–1.7)
ALP SERPL-CCNC: 58 U/L (ref 45–117)
ALT SERPL-CCNC: 15 U/L (ref 13–56)
AMPHET UR QL SCN: NEGATIVE
ANION GAP SERPL CALC-SCNC: 7 MMOL/L (ref 3–18)
APPEARANCE UR: CLEAR
AST SERPL-CCNC: 9 U/L (ref 10–38)
ATRIAL RATE: 88 BPM
BACTERIA URNS QL MICRO: ABNORMAL /HPF
BARBITURATES UR QL SCN: NEGATIVE
BASOPHILS # BLD: 0 K/UL (ref 0–0.1)
BASOPHILS NFR BLD: 0 % (ref 0–2)
BENZODIAZ UR QL: NEGATIVE
BILIRUB SERPL-MCNC: 0.4 MG/DL (ref 0.2–1)
BILIRUB UR QL: NEGATIVE
BUN SERPL-MCNC: 33 MG/DL (ref 7–18)
BUN/CREAT SERPL: 22 (ref 12–20)
CALCIUM SERPL-MCNC: 8.4 MG/DL (ref 8.5–10.1)
CALCULATED P AXIS, ECG09: 81 DEGREES
CALCULATED R AXIS, ECG10: 61 DEGREES
CALCULATED T AXIS, ECG11: 79 DEGREES
CANNABINOIDS UR QL SCN: NEGATIVE
CHLORIDE SERPL-SCNC: 101 MMOL/L (ref 100–111)
CK MB CFR SERPL CALC: ABNORMAL % (ref 0–4)
CK MB SERPL-MCNC: <1 NG/ML (ref 5–25)
CK SERPL-CCNC: 24 U/L (ref 26–192)
CO2 SERPL-SCNC: 31 MMOL/L (ref 21–32)
COCAINE UR QL SCN: NEGATIVE
COLOR UR: YELLOW
CREAT SERPL-MCNC: 1.53 MG/DL (ref 0.6–1.3)
DIAGNOSIS, 93000: NORMAL
DIFFERENTIAL METHOD BLD: ABNORMAL
EOSINOPHIL # BLD: 0.1 K/UL (ref 0–0.4)
EOSINOPHIL NFR BLD: 1 % (ref 0–5)
EPITH CASTS URNS QL MICRO: ABNORMAL /LPF (ref 0–5)
ERYTHROCYTE [DISTWIDTH] IN BLOOD BY AUTOMATED COUNT: 13.5 % (ref 11.6–14.5)
ETHANOL SERPL-MCNC: <3 MG/DL (ref 0–3)
GLOBULIN SER CALC-MCNC: 3.5 G/DL (ref 2–4)
GLUCOSE SERPL-MCNC: 103 MG/DL (ref 74–99)
GLUCOSE UR STRIP.AUTO-MCNC: NEGATIVE MG/DL
HCT VFR BLD AUTO: 34.9 % (ref 35–45)
HDSCOM,HDSCOM: NORMAL
HGB BLD-MCNC: 11.5 G/DL (ref 12–16)
HGB UR QL STRIP: ABNORMAL
HYALINE CASTS URNS QL MICRO: ABNORMAL /LPF (ref 0–2)
KETONES UR QL STRIP.AUTO: NEGATIVE MG/DL
LEUKOCYTE ESTERASE UR QL STRIP.AUTO: ABNORMAL
LIPASE SERPL-CCNC: 35 U/L (ref 73–393)
LYMPHOCYTES # BLD: 0.6 K/UL (ref 0.9–3.6)
LYMPHOCYTES NFR BLD: 4 % (ref 21–52)
MAGNESIUM SERPL-MCNC: 1.6 MG/DL (ref 1.6–2.6)
MCH RBC QN AUTO: 29.2 PG (ref 24–34)
MCHC RBC AUTO-ENTMCNC: 33 G/DL (ref 31–37)
MCV RBC AUTO: 88.6 FL (ref 74–97)
METHADONE UR QL: NEGATIVE
MONOCYTES # BLD: 0.6 K/UL (ref 0.05–1.2)
MONOCYTES NFR BLD: 4 % (ref 3–10)
MUCOUS THREADS URNS QL MICRO: ABNORMAL /LPF
NEUTS SEG # BLD: 14.9 K/UL (ref 1.8–8)
NEUTS SEG NFR BLD: 91 % (ref 40–73)
NITRITE UR QL STRIP.AUTO: NEGATIVE
OPIATES UR QL: NEGATIVE
P-R INTERVAL, ECG05: 132 MS
PCP UR QL: NEGATIVE
PH UR STRIP: 5 [PH] (ref 5–8)
PLATELET # BLD AUTO: 253 K/UL (ref 135–420)
PMV BLD AUTO: 9.5 FL (ref 9.2–11.8)
POTASSIUM SERPL-SCNC: 3.3 MMOL/L (ref 3.5–5.5)
PROT SERPL-MCNC: 6.5 G/DL (ref 6.4–8.2)
PROT UR STRIP-MCNC: ABNORMAL MG/DL
Q-T INTERVAL, ECG07: 344 MS
QRS DURATION, ECG06: 76 MS
QTC CALCULATION (BEZET), ECG08: 416 MS
RBC # BLD AUTO: 3.94 M/UL (ref 4.2–5.3)
RBC #/AREA URNS HPF: ABNORMAL /HPF (ref 0–5)
SODIUM SERPL-SCNC: 139 MMOL/L (ref 136–145)
SP GR UR REFRACTOMETRY: 1.01 (ref 1–1.03)
TROPONIN I SERPL-MCNC: <0.02 NG/ML (ref 0–0.04)
TSH SERPL DL<=0.05 MIU/L-ACNC: 1.01 UIU/ML (ref 0.36–3.74)
UROBILINOGEN UR QL STRIP.AUTO: 0.2 EU/DL (ref 0.2–1)
VENTRICULAR RATE, ECG03: 88 BPM
WBC # BLD AUTO: 16.2 K/UL (ref 4.6–13.2)
WBC URNS QL MICRO: ABNORMAL /HPF (ref 0–4)

## 2021-01-05 PROCEDURE — 74011250636 HC RX REV CODE- 250/636: Performed by: EMERGENCY MEDICINE

## 2021-01-05 PROCEDURE — 84443 ASSAY THYROID STIM HORMONE: CPT

## 2021-01-05 PROCEDURE — 83690 ASSAY OF LIPASE: CPT

## 2021-01-05 PROCEDURE — 93005 ELECTROCARDIOGRAM TRACING: CPT

## 2021-01-05 PROCEDURE — 74011250637 HC RX REV CODE- 250/637: Performed by: EMERGENCY MEDICINE

## 2021-01-05 PROCEDURE — 85025 COMPLETE CBC W/AUTO DIFF WBC: CPT

## 2021-01-05 PROCEDURE — 80307 DRUG TEST PRSMV CHEM ANLYZR: CPT

## 2021-01-05 PROCEDURE — 82553 CREATINE MB FRACTION: CPT

## 2021-01-05 PROCEDURE — 82077 ASSAY SPEC XCP UR&BREATH IA: CPT

## 2021-01-05 PROCEDURE — 74011000250 HC RX REV CODE- 250: Performed by: EMERGENCY MEDICINE

## 2021-01-05 PROCEDURE — 81001 URINALYSIS AUTO W/SCOPE: CPT

## 2021-01-05 PROCEDURE — 99284 EMERGENCY DEPT VISIT MOD MDM: CPT

## 2021-01-05 PROCEDURE — 96374 THER/PROPH/DIAG INJ IV PUSH: CPT

## 2021-01-05 PROCEDURE — 96361 HYDRATE IV INFUSION ADD-ON: CPT

## 2021-01-05 PROCEDURE — 80053 COMPREHEN METABOLIC PANEL: CPT

## 2021-01-05 PROCEDURE — 74022 RADEX COMPL AQT ABD SERIES: CPT

## 2021-01-05 PROCEDURE — 83735 ASSAY OF MAGNESIUM: CPT

## 2021-01-05 RX ORDER — CEFDINIR 300 MG/1
300 CAPSULE ORAL 2 TIMES DAILY
Qty: 14 CAP | Refills: 0 | Status: SHIPPED | OUTPATIENT
Start: 2021-01-05 | End: 2021-10-30

## 2021-01-05 RX ORDER — POLYETHYLENE GLYCOL 3350 17 G/17G
17 POWDER, FOR SOLUTION ORAL 2 TIMES DAILY
Qty: 507 G | Refills: 0 | Status: SHIPPED | OUTPATIENT
Start: 2021-01-05

## 2021-01-05 RX ORDER — POTASSIUM CHLORIDE 20 MEQ/1
40 TABLET, EXTENDED RELEASE ORAL
Status: COMPLETED | OUTPATIENT
Start: 2021-01-05 | End: 2021-01-05

## 2021-01-05 RX ORDER — MAGNESIUM CITRATE
SOLUTION, ORAL ORAL
Qty: 1 BOTTLE | Refills: 0 | Status: SHIPPED | OUTPATIENT
Start: 2021-01-05 | End: 2021-10-30

## 2021-01-05 RX ORDER — SODIUM CHLORIDE 9 MG/ML
125 INJECTION, SOLUTION INTRAVENOUS CONTINUOUS
Status: DISCONTINUED | OUTPATIENT
Start: 2021-01-05 | End: 2021-01-05 | Stop reason: HOSPADM

## 2021-01-05 RX ADMIN — SODIUM CHLORIDE 1000 ML: 900 INJECTION, SOLUTION INTRAVENOUS at 17:57

## 2021-01-05 RX ADMIN — CEFTRIAXONE SODIUM 1 G: 1 INJECTION, POWDER, FOR SOLUTION INTRAMUSCULAR; INTRAVENOUS at 17:50

## 2021-01-05 RX ADMIN — POTASSIUM CHLORIDE 40 MEQ: 1500 TABLET, EXTENDED RELEASE ORAL at 17:36

## 2021-01-05 NOTE — PROGRESS NOTES
Asked to meet with pt's niecce regarding discharge planning. Pt lives with niece and is able to ambulate with assistance. Family agreeable to take pt home today with f/u with HH. Pt currently does not have a PCP but niece would like Dr. Tank Saab as PCP. Will Arrange in am when office opens. Family interested in [address] and will apply for medicaid and will call [address] [place] [address] for LTSS screening. Updated Dr. Anitra Lopez. HH order sent to [facility] and place in [facility].      TAMMI Lance, [place]- [phone]

## 2021-01-05 NOTE — ED PROVIDER NOTES
EMERGENCY DEPARTMENT HISTORY AND PHYSICAL EXAM    2:09 PM      Date: 1/5/2021  Patient Name: Tom Rich    History of Presenting Illness     Chief Complaint   Patient presents with    Anxiety         History Provided By: Patient and Niece   Location/Duration/Severity/Modifying factors   Patient is an 59-year-old female with a history of osteoarthritis, hyperlipidemia, hypertension, vitamin D deficiency, colitis, paroxysmal atrial fibrillation, and asthma, the presents emergency department with 1 week of increasing anxiety. Patient went to [facility] emergency department 1 week ago and was found to have constipation and urinary tract infection. Patient was given an enema because she was not having much of a bowel movement and did not have much production and was started on Macrobid. States that she started the Arturo Sleeper she has been feeling worse she been having increasing anxiety with decreased urine output, decreased appetite, and has not been having regular bowel movements. Patient lives with her 27-year-old niece and the niece goes to the store for her. Patient is not working and gets $1100 a month in [employer] which does help her live. Patient says she has been getting weaker to the point that she is having a hard time getting around however she is able to walk and take care of her self. Patient know she is been losing weight and for the last week has been having increasing anxiety and says she really wants to come to the hospital and wanted to come to the hospital.  Patient denies any fevers, chills, cough, congestion or, change in her taste or smell. Patient is not a smoker, drinker, or drug user. Patient had hysterectomy when she was 28.           PCP: Maggie Baxter NP    Current Facility-Administered Medications   Medication Dose Route Frequency Provider Last Rate Last Admin    sodium chloride 0.9 % bolus infusion 1,000 mL  1,000 mL IntraVENous ONCE Ramona Ambrocio MD 1,000 mL/hr at 01/05/21 1757 1,000 mL at 01/05/21 1757    0.9% sodium chloride infusion  125 mL/hr IntraVENous CONTINUOUS Ming Bridges MD        cefTRIAXone (ROCEPHIN) 1 g in sterile water (preservative free) 10 mL IV syringe  1 g IntraVENous Q24H Ming Bridges MD   1 g at 01/05/21 1750     Current Outpatient Medications   Medication Sig Dispense Refill    magnesium citrate solution Take 1/3 of bottle every 8 hours until finished or until you have a bowel movement. 1 Bottle 0    polyethylene glycol (Miralax) 17 gram/dose powder Take 17 g by mouth two (2) times a day. 1 tablespoon with 8 oz of water daily 507 g 0    cefdinir (OMNICEF) 300 mg capsule Take 1 Cap by mouth two (2) times a day. Stop the macrobid 14 Cap 0    raNITIdine (ZANTAC) 150 mg tablet Take 1 Tab by mouth two (2) times a day. 60 Tab 2    acetaminophen (TYLENOL EX STR ARTHRITIS PAIN) 500 mg tablet Take 1 Tab by mouth every six (6) hours as needed. (Patient taking differently: Take 500 mg by mouth every eight (8) hours as needed.) 60 Tab 1    diphenhydrAMINE (BENADRYL ALLERGY) 25 mg tablet Take 1 Tab by mouth every six (6) hours as needed.  (Patient taking differently: Take 50 mg by mouth every six (6) hours as needed.) 30 Tab 2       Past History     Past Medical History:  Past Medical History:   Diagnosis Date    Anemia NEC     Asthma     Colitis     DDD (degenerative disc disease), cervical     Fibrocystic breast     GERD (gastroesophageal reflux disease)     Headache     HTN (hypertension)     Hyperlipidemia     OA (osteoarthritis)     Osteoporosis     PAF (paroxysmal atrial fibrillation) ([facility] [address] 1995    Pneumonia     Thyroid nodule     Vitamin D deficiency 2/7/2011       Past Surgical History:  Past Surgical History:   Procedure Laterality Date    BREAST SURGERY PROCEDURE UNLISTED      cysts removed    CARDIAC SURG PROCEDURE UNLIST      cardiac catherization    HX GYN      hysterectomy    HX HEENT      cataract surgery bilaterally    HX TOTAL ABDOMINAL HYSTERECTOMY         Family History:  Family History   Problem Relation Age of Onset    Hypertension Other     Diabetes Other     Heart Disease Other     Cancer Other         stomach & colon    Diabetes Mother     Heart Attack Mother     Stroke Mother     Heart Attack Father     Heart Failure Father    [facility] Arthritis-rheumatoid Father     Other Brother         aneuysm-ruptured    Parkinson's Disease Brother        Social History:  Social History     Tobacco Use    Smoking status: Never Smoker    Smokeless tobacco: Never Used   Substance Use Topics    Alcohol use: No    Drug use: No       Allergies: Allergies   Allergen Reactions    Latex, Natural Rubber Unknown (comments)    Asa-Acetaminophen-Caff-Potass Shortness of Breath     Patient is only allergic to ASA    Aspirin Shortness of Breath and Other (comments)     Patient states this caused her stomach to bleed internal    Erythromycin Other (comments)     bleeding    Iodine And Iodide Containing Products Unknown (comments)    Lemon Unknown (comments)    Other Medication Unknown (comments)     Pt not sure of allergies states \"I'm allergic to more but not sure of name\"    Pcn [Penicillins] Swelling    Shellfish Containing Products Unknown (comments)    Sulfa (Sulfonamide Antibiotics) Unknown (comments)         Review of Systems       Review of Systems   Constitutional: Positive for appetite change and fatigue. Negative for activity change and fever. HENT: Negative for congestion and rhinorrhea. Eyes: Negative for visual disturbance. Respiratory: Negative for shortness of breath. Cardiovascular: Negative for chest pain and palpitations. Gastrointestinal: Negative for abdominal pain, diarrhea, nausea and vomiting. Genitourinary: Negative for dysuria and hematuria. Musculoskeletal: Negative for back pain. Skin: Negative for rash.    Neurological: Positive for tremors, weakness and light-headedness. Negative for dizziness. Psychiatric/Behavioral: The patient is nervous/anxious. All other systems reviewed and are negative. Physical Exam     Visit Vitals  BP (!) 127/50 (BP 1 Location: Left arm, BP Patient Position: At rest)   Pulse 88   Temp 98.7 °F (37.1 °C)   Resp 18   SpO2 99%         Physical Exam  Vitals signs and nursing note reviewed. Constitutional:       General: She is not in acute distress. Appearance: She is well-developed. Comments: Thin, tremulous   HENT:      Head: Normocephalic and atraumatic. Right Ear: External ear normal.      Left Ear: External ear normal.      Nose: Nose normal.      Mouth/Throat:      Mouth: Mucous membranes are dry. Eyes:      General: No scleral icterus. Conjunctiva/sclera: Conjunctivae normal.      Pupils: Pupils are equal, round, and reactive to light. Neck:      Musculoskeletal: Normal range of motion and neck supple. Thyroid: No thyromegaly. Vascular: No JVD. Trachea: No tracheal deviation. Cardiovascular:      Rate and Rhythm: Normal rate and regular rhythm. Heart sounds: Normal heart sounds. No murmur. No friction rub. No gallop. Pulmonary:      Effort: Pulmonary effort is normal.      Breath sounds: Normal breath sounds. Chest:      Chest wall: No tenderness. Abdominal:      General: Bowel sounds are normal. There is no distension. Palpations: Abdomen is soft. Tenderness: There is abdominal tenderness. There is no guarding or rebound. Comments: Poorly localized tenderness   Musculoskeletal: Normal range of motion. General: No tenderness. Lymphadenopathy:      Cervical: No cervical adenopathy. Skin:     General: Skin is warm and dry. Capillary Refill: Capillary refill takes less than 2 seconds. Neurological:      Mental Status: She is alert and oriented to person, place, and time. Cranial Nerves: No cranial nerve deficit.       Coordination: Coordination normal.      Comments: Gait steady but slow, no arm or leg drift   Psychiatric:         Thought Content: Thought content normal.         Judgment: Judgment normal.      Comments: Is anxious, no family at the bedside, no SI or HI           Diagnostic Study Results     Labs -  Recent Results (from the past 12 hour(s))   EKG, 12 LEAD, INITIAL    Collection Time: 01/05/21  1:53 PM   Result Value Ref Range    Ventricular Rate 88 BPM    Atrial Rate 88 BPM    P-R Interval 132 ms    QRS Duration 76 ms    Q-T Interval 344 ms    QTC Calculation (Bezet) 416 ms    Calculated P Axis 81 degrees    Calculated R Axis 61 degrees    Calculated T Axis 79 degrees    Diagnosis       Normal sinus rhythm  Right atrial enlargement  Nonspecific T wave abnormality  Abnormal ECG  When compared with ECG of 26-DEC-2019 21:01,  Nonspecific T wave abnormality now evident in Inferior leads  QT has shortened  Confirmed by MD Elizabet, Ashok ([id number]) on 1/5/2021 4:45:31 PM     METABOLIC PANEL, COMPREHENSIVE    Collection Time: 01/05/21  2:06 PM   Result Value Ref Range    Sodium 139 136 - 145 mmol/L    Potassium 3.3 (L) 3.5 - 5.5 mmol/L    Chloride 101 100 - 111 mmol/L    CO2 31 21 - 32 mmol/L    Anion gap 7 3.0 - 18 mmol/L    Glucose 103 (H) 74 - 99 mg/dL    BUN 33 (H) 7.0 - 18 MG/DL    Creatinine 1.53 (H) 0.6 - 1.3 MG/DL    BUN/Creatinine ratio 22 (H) 12 - 20      GFR est AA 39 (L) >60 ml/min/1.73m2    GFR est non-AA 32 (L) >60 ml/min/1.73m2    Calcium 8.4 (L) 8.5 - 10.1 MG/DL    Bilirubin, total 0.4 0.2 - 1.0 MG/DL    ALT (SGPT) 15 13 - 56 U/L    AST (SGOT) 9 (L) 10 - 38 U/L    Alk. phosphatase 58 45 - 117 U/L    Protein, total 6.5 6.4 - 8.2 g/dL    Albumin 3.0 (L) 3.4 - 5.0 g/dL    Globulin 3.5 2.0 - 4.0 g/dL    A-G Ratio 0.9 0.8 - 1.7     TSH 3RD GENERATION    Collection Time: 01/05/21  2:06 PM   Result Value Ref Range    TSH 1.01 0.36 - 3.74 uIU/mL   MAGNESIUM    Collection Time: 01/05/21  2:06 PM   Result Value Ref Range     Magnesium 1.6 1.6 - 2.6 mg/dL   CBC WITH AUTOMATED DIFF    Collection Time: 01/05/21  2:12 PM   Result Value Ref Range    WBC 16.2 (H) 4.6 - 13.2 K/uL    RBC 3.94 (L) 4.20 - 5.30 M/uL    HGB 11.5 (L) 12.0 - 16.0 g/dL    HCT 34.9 (L) 35.0 - 45.0 %    MCV 88.6 74.0 - 97.0 FL    MCH 29.2 24.0 - 34.0 PG    MCHC 33.0 31.0 - 37.0 g/dL    RDW 13.5 11.6 - 14.5 %    PLATELET 254 027 - 707 K/uL    MPV 9.5 9.2 - 11.8 FL    NEUTROPHILS 91 (H) 40 - 73 %    LYMPHOCYTES 4 (L) 21 - 52 %    MONOCYTES 4 3 - 10 %    EOSINOPHILS 1 0 - 5 %    BASOPHILS 0 0 - 2 %    ABS. NEUTROPHILS 14.9 (H) 1.8 - 8.0 K/UL    ABS. LYMPHOCYTES 0.6 (L) 0.9 - 3.6 K/UL    ABS. MONOCYTES 0.6 0.05 - 1.2 K/UL    ABS. EOSINOPHILS 0.1 0.0 - 0.4 K/UL    ABS.  BASOPHILS 0.0 0.0 - 0.1 K/UL    DF AUTOMATED     CARDIAC PANEL,(CK, CKMB & TROPONIN)    Collection Time: 01/05/21  2:12 PM   Result Value Ref Range    CK - MB <1.0 <3.6 ng/ml    CK-MB Index  0.0 - 4.0 %     CALCULATION NOT PERFORMED WHEN RESULT IS BELOW LINEAR LIMIT    CK 24 (L) 26 - 192 U/L    Troponin-I, QT <0.02 0.0 - 0.045 NG/ML   DRUG SCREEN, URINE    Collection Time: 01/05/21  2:12 PM   Result Value Ref Range    BENZODIAZEPINES Negative NEG      BARBITURATES Negative NEG      THC (TH-CANNABINOL) Negative NEG      OPIATES Negative NEG      PCP(PHENCYCLIDINE) Negative NEG      COCAINE Negative NEG      AMPHETAMINES Negative NEG      METHADONE Negative NEG      HDSCOM (NOTE)    ETHYL ALCOHOL    Collection Time: 01/05/21  2:12 PM   Result Value Ref Range    ALCOHOL(ETHYL),SERUM <3 0 - 3 MG/DL   LIPASE    Collection Time: 01/05/21  2:12 PM   Result Value Ref Range    Lipase 35 (L) 73 - 393 U/L   URINALYSIS W/ RFLX MICROSCOPIC    Collection Time: 01/05/21  2:12 PM   Result Value Ref Range    Color YELLOW      Appearance CLEAR      Specific gravity 1.012 1.005 - 1.030      pH (UA) 5.0 5.0 - 8.0      Protein TRACE (A) NEG mg/dL    Glucose Negative NEG mg/dL    Ketone Negative NEG mg/dL    Bilirubin Negative NEG Blood TRACE (A) NEG      Urobilinogen 0.2 0.2 - 1.0 EU/dL    Nitrites Negative NEG      Leukocyte Esterase MODERATE (A) NEG     URINE MICROSCOPIC ONLY    Collection Time: 01/05/21  2:12 PM   Result Value Ref Range    WBC 15 to 20 0 - 4 /hpf    RBC 0 to 3 0 - 5 /hpf    Epithelial cells FEW 0 - 5 /lpf    Bacteria FEW (A) NEG /hpf    Mucus FEW (A) NEG /lpf    Hyaline cast 1 to 2 0 - 2 /lpf       Radiologic Studies -   XR ABD ACUTE W 1 V CHEST   Final Result   IMPRESSION:   1. No radiographic evidence of acute cardiopulmonary process. 2.  Findings suggestive of emphysema. 3.  Moderate to large colonic stool burden. Nonspecific bowel gas pattern   without findings to suggest obstruction. Medical Decision Making   I am the first provider for this patient. I reviewed the vital signs, available nursing notes, past medical history, past surgical history, family history and social history. Vital Signs-Reviewed the patient's vital signs. EKG: Normal sinus rhythm at 88 interpreted by me    Records Reviewed: Nursing Notes, Old Medical Records, Previous Radiology Studies and Previous Laboratory Studies (Time of Review: 2:09 PM)    ED Course: Progress Notes, Reevaluation, and Consults:     Patient's abdominal x-ray is suggestive of constipation and the patient's lab suggest he has a worsening UTI however the patient has reassuring blood pressure and is able to ambulate. I discussed this with the patient, family, and case management. Case management has helped arrange for home health and to get a primary care doctor while the family is working on a long-term goal of getting her into a care facility. The patient wants to [facility] her self and\" to the behavioral facility however I do not feel like she is a candidate for intensive behavioral therapy and think she needs more medical management as well as evaluation for progressive decline that may be related to dementia.   We will give a dose of Rocephin in the emergency department and if she tolerates it well given her remote history of penicillin allergy we will start the patient on antibiotics, stop the Macrobid, and have the patient return if not worsened or concerned. The patient's family would like the patient to go to Dr. Phan Braswell and I think that is a reasonable plan. The patient tolerated the Rocephin well and IV hydration and is comfortable. Patient is talking to her family and will proceed with close outpatient care. Patient was educated on signs worsening and will return if not worsened or concerned. Workup and recommendations were reviewed with the patient and all questions were answered. The patient understands the plan and will proceed with close outpatient care. I have encouraged the patient to return if at all worsened or concerned. Vasile Machado DO 6:44 PM      Provider Notes (Medical Decision Making):   MDM  Number of Diagnoses or Management Options  Diagnosis management comments: Patient is an 78-year-old female with a history of hypertension, dyslipidemia, anxiety, asthma, the presents emergency department with 1 week of increasing anxiety and recently diagnosed with a UTI on Macrobid. Patient states that eating and drinking well was told she was constipated approximately week and half ago had an enema without any relief. The patient's abdomen is soft she appears thin somewhat weak however can ambulate without any support. We will follow her renal function, TSH, alcohol, UDS, abdominal x-ray, hydrate, urinalysis, and then reevaluate. Vasile Machado DO 2:19 PM        Procedures          Diagnosis     Clinical Impression:   1. Acute cystitis without hematuria    2. Dehydration    3. Constipation, unspecified constipation type    4. Anxiety state    5. Memory loss    6.  Tremor        Disposition: DC    Follow-up Information     Follow up With Specialties Details Why Bessy Toledo MD Family Medicine In 2 days  [address] [place] [address] Dr [facility] EMERGENCY DEPT Emergency Medicine  As needed, If symptoms worsen [address] [phone] health arranged by Case Management               Patient's Medications   Start Taking    CEFDINIR (OMNICEF) 300 MG CAPSULE    Take 1 Cap by mouth two (2) times a day. Stop the macrobid    MAGNESIUM CITRATE SOLUTION    Take 1/3 of bottle every 8 hours until finished or until you have a bowel movement. POLYETHYLENE GLYCOL (MIRALAX) 17 GRAM/DOSE POWDER    Take 17 g by mouth two (2) times a day. 1 tablespoon with 8 oz of water daily   Continue Taking    ACETAMINOPHEN (TYLENOL EX STR ARTHRITIS PAIN) 500 MG TABLET    Take 1 Tab by mouth every six (6) hours as needed. DIPHENHYDRAMINE (BENADRYL ALLERGY) 25 MG TABLET    Take 1 Tab by mouth every six (6) hours as needed. RANITIDINE (ZANTAC) 150 MG TABLET    Take 1 Tab by mouth two (2) times a day. These Medications have changed    No medications on file   Stop Taking    HYDROXYZINE PAMOATE (VISTARIL) 25 MG CAPSULE    Take 1 Cap by mouth three (3) times daily as needed for Anxiety. MAGIC MOUTHWASH SOLUTION    Magic mouth wash   Maalox  Lidocaine 2% viscous   Diphenhydramine oral solution     Pharmacy to mix equal portions of ingredients to a total volume as indicated in the dispense amount. POLYETHYLENE GLYCOL (MIRALAX) 17 GRAM/DOSE POWDER    Take 17 g by mouth two (2) times a day. 1 tablespoon with 8 oz of water daily     Disclaimer: Sections of this note are dictated using utilizing voice recognition software. Minor typographical errors may be present. If questions arise, please do not hesitate to contact me or call our department.

## 2021-01-06 NOTE — PROGRESS NOTES
Patient would like to schedule primary care with Dr. Hermilo Bruce office. Call made to Dr. Hermilo Bruce office, spoke with Ebony, she stated that it is office policy that patient or family schedules appointments. Patient's niece MsParesh Jesse Chau made aware, she will call office and schedule follow up, MsParesh Jesse Chau will make us aware of follow up information.

## 2021-01-06 NOTE — PROGRESS NOTES
Spoke with Estefania Multani from [facility] she stated patient refused home health services, Griselda Canton CM made aware.

## 2021-01-06 NOTE — PROGRESS NOTES
Patient's niece returned call, patient has follow up with Dr. Rashad Arreola on 1/22/2021 at 1:30 pm.

## 2021-01-06 NOTE — HOME CARE
Found home health referral in [place], Orders for SN,PT,OT,MSW , spoke to Socorro in case management dept,she states patients niece made an PCP appt this morning for patient with Dr Lisa Aguilar for Jan.22,2021 at 1:30pm; called patient's home,patient answered the phone , upon explaining [facility] services to patient ,patient states,\" I do not want home health ,I do not want anyone coming into my home\"; This liaison attempted to encourage patient to accept home health, but patient is very adamant about not wanting home health or anyone come to her home , notified Socorro in case management dept ,she states she will inform Keith Ambrocio that patient has refused home health services. RADHA SIERRA.

## 2021-01-15 ENCOUNTER — HOSPITAL ENCOUNTER (EMERGENCY)
Age: 86
Discharge: HOME | End: 2021-01-15
Attending: EMERGENCY MEDICINE
Payer: MEDICARE

## 2021-01-15 ENCOUNTER — APPOINTMENT (OUTPATIENT)
Dept: GENERAL RADIOLOGY | Age: 86
End: 2021-01-15
Attending: PHYSICIAN ASSISTANT
Payer: MEDICARE

## 2021-01-15 VITALS
TEMPERATURE: 99.4 F | RESPIRATION RATE: 16 BRPM | DIASTOLIC BLOOD PRESSURE: 69 MMHG | HEART RATE: 93 BPM | OXYGEN SATURATION: 99 % | SYSTOLIC BLOOD PRESSURE: 98 MMHG

## 2021-01-15 DIAGNOSIS — K59.00 CONSTIPATION, UNSPECIFIED CONSTIPATION TYPE: Primary | ICD-10-CM

## 2021-01-15 PROCEDURE — 74018 RADEX ABDOMEN 1 VIEW: CPT

## 2021-01-15 PROCEDURE — 99283 EMERGENCY DEPT VISIT LOW MDM: CPT

## 2021-01-15 PROCEDURE — 74011250637 HC RX REV CODE- 250/637: Performed by: PHYSICIAN ASSISTANT

## 2021-01-15 RX ORDER — SENNOSIDES 8.6 MG/1
1 TABLET ORAL DAILY
Qty: 30 TAB | Refills: 0 | Status: SHIPPED | OUTPATIENT
Start: 2021-01-15

## 2021-01-15 RX ORDER — DEXTROMETHORPHAN POLISTIREX 30 MG/5 ML
SUSPENSION, EXTENDED RELEASE 12 HR ORAL
Status: COMPLETED | OUTPATIENT
Start: 2021-01-15 | End: 2021-01-15

## 2021-01-15 RX ADMIN — MINERAL OIL 133 ML: 100 ENEMA RECTAL at 14:37

## 2021-01-15 NOTE — ED TRIAGE NOTES
Pt states that she has not had a BM x 2 weeks. Pt states that she can feel that stool wants to come out but cant. Pt seen for the same recently.

## 2021-01-15 NOTE — ED PROVIDER NOTES
EMERGENCY DEPARTMENT HISTORY AND PHYSICAL EXAM      Date: 1/15/2021  Patient Name: Ryan Mcneill    History of Presenting Illness     Chief Complaint   Patient presents with    Constipation       History Provided By: Patient    HPI: Ryan Mcneill, 80 y.o. female PMHx significant for arthritis, osteoporosis, hypertension, hyperlipidemia, anemia, vitamin D deficiency, GERD, asthma, a fib presents ambulatory to the ED with cc of constipation. Patient reports she has not had a bowel movement in 2 weeks. Denies vomiting, change in appetite. Patient reports previous similar symptoms which was recently seen at [facility] and enema helped relieve symptoms. Patient reports she was prescribed MiraLAX. Patient states she is currently sleeping on her niece's couch, she has not taken MiraLAX because she is concerned she will have a \"accident\" on her niece's couch. Denies taking narcotic medication. Pt denies abdominal pain. There are no other complaints, changes, or physical findings at this time. PCP: Lucía Pathak NP    No current facility-administered medications on file prior to encounter. Current Outpatient Medications on File Prior to Encounter   Medication Sig Dispense Refill    magnesium citrate solution Take 1/3 of bottle every 8 hours until finished or until you have a bowel movement. 1 Bottle 0    polyethylene glycol (Miralax) 17 gram/dose powder Take 17 g by mouth two (2) times a day. 1 tablespoon with 8 oz of water daily 507 g 0    cefdinir (OMNICEF) 300 mg capsule Take 1 Cap by mouth two (2) times a day. Stop the macrobid 14 Cap 0    raNITIdine (ZANTAC) 150 mg tablet Take 1 Tab by mouth two (2) times a day. 60 Tab 2    acetaminophen (TYLENOL EX STR ARTHRITIS PAIN) 500 mg tablet Take 1 Tab by mouth every six (6) hours as needed.  (Patient taking differently: Take 500 mg by mouth every eight (8) hours as needed.) 60 Tab 1    diphenhydrAMINE (BENADRYL ALLERGY) 25 mg tablet Take 1 Tab by mouth every six (6) hours as needed. (Patient taking differently: Take 50 mg by mouth every six (6) hours as needed.) 30 Tab 2       Past History     Past Medical History:  Past Medical History:   Diagnosis Date    Anemia NEC     Asthma     Colitis     DDD (degenerative disc disease), cervical     Fibrocystic breast     GERD (gastroesophageal reflux disease)     Headache     HTN (hypertension)     Hyperlipidemia     OA (osteoarthritis)     Osteoporosis     PAF (paroxysmal atrial fibrillation) ([facility] [address] 1995    Pneumonia     Thyroid nodule     Vitamin D deficiency 2/7/2011       Past Surgical History:  Past Surgical History:   Procedure Laterality Date    BREAST SURGERY PROCEDURE UNLISTED      cysts removed    CARDIAC SURG PROCEDURE UNLIST      cardiac catherization    HX GYN      hysterectomy    HX HEENT      cataract surgery bilaterally    HX TOTAL ABDOMINAL HYSTERECTOMY         Family History:  Family History   Problem Relation Age of Onset    Hypertension Other     Diabetes Other     Heart Disease Other     Cancer Other         stomach & colon    Diabetes Mother     Heart Attack Mother     Stroke Mother     Heart Attack Father     Heart Failure Father    [address] [place] [address] Arthritis-rheumatoid Father     Other Brother         aneuysm-ruptured    Parkinson's Disease Brother        Social History:  Social History     Tobacco Use    Smoking status: Never Smoker    Smokeless tobacco: Never Used   Substance Use Topics    Alcohol use: No    Drug use: No       Allergies:   Allergies   Allergen Reactions    Latex, Natural Rubber Unknown (comments)    Asa-Acetaminophen-Caff-Potass Shortness of Breath     Patient is only allergic to ASA    Aspirin Shortness of Breath and Other (comments)     Patient states this caused her stomach to bleed internal    Erythromycin Other (comments)     bleeding    Iodine And Iodide Containing Products Unknown (comments)    Lemon Unknown (comments)    Other Medication Unknown (comments)     Pt not sure of allergies states \"I'm allergic to more but not sure of name\"    Pcn [Penicillins] Swelling    Shellfish Containing Products Unknown (comments)    Sulfa (Sulfonamide Antibiotics) Unknown (comments)         Review of Systems   Review of Systems   Constitutional: Negative for chills and fever. Respiratory: Negative for shortness of breath. Cardiovascular: Negative for chest pain. Gastrointestinal: Positive for constipation. Negative for abdominal pain, nausea and vomiting. Genitourinary: Negative for flank pain. Musculoskeletal: Negative for back pain and myalgias. Skin: Negative for color change, pallor, rash and wound. Neurological: Negative for dizziness, weakness and light-headedness. All other systems reviewed and are negative. Physical Exam   Physical Exam  Vitals signs and nursing note reviewed. Constitutional:       General: She is not in acute distress. Appearance: She is well-developed. Comments: Patient in NAD   HENT:      Head: Normocephalic and atraumatic. Eyes:      Conjunctiva/sclera: Conjunctivae normal.   Cardiovascular:      Rate and Rhythm: Normal rate and regular rhythm. Heart sounds: Normal heart sounds. Pulmonary:      Effort: Pulmonary effort is normal. No respiratory distress. Breath sounds: Normal breath sounds. Abdominal:      General: Bowel sounds are normal. There is no distension. Palpations: Abdomen is soft. Comments: Abdomen soft, nontender  Nondistended  No guarding or rigidity   Musculoskeletal: Normal range of motion. Skin:     General: Skin is warm. Findings: No rash. Neurological:      Mental Status: She is alert and oriented to person, place, and time. Psychiatric:         Behavior: Behavior normal.         Diagnostic Study Results     Labs -   No results found for this or any previous visit (from the past 12 hour(s)).     Radiologic Studies -   XR ABD (KUB)   Final Result IMPRESSION:   1. Moderate to large fecal burden but improved compared to prior imaging. CT Results  (Last 48 hours)    None        CXR Results  (Last 48 hours)    None          Medical Decision Making   I am the first provider for this patient. I reviewed the vital signs, available nursing notes, past medical history, past surgical history, family history and social history. Vital Signs-Reviewed the patient's vital signs. No data found. Records Reviewed: Nursing Notes and Old Medical Records    Provider Notes (Medical Decision Making):   DDX: Constipation, Impaction, SBO    81 yo F who presents with constipation x 2 weeks. Denies vomiting, abdominal pain. On exam abdomen soft and nontender. KUB shows moderate to large fecal burden without impaction. Pt has not been taking laxative at home. Low level of concern for SBO due to chronic hx of constipation, no vomiting, and soft and nondistended abdomen. Patient previously discharged home with mag citrate, which pt is afraid to take. Will prescribe senna because patient states she already has MiraLAX at home. Discussed importance of increased water intake and fiber. At discharge patient nontoxic-appearing in NAD. Patient stable for prompt outpatient follow-up with PCP 1 to 2 days. Patient given strict instructions to return if symptoms worsen. ED Course:   Initial assessment performed. The patients presenting problems have been discussed, and they are in agreement with the care plan formulated and outlined with them. I have encouraged them to ask questions as they arise throughout their visit. Nurse reports a moderate amount of light brown stool after enema. Pt reports improvement of sx. Disposition:  Discussed lab and imaging results with pt along with dx and treatment plan. Discussed importance of PCP follow up. All questions answered. Pt voiced they understood. Return if sx worsen. PLAN:  1.    Discharge Medication List as of 1/15/2021  4:02 PM      START taking these medications    Details   senna (Senna) 8.6 mg tablet Take 1 Tab by mouth daily. , Normal, Disp-30 Tab, R-0         CONTINUE these medications which have NOT CHANGED    Details   magnesium citrate solution Take 1/3 of bottle every 8 hours until finished or until you have a bowel movement., Normal, Disp-1 Bottle, R-0      polyethylene glycol (Miralax) 17 gram/dose powder Take 17 g by mouth two (2) times a day. 1 tablespoon with 8 oz of water daily, Normal, Disp-507 g, R-0      cefdinir (OMNICEF) 300 mg capsule Take 1 Cap by mouth two (2) times a day. Stop the macrobid, Normal, Disp-14 Cap, R-0      raNITIdine (ZANTAC) 150 mg tablet Take 1 Tab by mouth two (2) times a day., Normal, Disp-60 Tab, R-2      acetaminophen (TYLENOL EX STR ARTHRITIS PAIN) 500 mg tablet Take 1 Tab by mouth every six (6) hours as needed., Normal, Disp-60 Tab, R-1      diphenhydrAMINE (BENADRYL ALLERGY) 25 mg tablet Take 1 Tab by mouth every six (6) hours as needed., Normal, Disp-30 Tab, R-2           2. Follow-up Information     Follow up With Specialties Details Why Contact Info    Timmy Blackwell NP Nurse Practitioner Schedule an appointment as soon as possible for a visit today  [address] [facility]  [address]      [facility] EMERGENCY DEPT Emergency Medicine  As needed, If symptoms worsen [address] [facility] [address]  [phone]        Return to ED if worse     Diagnosis     Clinical Impression:   1. Constipation, unspecified constipation type        Attestations:    REHAN Michael    Please note that this dictation was completed with [employer], the computer voice recognition software. Quite often unanticipated grammatical, syntax, homophones, and other interpretive errors are inadvertently transcribed by the computer software. Please disregard these errors. Please excuse any errors that have escaped final proofreading. Thank you.

## 2021-01-15 NOTE — ED NOTES
Report given to [address] [place] [address], [address].  No questions or concerns after report

## 2021-04-07 ENCOUNTER — TRANSCRIBE ORDER (OUTPATIENT)
Dept: SCHEDULING | Age: 86
End: 2021-04-07

## 2021-04-07 DIAGNOSIS — G25.0 BENIGN ESSENTIAL TREMOR: Primary | ICD-10-CM

## 2021-04-20 ENCOUNTER — TRANSCRIBE ORDER (OUTPATIENT)
Dept: REGISTRATION | Age: 86
End: 2021-04-20

## 2021-04-20 ENCOUNTER — HOSPITAL ENCOUNTER (OUTPATIENT)
Dept: LAB | Age: 86
Discharge: HOME | End: 2021-04-20
Payer: MEDICARE

## 2021-04-20 DIAGNOSIS — G25.0 BENIGN ESSENTIAL TREMOR: ICD-10-CM

## 2021-04-20 DIAGNOSIS — G50.0 TRIGEMINAL NEURALGIA: Primary | ICD-10-CM

## 2021-04-20 DIAGNOSIS — G50.0 TRIGEMINAL NEURALGIA: ICD-10-CM

## 2021-04-20 LAB
ERYTHROCYTE [SEDIMENTATION RATE] IN BLOOD: 38 MM/HR (ref 0–30)
FOLATE SERPL-MCNC: >20 NG/ML (ref 3.1–17.5)
TSH SERPL DL<=0.05 MIU/L-ACNC: 1.23 UIU/ML (ref 0.36–3.74)
VIT B12 SERPL-MCNC: 325 PG/ML (ref 211–911)

## 2021-04-20 PROCEDURE — 82390 ASSAY OF CERULOPLASMIN: CPT

## 2021-04-20 PROCEDURE — 84443 ASSAY THYROID STIM HORMONE: CPT

## 2021-04-20 PROCEDURE — 82525 ASSAY OF COPPER: CPT

## 2021-04-20 PROCEDURE — 85652 RBC SED RATE AUTOMATED: CPT

## 2021-04-20 PROCEDURE — 36415 COLL VENOUS BLD VENIPUNCTURE: CPT

## 2021-04-20 PROCEDURE — 86780 TREPONEMA PALLIDUM: CPT

## 2021-04-20 PROCEDURE — 82607 VITAMIN B-12: CPT

## 2021-04-20 PROCEDURE — 86038 ANTINUCLEAR ANTIBODIES: CPT

## 2021-04-21 LAB
ANA SER QL: NEGATIVE
CERULOPLASMIN SERPL-MCNC: 22.8 MG/DL (ref 19–39)
T PALLIDUM AB SER QL IF: NON REACTIVE

## 2021-04-22 LAB — COPPER SERPL-MCNC: 95 UG/DL (ref 80–158)

## 2021-04-29 ENCOUNTER — HOSPITAL ENCOUNTER (OUTPATIENT)
Age: 86
Discharge: HOME | End: 2021-04-29
Payer: MEDICARE

## 2021-04-29 DIAGNOSIS — G25.0 BENIGN ESSENTIAL TREMOR: ICD-10-CM

## 2021-04-29 PROCEDURE — 70551 MRI BRAIN STEM W/O DYE: CPT

## 2021-05-11 ENCOUNTER — TRANSCRIBE ORDER (OUTPATIENT)
Dept: REGISTRATION | Age: 86
End: 2021-05-11

## 2021-05-11 ENCOUNTER — HOSPITAL ENCOUNTER (OUTPATIENT)
Dept: GENERAL RADIOLOGY | Age: 86
Discharge: HOME | End: 2021-05-11
Payer: MEDICARE

## 2021-05-11 DIAGNOSIS — R61 NIGHT SWEATS: Primary | ICD-10-CM

## 2021-05-11 DIAGNOSIS — R61 NIGHT SWEATS: ICD-10-CM

## 2021-05-11 PROCEDURE — 71046 X-RAY EXAM CHEST 2 VIEWS: CPT

## 2021-07-02 ENCOUNTER — TRANSCRIBE ORDER (OUTPATIENT)
Dept: SCHEDULING | Age: 86
End: 2021-07-02

## 2021-07-02 DIAGNOSIS — I63.033 CEREBRAL INFARCTION DUE TO BILATERAL THROMBOSIS OF CAROTID ARTERIES (HCC): Primary | ICD-10-CM

## 2021-07-15 ENCOUNTER — HOSPITAL ENCOUNTER (OUTPATIENT)
Age: 86
Discharge: HOME | End: 2021-07-15
Payer: MEDICARE

## 2021-07-15 DIAGNOSIS — I63.033 CEREBRAL INFARCTION DUE TO BILATERAL THROMBOSIS OF CAROTID ARTERIES (HCC): ICD-10-CM

## 2021-07-15 PROCEDURE — 70544 MR ANGIOGRAPHY HEAD W/O DYE: CPT

## 2021-10-24 ENCOUNTER — HOSPITAL ENCOUNTER (INPATIENT)
Age: 86
LOS: 5 days | Discharge: HOME | DRG: 643 | End: 2021-10-30
Attending: EMERGENCY MEDICINE | Admitting: STUDENT IN AN ORGANIZED HEALTH CARE EDUCATION/TRAINING PROGRAM
Payer: MEDICARE

## 2021-10-24 ENCOUNTER — APPOINTMENT (OUTPATIENT)
Dept: GENERAL RADIOLOGY | Age: 86
DRG: 643 | End: 2021-10-24
Attending: EMERGENCY MEDICINE
Payer: MEDICARE

## 2021-10-24 DIAGNOSIS — Z51.5 ENCOUNTER FOR PALLIATIVE CARE: ICD-10-CM

## 2021-10-24 DIAGNOSIS — R53.81 DEBILITY: ICD-10-CM

## 2021-10-24 DIAGNOSIS — E43 SEVERE PROTEIN-CALORIE MALNUTRITION (HCC): Chronic | ICD-10-CM

## 2021-10-24 DIAGNOSIS — Z71.89 GOALS OF CARE, COUNSELING/DISCUSSION: ICD-10-CM

## 2021-10-24 DIAGNOSIS — E87.1 HYPONATREMIA: Primary | ICD-10-CM

## 2021-10-24 PROCEDURE — 83735 ASSAY OF MAGNESIUM: CPT

## 2021-10-24 PROCEDURE — 93005 ELECTROCARDIOGRAM TRACING: CPT

## 2021-10-24 PROCEDURE — 80053 COMPREHEN METABOLIC PANEL: CPT

## 2021-10-24 PROCEDURE — 83690 ASSAY OF LIPASE: CPT

## 2021-10-24 PROCEDURE — 99285 EMERGENCY DEPT VISIT HI MDM: CPT

## 2021-10-24 PROCEDURE — 84484 ASSAY OF TROPONIN QUANT: CPT

## 2021-10-24 PROCEDURE — 85025 COMPLETE CBC W/AUTO DIFF WBC: CPT

## 2021-10-24 RX ORDER — PANTOPRAZOLE SODIUM 40 MG/10ML
80 INJECTION, POWDER, LYOPHILIZED, FOR SOLUTION INTRAVENOUS
Status: COMPLETED | OUTPATIENT
Start: 2021-10-24 | End: 2021-10-25

## 2021-10-24 RX ORDER — ONDANSETRON 2 MG/ML
4 INJECTION INTRAMUSCULAR; INTRAVENOUS ONCE
Status: COMPLETED | OUTPATIENT
Start: 2021-10-24 | End: 2021-10-25

## 2021-10-24 NOTE — Clinical Note
Status[de-identified] INPATIENT [101]   Type of Bed: Telemetry [19]   Cardiac Monitoring Required?: Yes   Inpatient Hospitalization Certified Necessary for the Following Reasons: 3.  Patient receiving treatment that can only be provided in an inpatient setting (further clarification in H&P documentation)   Admitting Diagnosis: Hyponatremia [394613]   Admitting Physician: Virginia Tao [[id number]]   Attending Physician: Virginia Tao [[id number]]   Estimated Length of Stay: 2 Midnights   Discharge Plan[de-identified] [facility]

## 2021-10-25 ENCOUNTER — APPOINTMENT (OUTPATIENT)
Dept: CT IMAGING | Age: 86
DRG: 643 | End: 2021-10-25
Attending: STUDENT IN AN ORGANIZED HEALTH CARE EDUCATION/TRAINING PROGRAM
Payer: MEDICARE

## 2021-10-25 ENCOUNTER — APPOINTMENT (OUTPATIENT)
Dept: GENERAL RADIOLOGY | Age: 86
DRG: 643 | End: 2021-10-25
Attending: EMERGENCY MEDICINE
Payer: MEDICARE

## 2021-10-25 ENCOUNTER — APPOINTMENT (OUTPATIENT)
Dept: CT IMAGING | Age: 86
DRG: 643 | End: 2021-10-25
Attending: EMERGENCY MEDICINE
Payer: MEDICARE

## 2021-10-25 PROBLEM — E87.1 HYPONATREMIA: Status: ACTIVE | Noted: 2021-10-25

## 2021-10-25 LAB
ALBUMIN SERPL-MCNC: 3.5 G/DL (ref 3.4–5)
ALBUMIN SERPL-MCNC: 3.6 G/DL (ref 3.4–5)
ALBUMIN/GLOB SERPL: 1 {RATIO} (ref 0.8–1.7)
ALBUMIN/GLOB SERPL: 1 {RATIO} (ref 0.8–1.7)
ALP SERPL-CCNC: 103 U/L (ref 45–117)
ALP SERPL-CCNC: 90 U/L (ref 45–117)
ALT SERPL-CCNC: 15 U/L (ref 13–56)
ALT SERPL-CCNC: 16 U/L (ref 13–56)
ANION GAP SERPL CALC-SCNC: 4 MMOL/L (ref 3–18)
ANION GAP SERPL CALC-SCNC: 4 MMOL/L (ref 3–18)
ANION GAP SERPL CALC-SCNC: 6 MMOL/L (ref 3–18)
ANION GAP SERPL CALC-SCNC: 6 MMOL/L (ref 3–18)
ANION GAP SERPL CALC-SCNC: 7 MMOL/L (ref 3–18)
ANION GAP SERPL CALC-SCNC: 9 MMOL/L (ref 3–18)
APPEARANCE UR: CLEAR
AST SERPL-CCNC: 15 U/L (ref 10–38)
AST SERPL-CCNC: 17 U/L (ref 10–38)
ATRIAL RATE: 55 BPM
BACTERIA URNS QL MICRO: NEGATIVE /HPF
BASOPHILS # BLD: 0.1 K/UL (ref 0–0.1)
BASOPHILS NFR BLD: 1 % (ref 0–2)
BILIRUB SERPL-MCNC: 0.4 MG/DL (ref 0.2–1)
BILIRUB SERPL-MCNC: 0.4 MG/DL (ref 0.2–1)
BILIRUB UR QL: NEGATIVE
BUN SERPL-MCNC: 12 MG/DL (ref 7–18)
BUN SERPL-MCNC: 14 MG/DL (ref 7–18)
BUN SERPL-MCNC: 15 MG/DL (ref 7–18)
BUN SERPL-MCNC: 17 MG/DL (ref 7–18)
BUN SERPL-MCNC: 18 MG/DL (ref 7–18)
BUN SERPL-MCNC: 20 MG/DL (ref 7–18)
BUN/CREAT SERPL: 17 (ref 12–20)
BUN/CREAT SERPL: 21 (ref 12–20)
BUN/CREAT SERPL: 21 (ref 12–20)
BUN/CREAT SERPL: 22 (ref 12–20)
BUN/CREAT SERPL: 22 (ref 12–20)
BUN/CREAT SERPL: 39 (ref 12–20)
CALCIUM SERPL-MCNC: 8 MG/DL (ref 8.5–10.1)
CALCIUM SERPL-MCNC: 8.2 MG/DL (ref 8.5–10.1)
CALCIUM SERPL-MCNC: 8.4 MG/DL (ref 8.5–10.1)
CALCIUM SERPL-MCNC: 8.5 MG/DL (ref 8.5–10.1)
CALCIUM SERPL-MCNC: 8.6 MG/DL (ref 8.5–10.1)
CALCIUM SERPL-MCNC: <5 MG/DL (ref 8.5–10.1)
CALCULATED P AXIS, ECG09: 66 DEGREES
CALCULATED R AXIS, ECG10: 17 DEGREES
CALCULATED T AXIS, ECG11: 62 DEGREES
CHLORIDE SERPL-SCNC: 116 MMOL/L (ref 100–111)
CHLORIDE SERPL-SCNC: 85 MMOL/L (ref 100–111)
CHLORIDE SERPL-SCNC: 92 MMOL/L (ref 100–111)
CHLORIDE SERPL-SCNC: 93 MMOL/L (ref 100–111)
CHLORIDE SERPL-SCNC: 93 MMOL/L (ref 100–111)
CHLORIDE SERPL-SCNC: 96 MMOL/L (ref 100–111)
CHLORIDE UR-SCNC: 63 MMOL/L (ref 55–125)
CO2 SERPL-SCNC: 16 MMOL/L (ref 21–32)
CO2 SERPL-SCNC: 25 MMOL/L (ref 21–32)
CO2 SERPL-SCNC: 27 MMOL/L (ref 21–32)
CO2 SERPL-SCNC: 27 MMOL/L (ref 21–32)
CO2 SERPL-SCNC: 28 MMOL/L (ref 21–32)
CO2 SERPL-SCNC: 29 MMOL/L (ref 21–32)
COLOR UR: YELLOW
CREAT SERPL-MCNC: 0.31 MG/DL (ref 0.6–1.3)
CREAT SERPL-MCNC: 0.69 MG/DL (ref 0.6–1.3)
CREAT SERPL-MCNC: 0.78 MG/DL (ref 0.6–1.3)
CREAT SERPL-MCNC: 0.81 MG/DL (ref 0.6–1.3)
CREAT SERPL-MCNC: 0.85 MG/DL (ref 0.6–1.3)
CREAT SERPL-MCNC: 0.94 MG/DL (ref 0.6–1.3)
DIAGNOSIS, 93000: NORMAL
DIFFERENTIAL METHOD BLD: ABNORMAL
EOSINOPHIL # BLD: 0 K/UL (ref 0–0.4)
EOSINOPHIL NFR BLD: 0 % (ref 0–5)
EPITH CASTS URNS QL MICRO: NORMAL /LPF (ref 0–5)
ERYTHROCYTE [DISTWIDTH] IN BLOOD BY AUTOMATED COUNT: 11.9 % (ref 11.6–14.5)
GLOBULIN SER CALC-MCNC: 3.5 G/DL (ref 2–4)
GLOBULIN SER CALC-MCNC: 3.7 G/DL (ref 2–4)
GLUCOSE SERPL-MCNC: 103 MG/DL (ref 74–99)
GLUCOSE SERPL-MCNC: 108 MG/DL (ref 74–99)
GLUCOSE SERPL-MCNC: 113 MG/DL (ref 74–99)
GLUCOSE SERPL-MCNC: 126 MG/DL (ref 74–99)
GLUCOSE SERPL-MCNC: 126 MG/DL (ref 74–99)
GLUCOSE SERPL-MCNC: 73 MG/DL (ref 74–99)
GLUCOSE UR STRIP.AUTO-MCNC: NEGATIVE MG/DL
HCT VFR BLD AUTO: 27.7 % (ref 35–45)
HEMOCCULT STL QL: NEGATIVE
HGB BLD-MCNC: 9.8 G/DL (ref 12–16)
HGB UR QL STRIP: ABNORMAL
KETONES UR QL STRIP.AUTO: NEGATIVE MG/DL
LEUKOCYTE ESTERASE UR QL STRIP.AUTO: NEGATIVE
LIPASE SERPL-CCNC: 61 U/L (ref 73–393)
LYMPHOCYTES # BLD: 1.2 K/UL (ref 0.9–3.6)
LYMPHOCYTES NFR BLD: 17 % (ref 21–52)
MAGNESIUM SERPL-MCNC: 1.7 MG/DL (ref 1.6–2.6)
MAGNESIUM SERPL-MCNC: 1.8 MG/DL (ref 1.6–2.6)
MCH RBC QN AUTO: 29.6 PG (ref 24–34)
MCHC RBC AUTO-ENTMCNC: 35.4 G/DL (ref 31–37)
MCV RBC AUTO: 83.7 FL (ref 78–100)
MONOCYTES # BLD: 0.4 K/UL (ref 0.05–1.2)
MONOCYTES NFR BLD: 5 % (ref 3–10)
NEUTS SEG # BLD: 5.7 K/UL (ref 1.8–8)
NEUTS SEG NFR BLD: 77 % (ref 40–73)
NITRITE UR QL STRIP.AUTO: NEGATIVE
P-R INTERVAL, ECG05: 138 MS
PH UR STRIP: 8.5 [PH] (ref 5–8)
PLATELET # BLD AUTO: 250 K/UL (ref 135–420)
PMV BLD AUTO: 8.9 FL (ref 9.2–11.8)
POTASSIUM SERPL-SCNC: 2.6 MMOL/L (ref 3.5–5.5)
POTASSIUM SERPL-SCNC: 4.2 MMOL/L (ref 3.5–5.5)
POTASSIUM SERPL-SCNC: 4.5 MMOL/L (ref 3.5–5.5)
POTASSIUM SERPL-SCNC: 4.5 MMOL/L (ref 3.5–5.5)
POTASSIUM SERPL-SCNC: 4.6 MMOL/L (ref 3.5–5.5)
POTASSIUM SERPL-SCNC: 4.7 MMOL/L (ref 3.5–5.5)
POTASSIUM UR-SCNC: 20 MMOL/L (ref 12–62)
PROT SERPL-MCNC: 7 G/DL (ref 6.4–8.2)
PROT SERPL-MCNC: 7.3 G/DL (ref 6.4–8.2)
PROT UR STRIP-MCNC: NEGATIVE MG/DL
Q-T INTERVAL, ECG07: 456 MS
QRS DURATION, ECG06: 76 MS
QTC CALCULATION (BEZET), ECG08: 436 MS
RBC # BLD AUTO: 3.31 M/UL (ref 4.2–5.3)
RBC #/AREA URNS HPF: NORMAL /HPF (ref 0–5)
SODIUM SERPL-SCNC: 118 MMOL/L (ref 136–145)
SODIUM SERPL-SCNC: 125 MMOL/L (ref 136–145)
SODIUM SERPL-SCNC: 126 MMOL/L (ref 136–145)
SODIUM SERPL-SCNC: 126 MMOL/L (ref 136–145)
SODIUM SERPL-SCNC: 127 MMOL/L (ref 136–145)
SODIUM SERPL-SCNC: 141 MMOL/L (ref 136–145)
SODIUM UR-SCNC: 60 MMOL/L (ref 20–110)
SP GR UR REFRACTOMETRY: 1.01 (ref 1–1.03)
TROPONIN I SERPL-MCNC: <0.02 NG/ML (ref 0–0.04)
TROPONIN I SERPL-MCNC: <0.02 NG/ML (ref 0–0.04)
TSH SERPL DL<=0.05 MIU/L-ACNC: 1.52 UIU/ML (ref 0.36–3.74)
URATE SERPL-MCNC: 2.5 MG/DL (ref 2.6–7.2)
UROBILINOGEN UR QL STRIP.AUTO: 0.2 EU/DL (ref 0.2–1)
VENTRICULAR RATE, ECG03: 55 BPM
WBC # BLD AUTO: 7.4 K/UL (ref 4.6–13.2)
WBC URNS QL MICRO: NORMAL /HPF (ref 0–4)

## 2021-10-25 PROCEDURE — 74011250637 HC RX REV CODE- 250/637: Performed by: EMERGENCY MEDICINE

## 2021-10-25 PROCEDURE — 70450 CT HEAD/BRAIN W/O DYE: CPT

## 2021-10-25 PROCEDURE — 84550 ASSAY OF BLOOD/URIC ACID: CPT

## 2021-10-25 PROCEDURE — 96374 THER/PROPH/DIAG INJ IV PUSH: CPT

## 2021-10-25 PROCEDURE — 80048 BASIC METABOLIC PNL TOTAL CA: CPT

## 2021-10-25 PROCEDURE — 83935 ASSAY OF URINE OSMOLALITY: CPT

## 2021-10-25 PROCEDURE — 74176 CT ABD & PELVIS W/O CONTRAST: CPT

## 2021-10-25 PROCEDURE — 84300 ASSAY OF URINE SODIUM: CPT

## 2021-10-25 PROCEDURE — 81001 URINALYSIS AUTO W/SCOPE: CPT

## 2021-10-25 PROCEDURE — 74011250636 HC RX REV CODE- 250/636: Performed by: EMERGENCY MEDICINE

## 2021-10-25 PROCEDURE — 96361 HYDRATE IV INFUSION ADD-ON: CPT

## 2021-10-25 PROCEDURE — 65660000004 HC RM CVT STEPDOWN

## 2021-10-25 PROCEDURE — 71045 X-RAY EXAM CHEST 1 VIEW: CPT

## 2021-10-25 PROCEDURE — C9113 INJ PANTOPRAZOLE SODIUM, VIA: HCPCS | Performed by: EMERGENCY MEDICINE

## 2021-10-25 PROCEDURE — 84133 ASSAY OF URINE POTASSIUM: CPT

## 2021-10-25 PROCEDURE — 83735 ASSAY OF MAGNESIUM: CPT

## 2021-10-25 PROCEDURE — 84443 ASSAY THYROID STIM HORMONE: CPT

## 2021-10-25 PROCEDURE — 80053 COMPREHEN METABOLIC PANEL: CPT

## 2021-10-25 PROCEDURE — 74011250636 HC RX REV CODE- 250/636: Performed by: STUDENT IN AN ORGANIZED HEALTH CARE EDUCATION/TRAINING PROGRAM

## 2021-10-25 PROCEDURE — 82270 OCCULT BLOOD FECES: CPT

## 2021-10-25 PROCEDURE — 82436 ASSAY OF URINE CHLORIDE: CPT

## 2021-10-25 PROCEDURE — 83930 ASSAY OF BLOOD OSMOLALITY: CPT

## 2021-10-25 PROCEDURE — 96375 TX/PRO/DX INJ NEW DRUG ADDON: CPT

## 2021-10-25 PROCEDURE — 84484 ASSAY OF TROPONIN QUANT: CPT

## 2021-10-25 RX ORDER — LUBIPROSTONE 8 UG/1
8 CAPSULE, GELATIN COATED ORAL 2 TIMES DAILY
COMMUNITY
Start: 2021-08-24

## 2021-10-25 RX ORDER — OMEPRAZOLE 20 MG/1
20 CAPSULE, DELAYED RELEASE ORAL DAILY
COMMUNITY
Start: 2021-10-11

## 2021-10-25 RX ORDER — EZETIMIBE 10 MG/1
10 TABLET ORAL DAILY
COMMUNITY
Start: 2021-08-30

## 2021-10-25 RX ORDER — PANTOPRAZOLE SODIUM 40 MG/1
40 TABLET, DELAYED RELEASE ORAL
Status: DISCONTINUED | OUTPATIENT
Start: 2021-10-25 | End: 2021-10-30 | Stop reason: HOSPADM

## 2021-10-25 RX ORDER — PROPRANOLOL HYDROCHLORIDE 60 MG/1
60 CAPSULE, EXTENDED RELEASE ORAL DAILY
Status: DISCONTINUED | OUTPATIENT
Start: 2021-10-25 | End: 2021-10-26

## 2021-10-25 RX ORDER — SODIUM CHLORIDE 9 MG/ML
100 INJECTION, SOLUTION INTRAVENOUS ONCE
Status: COMPLETED | OUTPATIENT
Start: 2021-10-25 | End: 2021-10-25

## 2021-10-25 RX ORDER — ONDANSETRON 2 MG/ML
4 INJECTION INTRAMUSCULAR; INTRAVENOUS
Status: DISCONTINUED | OUTPATIENT
Start: 2021-10-25 | End: 2021-10-26 | Stop reason: SDUPTHER

## 2021-10-25 RX ORDER — LUBIPROSTONE 8 UG/1
8 CAPSULE, GELATIN COATED ORAL 2 TIMES DAILY
Status: DISCONTINUED | OUTPATIENT
Start: 2021-10-25 | End: 2021-10-30 | Stop reason: HOSPADM

## 2021-10-25 RX ORDER — PROPRANOLOL HYDROCHLORIDE 60 MG/1
60 CAPSULE, EXTENDED RELEASE ORAL DAILY
COMMUNITY
Start: 2021-09-13 | End: 2021-10-30

## 2021-10-25 RX ORDER — EZETIMIBE 10 MG/1
10 TABLET ORAL DAILY
Status: DISCONTINUED | OUTPATIENT
Start: 2021-10-25 | End: 2021-10-30 | Stop reason: HOSPADM

## 2021-10-25 RX ADMIN — LUBIPROSTONE 8 MCG: 8 CAPSULE, GELATIN COATED ORAL at 09:17

## 2021-10-25 RX ADMIN — PROPRANOLOL HYDROCHLORIDE 60 MG: 60 CAPSULE, EXTENDED RELEASE ORAL at 09:18

## 2021-10-25 RX ADMIN — SODIUM CHLORIDE 100 ML/HR: 900 INJECTION, SOLUTION INTRAVENOUS at 00:40

## 2021-10-25 RX ADMIN — ONDANSETRON 4 MG: 2 INJECTION INTRAMUSCULAR; INTRAVENOUS at 00:25

## 2021-10-25 RX ADMIN — PANTOPRAZOLE SODIUM 80 MG: 40 INJECTION, POWDER, FOR SOLUTION INTRAVENOUS at 00:25

## 2021-10-25 RX ADMIN — ONDANSETRON 4 MG: 2 INJECTION INTRAMUSCULAR; INTRAVENOUS at 21:25

## 2021-10-25 RX ADMIN — PANTOPRAZOLE SODIUM 40 MG: 40 TABLET, DELAYED RELEASE ORAL at 08:19

## 2021-10-25 RX ADMIN — LUBIPROSTONE 8 MCG: 8 CAPSULE, GELATIN COATED ORAL at 21:26

## 2021-10-25 RX ADMIN — EZETIMIBE 10 MG: 10 TABLET ORAL at 09:17

## 2021-10-25 NOTE — ED NOTES
Pt resting in bed, alert & awake. Introduced self to pt and updated on poc. Assisted pt to restroom, pt gait very unsteady. Pt urinated and assisted back into bed without incident. Pt vitals updated. Sheets changed. Pt prefers to keep pajamas on at this time as she is cold. Provided pt with warm blankets. Lab drawn from existing PIV. PIV taped securely in place and flushed easily with 10 ml of NS. Med admin per mar order. Pt denies any other needs at this time. Call bell in reach. Sr up x 2 with bed in low & locked position.

## 2021-10-25 NOTE — PROGRESS NOTES
Acute Hyponatremia from GI Fluid loss. Serum Na 118, 127,125 corrected over 8 hours with Bolus of NS. Now off IVF, totally asymptomatic, mentally clear, Eating Breakfast.    Plan : No more IVF. Observe for any Neuro symptoms. Monitor BMP q  6 hour. If Serm Na goes above 128 in next 24 hours then will give D5W,. Needs base CT of Head Non contrast & Chest X-ray also. Hyponatremia work up out lined. Will follow thanks.

## 2021-10-25 NOTE — ED NOTES
Labs drawn from existing PIV, purple & yellow top sent to lab. Pt provided urine by voiding in cap. 400 ml of clear yellow urine noted in cap. Placed in urine cup and sent to lab with pt label. Pt assisted back into bed with no incident. Pt provided with two warm blankets, cup of water, tissues, and chapstick per her request.  Fall precautions in place. Pt denies any needs at this time. Call bell in reach.

## 2021-10-25 NOTE — ED NOTES
Repeat BMP drawn after lab advised the tubes sent over at 1115 were not used for BMP. Pt a/o x 4 with nad noted. Pt was hanging cell phone up when th is RN entered room, she said she was speaking to her friend Abdirizak. [place] called this RN several hours ago for an update on pt which pt said it was fine to give to Abdirizak. However, pt is now saying \"when I told you earlier not to give any personal information to Alexis Lashell, I was thinking about my ex boyfriend name Alexis Lobo, not my landlord\". This RN then told pt that pt was speaking of Alexis Lobo \"caregiver/landlord\" during the entire previous conversation and that she was most certainly not talking about an old boyfriend, pt says \"well it's ok to give her info\". Asked pt if she was being coerced/pressured into saying this and pt said \"no\". This RN advised that I did not feel comfortable giving pt updates to anyone who called with the exception of pt son due to the controversy over the standards of living and adequate care at pt group home. Pt verbalized understanding. She has cell phone at beside to use if needed for updates. Call bell in reach. Fall precautions remain in place.

## 2021-10-25 NOTE — ED NOTES
Provided pt with breakfast tray. Advised pt that Morena Rodriguez has called several times to get pt status and pt reports \"you can tell her that yall are checking me out but I don't want her to know everything that's going on, it's not her business\". Called Mrs. Hammer and reported this to her and she verbalized understanding. Pt has vocalized that she is not being treated well at this home and that \"they take all my money and I only get $16 and they yell at me all the time. I've been telling her that I'm allergic to milk and all this other stuff but she said that I'm crazy and I\"m not allergic to anything. But I think that's why my stomach has been hurting\". Nephrologist in room with pt at this time.

## 2021-10-25 NOTE — ED PROVIDER NOTES
EMERGENCY DEPARTMENT HISTORY AND PHYSICAL EXAM    11:36 PM  Date: 10/24/2021  Patient Name: Blake Lewis    History of Presenting Illness     No chief complaint on file. History Provided By: Patient    HPI: Blake Lewis is a 80 y.o. female with multiple medical problems as below. Patient is presenting with nausea and vomiting that started few hours prior to arrival.  Patient states that she has vomited twice. Also reports epigastric abdominal pain that radiates to her chest and to her left upper quadrant. Pain started this morning and has been constant since then. No aggravating or alleviating factors. She cannot remember if her vomitus was dark but states that her last bowel movement was 2 days ago and it was dark, patient described it as \"tarry\". No previous similar episodes or history of GI bleed. Not on blood thinners. Denies shortness of breath. No history of fever or chills. Patient states that she has been reporting this to her caregiver and they have not done anything about it and she would like to voice her concerns about her care at home. Location:  Severity:  Timing/course:    Onset/Duration:     PCP: Chuck Mora NP    Past History     Past Medical History:  Past Medical History:   Diagnosis Date    Anemia NEC     Asthma     Colitis     DDD (degenerative disc disease), cervical     Fibrocystic breast     GERD (gastroesophageal reflux disease)     Headache     HTN (hypertension)     Hyperlipidemia     OA (osteoarthritis)     Osteoporosis     PAF (paroxysmal atrial fibrillation) ([facility] [address] 1995    Pneumonia     Thyroid nodule     Vitamin D deficiency 2/7/2011       Past Surgical History:  Past Surgical History:   Procedure Laterality Date    BREAST SURGERY PROCEDURE UNLISTED      cysts removed    CARDIAC SURG PROCEDURE UNLIST      cardiac catherization    HX GYN      hysterectomy    HX HEENT      cataract surgery bilaterally    HX TOTAL ABDOMINAL HYSTERECTOMY         Family History:  Family History   Problem Relation Age of Onset    Hypertension Other     Diabetes Other     Heart Disease Other     Cancer Other         stomach & colon    Diabetes Mother     Heart Attack Mother     Stroke Mother     Heart Attack Father     Heart Failure Father    [employer] Arthritis-rheumatoid Father     Other Brother         aneuysm-ruptured    Parkinson's Disease Brother        Social History:  Social History     Tobacco Use    Smoking status: Never Smoker    Smokeless tobacco: Never Used   Substance Use Topics    Alcohol use: No    Drug use: No       Allergies: Allergies   Allergen Reactions    Latex, Natural Rubber Unknown (comments)    Asa-Acetaminophen-Caff-Potass Shortness of Breath     Patient is only allergic to ASA    Aspirin Shortness of Breath and Other (comments)     Patient states this caused her stomach to bleed internal    Erythromycin Other (comments)     bleeding    Iodine And Iodide Containing Products Unknown (comments)    Lemon Unknown (comments)    Other Medication Unknown (comments)     Pt not sure of allergies states \"I'm allergic to more but not sure of name\"    Pcn [Penicillins] Swelling    Shellfish Containing Products Unknown (comments)    Sulfa (Sulfonamide Antibiotics) Unknown (comments)       Review of Systems   Review of Systems   Cardiovascular: Positive for chest pain. Gastrointestinal: Positive for abdominal pain, blood in stool, nausea and vomiting. All other systems reviewed and are negative. Physical Exam     No data found. Physical Exam  Vitals and nursing note reviewed. Constitutional:       General: She is not in acute distress. Appearance: Normal appearance. She is not ill-appearing. HENT:      Head: Normocephalic and atraumatic. Eyes:      Extraocular Movements: Extraocular movements intact. Cardiovascular:      Rate and Rhythm: Bradycardia present. Pulses: Normal pulses.    Pulmonary: Effort: Pulmonary effort is normal. No respiratory distress. Abdominal:      Palpations: Abdomen is soft. Tenderness: There is abdominal tenderness in the epigastric area and left upper quadrant. Musculoskeletal:         General: No deformity. Cervical back: Normal range of motion and neck supple. Skin:     General: Skin is warm and dry. Neurological:      Mental Status: She is alert and oriented to person, place, and time. Mental status is at baseline. Motor: Tremor present. Psychiatric:         Mood and Affect: Mood normal.         Behavior: Behavior normal.         Diagnostic Study Results     Labs -  No results found for this or any previous visit (from the past 12 hour(s)). Radiologic Studies -   No results found. Medical Decision Making     ED Course: Progress Notes, Reevaluation, and Consults:    11:36 PM Initial assessment performed. The patients presenting problems have been discussed, and they/their family are in agreement with the care plan formulated and outlined with them. I have encouraged them to ask questions as they arise throughout their visit. Provider Notes (Medical Decision Making): 14-year-old female presenting with epigastric pain radiating to her chest, nausea and vomiting for 1 day. She is well-appearing on exam and not in distress, vitals are unremarkable. She is epigastric and left upper quadrant tenderness. Rectal exam was done and revealed brown stool. Concern would be for peptic ulcer disease versus pancreatitis versus gallbladder disease. Screening labs were ordered and the patient has acute hyponatremia as well as drop in her hemoglobin. Patient was started on NS infusion as well as 80 mg of IV Protonix. Pending CT scan results then admission    Procedures:     Critical Care Time:     Vital Signs-Reviewed the patient's vital signs. Reviewed pt's pulse ox reading. EKG: Interpreted by the EP.    Time Interpreted:    Rate: 55   Rhythm: Sinus bradycardia   Interpretation: Normal EKG   Comparison: Nonspecific T waves on previous EKG resolved    Records Reviewed: Nursing Notes, Old Medical Records, Previous electrocardiograms and Previous Laboratory Studies (Time of Review: 11:36 PM)  -I am the first provider for this patient.  -I reviewed the vital signs, available nursing notes, past medical history, past surgical history, family history and social history. Current Outpatient Medications   Medication Sig Dispense Refill    senna (Senna) 8.6 mg tablet Take 1 Tab by mouth daily. 30 Tab 0    magnesium citrate solution Take 1/3 of bottle every 8 hours until finished or until you have a bowel movement. 1 Bottle 0    polyethylene glycol (Miralax) 17 gram/dose powder Take 17 g by mouth two (2) times a day. 1 tablespoon with 8 oz of water daily 507 g 0    cefdinir (OMNICEF) 300 mg capsule Take 1 Cap by mouth two (2) times a day. Stop the macrobid 14 Cap 0    raNITIdine (ZANTAC) 150 mg tablet Take 1 Tab by mouth two (2) times a day. 60 Tab 2    acetaminophen (TYLENOL EX STR ARTHRITIS PAIN) 500 mg tablet Take 1 Tab by mouth every six (6) hours as needed. (Patient taking differently: Take 500 mg by mouth every eight (8) hours as needed.) 60 Tab 1    diphenhydrAMINE (BENADRYL ALLERGY) 25 mg tablet Take 1 Tab by mouth every six (6) hours as needed. (Patient taking differently: Take 50 mg by mouth every six (6) hours as needed.) 30 Tab 2        Clinical Impression     Clinical Impression: No diagnosis found. Disposition: admit        This note was dictated utilizing voice recognition software which may lead to typographical errors. I apologize in advance if the situation occurs. If questions arise please do not hesitate to contact me or call our department.     Anne Linder MD  11:36 PM

## 2021-10-26 LAB
ALBUMIN SERPL-MCNC: 3 G/DL (ref 3.4–5)
ALBUMIN/GLOB SERPL: 1.1 {RATIO} (ref 0.8–1.7)
ALP SERPL-CCNC: 70 U/L (ref 45–117)
ALT SERPL-CCNC: 13 U/L (ref 13–56)
ANION GAP SERPL CALC-SCNC: 5 MMOL/L (ref 3–18)
ANION GAP SERPL CALC-SCNC: 7 MMOL/L (ref 3–18)
AST SERPL-CCNC: 12 U/L (ref 10–38)
BASOPHILS # BLD: 0 K/UL (ref 0–0.1)
BASOPHILS NFR BLD: 1 % (ref 0–2)
BILIRUB SERPL-MCNC: 0.4 MG/DL (ref 0.2–1)
BUN SERPL-MCNC: 21 MG/DL (ref 7–18)
BUN SERPL-MCNC: 26 MG/DL (ref 7–18)
BUN/CREAT SERPL: 24 (ref 12–20)
BUN/CREAT SERPL: 25 (ref 12–20)
CALCIUM SERPL-MCNC: 8.1 MG/DL (ref 8.5–10.1)
CALCIUM SERPL-MCNC: 8.2 MG/DL (ref 8.5–10.1)
CHLORIDE SERPL-SCNC: 94 MMOL/L (ref 100–111)
CHLORIDE SERPL-SCNC: 95 MMOL/L (ref 100–111)
CO2 SERPL-SCNC: 26 MMOL/L (ref 21–32)
CO2 SERPL-SCNC: 27 MMOL/L (ref 21–32)
CREAT SERPL-MCNC: 0.85 MG/DL (ref 0.6–1.3)
CREAT SERPL-MCNC: 1.08 MG/DL (ref 0.6–1.3)
DIFFERENTIAL METHOD BLD: ABNORMAL
EOSINOPHIL # BLD: 0 K/UL (ref 0–0.4)
EOSINOPHIL NFR BLD: 1 % (ref 0–5)
ERYTHROCYTE [DISTWIDTH] IN BLOOD BY AUTOMATED COUNT: 12.3 % (ref 11.6–14.5)
GLOBULIN SER CALC-MCNC: 2.8 G/DL (ref 2–4)
GLUCOSE SERPL-MCNC: 100 MG/DL (ref 74–99)
GLUCOSE SERPL-MCNC: 91 MG/DL (ref 74–99)
HCT VFR BLD AUTO: 25.1 % (ref 35–45)
HGB BLD-MCNC: 8.7 G/DL (ref 12–16)
LYMPHOCYTES # BLD: 1.8 K/UL (ref 0.9–3.6)
LYMPHOCYTES NFR BLD: 37 % (ref 21–52)
MAGNESIUM SERPL-MCNC: 1.9 MG/DL (ref 1.6–2.6)
MCH RBC QN AUTO: 29.5 PG (ref 24–34)
MCHC RBC AUTO-ENTMCNC: 34.7 G/DL (ref 31–37)
MCV RBC AUTO: 85.1 FL (ref 78–100)
MONOCYTES # BLD: 0.5 K/UL (ref 0.05–1.2)
MONOCYTES NFR BLD: 9 % (ref 3–10)
NEUTS SEG # BLD: 2.6 K/UL (ref 1.8–8)
NEUTS SEG NFR BLD: 52 % (ref 40–73)
OSMOLALITY SERPL: 257 MOSMOL/KG (ref 280–301)
OSMOLALITY UR: 212 MOSMOL/KG
PLATELET # BLD AUTO: 215 K/UL (ref 135–420)
PMV BLD AUTO: 9 FL (ref 9.2–11.8)
POTASSIUM SERPL-SCNC: 4.1 MMOL/L (ref 3.5–5.5)
POTASSIUM SERPL-SCNC: 5.2 MMOL/L (ref 3.5–5.5)
PROT SERPL-MCNC: 5.8 G/DL (ref 6.4–8.2)
RBC # BLD AUTO: 2.95 M/UL (ref 4.2–5.3)
SODIUM SERPL-SCNC: 127 MMOL/L (ref 136–145)
SODIUM SERPL-SCNC: 127 MMOL/L (ref 136–145)
WBC # BLD AUTO: 4.9 K/UL (ref 4.6–13.2)

## 2021-10-26 PROCEDURE — 74011250637 HC RX REV CODE- 250/637: Performed by: EMERGENCY MEDICINE

## 2021-10-26 PROCEDURE — 99223 1ST HOSP IP/OBS HIGH 75: CPT | Performed by: STUDENT IN AN ORGANIZED HEALTH CARE EDUCATION/TRAINING PROGRAM

## 2021-10-26 PROCEDURE — 74011250636 HC RX REV CODE- 250/636: Performed by: EMERGENCY MEDICINE

## 2021-10-26 PROCEDURE — 80053 COMPREHEN METABOLIC PANEL: CPT

## 2021-10-26 PROCEDURE — APPSS60 APP SPLIT SHARED TIME 46-60 MINUTES: Performed by: NURSE PRACTITIONER

## 2021-10-26 PROCEDURE — 65660000000 HC RM CCU STEPDOWN

## 2021-10-26 PROCEDURE — 85025 COMPLETE CBC W/AUTO DIFF WBC: CPT

## 2021-10-26 PROCEDURE — 83735 ASSAY OF MAGNESIUM: CPT

## 2021-10-26 RX ORDER — ACETAMINOPHEN 650 MG/1
650 SUPPOSITORY RECTAL
Status: DISCONTINUED | OUTPATIENT
Start: 2021-10-26 | End: 2021-10-30 | Stop reason: HOSPADM

## 2021-10-26 RX ORDER — ONDANSETRON 4 MG/1
4 TABLET, ORALLY DISINTEGRATING ORAL
Status: DISCONTINUED | OUTPATIENT
Start: 2021-10-26 | End: 2021-10-30 | Stop reason: HOSPADM

## 2021-10-26 RX ORDER — SODIUM CHLORIDE 0.9 % (FLUSH) 0.9 %
5-40 SYRINGE (ML) INJECTION AS NEEDED
Status: DISCONTINUED | OUTPATIENT
Start: 2021-10-26 | End: 2021-10-30 | Stop reason: HOSPADM

## 2021-10-26 RX ORDER — ATROPINE SULFATE 0.1 MG/ML
0.5 INJECTION INTRAVENOUS
Status: COMPLETED | OUTPATIENT
Start: 2021-10-26 | End: 2021-10-26

## 2021-10-26 RX ORDER — ONDANSETRON 2 MG/ML
4 INJECTION INTRAMUSCULAR; INTRAVENOUS
Status: DISCONTINUED | OUTPATIENT
Start: 2021-10-26 | End: 2021-10-30 | Stop reason: HOSPADM

## 2021-10-26 RX ORDER — WATER FOR INJECTION,STERILE
VIAL (ML) INJECTION
Status: DISCONTINUED
Start: 2021-10-26 | End: 2021-10-26 | Stop reason: WASHOUT

## 2021-10-26 RX ORDER — SODIUM CHLORIDE 0.9 % (FLUSH) 0.9 %
5-40 SYRINGE (ML) INJECTION EVERY 8 HOURS
Status: DISCONTINUED | OUTPATIENT
Start: 2021-10-26 | End: 2021-10-30 | Stop reason: HOSPADM

## 2021-10-26 RX ORDER — POLYETHYLENE GLYCOL 3350 17 G/17G
17 POWDER, FOR SOLUTION ORAL DAILY PRN
Status: DISCONTINUED | OUTPATIENT
Start: 2021-10-26 | End: 2021-10-30 | Stop reason: HOSPADM

## 2021-10-26 RX ORDER — ACETAMINOPHEN 325 MG/1
650 TABLET ORAL
Status: DISCONTINUED | OUTPATIENT
Start: 2021-10-26 | End: 2021-10-30 | Stop reason: HOSPADM

## 2021-10-26 RX ORDER — ONDANSETRON 2 MG/ML
4 INJECTION INTRAMUSCULAR; INTRAVENOUS ONCE
Status: COMPLETED | OUTPATIENT
Start: 2021-10-26 | End: 2021-10-26

## 2021-10-26 RX ADMIN — GLUCAGON HYDROCHLORIDE 0.5 MG: KIT at 05:30

## 2021-10-26 RX ADMIN — PANTOPRAZOLE SODIUM 40 MG: 40 TABLET, DELAYED RELEASE ORAL at 10:33

## 2021-10-26 RX ADMIN — ONDANSETRON 4 MG: 2 INJECTION INTRAMUSCULAR; INTRAVENOUS at 05:30

## 2021-10-26 RX ADMIN — ATROPINE SULFATE 0.5 MG: 0.1 INJECTION INTRAVENOUS at 01:25

## 2021-10-26 RX ADMIN — EZETIMIBE 10 MG: 10 TABLET ORAL at 10:34

## 2021-10-26 RX ADMIN — LUBIPROSTONE 8 MCG: 8 CAPSULE, GELATIN COATED ORAL at 10:33

## 2021-10-26 NOTE — ED NOTES
Recommended artificial tears to use: 1 drop 4x a day in both eyes.  use especially with extended near tasking. Report has been called to Olivia Sanches. [facility] contacted, requested transfer spoke with Kiarra Lopez.

## 2021-10-26 NOTE — ED NOTES
Patient still pending bed availability. Admitted for hyponatremia. Initially her sodium corrected rapidly and that renal was consulted and her IV fluids were stopped. Only on fluid restriction. Been doing well overnight however had episodes of bradycardia in the low 40s and her blood pressure was soft so I gave her 0.5 of atropine. She was completely asymptomatic and alert and oriented and answering all questions appropriately. This will need to be further evaluated by cardiology when she gets to [facility], [place]        5:18 AM  Patient had received propranolol, likely the cause of her hypertension bradycardia. She is still asymptomatic and mentating well. Propranolol was stopped and I gave her dose of glucagon.

## 2021-10-26 NOTE — ED NOTES
Patient continues to be hypotensive. Provider notified, will hold patient's Propranolol. Orders received for Zofran and Glucagon.

## 2021-10-26 NOTE — ED NOTES
Pt resting in bed with nad noted. Hob lowered per pt request.  Pt denies any other needs. Call bell in reach. Fall precautions remain in place.

## 2021-10-26 NOTE — PROGRESS NOTES
Progress Note    Laura Stover  80 y.o. Admit Date: 10/24/2021  Active Problems:    Hyponatremia (10/25/2021) POA: Unknown            Subjective:     Patient still in [facility] view ER. Vitals & lab reviewed. A comprehensive review of systems was negative except for that written in the History of Present Illness. Objective:     Visit Vitals  /88 (BP 1 Location: Left upper arm, BP Patient Position: Sitting)   Pulse (!) 51   Temp 98.4 °F (36.9 °C)   Resp 16   Ht 5' 2\" (1.575 m)   Wt 45.4 kg (100 lb)   SpO2 100%   BMI 18.29 kg/m²       No intake or output data in the 24 hours ending 10/26/21 1842    Current Facility-Administered Medications   Medication Dose Route Frequency Provider Last Rate Last Admin    ezetimibe (ZETIA) tablet 10 mg  10 mg Oral DAILY Daniel Lundy MD   10 mg at 10/26/21 1034    lubiPROStone (AMITIZA) capsule 8 mcg  8 mcg Oral BID Daniel Lundy MD   8 mcg at 10/26/21 1033    pantoprazole (PROTONIX) tablet 40 mg  40 mg Oral ACB Daniel Lundy MD   40 mg at 10/26/21 1033    ondansetron (ZOFRAN) injection 4 mg  4 mg IntraVENous Q8H PRN Vicki Tejada MD   4 mg at 10/25/21 2125     Current Outpatient Medications   Medication Sig Dispense Refill    ezetimibe (ZETIA) 10 mg tablet Take 10 mg by mouth daily.  lubiPROStone (AMITIZA) 8 mcg capsule Take 8 mcg by mouth two (2) times a day.  omeprazole (PRILOSEC) 20 mg capsule Take 20 mg by mouth daily.  propranolol LA (INDERAL LA) 60 mg SR capsule Take 60 mg by mouth daily.  senna (Senna) 8.6 mg tablet Take 1 Tab by mouth daily. 30 Tab 0    magnesium citrate solution Take 1/3 of bottle every 8 hours until finished or until you have a bowel movement. 1 Bottle 0    polyethylene glycol (Miralax) 17 gram/dose powder Take 17 g by mouth two (2) times a day. 1 tablespoon with 8 oz of water daily 507 g 0    cefdinir (OMNICEF) 300 mg capsule Take 1 Cap by mouth two (2) times a day.  Stop the macrobid 14 Cap 0    raNITIdine (ZANTAC) 150 mg tablet Take 1 Tab by mouth two (2) times a day. 60 Tab 2    acetaminophen (TYLENOL EX STR ARTHRITIS PAIN) 500 mg tablet Take 1 Tab by mouth every six (6) hours as needed. (Patient taking differently: Take 500 mg by mouth every eight (8) hours as needed.) 60 Tab 1    diphenhydrAMINE (BENADRYL ALLERGY) 25 mg tablet Take 1 Tab by mouth every six (6) hours as needed. (Patient taking differently: Take 50 mg by mouth every six (6) hours as needed.) 30 Tab 2                Data Review:    CBC w/Diff    Recent Labs     10/26/21  0500 10/24/21  2357 10/24/21  2357   WBC 4.9  --  7.4   RBC 2.95*  --  3.31*   HGB 8.7*  --  9.8*   HCT 25.1*  --  27.7*   MCV 85.1   < > 83.7   MCH 29.5   < > 29.6   MCHC 34.7   < > 35.4   RDW 12.3   < > 11.9    < > = values in this interval not displayed. Recent Labs     10/26/21  0500 10/24/21  2357 10/24/21  2357   MONOS 9  --  5   EOS 1  --  0   BASOS 1   < > 1   RDW 12.3   < > 11.9    < > = values in this interval not displayed. Comprehensive Metabolic Profile    Recent Labs     10/26/21  1740 10/26/21  0500 10/25/21  2031   * 127* 126*   K 5.2 4.1 4.2   CL 94* 95* 93*   CO2 26 27 29   BUN 26* 21* 20*   CREA 1.08 0.85 0.94    Recent Labs     10/26/21  1740 10/26/21  0500 10/25/21  2031 10/25/21  0707 10/24/21  2357   CA 8.1* 8.2* 8.4*   < > 8.2*   ALB  --  3.0* 3.6  --  3.5   TP  --  5.8* 7.3  --  7.0   TBILI  --  0.4 0.4  --  0.4    < > = values in this interval not displayed. Results for Maria Guadalupe Mckeon (MRN [id number]) as of 10/26/2021 18:41   Ref. Range 10/25/2021 08:15 10/25/2021 13:40   Uric acid Latest Ref Range: 2.6 - 7.2 MG/DL 2.5 (L)        Results for Maria Guadalupe Mckeon (MRN [id number]) as of 10/26/2021 18:41   Ref.  Range 10/25/2021 11:30   Sodium,urine random Latest Ref Range: 20 - 110 MMOL/L 60   Potassium urine, random Latest Ref Range: 12.0 - 62 MMOL/L 20   Chloride,urine random Latest Ref Range: 55 - 125 MMOL/L 63 Ref Range & Units 10/25/21 1111   Osmolality, Serum 280 - 301 mOsmol/kg 257Low                  Impression:       Active Hospital Problems    Diagnosis Date Noted    Hyponatremia 10/25/2021      Based on the Lab  She has  Hypo-osmolar Hyponatremia . Possible SIADH. Plan:     Needs Urine Osmolality, orthostatics, if found to be euvolemic Hyponatremic then might consider Tolvaptan.       Mann Schneider MD

## 2021-10-26 NOTE — PROGRESS NOTES
1811: TRANSFER - IN REPORT:    Verbal report received from [facility], RN(name) on [employer]  being received from [facility](unit) for routine progression of care      Report consisted of patients Situation, Background, Assessment and   Recommendations(SBAR). Information from the following report(s) SBAR, Kardex, Intake/Output, MAR, Recent Results and Cardiac Rhythm S VON was reviewed with the receiving nurse. Opportunity for questions and clarification was provided. Assessment completed upon patients arrival to unit and care assumed. Bedside shift change report given to Timmy Keys (oncoming nurse) by Patti Dove RN (offgoing nurse). Report included the following information SBAR, Kardex, Intake/Output, MAR, Recent Results and Cardiac Rhythm S VON.

## 2021-10-26 NOTE — CONSULTS
[address]    Name:  Jonas Lefort  MR#:   [id number]  :  1934  ACCOUNT #:  [de-identified]  DATE OF SERVICE:  10/25/2021    Consult is requested by [facility]. REASON FOR CONSULTATION:  Acute hyponatremia which has been corrected rapidly. Please note, on admission, her serum sodium was 118, which has increased up to 127 just over 8 hours with bolus of normal saline given on arrival at the emergency room. HISTORY OF PRESENT ILLNESS:  This pleasant 68-year-old  female who I have encountered first time today on consultation for the rapid correction of serum sodium from 118 to 127 in a matter of 8 hours. The patient presented to the emergency room last night with nausea and vomiting that started several hours before the patient's arrival to the emergency room. She claims that she had vomited twice, also is complaining of epigastric pain that radiates to her chest, to her left upper quadrant. Pain started on the day of admission in the morning and has been constant. No aggravating or alleviating factors. Cannot remember her vomitus was dark but states that her last bowel movement was two days ago and it was dark. No previous similar episode of history of GI bleed or tarry stool. The patient is not on any blood thinner. No shortness of breath. No history of any fever or chills. She knows her name, date, time, place. Fully alert, awake, and oriented at the time of encounter. States that she may have a chronic headache also, but no acute headache at all. The patient has known history of tremor and the chart shows parkinsonism, but she says it is not parkinsonism but maybe some sort of tremor, nobody could explain to her what it is and that is going on for years.     PAST MEDICAL HISTORY:  Includes anemia, asthma, colitis, osteoarthritis, fibrocystic disease of the breast, gastroesophageal reflux disease, headache, hypertension, hyperlipidemia, osteoarthritis, osteoporosis, paroxysmal atrial fibrillation, pneumonia, thyroid nodule as well as vitamin D deficiency, but no history of any hyponatremia. PAST SURGICAL HISTORY:  Includes breast surgery, procedure unlisted; cardiac surgery; cardiac cath, procedure unlisted; GYN surgeries including hysterectomy; eye surgery includes cataract surgery, both eyes. FAMILY HISTORY:  Significant for hypertension, diabetes, and other heart disease. Also has Parkinson's disease in her brother. SOCIAL HISTORY:  Nonsmoker. No drug. No alcohol. Never used any smokeless tobacco.    ALLERGIES:  TO LATEX, NATURAL RUBBER, ASPIRIN, ERYTHROMYCIN, IODINE, LEMON. ALLERGIC TO MORE BUT NOT SURE OF OTHER ALLERGIC MEDICINES. ALSO ALLERGIC TO SHELLFISH, SULFA, PENICILLIN. EXACT NATURE OF THE ALLERGY IS NOT CLEAR. REVIEW OF SYSTEMS:  On the day of admission, positive for mild chest pain. GI:  Positive for abdominal pain. Questionable blood in the stool. Nausea and vomiting. Other systems as reviewed, negative except for generalized tremor of both arms, particularly in the right side. LIST OF MEDICATIONS:  Before admission are as follows:  Tylenol; Omnicef antibiotic one capsule two times daily, probably it is old medicine, not on the active med list; Benadryl 25 mg every 6 hours p.r.n. for itching; Zetia 10 mg tablet daily; Amitiza 8 mcg capsule by mouth two times daily for constipation; magnesium citrate one set of bottle every 8 hours until finished; Prilosec 20 mg capsule daily; polyethylene glycol 17 g p.o. daily; propranolol long-acting 60 mg capsule daily; Zantac 150 mg tablet two times daily; Senokot 8.6 mg tablet two tablets by mouth daily. It looks she is on a lot of medicines for constipation-related problem. PHYSICAL EXAMINATION:  VITAL SIGNS:  On admission, temperature 98.5, heart rate 55, blood pressure 169/73, mean arterial pressure of 105, oxygen saturation 100%. Weight is 45.4 kg.   GENERAL: When I examined her this morning, the patient is completely alert, awake and oriented, eating her breakfast and with shaking hands from chronic tremor. HEENT:  Oral mucosa moist.  Jugular venous pressure not distended. NECK:  Supple. HEART:  S1, S2.  ABDOMEN:  Soft. No palpable mass. EXTREMITIES:  Ankle:  Negative edema. LABORATORY DATA:  Relevant labs on the day of admission:  WBC of 13.4 with a hemoglobin of 9.8; hematocrit of 27.7; platelets of [phone],412. Chemistry on admission:  Serum sodium of 118, potassium 4.7, chloride 85, anion gap of 6, glucose of 126, blood urea nitrogen of 18, creatinine of 0.85, calcium 8.2, magnesium 1.7, eGFR more than 60 mL/min. Troponin less than 0.02. Lipase is 61. Please note, this lab was drawn at [phone]. A repeat chemistry was done at 7 o'clock this morning which showed sodium of 127, increased from 118; potassium 4.5; chloride 96; CO2 of 25; glucose of 103; blood urea nitrogen of 15; creatinine of 0.69; calcium of 8.0. Subsequently, another lab was drawn at 1340. At that time, serum sodium was 126, potassium 4.5, chloride 92, CO2 is 27, glucose of 113, blood urea nitrogen of 17, creatinine of 0.78, calcium of 8.6. A urinalysis was done today. Specific gravity of 1.008, pH of 8.5, protein negative, glucose negative, ketone negative, rbc 4-10, wbc 0-3 and bacteria negative. Urine chemistry:  Sodium 60, potassium 20, urine chloride is 63. Urine osmolality and serum osmolality are still pending. A CT of the abdomen and pelvis was done without contrast.  Impression:  Multiple renal cysts; moderate hiatal hernia; other than that CT of the abdomen and pelvis is unremarkable. CT of the head without contrast was done and no acute findings or significant interval change. IMPRESSION AND PLAN:  1. Hyponatremia by history.   It looks like it is volume related but entirely cannot rule out any euvolemic hyponatremia, needs to be worked up including syndrome of inappropriate antidiuretic hormone or any underlying polydipsia. 2.  Rapid correction definitely was quite rapid in 8 hours, serum sodium from 118 to 127. Luckily, she did not have any symptoms and no further rise of serum sodium. All the fluids had been discontinued as I advised and now the serum sodium has stabilized between 125 to 126. We will continue to monitor her. There is no neurologic sign to be concerned. No need to give any D5W to lower her sodium. We will continue to monitor her. Once she is more stable and if the patient is found to have syndrome of inappropriate secretion of antidiuretic hormone, then we will give the appropriate recommendations. Further recommendations will be given based on the hospital course.       Angela Farias MD      [id number]  D:  10/25/2021 23:18  T:  10/26/2021 7:43  JOB #:  [id number]

## 2021-10-26 NOTE — ED NOTES
Pt provided with breakfast tray, heated. Admin meds per mar order. Vitals updated. Room tidied up. Pt denies any needs at this time.

## 2021-10-27 PROBLEM — E43 SEVERE PROTEIN-CALORIE MALNUTRITION (HCC): Chronic | Status: ACTIVE | Noted: 2021-10-27

## 2021-10-27 LAB
ANION GAP SERPL CALC-SCNC: 6 MMOL/L (ref 3–18)
BUN SERPL-MCNC: 19 MG/DL (ref 7–18)
BUN SERPL-MCNC: 22 MG/DL (ref 7–18)
BUN SERPL-MCNC: 24 MG/DL (ref 7–18)
BUN/CREAT SERPL: 20 (ref 12–20)
BUN/CREAT SERPL: 23 (ref 12–20)
BUN/CREAT SERPL: 26 (ref 12–20)
CALCIUM SERPL-MCNC: 8.3 MG/DL (ref 8.5–10.1)
CALCIUM SERPL-MCNC: 8.7 MG/DL (ref 8.5–10.1)
CALCIUM SERPL-MCNC: 8.8 MG/DL (ref 8.5–10.1)
CHLORIDE SERPL-SCNC: 92 MMOL/L (ref 100–111)
CHLORIDE SERPL-SCNC: 94 MMOL/L (ref 100–111)
CHLORIDE SERPL-SCNC: 94 MMOL/L (ref 100–111)
CO2 SERPL-SCNC: 25 MMOL/L (ref 21–32)
CO2 SERPL-SCNC: 25 MMOL/L (ref 21–32)
CO2 SERPL-SCNC: 26 MMOL/L (ref 21–32)
CREAT SERPL-MCNC: 0.91 MG/DL (ref 0.6–1.3)
CREAT SERPL-MCNC: 0.94 MG/DL (ref 0.6–1.3)
CREAT SERPL-MCNC: 0.96 MG/DL (ref 0.6–1.3)
GLUCOSE SERPL-MCNC: 106 MG/DL (ref 74–99)
GLUCOSE SERPL-MCNC: 87 MG/DL (ref 74–99)
GLUCOSE SERPL-MCNC: 93 MG/DL (ref 74–99)
IRON SATN MFR SERPL: 17 % (ref 20–50)
IRON SERPL-MCNC: 47 UG/DL (ref 50–175)
POTASSIUM SERPL-SCNC: 4.2 MMOL/L (ref 3.5–5.5)
POTASSIUM SERPL-SCNC: 4.5 MMOL/L (ref 3.5–5.5)
POTASSIUM SERPL-SCNC: 4.5 MMOL/L (ref 3.5–5.5)
SODIUM SERPL-SCNC: 123 MMOL/L (ref 136–145)
SODIUM SERPL-SCNC: 125 MMOL/L (ref 136–145)
SODIUM SERPL-SCNC: 126 MMOL/L (ref 136–145)
TIBC SERPL-MCNC: 275 UG/DL (ref 250–450)

## 2021-10-27 PROCEDURE — 83935 ASSAY OF URINE OSMOLALITY: CPT

## 2021-10-27 PROCEDURE — 80048 BASIC METABOLIC PNL TOTAL CA: CPT

## 2021-10-27 PROCEDURE — 97530 THERAPEUTIC ACTIVITIES: CPT

## 2021-10-27 PROCEDURE — 65660000000 HC RM CCU STEPDOWN

## 2021-10-27 PROCEDURE — 83540 ASSAY OF IRON: CPT

## 2021-10-27 PROCEDURE — 74011250637 HC RX REV CODE- 250/637: Performed by: INTERNAL MEDICINE

## 2021-10-27 PROCEDURE — 97162 PT EVAL MOD COMPLEX 30 MIN: CPT

## 2021-10-27 PROCEDURE — 2709999900 HC NON-CHARGEABLE SUPPLY

## 2021-10-27 PROCEDURE — 36415 COLL VENOUS BLD VENIPUNCTURE: CPT

## 2021-10-27 PROCEDURE — 74011250637 HC RX REV CODE- 250/637: Performed by: EMERGENCY MEDICINE

## 2021-10-27 PROCEDURE — 97166 OT EVAL MOD COMPLEX 45 MIN: CPT

## 2021-10-27 PROCEDURE — 74011250637 HC RX REV CODE- 250/637: Performed by: NURSE PRACTITIONER

## 2021-10-27 PROCEDURE — 99232 SBSQ HOSP IP/OBS MODERATE 35: CPT | Performed by: EMERGENCY MEDICINE

## 2021-10-27 PROCEDURE — 97535 SELF CARE MNGMENT TRAINING: CPT

## 2021-10-27 PROCEDURE — 97116 GAIT TRAINING THERAPY: CPT

## 2021-10-27 RX ADMIN — TOLVAPTAN 15 MG: 30 TABLET ORAL at 13:00

## 2021-10-27 RX ADMIN — Medication 10 ML: at 05:06

## 2021-10-27 RX ADMIN — PANTOPRAZOLE SODIUM 40 MG: 40 TABLET, DELAYED RELEASE ORAL at 06:44

## 2021-10-27 RX ADMIN — LUBIPROSTONE 8 MCG: 8 CAPSULE, GELATIN COATED ORAL at 17:42

## 2021-10-27 RX ADMIN — Medication 10 ML: at 06:44

## 2021-10-27 RX ADMIN — Medication 10 ML: at 22:00

## 2021-10-27 RX ADMIN — LUBIPROSTONE 8 MCG: 8 CAPSULE, GELATIN COATED ORAL at 08:12

## 2021-10-27 RX ADMIN — Medication 10 ML: at 14:42

## 2021-10-27 RX ADMIN — EZETIMIBE 10 MG: 10 TABLET ORAL at 08:13

## 2021-10-27 RX ADMIN — ACETAMINOPHEN 650 MG: 325 TABLET ORAL at 00:47

## 2021-10-27 RX ADMIN — ACETAMINOPHEN 650 MG: 325 TABLET ORAL at 21:22

## 2021-10-27 NOTE — ED NOTES
Awaiting arrival of medical transport to [facility] inpatient unit.

## 2021-10-27 NOTE — PROGRESS NOTES
conducted an initial consultation and Spiritual Assessment for Beronica, who is a 80 y.o.,female. Patients Primary Language is: [place]. According to the patients EMR Gnosticism Affiliation is: Mu-ism. The reason the Patient came to the hospital is:   Patient Active Problem List    Diagnosis Date Noted    Severe protein-calorie malnutrition ([facility] [address] 10/27/2021    Hyponatremia 10/25/2021    Gait abnormality 05/15/2018    Vasovagal syncope 05/15/2018    Cognitive decline 05/15/2018    Anemia 04/03/2018    Elevated blood pressure reading without diagnosis of hypertension 04/03/2018    Vitamin B12 deficiency (dietary) anemia 10/18/2017    Acquired absence of both cervix and uterus 11/18/2016    SOB (shortness of breath) 10/13/2016    GERD (gastroesophageal reflux disease) 03/04/2011    AR (allergic rhinitis) 02/21/2011    Thyroid nodule 02/07/2011    Vitamin D deficiency 02/07/2011    Hyperlipidemia 02/07/2011        The  provided the following Interventions:  Initiated a relationship of care and support. Explored issues of jayant, belief, spirituality and Mandaen/ritual needs while hospitalized. Listened empathically. Patient shared that she lives with her caregiver named Tyrone Yang and Mickel Cranker. Patient shared also that she has two sons who both live out of state ([place] and [place]). Ray Foy (son) is her Power of  in both financial and medical matters. Provided chaplaincy education. Provided information about Spiritual Care Services. Offered prayer and assurance of continued prayers on patient's behalf. Patient was offered the possibility of connecting with her sons via zoom call and was very happy about it. Chart reviewed. The following outcomes where achieved:  Patient shared limited information about both her medical narrative and spiritual journey/beliefs.  confirmed Patient's [employer].   Patient processed feeling about current hospitalization. Patient expressed gratitude for 's visit. Assessment:  Patient does not have any Moravian/cultural needs that will affect patients preferences in health care. There are no spiritual or Moravian issues which require intervention at this time.  will try to connect with patient's sons via zoom call tomorrow. Plan:  Chaplains will continue to follow and will provide pastoral care on an as needed/requested basis.  recommends bedside caregivers page  on duty if patient shows signs of acute spiritual or emotional distress.     Rud[address]   [phone]

## 2021-10-27 NOTE — PROGRESS NOTES
Comprehensive Nutrition Assessment    Type and Reason for Visit: Initial, Positive nutrition screen    Nutrition Recommendations/Plan:   - Downgrade diet and add oral nutrition supplements: Ensure Enlive TID. Nutrition Assessment:  Patient with poor appetite due to difficulty chewing, dentures do not fit correctly. Malnutrition Assessment:  Malnutrition Status:  Severe malnutrition    Context:  Chronic illness     Findings of the 6 clinical characteristics of malnutrition:   Energy Intake:  7 - 75% or less est energy requirements for 1 month or longer  Weight Loss:  7.00 - Greater than 10% over 6 months     Body Fat Loss:  7 - Severe body fat loss, Buccal region, Orbital   Muscle Mass Loss:  7 - Severe muscle mass loss, Clavicles (pectoralis &deltoids), Temples (temporalis)    Nutrition History and Allergies: Past medical history of HTN. GERD, HLD, OA, vitamin D deficiency, anemia. Patient reports previous weight of 104 lb in March 2021 and weight loss down to 91 lb (13 lb, 12.5% weight loss x 6 months) and recent weight gain up to 95 lb over the past month. Decreased intake due to misfitting dentures at home, remade in May and still do not fit correctly per pt. Shellfish allergy.      Estimated Daily Nutrient Needs:  Energy (kcal): 2996-0716; Weight Used for Energy Requirements: Current  Protein (g): 35-53; Weight Used for Protein Requirements: Current (0.8-1.2)  Fluid (ml/day): 0386-0336; Method Used for Fluid Requirements: 1 ml/kcal    Nutrition Related Findings:  Last BM 10/27      Wounds:  None       Current Nutrition Therapies:  ADULT DIET Easy to Chew    Anthropometric Measures:  · Height:  5' 2\" (157.5 cm)  · Current Body Wt:  44 kg (97 lb)   · Admission Body Wt:  100 lb 1.4 oz    · Usual Body Wt:  47.2 kg (104 lb)     · Ideal Body Wt:  110 lbs:  88.2 %   · BMI Category:  Underweight (BMI less than 22) age over 72       Nutrition Diagnosis:   · Severe malnutrition, In context of chronic illness related to partial or complete edentulism as evidenced by poor dentition, weight loss greater than or equal to 10% in 6 months, poor intake prior to admission, severe loss of subcutaneous fat, severe muscle loss      Nutrition Interventions:   Food and/or Nutrient Delivery: Modify current diet, Start oral nutrition supplement  Nutrition Education and Counseling: Education not indicated, No recommendations at this time  Coordination of Nutrition Care: Continue to monitor while inpatient, Coordination of community care    Goals:  PO nutrition intake will meet >75% of patient estimated nutritional needs within the next 7 days       Nutrition Monitoring and Evaluation:   Behavioral-Environmental Outcomes: None identified  Food/Nutrient Intake Outcomes: Diet advancement/tolerance, Food and nutrient intake, Supplement intake  Physical Signs/Symptoms Outcomes: Biochemical data, Chewing or swallowing, GI status, Meal time behavior, Nutrition focused physical findings    Discharge Planning:     Too soon to determine     Electronically signed by Socorro Orourke RD, [address]  on 10/27/2021 at 3:15 PM    Contact: [phone]

## 2021-10-27 NOTE — ROUTINE PROCESS
Pt admitted to [facility] via stretcher and paramedics from [facility]. No family @ bedside. Pt is alert, awake, oriented x 4 w/ clear speech. She admits to discomfort \"to stomach\" w/o pain. Severe hand tremor noted. She has hearing aides in both ear and glasses. Clothes, purse, and cellular phone placed in closet. See shift assessment and vitals for further details.

## 2021-10-27 NOTE — PROGRESS NOTES
Problem: Mobility Impaired (Adult and Pediatric)  Goal: *Acute Goals and Plan of Care (Insert Text)  Description: Physical Therapy Goals  Initiated 10/27/2021 and to be accomplished within 7 day(s)  1. Patient will move from supine to sit and sit to supine , scoot up and down, and roll side to side in bed with modified independence. 2.  Patient will transfer from bed to chair and chair to bed with supervision/set-up using the least restrictive device. 3.  Patient will perform sit to stand with supervision/set-up. 4.  Patient will ambulate with supervision/set-up for 75 feet with the least restrictive device. 5.  Patient will perform LE therex as prescribed to BLE to tolerance. PLOF: Pt lives in a private home with in-home adult caregivers and 3 other residents. Pt ambulates with no AD with HHA/CGA, 2 ELINA, uses BSC for toileting. Pt reporting she is not happy there and does not feel as though she is being treated well and does not wish to return there upon discharge. (Asked pt if she is being physically abused  and she said 'no' but that she is scared of them, asked pt if she is getting enough to eat and pt does not feel like it.)       Outcome: Progressing Towards Goal     PHYSICAL THERAPY EVALUATION    Patient: Blayne Bedolla (29 y.o. female)  Date: 10/27/2021  Primary Diagnosis: Hyponatremia [E87.1]        Precautions:   Fall  WBAT  PLOF: see above     ASSESSMENT :  Based on the objective data described below, the patient presents with generalized weakness, decreased activity tolerance, impaired balance, impaired gait, decreased functional mobility. Pt was seen up in bed in NAD and agreeable, pt very talkative and requires redirection throughout session. Pt completes SPT bed <> BSC with SBA, pt noted to safely use arm rests for supports. Pt states that she holds onto the arms of the caregivers/walls at home to walk around as she does not have a walker.  Pt then trialed RW and pt demonstrated safe management with CGA and no LOB. At end of session, pt positioned for comfort back in bed with all needs met. Pt would benefit from RW upon discharge for energy conservation and safe ambulation. Will recommend [place] vs rehab upon discharge pending progress in the acute setting. Pt verbalized during session very frequently that she was not happy with the care she was receiving at her prior residence and does not wish to return there. Patient will benefit from skilled intervention to address the above impairments. Patient's rehabilitation potential is considered to be Good  Factors which may influence rehabilitation potential include:   []         None noted  []         Mental ability/status  [x]         Medical condition  [x]         Home/family situation and support systems  []         Safety awareness  []         Pain tolerance/management  []         Other:      PLAN :  Recommendations and Planned Interventions:   [x]           Bed Mobility Training             [x]    Neuromuscular Re-Education  [x]           Transfer Training                   []    Orthotic/Prosthetic Training  [x]           Gait Training                          []    Modalities  [x]           Therapeutic Exercises           []    Edema Management/Control  [x]           Therapeutic Activities            [x]    Family Training/Education  [x]           Patient Education  []           Other (comment):    Frequency/Duration: Patient will be followed by physical therapy 1-2 times per day/3-5 days per week to address goals. Discharge Recommendations: Home Health vs rehab   Further Equipment Recommendations for Discharge: rolling walker     SUBJECTIVE:   Patient stated I do not want to go back there.     OBJECTIVE DATA SUMMARY:     Past Medical History:   Diagnosis Date    Anemia NEC     Asthma     Colitis     DDD (degenerative disc disease), cervical     Fibrocystic breast     GERD (gastroesophageal reflux disease)     Headache     HTN (hypertension)     Hyperlipidemia     OA (osteoarthritis)     Osteoporosis     PAF (paroxysmal atrial fibrillation) ([facility] [address] 1995    Pneumonia     Thyroid nodule     Vitamin D deficiency 2/7/2011     Past Surgical History:   Procedure Laterality Date    BREAST SURGERY PROCEDURE UNLISTED      cysts removed    CARDIAC SURG PROCEDURE UNLIST      cardiac catherization    HX GYN      hysterectomy    HX HEENT      cataract surgery bilaterally    HX TOTAL ABDOMINAL HYSTERECTOMY       Barriers to Learning/Limitations: yes;  physical  Compensate with: Visual Cues, Verbal Cues, and Tactile Cues  Home Situation:  Home Situation  Home Environment: Group home (private residence with caregivers and other patients )  One/Two Story Residence: One story  Living Alone: No  Support Systems:  (caregivers )  Patient Expects to be Discharged to[de-identified] Unknown  Current DME Used/Available at Home: Commode, bedside  Tub or Shower Type: Tub/Shower combination  Critical Behavior:  Neurologic State: Alert  Orientation Level: Oriented X4  Cognition: Follows commands  Safety/Judgement: Fall prevention  Psychosocial  Patient Behaviors: Calm; Cooperative  Purposeful Interaction: Yes  Pt Identified Daily Priority: Clinical issues (comment)  Caritas Process: Nurture loving kindness;Establish trust;Attend basic human needs; Supportive expression  Caring Interventions: Reassure; Therapeutic modalities  Reassure: Therapeutic listening; Informing; Instilling jayant and hope;Caring rounds  Therapeutic Modalities: Intentional therapeutic touch;Humor  Skin Condition/Temp: Cool     Skin Integrity: Excoriation (Coccyx)  Skin Integumentary  Skin Color: Appropriate for ethnicity; Red  Skin Condition/Temp: Cool  Skin Integrity: Excoriation (Coccyx)  Turgor: Non-tenting  Hair Growth: Present  Varicosities: Absent  Nails: Within Defined Limits     Strength:    Strength: Generally decreased, functional                    Tone & Sensation:   Tone: Normal       Sensation: Intact        Range Of Motion:  AROM: Within functional limits       Functional Mobility:  Bed Mobility:     Supine to Sit: Stand-by assistance  Sit to Supine: Stand-by assistance  Scooting: Stand-by assistance  Transfers:  Sit to Stand: Stand-by assistance  Stand to Sit: Stand-by assistance  Stand Pivot Transfers: Stand-by assistance       Balance:   Sitting: Intact  Standing: Impaired; With support  Standing - Static: Good  Standing - Dynamic : Fair    Ambulation/Gait Training:  Distance (ft): 15 Feet (ft)  Assistive Device: Walker, rolling  Ambulation - Level of Assistance: Contact guard assistance        Gait Abnormalities: Decreased step clearance    Speed/Merlyn: Slow  Step Length: Right shortened;Left shortened    Pain:  Pain level pre-treatment: 0/10   Pain level post-treatment: 0/10   Pain Intervention(s) : Medication (see MAR); Rest, Ice, Repositioning  Response to intervention: Nurse notified    Activity Tolerance:   Good    Please refer to the flowsheet for vital signs taken during this treatment. After treatment:   []         Patient left in no apparent distress sitting up in chair  [x]         Patient left in no apparent distress in bed  [x]         Call bell left within reach  [x]         Nursing notified  []         Caregiver present  [x]         Bed alarm activated  []         SCDs applied    COMMUNICATION/EDUCATION:   [x]         Role of Physical Therapy in the acute care setting. [x]         Fall prevention education was provided and the patient/caregiver indicated understanding. [x]         Patient/family have participated as able in goal setting and plan of care. [x]         Patient/family agree to work toward stated goals and plan of care. []         Patient understands intent and goals of therapy, but is neutral about his/her participation. []         Patient is unable to participate in goal setting/plan of care: ongoing with therapy staff.  []         Other:     Thank you for this referral.  Arnoldo Heading   Time Calculation: 38 mins      Eval Complexity: History: MEDIUM  Complexity : 1-2 comorbidities / personal factors will impact the outcome/ POC Exam:MEDIUM Complexity : 3 Standardized tests and measures addressing body structure, function, activity limitation and / or participation in recreation  Presentation: MEDIUM Complexity : Evolving with changing characteristics  Clinical Decision Making:Medium Complexity    Overall Complexity:MEDIUM

## 2021-10-27 NOTE — H&P
History & Physical    Patient: Nay Mandujano MRN: [id number]  CSN: [id number]    YOB: 1934  Age: 80 y.o. Sex: female      DOA: 10/24/2021  CC:N/V    PCP: Sherryle Canterbury, NP       HPI:     Nay Mandujano is a 80 y.o. female who is a transfer from [facility] for further medical treatment for hypovolemia due to N/V. Arrived at [facility] on 10/24 with complaints of N/V for few hours. Associated symptoms of epigastric abdominal pain with radiation to LUQ. Pain started that morning and constant. Possible dark colored stool 2 days ago, \"tarry\". No aggravating or alleviating factors. In ER hyponatemia noted and nephrology consult. Urine studies ordered. IV hydration initiated on 10/24 and stopped on 10/25 with slight improvement of sodium. Episodes of nausea, no vomiting. Treated with zofran as needed. Bradycardia noted today, HR 30-40s, atropine given x1 dose. Propanolol given on 10/25 and now held due to asymptomatic bradycardia. PPI initiated. Fecal occult blood negative. ROS  GENERAL: no weight loss, no falls. HEENT: No change in vision, no earache, no tinnitus, no sore throat or sinus congestion. NECK: No pain or stiffness. PULMONARY: No shortness of breath, no cough or wheeze. Cardiovascular: no pnd or orthopnea, no CP  GASTROINTESTINAL: epigastric pain, LUQ pain, N/V, dark tarry stool  GENITOURINARY: No urinary frequency, no urgency, no hesitancy or dysuria. MUSCULOSKELETAL: No joint or muscle pain, no back pain, no recent trauma. DERMATOLOGIC: No rash, no itching, no lesions. ENDOCRINE: No polyuria, no polydipsia, no heat or cold intolerance. No recent change in weight. HEMATOLOGICAL: No anemia or easy bruising or bleeding. NEUROLOGIC: No headache, no seizures, no numbness, no tingling or weakness.      Past Medical History:   Diagnosis Date    Anemia NEC     Asthma     Colitis     DDD (degenerative disc disease), cervical     Fibrocystic breast     GERD (gastroesophageal reflux disease)     Headache     HTN (hypertension)     Hyperlipidemia     OA (osteoarthritis)     Osteoporosis     PA[facility]paroxysmal atrial fibrillation) ([facility] [address] 1995    Pneumonia     Thyroid nodule     Vitamin D deficiency 2/7/2011       Past Surgical History:   Procedure Laterality Date    BREAST SURGERY PROCEDURE UNLISTED      cysts removed    CARDIAC SURG PROCEDURE UNLIST      cardiac catherization    HX GYN      hysterectomy    HX HEENT      cataract surgery bilaterally    HX TOTAL ABDOMINAL HYSTERECTOMY         Family History   Problem Relation Age of Onset    Hypertension Other     Diabetes Other     Heart Disease Other     Cancer Other         stomach & colon    Diabetes Mother     Heart Attack Mother     Stroke Mother     Heart Attack Father     Heart Failure Father     Arthritis-rheumatoid Father     Other Brother         aneuysm-ruptured    Parkinson's Disease Brother        Social History     Socioeconomic History    Marital status:      Spouse name: Not on file    Number of children: Not on file    Years of education: Not on file    Highest education level: Not on file   Tobacco Use    Smoking status: Never Smoker    Smokeless tobacco: Never Used   Substance and Sexual Activity    Alcohol use: No    Drug use: No     Social Determinants of Health     Financial Resource Strain:     Difficulty of Paying Living Expenses:    Food Insecurity:     Worried About Running Out of Food in the Last Year:     Ran Out of Food in the Last Year:    Transportation Needs:     Lack of Transportation (Medical):      Lack of Transportation (Non-Medical):    Physical Activity:     Days of Exercise per Week:     Minutes of Exercise per Session:    Stress:     Feeling of Stress :    Social Connections:     Frequency of Communication with Friends and Family:     Frequency of Social Gatherings with Friends and Family:     Attends [employer] Services:     Active Member of Clubs or Organizations:     Attends Club or Organization Meetings:     Marital Status:        Prior to Admission medications    Medication Sig Start Date End Date Taking? Authorizing Provider   ezetimibe (ZETIA) 10 mg tablet Take 10 mg by mouth daily. 8/30/21   Bo aVnce MD   lubiPROStone (AMITIZA) 8 mcg capsule Take 8 mcg by mouth two (2) times a day. 8/24/21   Bo Vance MD   omeprazole (PRILOSEC) 20 mg capsule Take 20 mg by mouth daily. 10/11/21   Bo Vance MD   propranolol LA (INDERAL LA) 60 mg SR capsule Take 60 mg by mouth daily. 9/13/21   Bo Vance MD   senna (Senna) 8.6 mg tablet Take 1 Tab by mouth daily. 1/15/21   REHAN Mcadams   magnesium citrate solution Take 1/3 of bottle every 8 hours until finished or until you have a bowel movement. 1/5/21   Kristin Pyle MD   polyethylene glycol (Miralax) 17 gram/dose powder Take 17 g by mouth two (2) times a day. 1 tablespoon with 8 oz of water daily 1/5/21   Kristin Pyle MD   cefdinir (OMNICEF) 300 mg capsule Take 1 Cap by mouth two (2) times a day. Stop the macrobid 1/5/21   Kristin Pyle MD   raNITIdine (ZANTAC) 150 mg tablet Take 1 Tab by mouth two (2) times a day. 11/18/16   Sushma Main DO   acetaminophen (TYLENOL EX STR ARTHRITIS PAIN) 500 mg tablet Take 1 Tab by mouth every six (6) hours as needed. Patient taking differently: Take 500 mg by mouth every eight (8) hours as needed. 11/18/16   Sushma Main DO   diphenhydrAMINE (BENADRYL ALLERGY) 25 mg tablet Take 1 Tab by mouth every six (6) hours as needed. Patient taking differently: Take 50 mg by mouth every six (6) hours as needed.  11/18/16   Sushma Main DO       Allergies   Allergen Reactions    Latex, Natural Rubber Unknown (comments)    Asa-Acetaminophen-Caff-Potass Shortness of Breath     Patient is only allergic to ASA    Aspirin Shortness of Breath and Other (comments)     Patient states this caused her stomach to bleed internal    Erythromycin Other (comments)     bleeding    Iodine And Iodide Containing Products Unknown (comments)    Lemon Unknown (comments)    Other Medication Unknown (comments)     Pt not sure of allergies states \"I'm allergic to more but not sure of name\"    Pcn [Penicillins] Swelling    Shellfish Containing Products Unknown (comments)    Sulfa (Sulfonamide Antibiotics) Unknown (comments)              Physical Exam:      Visit Vitals  BP (!) 128/48 (BP 1 Location: Left upper arm, BP Patient Position: Semi fowlers)   Pulse 64   Temp 98.5 °F (36.9 °C)   Resp 22   Ht 5' 2\" (1.575 m)   Wt 44.2 kg (97 lb 8 oz)   SpO2 97%   BMI 17.83 kg/m²       Physical Exam:  General:         Alert, cooperative, no acute distress    HEENT:           NC, Atraumatic. PERRLA, anicteric sclerae. Lungs:            CTA bilaterally. No Wheezing/Rhonchi/Rales  Heart:              RRR, No murmur, No Rubs, No Gallops  Abdomen:      Soft, Non distended, Non tender. +Bowel sounds, no HSM  Extremities:   bilateral arm tremors, head tremor   Psych:              Good insight. Not anxious or agitated. Neurologic:     Alert and oriented X 4. No acute neurological deficits    Lab/Data Review:  Labs: Results:       Chemistry Recent Labs     10/26/21  1740 10/26/21  0500 10/25/21  2031 10/25/21  0707 10/24/21  2357   * 91 126*   < > 126*   * 127* 126*   < > 118*   K 5.2 4.1 4.2   < > 4.7   CL 94* 95* 93*   < > 85*   CO2 26 27 29   < > 27   BUN 26* 21* 20*   < > 18   CREA 1.08 0.85 0.94   < > 0.85   CA 8.1* 8.2* 8.4*   < > 8.2*   AGAP 7 5 4   < > 6   BUCR 24* 25* 21*   < > 21*   AP  --  70 103  --  90   TP  --  5.8* 7.3  --  7.0   ALB  --  3.0* 3.6  --  3.5   GLOB  --  2.8 3.7  --  3.5   AGRAT  --  1.1 1.0  --  1.0    < > = values in this interval not displayed.       CBC w/Diff Recent Labs     10/26/21  0500 10/24/21  2357   WBC 4.9 7.4   RBC 2.95* 3.31*   HGB 8.7* 9.8*   HCT 25.1* 27.7*    250   GRANS 52 77*   LYMPH 37 17*   EOS 1 0      Coagulation No results for input(s): PTP, INR, APTT, INREXT in the last 72 hours. Iron/Ferritin No results for input(s): IRON in the last 72 hours. No lab exists for component: TIBCCALC   BNP No results for input(s): BNPP in the last 72 hours. Cardiac Enzymes No results for input(s): CPK, CKND1, RAKESH in the last 72 hours. No lab exists for component: CKRMB, TROIP   Liver Enzymes Recent Labs     10/26/21  0500   TP 5.8*   ALB 3.0*   AP 70      Thyroid Studies Lab Results   Component Value Date/Time    TSH 1.52 10/25/2021 08:15 AM          All Micro Results     None          Imaging Reviewed:  CT Results (most recent):  Results from [place] encounter on 10/24/21    CT HEAD WO CONT    Narrative  CT HEAD WO CONT    History: Nausea, vomiting, altered. Comparison: 7/15/2021; 4/29/2021; 12/26/2019    TECHNIQUE: 5 mm helical scan obtained of the head were obtained from the skull  vertex through the base of the skull without intravenous contrast.    All CT scans at this facility are performed using dose optimization technique as  appropriate to a performed exam, to include automated exposure control,  adjustment of the mA and/or kV according to patient size (including appropriate  matching first site-specific examinations), or use of iterative reconstruction  technique. BRAIN RESULT:    Mild periventricular hypodensity. Mild generalized volume loss. Atherosclerosis. Gray-white matter differentiation is maintained. No midline shift. Basilar  cisterns are patent. No acute intracranial hemorrhage. Status post cataract surgery. Soft tissues and osseous structures grossly  normal. Mild sinus mucosal thickening. Mastoid air cells are patent. Impression  No acute findings or significant interval change.     XR Results (most recent):  Results from Hospital Encounter encounter on 10/24/21    XR CHEST PORT    Narrative  INDICATION: Chest pain    TECHNIQUE: Portable chest radiograph    COMPARISON: May 11, 2021    FINDINGS:    Lung hyperinflation. Cardiac size at the upper limits of normal.    No focal lung consolidation, pleural effusion or pneumothorax. No acute osseous abnormality. Impression  No acute cardiopulmonary findings. Assessment:   Active Problems:    Hyponatremia (10/25/2021)      Past medical history   HTN  HLD  Chronic anemia   GERD        Plan:   1. Nephrology consult. Urine studies collected. Nephrology is following and will follow up tomorrow. Nephrology orders placed. 2. Monitor HH in the am. Iron profile in the am. PPI initiated  3. Monitor metabolic panel and renal function   4. Protonix and SCD  5. PT/OT eval and treat   6.  Full code           Aj Boone NP  10/26/2021, 10:05 PM
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
## 2021-10-27 NOTE — ROUTINE PROCESS
Clean catch urine specimen collected & sent to lab. Bladder scanned x 3 for a maximum of 2 mls. Pt tolerated interventions well. Assisted w/ walker back to bed w/o incident. Persistent sinus bon in low 50s shift w/o hypotension. Please confirm fluid restriction order value.

## 2021-10-27 NOTE — ROUTINE PROCESS
0715 - Bedside and Verbal shift change report given to [facility] (oncoming nurse) by Anjali Torres (offgoing nurse). Report included the following information SBAR, Kardex, Intake/Output, MAR and Recent Results. 0900 - Assisted pt to the bedside commode. 900mL urine output recorded. 1231 - Pt c/o trouble consuming her meals due to the food being \"too hard. \" Cut food into smaller pieces for pt and diet modified to easy to chew texture. 1500 - Dietician visited pt and modified diet to soft and bite sized dysphagia with supplements. 1700 - Pt tolerated diet texture well. .. Also educated pt to try restricting fluid intake in effort to increase sodium. Verbalized understanding. 1907 - Bedside and Verbal shift change report given to Anjali Torres (oncoming nurse) by Tatiana Mendes RN (offgoing nurse). Report included the following information SBAR, Kardex, Intake/Output, MAR and Recent Results.

## 2021-10-27 NOTE — ROUTINE PROCESS
Pt able to eat 70% of dinner. She has trouble chewing- \"even with my dentures. \"  Bladder scanner battery is charging.

## 2021-10-28 LAB
ANION GAP SERPL CALC-SCNC: 3 MMOL/L (ref 3–18)
ANION GAP SERPL CALC-SCNC: 4 MMOL/L (ref 3–18)
ANION GAP SERPL CALC-SCNC: 5 MMOL/L (ref 3–18)
ANION GAP SERPL CALC-SCNC: 6 MMOL/L (ref 3–18)
BASOPHILS # BLD: 0.1 K/UL (ref 0–0.1)
BASOPHILS NFR BLD: 1 % (ref 0–2)
BUN SERPL-MCNC: 21 MG/DL (ref 7–18)
BUN SERPL-MCNC: 21 MG/DL (ref 7–18)
BUN SERPL-MCNC: 23 MG/DL (ref 7–18)
BUN SERPL-MCNC: 26 MG/DL (ref 7–18)
BUN/CREAT SERPL: 17 (ref 12–20)
BUN/CREAT SERPL: 21 (ref 12–20)
BUN/CREAT SERPL: 21 (ref 12–20)
BUN/CREAT SERPL: 22 (ref 12–20)
CALCIUM SERPL-MCNC: 8.9 MG/DL (ref 8.5–10.1)
CALCIUM SERPL-MCNC: 8.9 MG/DL (ref 8.5–10.1)
CALCIUM SERPL-MCNC: 9.1 MG/DL (ref 8.5–10.1)
CALCIUM SERPL-MCNC: 9.6 MG/DL (ref 8.5–10.1)
CHLORIDE SERPL-SCNC: 97 MMOL/L (ref 100–111)
CHLORIDE SERPL-SCNC: 98 MMOL/L (ref 100–111)
CO2 SERPL-SCNC: 26 MMOL/L (ref 21–32)
CO2 SERPL-SCNC: 27 MMOL/L (ref 21–32)
CO2 SERPL-SCNC: 29 MMOL/L (ref 21–32)
CO2 SERPL-SCNC: 30 MMOL/L (ref 21–32)
CREAT SERPL-MCNC: 0.99 MG/DL (ref 0.6–1.3)
CREAT SERPL-MCNC: 1 MG/DL (ref 0.6–1.3)
CREAT SERPL-MCNC: 1.06 MG/DL (ref 0.6–1.3)
CREAT SERPL-MCNC: 1.52 MG/DL (ref 0.6–1.3)
DIFFERENTIAL METHOD BLD: ABNORMAL
EOSINOPHIL # BLD: 0.1 K/UL (ref 0–0.4)
EOSINOPHIL NFR BLD: 1 % (ref 0–5)
ERYTHROCYTE [DISTWIDTH] IN BLOOD BY AUTOMATED COUNT: 12.5 % (ref 11.6–14.5)
FOLATE SERPL-MCNC: >20 NG/ML (ref 3.1–17.5)
GLUCOSE SERPL-MCNC: 104 MG/DL (ref 74–99)
GLUCOSE SERPL-MCNC: 112 MG/DL (ref 74–99)
GLUCOSE SERPL-MCNC: 120 MG/DL (ref 74–99)
GLUCOSE SERPL-MCNC: 97 MG/DL (ref 74–99)
HCT VFR BLD AUTO: 29.2 % (ref 35–45)
HGB BLD-MCNC: 9.7 G/DL (ref 12–16)
LYMPHOCYTES # BLD: 1.5 K/UL (ref 0.9–3.6)
LYMPHOCYTES NFR BLD: 31 % (ref 21–52)
MAGNESIUM SERPL-MCNC: 2 MG/DL (ref 1.6–2.6)
MCH RBC QN AUTO: 29.4 PG (ref 24–34)
MCHC RBC AUTO-ENTMCNC: 33.2 G/DL (ref 31–37)
MCV RBC AUTO: 88.5 FL (ref 78–100)
MONOCYTES # BLD: 0.4 K/UL (ref 0.05–1.2)
MONOCYTES NFR BLD: 9 % (ref 3–10)
NEUTS SEG # BLD: 2.7 K/UL (ref 1.8–8)
NEUTS SEG NFR BLD: 57 % (ref 40–73)
OSMOLALITY UR: 174 MOSMOL/KG
PLATELET # BLD AUTO: 248 K/UL (ref 135–420)
PMV BLD AUTO: 9.3 FL (ref 9.2–11.8)
POTASSIUM SERPL-SCNC: 4.4 MMOL/L (ref 3.5–5.5)
POTASSIUM SERPL-SCNC: 4.4 MMOL/L (ref 3.5–5.5)
POTASSIUM SERPL-SCNC: 4.6 MMOL/L (ref 3.5–5.5)
POTASSIUM SERPL-SCNC: 4.9 MMOL/L (ref 3.5–5.5)
RBC # BLD AUTO: 3.3 M/UL (ref 4.2–5.3)
SODIUM SERPL-SCNC: 127 MMOL/L (ref 136–145)
SODIUM SERPL-SCNC: 129 MMOL/L (ref 136–145)
SODIUM SERPL-SCNC: 130 MMOL/L (ref 136–145)
SODIUM SERPL-SCNC: 133 MMOL/L (ref 136–145)
VIT B12 SERPL-MCNC: 428 PG/ML (ref 211–911)
WBC # BLD AUTO: 4.7 K/UL (ref 4.6–13.2)

## 2021-10-28 PROCEDURE — 65660000000 HC RM CCU STEPDOWN

## 2021-10-28 PROCEDURE — 80048 BASIC METABOLIC PNL TOTAL CA: CPT

## 2021-10-28 PROCEDURE — 74011250637 HC RX REV CODE- 250/637: Performed by: EMERGENCY MEDICINE

## 2021-10-28 PROCEDURE — 76450000000

## 2021-10-28 PROCEDURE — 74011250637 HC RX REV CODE- 250/637: Performed by: INTERNAL MEDICINE

## 2021-10-28 PROCEDURE — 74011250636 HC RX REV CODE- 250/636: Performed by: EMERGENCY MEDICINE

## 2021-10-28 PROCEDURE — 82607 VITAMIN B-12: CPT

## 2021-10-28 PROCEDURE — 85025 COMPLETE CBC W/AUTO DIFF WBC: CPT

## 2021-10-28 PROCEDURE — 74011250636 HC RX REV CODE- 250/636: Performed by: NURSE PRACTITIONER

## 2021-10-28 PROCEDURE — 74011250637 HC RX REV CODE- 250/637: Performed by: NURSE PRACTITIONER

## 2021-10-28 PROCEDURE — 36415 COLL VENOUS BLD VENIPUNCTURE: CPT

## 2021-10-28 PROCEDURE — 99223 1ST HOSP IP/OBS HIGH 75: CPT | Performed by: NURSE PRACTITIONER

## 2021-10-28 PROCEDURE — 99232 SBSQ HOSP IP/OBS MODERATE 35: CPT | Performed by: EMERGENCY MEDICINE

## 2021-10-28 PROCEDURE — 83735 ASSAY OF MAGNESIUM: CPT

## 2021-10-28 RX ORDER — SODIUM CHLORIDE 9 MG/ML
50 INJECTION, SOLUTION INTRAVENOUS CONTINUOUS
Status: DISCONTINUED | OUTPATIENT
Start: 2021-10-28 | End: 2021-10-29

## 2021-10-28 RX ADMIN — LUBIPROSTONE 8 MCG: 8 CAPSULE, GELATIN COATED ORAL at 18:06

## 2021-10-28 RX ADMIN — SODIUM CHLORIDE 50 ML/HR: 900 INJECTION, SOLUTION INTRAVENOUS at 21:59

## 2021-10-28 RX ADMIN — TOLVAPTAN 15 MG: 30 TABLET ORAL at 08:08

## 2021-10-28 RX ADMIN — PANTOPRAZOLE SODIUM 40 MG: 40 TABLET, DELAYED RELEASE ORAL at 08:08

## 2021-10-28 RX ADMIN — EZETIMIBE 10 MG: 10 TABLET ORAL at 08:08

## 2021-10-28 RX ADMIN — ONDANSETRON 4 MG: 2 INJECTION INTRAMUSCULAR; INTRAVENOUS at 21:58

## 2021-10-28 RX ADMIN — ONDANSETRON 4 MG: 4 TABLET, ORALLY DISINTEGRATING ORAL at 03:21

## 2021-10-28 RX ADMIN — Medication 10 ML: at 05:17

## 2021-10-28 RX ADMIN — LUBIPROSTONE 8 MCG: 8 CAPSULE, GELATIN COATED ORAL at 08:07

## 2021-10-28 RX ADMIN — Medication 10 ML: at 21:58
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
## 2021-10-28 NOTE — ROUTINE PROCESS
Katerine placed @ midnight to allow sleep. Pt did not tolerate. Assisted out of bed to bedside commode this morning for voiding. Pt said she would like to go back to caregiver's home. Living with the Son in [place] did not work- out. The Son in [place] is too ill. Nurse reassured pt of pass message along to morning staff.

## 2021-10-28 NOTE — CONSULTS
[facility]: [phone] ([phone])  [facility]: [phone]     Patient Name: Inderjit Aguilar  YOB: 1934    Date of Consult : 10/28/21  Reason for Consult: establish goals of care  Requesting Provider: Rodger Ormond MD   Primary Care Physician: Nestor Dao NP      SUMMARY:   Inderjit Aguilar is a 80 y.o. female with a past history of HTN, HLD, OA, GERD, DDD, asthma, anemia, colitis who was admitted on 10/24/2021 from home with a diagnosis of nausea and vomiting, hyponatremia, general malaise. Current medical issues leading to Palliative Medicine involvement include: Establish goals of care. CHIEF COMPLAINT: epigastric abdominal pain    HPI/SUBJECTIVE:    Patient is an 59-year-old  female that was transferred over from [facility] ER for hypokalemia due to nausea vomiting and hyponatremia. Patient lives in a care home with 3 other residents and a  couple that takes care of them. The patient is:   [x] Verbal and participatory  [] Non-participatory due to:     GOALS OF CARE:  Patient/Health Care Proxy Stated Goals: Prolong life      TREATMENT PREFERENCES:   Code Status: Full Code         PALLIATIVE DIAGNOSES:   1. Goals of care/ACP  2. Hyponatremia  3. Debility  4. Encounter for palliative medicine       PLAN:   1. Goals of care/ACP  This NP and Jamilah Alarcon RN in to see patient at the bedside. She is alert and interactive although hard of hearing. Patient likes to talk and tell stories. Did complete an advanced medical directive naming her eldest son Erik Dozier her primary medical power of  and her son Chu Cevallos secondary. Patient states that her entire life she is always wanted to be a full code. Our discussion has caused her to think a little more about if this would benefit her. We left her with some written information about CPR and intubation.   She is to have a 3-way video chat with her 2 sons today and says she will discuss with them. Our team will follow back up with her tomorrow. At this time patient remains a full code with full interventions  2. Hyponatremia  Resolving with medical intervention  3. Debility  Patient has a palliative performance score of around 50 to 60% she lives in mainly bed to chair existence but does ambulate to the bathroom with assistance. She is unable to do any work due to extensive disease progression, and she needs considerable assistance for self-care and functional ADLs, in addition patient has cachexia with low Body mass index is 17.83 kg/m². 4.   Encounter for palliative medicine  Patient has advanced age with multiple comorbid conditions including malnutrition. She would be a poor candidate for full interventions/CPR with likely poor outcomes. Have reviewed this with patient. She remains independent with decision making. 5.   Initial consult note routed to primary continuity provider  6. Communicated plan of care with: Palliative IDT      Advance Care Planning:  [] [facility] Interdisciplinary Team has updated the ACP Navigator with [address] and Patient Capacity    Primary Decision Maker ([address]): Haroon Multani    Medical Interventions: Full interventions   Other Instructions:         As far as possible, the palliative care team has discussed with patient / health care proxy about goals of care / treatment preferences for patient.          HISTORY:     History obtained from: Chart review   Active Problems:    Hyponatremia (10/25/2021)      Severe protein-calorie malnutrition ([address] (10/27/2021)      Past Medical History:   Diagnosis Date    Anemia NEC     Asthma     Colitis     DDD (degenerative disc disease), cervical     Fibrocystic breast     GERD (gastroesophageal reflux disease)     Headache     HTN (hypertension)     Hyperlipidemia     OA (osteoarthritis)     Osteoporosis     PAF (paroxysmal atrial fibrillation) ([address] 1995    Pneumonia     Thyroid nodule     Vitamin D deficiency 2/7/2011      Past Surgical History:   Procedure Laterality Date    BREAST SURGERY PROCEDURE UNLISTED      cysts removed    CARDIAC SURG PROCEDURE UNLIST      cardiac catherization    HX GYN      hysterectomy    HX HEENT      cataract surgery bilaterally    HX TOTAL ABDOMINAL HYSTERECTOMY        Family History   Problem Relation Age of Onset    Hypertension Other     Diabetes Other     Heart Disease Other     Cancer Other         stomach & colon    Diabetes Mother     Heart Attack Mother     Stroke Mother     Heart Attack Father     Heart Failure Father     Arthritis-rheumatoid Father     Other Brother         aneuysm-ruptured    Parkinson's Disease Brother      History reviewed, no pertinent family history.   Social History     Tobacco Use    Smoking status: Never Smoker    Smokeless tobacco: Never Used   Substance Use Topics    Alcohol use: No     Allergies   Allergen Reactions    Latex, Natural Rubber Unknown (comments)    Asa-Acetaminophen-Caff-Potass Shortness of Breath     Patient is only allergic to ASA    Aspirin Shortness of Breath and Other (comments)     Patient states this caused her stomach to bleed internal    Erythromycin Other (comments)     bleeding    Iodine And Iodide Containing Products Unknown (comments)    Lemon Unknown (comments)    Other Medication Unknown (comments)     Pt not sure of allergies states \"I'm allergic to more but not sure of name\"    Pcn [Penicillins] Swelling    Shellfish Containing Products Unknown (comments)    Sulfa (Sulfonamide Antibiotics) Unknown (comments)      Current Facility-Administered Medications   Medication Dose Route Frequency    tolvaptan (SAMSCA) tablet (0.5 X 30 MG) 15 mg  15 mg Oral DAILY    sodium chloride (NS) flush 5-40 mL  5-40 mL IntraVENous Q8H    sodium chloride (NS) flush 5-40 mL  5-40 mL IntraVENous PRN    acetaminophen (TYLENOL) tablet 650 mg  650 mg Oral Q6H PRN    Or    acetaminophen (TYLENOL) suppository 650 mg  650 mg Rectal Q6H PRN    polyethylene glycol (MIRALAX) packet 17 g  17 g Oral DAILY PRN    ondansetron (ZOFRAN ODT) tablet 4 mg  4 mg Oral Q8H PRN    Or    ondansetron (ZOFRAN) injection 4 mg  4 mg IntraVENous Q6H PRN    ezetimibe (ZETIA) tablet 10 mg  10 mg Oral DAILY    lubiPROStone (AMITIZA) capsule 8 mcg  8 mcg Oral BID    pantoprazole (PROTONIX) tablet 40 mg  40 mg Oral ACB          Clinical Pain Assessment (nonverbal scale for nonverbal patients): Clinical Pain Assessment  Severity: 5          Duration: for how long has pt been experiencing pain (e.g., 2 days, 1 month, years)  Frequency: how often pain is an issue (e.g., several times per day, once every few days, constant)     FUNCTIONAL ASSESSMENT:     Palliative Performance Scale (PPS):  PPS: 50    ECOG  ECOG Status : Limited self-care     PSYCHOSOCIAL/SPIRITUAL SCREENING:      Any spiritual / Temple concerns:  [] Yes /  [x] No    Caregiver Burnout:  [] Yes /  [] No /  [] No Caregiver Present      Anticipatory grief assessment:   [x] Normal  / [] Maladaptive        REVIEW OF SYSTEMS:     Systems: constitutional, ears/nose/mouth/throat, respiratory, gastrointestinal, genitourinary, musculoskeletal, integumentary, neurologic, psychiatric, endocrine. Positive findings noted below. Modified ESAS Completed by: provider           Pain: 5           Dyspnea: 0               Positive and pertinent negative findings in ROS are noted above in HPI. The following systems were [x] reviewed / [] unable to be reviewed as noted in HPI  Other findings are noted below. PHYSICAL EXAM:     Constitutional: alert and interactive but hard of hearing. Complaints of pain in her abdomen.    Eyes: pupils equal, anicteric  ENMT: no nasal discharge, moist mucous membranes  Cardiovascular: regular rhythm, distal pulses intact  Respiratory: breathing not labored, symmetric  Gastrointestinal: soft non-tender, +bowel sounds  Musculoskeletal: no deformity, no tenderness to palpation  Skin: warm, dry  Neurologic: AA and oriented to person, place, and situation. following commands, moving all extremities  Psychiatric: full affect, no hallucinations    Other: Wt Readings from Last 3 Encounters:   10/26/21 44.2 kg (97 lb 8 oz)   01/09/20 47.6 kg (105 lb)   12/26/19 56.7 kg (125 lb)     Blood pressure (!) 101/50, pulse 71, temperature 97.7 °F (36.5 °C), resp. rate 18, height 5' 2\" (1.575 m), weight 44.2 kg (97 lb 8 oz), SpO2 98 %. Pain:  Pain Scale 1: Numeric (0 - 10)  Pain Intensity 1: 0     Pain Location 1: Abdomen  Pain Orientation 1: Medial  Pain Description 1: Other (comment) (nausea)          LAB AND IMAGING FINDINGS:     Lab Results   Component Value Date/Time    WBC 4.7 10/28/2021 06:09 AM    HGB 9.7 (L) 10/28/2021 06:09 AM    PLATELET 226 92/63/3741 06:09 AM     Lab Results   Component Value Date/Time    Sodium 133 (L) 10/28/2021 12:15 PM    Potassium 4.4 10/28/2021 12:15 PM    Chloride 98 (L) 10/28/2021 12:15 PM    CO2 30 10/28/2021 12:15 PM    BUN 21 (H) 10/28/2021 12:15 PM    Creatinine 1.00 10/28/2021 12:15 PM    Calcium 9.6 10/28/2021 12:15 PM    Magnesium 2.0 10/28/2021 12:28 AM      Lab Results   Component Value Date/Time    Alk.  phosphatase 70 10/26/2021 05:00 AM    Protein, total 5.8 (L) 10/26/2021 05:00 AM    Albumin 3.0 (L) 10/26/2021 05:00 AM    Globulin 2.8 10/26/2021 05:00 AM     Lab Results   Component Value Date/Time    INR 0.9 10/08/2011 02:05 AM    Prothrombin time 11.9 10/08/2011 02:05 AM    aPTT 29.3 10/08/2011 02:05 AM      Lab Results   Component Value Date/Time    Iron 47 (L) 10/27/2021 05:04 AM    TIBC 275 10/27/2021 05:04 AM    Iron % saturation 17 (L) 10/27/2021 05:04 AM      No results found for: PH, PCO2, PO2  No components found for: [place]   Lab Results   Component Value Date/Time    CK 24 (L) 01/05/2021 02:12 PM    CK - MB <1.0 01/05/2021 02:12 PM              Total time: 70 minutes Counseling / coordination time, spent as noted above:   > 50% counseling / coordination:  Time spent in direct consultation with the patient, medical team, and family     Prolonged service was provided for  []30 min   []75 min in face to face time in the presence of the patient, spent as noted above. Time Start:   Time End:     Disclaimer: Sections of this note are dictated using utilizing voice recognition software, which may have resulted in some phonetic based errors in grammar and contents. Even though attempts were made to correct all the mistakes, some may have been missed, and remained in the body of the document. If questions arise, please contact our department.

## 2021-10-28 NOTE — PROGRESS NOTES
Discharge planning    Received call from [facility] owner of group home. Thelma Shearer stated \" Yes. She can come back. We use [facility] max agency. She has limited medicaid only. It was effective May 1, 2021. Her medicaid ID number is [id number]. We will pick her up at discharge. Just call us. \"    [place] of choice was obtained for [facility]. Will need home health orders prior to discharge.        GOLD CristobalN, RN  Pager # [phone]  Care Manager

## 2021-10-28 NOTE — PROGRESS NOTES
Writer was called to bedside for zoom call with patient and Luis Alberto Cantrell, son. Mr. Brock Opitz wanted to know if MD and  have the authority to make the patient go to a facility. Writer  explained that it was a recommendation from therapy for home health vs rehab. We can not make patient or demand patient to go to rehab. The patient and family has the right to make the decision for return to group home with home health or SNF placement. Patient only has medicare and would need to apply for medicaid if wants long-term care. Mr. Brock Opitz verbalized understanding. He stated \" I was under the impression that all the decisions were made and final.\"  Writer is waiting on return call from Edwardo Haynes to see if patient can return. Also, will ask if patient applied for medicaid. Mr. Brock Opitz stated \" I agree. Talk to Zelda Hdz to see what she says. \"  CM left patient and son to continue their zoom call in private.      GOLD ValenzuelaN, RN  Pager # [phone]  Care Manager

## 2021-10-28 NOTE — PROGRESS NOTES
Physician Progress Note      Maura Rubin  CSN #:                  [id number]  :                       1934  ADMIT DATE:       10/24/2021 11:24 PM  [address] [facility] DATE:  RESPONDING  PROVIDER #:        Blaine Riedel MD Georgina Kanner MD        QUERY TEXT:    Type of Anemia: Please provide further specificity, if known. Clinical indicators include: chronic anemia, b12, iron, tibc  Options provided:  -- Anemia due to acute blood loss  -- Anemia due to chronic blood loss  -- Anemia due to iron deficiency  -- Anemia due to postoperative blood loss  -- Anemia due to chronic disease  -- Other - I will add my own diagnosis  -- Disagree - Not applicable / Not valid  -- Disagree - Clinically Unable to determine / Unknown        PROVIDER RESPONSE TEXT:    The patient has anemia due to iron deficiency. QUERY TEXT:    Patient admitted with hyponatremia. Noted documentation of severe malnutrition on 10/27/21 by registered dietician.   If possible, please document in progress notes and discharge summary:    The medical record reflects the following:  Risk Factors:  10/27 RD PN:  Malnutrition Status:  Severe malnutrition  Context:  Chronic illness  Findings of the 6 clinical characteristics of malnutrition:  Energy Intake:  7 - 75% or less est energy requirements for 1 month or longer  Weight Loss:  7.00 - Greater than 10% over 6 months  Body Fat Loss:  7 - Severe body fat loss, Buccal region, Orbital  Muscle Mass Loss:  7 - Severe muscle mass loss, Clavicles (pectoralis &deltoids), Temples (temporalis)  Treatment: Ensure Enlive TID; Dysphagia diet soft and bite sized, RD following    Thank you,  Brandon Solano RN/CCDS  [phone]  Options provided:  -- Severe malnutrition confirmed present on admission  -- Severe malnutrition confirmed not present on admission  -- Other - I will add my own diagnosis  -- Disagree - Not applicable / Not valid  -- Disagree - Clinically unable to determine / Unknown  -- Refer to Clinical Documentation Reviewer    PROVIDER RESPONSE TEXT:    The diagnosis of severe malnutrition was confirmed as present on admission.     Query created by: Patrice Fernandez on 10/28/2021 8:27 AM      Electronically signed by:  Bret Argueta MD 10/28/2021 12:32 PM

## 2021-10-28 NOTE — ROUTINE PROCESS
Current caregiver Refugio Mohamud) phoned nurse for discharge plans. This nurse told her that the  would be calling the P.O. A. tomorrow to discuss plan for palliative care discharge. Caregiver able to verbalize understanding. Grand- daughter needs reinforcement.

## 2021-10-28 NOTE — PROGRESS NOTES
New OT order received and chart reviewed. Patient evaluated and currently on skilled OT caseload. Will acknowledge the order.   Thank you for the referral.  Elgin Rodriguez MS OTR/L

## 2021-10-28 NOTE — PROGRESS NOTES
NEW PT orders received and chart reviewed. Pt currently on caseload. Will acknowledge new orders and continue to follow.      Thank you for this referral   Donna Speaker PT DPT

## 2021-10-28 NOTE — ROUTINE PROCESS
Pt c/o \"headache in top of head @ 6. \"  PRN tylenol provided. Dr. Mauricio Solis notes noted to \"no fluid restriction. \"  Will continue monitoring pt for safety & pain management. Stool specimen to be obtained.

## 2021-10-28 NOTE — PROGRESS NOTES
[facility]  [facility]    Patient: Jesse Rosen Age: 80 y.o. : 1934 MR#: [id number] SSN: [id number]  Date/Time: 10/27/2021    Subjective:     Patient is sitting in bed in no apparent distress, awake and alert. Denies any discomfort.   Denies any nausea vomiting or diarrhea    Assessment/Plan:     Hyponatremia, possible SIADH  Dyslipidemia  Chronic anemia   GERD    PLAN  Nephrology is following, continue Samsca\  Monitor sodium  Continue Zetia  Check A03 and folic acid levels  Monitor labs  PT and OT  Discussed with patient  Palliative care consult       Case discussed with:  [x]Patient  []Family  []Nursing  []Case Management  DVT Prophylaxis:  []Lovenox  []Hep SQ  [x]SCDs  []Coumadin   []On Heparin gtt    Objective:   VS:   Visit Vitals  BP (!) 112/53 (BP 1 Location: Left upper arm)   Pulse (!) 53   Temp 97.8 °F (36.6 °C)   Resp 16   Ht 5' 2\" (1.575 m)   Wt 44.2 kg (97 lb 8 oz)   SpO2 98%   BMI 17.83 kg/m²      Tmax/24hrs: Temp (24hrs), Av.9 °F (36.6 °C), Min:97.5 °F (36.4 °C), Max:98.5 °F (36.9 °C)    Input/Output:     Intake/Output Summary (Last 24 hours) at 10/27/2021 2009  Last data filed at 10/27/2021 1649  Gross per 24 hour   Intake 1223 ml   Output 1102 ml   Net 121 ml       General:  Awake, follows commands  Cardiovascular:  S1S2+, RRR  Pulmonary:  CTA b/l  GI:  Soft, BS+, NT, ND  Extremities:  No edema      Labs:    Recent Results (from the past 24 hour(s))   METABOLIC PANEL, BASIC    Collection Time: 10/27/21  5:04 AM   Result Value Ref Range    Sodium 126 (L) 136 - 145 mmol/L    Potassium 4.5 3.5 - 5.5 mmol/L    Chloride 94 (L) 100 - 111 mmol/L    CO2 26 21 - 32 mmol/L    Anion gap 6 3.0 - 18 mmol/L    Glucose 87 74 - 99 mg/dL    BUN 24 (H) 7.0 - 18 MG/DL    Creatinine 0.91 0.6 - 1.3 MG/DL    BUN/Creatinine ratio 26 (H) 12 - 20      GFR est AA >60 >60 ml/min/1.73m2    GFR est non-AA 59 (L) >60 ml/min/1.73m2    Calcium 8.3 (L) 8.5 - 10.1 MG/DL   IRON PROFILE    Collection Time: 10/27/21  5:04 AM   Result Value Ref Range    Iron 47 (L) 50 - 175 ug/dL    TIBC 275 250 - 450 ug/dL    Iron % saturation 17 (L) 20 - 50 %   METABOLIC PANEL, BASIC    Collection Time: 10/27/21  8:10 AM   Result Value Ref Range    Sodium 123 (L) 136 - 145 mmol/L    Potassium 4.2 3.5 - 5.5 mmol/L    Chloride 92 (L) 100 - 111 mmol/L    CO2 25 21 - 32 mmol/L    Anion gap 6 3.0 - 18 mmol/L    Glucose 93 74 - 99 mg/dL    BUN 22 (H) 7.0 - 18 MG/DL    Creatinine 0.94 0.6 - 1.3 MG/DL    BUN/Creatinine ratio 23 (H) 12 - 20      GFR est AA >60 >60 ml/min/1.73m2    GFR est non-AA 56 (L) >60 ml/min/1.73m2    Calcium 8.8 8.5 - 39.7 MG/DL   METABOLIC PANEL, BASIC    Collection Time: 10/27/21  6:16 PM   Result Value Ref Range    Sodium 125 (L) 136 - 145 mmol/L    Potassium 4.5 3.5 - 5.5 mmol/L    Chloride 94 (L) 100 - 111 mmol/L    CO2 25 21 - 32 mmol/L    Anion gap 6 3.0 - 18 mmol/L    Glucose 106 (H) 74 - 99 mg/dL    BUN 19 (H) 7.0 - 18 MG/DL    Creatinine 0.96 0.6 - 1.3 MG/DL    BUN/Creatinine ratio 20 12 - 20      GFR est AA >60 >60 ml/min/1.73m2    GFR est non-AA 55 (L) >60 ml/min/1.73m2    Calcium 8.7 8.5 - 10.1 MG/DL     Additional Data Reviewed:      Signed By: Nelson Montgomery MD     October 27, 2021

## 2021-10-28 NOTE — PROGRESS NOTES
Nutrition Note    Patient with variable intake due to continued difficulty chewing, dentures do not fit correctly. Agreeable to further downgrade of diet. Likes supplements. BM 10/27. Recommendations/Plan   - Downgrade diet and continue oral nutrition supplements: Ensure Enlive TID.     Electronically signed by Comfort Garcia RD, [address]  on 10/28/2021 at 1:47 PM    Contact: [phone]

## 2021-10-28 NOTE — PROGRESS NOTES
helped the patient Chano Lomeli, who is a 80 y.o.,female, connect with their family with the Zoom Video Connection. The  provided the following Interventions:  Assisted patient's son Maris Greenberg) in setting up his phone for zoom call. Conducted the zoom call around 13:45 up to 14:45pm.  Provided patient pastoral care and support after the call because she was overwhelmed with finding out that she no longer has medicaid coverage, choosing placement after hospitalization, either in a nursing home or the group home where she's at now, and making decisions on EOL matters by completing an Advance Medical Directive with the palliative care team..        The following outcomes were achieved:  Patient expressed gratitude for 's visit. Assessment:  Patient had an emotional day today overwhelmed with all the matters at hand presented to her. There are no further spiritual or Buddhist issues which require Spiritual Care Services interventions at this time. Plan:  Chaplains will continue to follow and will provide pastoral care on an as needed/requested basis.  recommends bedside caregivers page  on duty if patient shows signs of acute spiritual or emotional distress.      Kari Garcia [address] [facility]   [phone]

## 2021-10-28 NOTE — PROGRESS NOTES
New PT evaluation seen and acknowledged. Patient is already on PT caseload. Patient will bee seen as schedule allows.  Thank you for this referral.    Reba Anthony, PT, DPT

## 2021-10-28 NOTE — ROUTINE PROCESS
2963 - Bedside and Verbal shift change report given to [address] [place] [address] (oncoming nurse) by Marce Lofton (offgoing nurse). Report included the following information SBAR, Kardex, Intake/Output, MAR and Recent Results. 1334 - Pt assisted to the bedside commode.

## 2021-10-28 NOTE — PROGRESS NOTES
Progress Note    Ryan Credit  80 y.o. Admit Date: 10/24/2021  Active Problems:    Hyponatremia (10/25/2021) POA: Unknown      Severe protein-calorie malnutrition ([address] (10/27/2021) POA: Yes            Subjective:     Patient feels good, says makes lot of  Urine as she drinks lot of Water. So far tolerating Tolvaptan well. A comprehensive review of systems was negative except for that written in the History of Present Illness.     Objective:     Visit Vitals  BP (!) 101/50 (BP 1 Location: Left upper arm, BP Patient Position: At rest)   Pulse 71   Temp 97.7 °F (36.5 °C)   Resp 18   Ht 5' 2\" (1.575 m)   Wt 44.2 kg (97 lb 8 oz)   SpO2 98%   BMI 17.83 kg/m²         Intake/Output Summary (Last 24 hours) at 10/28/2021 1301  Last data filed at 10/28/2021 1135  Gross per 24 hour   Intake 1933 ml   Output 1404 ml   Net 529 ml       Current Facility-Administered Medications   Medication Dose Route Frequency Provider Last Rate Last Admin    tolvaptan (SAMSCA) tablet (0.5 X 30 MG) 15 mg  15 mg Oral DAILY Jeanne Palacio MD   15 mg at 10/28/21 9669    sodium chloride (NS) flush 5-40 mL  5-40 mL IntraVENous Q8H Nettie Zavala NP   10 mL at 10/28/21 0517    sodium chloride (NS) flush 5-40 mL  5-40 mL IntraVENous PRN Anya Zavala NP        acetaminophen (TYLENOL) tablet 650 mg  650 mg Oral Q6H PRN Anya Zavala NP   650 mg at 10/27/21 2122    Or    acetaminophen (TYLENOL) suppository 650 mg  650 mg Rectal Q6H PRN Anya Zavala NP        polyethylene glycol (MIRALAX) packet 17 g  17 g Oral DAILY PRN Anya Zavala NP        ondansetron (ZOFRAN ODT) tablet 4 mg  4 mg Oral Q8H PRN Nettie Zavala NP   4 mg at 10/28/21 0321    Or    ondansetron (ZOFRAN) injection 4 mg  4 mg IntraVENous Q6H PRN Anya Zavala, NP        ezetimibe (ZETIA) tablet 10 mg  10 mg Oral DAILY Dainel Lundy MD   10 mg at 10/28/21 3419    lubiPROStone (AMITIZA) capsule 8 mcg  8 mcg Oral BID Daniel Lundy MD   8 mcg at 10/28/21 0807    pantoprazole (PROTONIX) tablet 40 mg  40 mg Oral ACB Daniel Lundy MD   40 mg at 10/28/21 0808        Physical Exam:     Physical Exam:   General:  Alert, cooperative, no distress, appears stated age. Neck: Supple, symmetrical, trachea midline, no adenopathy, thyroid: no enlargement/tenderness/nodules, no carotid bruit and no JVD. Lungs:   Clear to auscultation bilaterally. Heart:  Regular rate and rhythm, S1, S2 normal, no murmur, click, rub or gallop. Abdomen:   Soft, non-tender. Bowel sounds normal. No masses,  No organomegaly. Extremities: Extremities normal, atraumatic, no cyanosis or edema   Skin: Skin color, texture, turgor normal. No rashes or lesions         Data Review:    CBC w/Diff    Recent Labs     10/28/21  0609 10/26/21  0500 10/26/21  0500   WBC 4.7  --  4.9   RBC 3.30*  --  2.95*   HGB 9.7*  --  8.7*   HCT 29.2*  --  25.1*   MCV 88.5   < > 85.1   MCH 29.4   < > 29.5   MCHC 33.2   < > 34.7   RDW 12.5   < > 12.3    < > = values in this interval not displayed. Recent Labs     10/28/21  0609 10/26/21  0500 10/26/21  0500   MONOS 9  --  9   EOS 1  --  1   BASOS 1   < > 1   RDW 12.5   < > 12.3    < > = values in this interval not displayed. Comprehensive Metabolic Profile    Recent Labs     10/28/21  1215 10/28/21  0609 10/28/21  0028   * 129* 127*   K 4.4 4.4 4.6   CL 98* 97* 97*   CO2 30 26 27   BUN 21* 21* 23*   CREA 1.00 0.99 1.06    Recent Labs     10/28/21  1215 10/28/21  0609 10/28/21  0028 10/26/21  1740 10/26/21  0500 10/25/21  2031 10/25/21  2031   CA 9.6 8.9 8.9   < > 8.2*   < > 8.4*   ALB  --   --   --   --  3.0*  --  3.6   TP  --   --   --   --  5.8*  --  7.3   TBILI  --   --   --   --  0.4  --  0.4    < > = values in this interval not displayed.                         Impression:       Active Hospital Problems    Diagnosis Date Noted    Severe protein-calorie malnutrition ([address] 10/27/2021    Hyponatremia 10/25/2021    Slowly improving Na. Plan:     Continue Tolvaptan, no fluid restriction as Tolvaptan causes Aquarosis. Continue electrolytes q 12 hour. Yesenia Donohue MD

## 2021-10-28 NOTE — ROUTINE PROCESS
Burgess Leon (pt's grand-daughter) phoned nurse and provided code to inquire about pt's discharge \"to a nursing home. \"  This nurse explained the term palliative care, told her there was no order for a specific discharge place. I also told her that case management would be contacting family w/ further details.

## 2021-10-28 NOTE — PROGRESS NOTES
Progress Note    Sunny Green  80 y.o. Admit Date: 10/24/2021  Active Problems:    Hyponatremia (10/25/2021) POA: Unknown      Severe protein-calorie malnutrition ([address] (10/27/2021) POA: Yes            Subjective:     Patient feels good, has tendency of drinking free water,Jaclynie, says Feels thisty. A comprehensive review of systems was negative except for that written in the History of Present Illness.     Objective:     Visit Vitals  BP (!) 112/53 (BP 1 Location: Left upper arm)   Pulse (!) 53   Temp 97.8 °F (36.6 °C)   Resp 16   Ht 5' 2\" (1.575 m)   Wt 44.2 kg (97 lb 8 oz)   SpO2 98%   BMI 17.83 kg/m²         Intake/Output Summary (Last 24 hours) at 10/27/2021 2056  Last data filed at 10/27/2021 1649  Gross per 24 hour   Intake 1223 ml   Output 1102 ml   Net 121 ml       Current Facility-Administered Medications   Medication Dose Route Frequency Provider Last Rate Last Admin    tolvaptan (SAMSCA) tablet (0.5 X 30 MG) 15 mg  15 mg Oral DAILY Shanell Krishnan MD   15 mg at 10/27/21 1300    sodium chloride (NS) flush 5-40 mL  5-40 mL IntraVENous Q8H Nettie Zavala NP   10 mL at 10/27/21 1442    sodium chloride (NS) flush 5-40 mL  5-40 mL IntraVENous PRN Brittany Zavala NP        acetaminophen (TYLENOL) tablet 650 mg  650 mg Oral Q6H PRN Brittany Zavala NP   650 mg at 10/27/21 0047    Or    acetaminophen (TYLENOL) suppository 650 mg  650 mg Rectal Q6H PRN Brittany Zavala NP        polyethylene glycol (MIRALAX) packet 17 g  17 g Oral DAILY PRN Brittany Zavala NP        ondansetron (ZOFRAN ODT) tablet 4 mg  4 mg Oral Q8H PRN Nettie Zavala NP        Or    ondansetron (ZOFRAN) injection 4 mg  4 mg IntraVENous Q6H PRN Edy-Henry, [place], NP        ezetimibe (ZETIA) tablet 10 mg  10 mg Oral DAILY Daniel Lundy MD   10 mg at 10/27/21 0813    lubiPROStone (AMITIZA) capsule 8 mcg  8 mcg Oral BID Daniel Lundy MD   8 mcg at 10/27/21 1742    pantoprazole (PROTONIX) tablet 40 mg  40 mg Oral Daniel Sultana MD   40 mg at 10/27/21 8031        Physical Exam:     Physical Exam:   General:  Alert, cooperative, no distress, appears stated age. Neck: Supple, symmetrical, trachea midline, no adenopathy, thyroid: no enlargement/tenderness/nodules, no carotid bruit and no JVD. Lungs:   Clear to auscultation bilaterally. Heart:  Regular rate and rhythm, S1, S2 normal, no murmur, click, rub or gallop. Abdomen:   Soft, non-tender. Bowel sounds normal. No masses,  No organomegaly. Extremities: Extremities normal, atraumatic, no cyanosis or edema   Skin: Skin color, texture, turgor normal. No rashes or lesions         Data Review:    CBC w/Diff    Recent Labs     10/26/21  0500 10/24/21  2357 10/24/21  2357   WBC 4.9  --  7.4   RBC 2.95*  --  3.31*   HGB 8.7*  --  9.8*   HCT 25.1*  --  27.7*   MCV 85.1   < > 83.7   MCH 29.5   < > 29.6   MCHC 34.7   < > 35.4   RDW 12.3   < > 11.9    < > = values in this interval not displayed. Recent Labs     10/26/21  0500 10/24/21  2357 10/24/21  2357   MONOS 9  --  5   EOS 1  --  0   BASOS 1   < > 1   RDW 12.3   < > 11.9    < > = values in this interval not displayed. Comprehensive Metabolic Profile    Recent Labs     10/27/21  1816 10/27/21  0810 10/27/21  0504   * 123* 126*   K 4.5 4.2 4.5   CL 94* 92* 94*   CO2 25 25 26   BUN 19* 22* 24*   CREA 0.96 0.94 0.91    Recent Labs     10/27/21  1816 10/27/21  0810 10/27/21  0504 10/26/21  1740 10/26/21  0500 10/25/21  2031 10/25/21  2031 10/25/21  0707 10/24/21  2357   CA 8.7 8.8 8.3*   < > 8.2*   < > 8.4*   < > 8.2*   ALB  --   --   --   --  3.0*  --  3.6  --  3.5   TP  --   --   --   --  5.8*  --  7.3  --  7.0   TBILI  --   --   --   --  0.4  --  0.4  --  0.4    < > = values in this interval not displayed.           EXAM: XR CHEST PA LAT     INDICATION: Night Sweats     COMPARISON: July 2021.     FINDINGS: PA and lateral radiographs of the chest demonstrate no focal  consolidation. Hyperlucent and hyperinflated lungs again noted. Biapical  scarring present and similar to prior exam.. The cardiac and mediastinal  contours and pulmonary vascularity are normal. Atherosclerosis. Decreased bone  mineral density. Partial evaluate scoliosis. Osseous degenerative changes. .      IMPRESSION     No acute findings or interval change. Emphysema/COPD. Results for Lashonda Mckeon (MRN [id number]) as of 10/27/2021 20:59   Ref. Range 10/25/2021 01:00   Color Latest Units:   YELLOW   Appearance Latest Units:   CLEAR   Specific gravity Latest Ref Range: 1.005 - 1.030   1.008   pH (UA) Latest Ref Range: 5.0 - 8.0   8.5 (H)   Protein Latest Ref Range: NEG mg/dL Negative   Glucose Latest Ref Range: NEG mg/dL Negative   Ketone Latest Ref Range: NEG mg/dL Negative   Blood Latest Ref Range: NEG   SMALL (A)   Bilirubin Latest Ref Range: NEG   Negative   Urobilinogen Latest Ref Range: 0.2 - 1.0 EU/dL 0.2   Nitrites Latest Ref Range: NEG   Negative   Leukocyte Esterase Latest Ref Range: NEG   Negative   Epithelial cells Latest Ref Range: 0 - 5 /lpf FEW   WBC Latest Ref Range: 0 - 4 /hpf 0 to 3   RBC Latest Ref Range: 0 - 5 /hpf 4 to 10   Bacteria Latest Ref Range: NEG /hpf Negative     Results for Lashonda Mckeon (MRN [id number]) as of 10/27/2021 20:59   Ref. Range 10/25/2021 11:30   Sodium,urine random Latest Ref Range: 20 - 110 MMOL/L 60   Potassium urine, random Latest Ref Range: 12.0 - 62 MMOL/L 20   Chloride,urine random Latest Ref Range: 55 - 125 MMOL/L 63     Urine Osmolality pending     Ref Range & Units 10/25/21 1111   Osmolality, Serum 280 - 301 mOsmol/kg 257Low     Comment: (NOTE)        Impression:       Active Hospital Problems    Diagnosis Date Noted    Severe protein-calorie malnutrition ([address] 10/27/2021    Hyponatremia 10/25/2021    Has Eu volemic Hyponatremia. Plan:     Start Tolvaptan, no Fluid restriction. , Watch Na closely.       Dionicio Figueroa Beau Hudson MD

## 2021-10-28 NOTE — PROGRESS NOTES
Reason for Admission:  Hyponatremia [E87.1]                 RUR Score:    12%           Plan for utilizing home health: To be determined                      Likelihood of Readmission:   LOW                         Transition of Care Plan:              Initial assessment completed with patient. Cognitive status of patient: oriented to time, place, person and situation. Face sheet information confirmed:  yes. The patient designates Zakiya Lambert, son ([phone]), Estela Schafer, son ([phone]), and Ava Renee, caregiver ([phone]) to participate in her discharge plan and to receive any needed information. This patient lives in a group home with caregivers and 3 other people. Patient was able to navigate steps as needed with assistance. Prior to hospitalization, patient was considered to be independent with ADLs/IADLS : yes but needs some assistance . If not independent,  patient needs assist with : dressing, food preparation, cooking and toileting . Patient has a current ACP document on file: yes, completed by palliative care      Healthcare Decision Maker:   Primary Decision Maker: Jacquelineimtiaz Love - [phone]    Secondary Decision Maker: Elle Merlos - [phone]      Transportation needs to be determined  upon discharge. The patient reported that no medical equipment available in the home. Patient is not currently active with home health. Patient has not stayed in a skilled nursing facility or rehab. This patient is on dialysis :no    Freedom of choice signed: no     Currently, the discharge plan is Group Home. Patient stated \" I thought about it. I want to go back with Shea Howell. You can call her. I don't know about home health coming in. I know some of the other people have a nurse. \"    Attempted to call Ava Renee at [phone]. Mr Shira Kim answered the phone. He stated \" Shea Howell is just leaving out of the drive way.  She has one of the other clients with her. You can't call her while she is driving. Give me your number. I will have her call you back. \" Writer provided contact information. Waiting for a return call. CM will continue to assist with transition of care needs. The patient states that she can obtain her medications from the pharmacy, and take her medications as directed with assistance    Patient's current insurance is  Medicare Part A & B       Care Management Interventions  PCP Verified by CM: Yes  Mode of Transport at Discharge:  (transportation needs to be determined prior to discharge.)  Transition of Care Consult (CM Consult): Discharge Planning  Discharge Durable Medical Equipment: No  Physical Therapy Consult: Yes  Speech Therapy Consult: No  Support Systems: Adult Group Home, Child(ambrose)  Discharge Location  Discharge Placement: Group home    SVETLANA Conti, RN  Pager # [phone]  Care Manager

## 2021-10-28 NOTE — PROGRESS NOTES
[facility]  [facility]    Patient: Aurora Camejo Age: 80 y.o. : 1934 MR#: [id number] SSN: [id number]  Date/Time: 10/28/2021    Subjective:   I personally saw and evaluated this patient on 2021  Patient is sitting in bed in no apparent distress, awake and alert    Assessment/Plan:     Hyponatremia, possible SIADH  Dyslipidemia  Chronic anemia   GERD    PLAN  Worsening renal function noted, creatinine 1.52. I called and discussed with nephrologist and he recommends to hold her Samsca and give normal saline 50 cc/h for the next 10 hours.   Monitor sodium  Continue Zetia  Monitor labs  PT and OT  Discussed with patient       Case discussed with:  [x]Patient  []Family  [x]Nursing  []Case Management  DVT Prophylaxis:  []Lovenox  []Hep SQ  [x]SCDs  []Coumadin   []On Heparin gtt    Objective:   VS:   Visit Vitals  BP (!) 116/50 (BP 1 Location: Left upper arm, BP Patient Position: At rest)   Pulse 67   Temp 97.5 °F (36.4 °C)   Resp 18   Ht 5' 2\" (1.575 m)   Wt 44.2 kg (97 lb 8 oz)   SpO2 98%   BMI 17.83 kg/m²      Tmax/24hrs: Temp (24hrs), Av.7 °F (36.5 °C), Min:97.5 °F (36.4 °C), Max:97.8 °F (36.6 °C)    Input/Output:     Intake/Output Summary (Last 24 hours) at 10/28/2021 1938  Last data filed at 10/28/2021 1809  Gross per 24 hour   Intake 1710 ml   Output 1603 ml   Net 107 ml       General:  Awake, alert  Cardiovascular:  S1S2+, RRR  Pulmonary:  CTA b/l  GI:  Soft, BS+, NT, ND  Extremities:  No edema        Labs:    Recent Results (from the past 24 hour(s))   VITAMIN B12 & FOLATE    Collection Time: 10/28/21 12:22 AM   Result Value Ref Range    Vitamin B12 428 211 - 911 pg/mL    Folate >20.0 (H) 3.10 - 74.81 ng/mL   METABOLIC PANEL, BASIC    Collection Time: 10/28/21 12:28 AM   Result Value Ref Range    Sodium 127 (L) 136 - 145 mmol/L    Potassium 4.6 3.5 - 5.5 mmol/L    Chloride 97 (L) 100 - 111 mmol/L    CO2 27 21 - 32 mmol/L    Anion gap 3 3.0 - 18 mmol/L    Glucose 104 (H) 74 - 99 mg/dL    BUN 23 (H) 7.0 - 18 MG/DL    Creatinine 1.06 0.6 - 1.3 MG/DL    BUN/Creatinine ratio 22 (H) 12 - 20      GFR est AA 60 (L) >60 ml/min/1.73m2    GFR est non-AA 49 (L) >60 ml/min/1.73m2    Calcium 8.9 8.5 - 10.1 MG/DL   MAGNESIUM    Collection Time: 10/28/21 12:28 AM   Result Value Ref Range    Magnesium 2.0 1.6 - 2.6 mg/dL   METABOLIC PANEL, BASIC    Collection Time: 10/28/21  6:09 AM   Result Value Ref Range    Sodium 129 (L) 136 - 145 mmol/L    Potassium 4.4 3.5 - 5.5 mmol/L    Chloride 97 (L) 100 - 111 mmol/L    CO2 26 21 - 32 mmol/L    Anion gap 6 3.0 - 18 mmol/L    Glucose 97 74 - 99 mg/dL    BUN 21 (H) 7.0 - 18 MG/DL    Creatinine 0.99 0.6 - 1.3 MG/DL    BUN/Creatinine ratio 21 (H) 12 - 20      GFR est AA >60 >60 ml/min/1.73m2    GFR est non-AA 53 (L) >60 ml/min/1.73m2    Calcium 8.9 8.5 - 10.1 MG/DL   CBC WITH AUTOMATED DIFF    Collection Time: 10/28/21  6:09 AM   Result Value Ref Range    WBC 4.7 4.6 - 13.2 K/uL    RBC 3.30 (L) 4.20 - 5.30 M/uL    HGB 9.7 (L) 12.0 - 16.0 g/dL    HCT 29.2 (L) 35.0 - 45.0 %    MCV 88.5 78.0 - 100.0 FL    MCH 29.4 24.0 - 34.0 PG    MCHC 33.2 31.0 - 37.0 g/dL    RDW 12.5 11.6 - 14.5 %    PLATELET 586 753 - 505 K/uL    MPV 9.3 9.2 - 11.8 FL    NEUTROPHILS 57 40 - 73 %    LYMPHOCYTES 31 21 - 52 %    MONOCYTES 9 3 - 10 %    EOSINOPHILS 1 0 - 5 %    BASOPHILS 1 0 - 2 %    ABS. NEUTROPHILS 2.7 1.8 - 8.0 K/UL    ABS. LYMPHOCYTES 1.5 0.9 - 3.6 K/UL    ABS. MONOCYTES 0.4 0.05 - 1.2 K/UL    ABS. EOSINOPHILS 0.1 0.0 - 0.4 K/UL    ABS.  BASOPHILS 0.1 0.0 - 0.1 K/UL    DF AUTOMATED     METABOLIC PANEL, BASIC    Collection Time: 10/28/21 12:15 PM   Result Value Ref Range    Sodium 133 (L) 136 - 145 mmol/L    Potassium 4.4 3.5 - 5.5 mmol/L    Chloride 98 (L) 100 - 111 mmol/L    CO2 30 21 - 32 mmol/L    Anion gap 5 3.0 - 18 mmol/L    Glucose 112 (H) 74 - 99 mg/dL    BUN 21 (H) 7.0 - 18 MG/DL    Creatinine 1.00 0.6 - 1.3 MG/DL    BUN/Creatinine ratio 21 (H) 12 - 20      GFR est AA >60 >60 ml/min/1.73m2    GFR est non-AA 53 (L) >60 ml/min/1.73m2    Calcium 9.6 8.5 - 24.3 MG/DL   METABOLIC PANEL, BASIC    Collection Time: 10/28/21  6:01 PM   Result Value Ref Range    Sodium 130 (L) 136 - 145 mmol/L    Potassium 4.9 3.5 - 5.5 mmol/L    Chloride 97 (L) 100 - 111 mmol/L    CO2 29 21 - 32 mmol/L    Anion gap 4 3.0 - 18 mmol/L    Glucose 120 (H) 74 - 99 mg/dL    BUN 26 (H) 7.0 - 18 MG/DL    Creatinine 1.52 (H) 0.6 - 1.3 MG/DL    BUN/Creatinine ratio 17 12 - 20      GFR est AA 39 (L) >60 ml/min/1.73m2    GFR est non-AA 32 (L) >60 ml/min/1.73m2    Calcium 9.1 8.5 - 10.1 MG/DL     Additional Data Reviewed:      Signed By: Noe Montez MD     October 28, 2021

## 2021-10-29 LAB
ANION GAP SERPL CALC-SCNC: 5 MMOL/L (ref 3–18)
ANION GAP SERPL CALC-SCNC: 6 MMOL/L (ref 3–18)
ANION GAP SERPL CALC-SCNC: 8 MMOL/L (ref 3–18)
BASOPHILS # BLD: 0 K/UL (ref 0–0.1)
BASOPHILS NFR BLD: 1 % (ref 0–2)
BUN SERPL-MCNC: 29 MG/DL (ref 7–18)
BUN SERPL-MCNC: 32 MG/DL (ref 7–18)
BUN SERPL-MCNC: 32 MG/DL (ref 7–18)
BUN/CREAT SERPL: 26 (ref 12–20)
BUN/CREAT SERPL: 28 (ref 12–20)
BUN/CREAT SERPL: 29 (ref 12–20)
CALCIUM SERPL-MCNC: 8.4 MG/DL (ref 8.5–10.1)
CALCIUM SERPL-MCNC: 8.7 MG/DL (ref 8.5–10.1)
CALCIUM SERPL-MCNC: 9 MG/DL (ref 8.5–10.1)
CHLORIDE SERPL-SCNC: 100 MMOL/L (ref 100–111)
CHLORIDE SERPL-SCNC: 101 MMOL/L (ref 100–111)
CHLORIDE SERPL-SCNC: 98 MMOL/L (ref 100–111)
CO2 SERPL-SCNC: 26 MMOL/L (ref 21–32)
CO2 SERPL-SCNC: 26 MMOL/L (ref 21–32)
CO2 SERPL-SCNC: 27 MMOL/L (ref 21–32)
CREAT SERPL-MCNC: 1.09 MG/DL (ref 0.6–1.3)
CREAT SERPL-MCNC: 1.1 MG/DL (ref 0.6–1.3)
CREAT SERPL-MCNC: 1.16 MG/DL (ref 0.6–1.3)
DIFFERENTIAL METHOD BLD: ABNORMAL
EOSINOPHIL # BLD: 0 K/UL (ref 0–0.4)
EOSINOPHIL NFR BLD: 1 % (ref 0–5)
ERYTHROCYTE [DISTWIDTH] IN BLOOD BY AUTOMATED COUNT: 12.7 % (ref 11.6–14.5)
GLUCOSE SERPL-MCNC: 127 MG/DL (ref 74–99)
GLUCOSE SERPL-MCNC: 90 MG/DL (ref 74–99)
GLUCOSE SERPL-MCNC: 92 MG/DL (ref 74–99)
HCT VFR BLD AUTO: 27.6 % (ref 35–45)
HGB BLD-MCNC: 9.2 G/DL (ref 12–16)
LYMPHOCYTES # BLD: 1.9 K/UL (ref 0.9–3.6)
LYMPHOCYTES NFR BLD: 33 % (ref 21–52)
MAGNESIUM SERPL-MCNC: 1.9 MG/DL (ref 1.6–2.6)
MCH RBC QN AUTO: 29.5 PG (ref 24–34)
MCHC RBC AUTO-ENTMCNC: 33.3 G/DL (ref 31–37)
MCV RBC AUTO: 88.5 FL (ref 78–100)
MONOCYTES # BLD: 0.5 K/UL (ref 0.05–1.2)
MONOCYTES NFR BLD: 9 % (ref 3–10)
NEUTS SEG # BLD: 3.2 K/UL (ref 1.8–8)
NEUTS SEG NFR BLD: 56 % (ref 40–73)
PLATELET # BLD AUTO: 273 K/UL (ref 135–420)
PMV BLD AUTO: 9.3 FL (ref 9.2–11.8)
POTASSIUM SERPL-SCNC: 4.4 MMOL/L (ref 3.5–5.5)
POTASSIUM SERPL-SCNC: 4.4 MMOL/L (ref 3.5–5.5)
POTASSIUM SERPL-SCNC: 4.7 MMOL/L (ref 3.5–5.5)
RBC # BLD AUTO: 3.12 M/UL (ref 4.2–5.3)
SODIUM SERPL-SCNC: 131 MMOL/L (ref 136–145)
SODIUM SERPL-SCNC: 132 MMOL/L (ref 136–145)
SODIUM SERPL-SCNC: 134 MMOL/L (ref 136–145)
WBC # BLD AUTO: 5.6 K/UL (ref 4.6–13.2)

## 2021-10-29 PROCEDURE — 97116 GAIT TRAINING THERAPY: CPT

## 2021-10-29 PROCEDURE — 65660000000 HC RM CCU STEPDOWN

## 2021-10-29 PROCEDURE — 97530 THERAPEUTIC ACTIVITIES: CPT

## 2021-10-29 PROCEDURE — 80048 BASIC METABOLIC PNL TOTAL CA: CPT

## 2021-10-29 PROCEDURE — 74011250637 HC RX REV CODE- 250/637: Performed by: INTERNAL MEDICINE

## 2021-10-29 PROCEDURE — 97535 SELF CARE MNGMENT TRAINING: CPT

## 2021-10-29 PROCEDURE — 36415 COLL VENOUS BLD VENIPUNCTURE: CPT

## 2021-10-29 PROCEDURE — 74011250636 HC RX REV CODE- 250/636: Performed by: NURSE PRACTITIONER

## 2021-10-29 PROCEDURE — 85025 COMPLETE CBC W/AUTO DIFF WBC: CPT

## 2021-10-29 PROCEDURE — 74011250637 HC RX REV CODE- 250/637: Performed by: EMERGENCY MEDICINE

## 2021-10-29 PROCEDURE — 99232 SBSQ HOSP IP/OBS MODERATE 35: CPT | Performed by: EMERGENCY MEDICINE

## 2021-10-29 PROCEDURE — 83735 ASSAY OF MAGNESIUM: CPT

## 2021-10-29 RX ORDER — SODIUM CHLORIDE 1 G/1
1 TABLET ORAL 2 TIMES DAILY
Status: DISCONTINUED | OUTPATIENT
Start: 2021-10-29 | End: 2021-10-30 | Stop reason: HOSPADM

## 2021-10-29 RX ADMIN — Medication 10 ML: at 16:31

## 2021-10-29 RX ADMIN — LUBIPROSTONE 8 MCG: 8 CAPSULE, GELATIN COATED ORAL at 17:51

## 2021-10-29 RX ADMIN — EZETIMIBE 10 MG: 10 TABLET ORAL at 08:40

## 2021-10-29 RX ADMIN — ONDANSETRON 4 MG: 2 INJECTION INTRAMUSCULAR; INTRAVENOUS at 21:54

## 2021-10-29 RX ADMIN — PANTOPRAZOLE SODIUM 40 MG: 40 TABLET, DELAYED RELEASE ORAL at 08:40

## 2021-10-29 RX ADMIN — LUBIPROSTONE 8 MCG: 8 CAPSULE, GELATIN COATED ORAL at 08:40

## 2021-10-29 RX ADMIN — Medication 10 ML: at 21:54

## 2021-10-29 RX ADMIN — SODIUM CHLORIDE 1000 MG: 1 TABLET ORAL at 17:51

## 2021-10-29 NOTE — PROGRESS NOTES
[facility]  Progress Note    Patient: Terri Maldonado Age: 80 y.o. : 1934 MR#: [id number] SSN: [id number]  Date/Time: 10/29/2021    Subjective:     Patient is sitting in bed in no apparent distress, awake, follows commands and responds appropriately    Assessment/Plan:     Hyponatremia, possible SIADH  Dyslipidemia  Chronic anemia   GERD    PLAN  Monitor renal function  Monitor sodium, salt tablets  Nephrology is following  Continue Zetia  Monitor labs  PT and OT  Discussed with patient  Disposition-patient wishes to go back to group home when ready. Back to group home with home health care soon.   I called patient's son Melida Gil at phone #[phone] and left a message    Case discussed with:  [x]Patient  []Family  [x]Nursing  []Case Management  DVT Prophylaxis:  []Lovenox  []Hep SQ  [x]SCDs  []Coumadin   []On Heparin gtt    Objective:   VS:   Visit Vitals  BP (!) 103/50 (BP 1 Location: Left upper arm, BP Patient Position: At rest)   Pulse 62   Temp 97.9 °F (36.6 °C)   Resp 18   Ht 5' 2\" (1.575 m)   Wt 44.2 kg (97 lb 8 oz)   SpO2 99%   BMI 17.83 kg/m²      Tmax/24hrs: Temp (24hrs), Av.8 °F (36.6 °C), Min:96.9 °F (36.1 °C), Max:98.9 °F (37.2 °C)    Input/Output:     Intake/Output Summary (Last 24 hours) at 10/29/2021 1752  Last data filed at 10/29/2021 1654  Gross per 24 hour   Intake 1957.5 ml   Output 2420 ml   Net -462.5 ml       General:  Awake, alert  Cardiovascular:  S1S2+, RRR  Pulmonary:  CTA b/l  GI:  Soft, BS+, NT, ND  Extremities:  No edema          Labs:    Recent Results (from the past 24 hour(s))   METABOLIC PANEL, BASIC    Collection Time: 10/28/21  6:01 PM   Result Value Ref Range    Sodium 130 (L) 136 - 145 mmol/L    Potassium 4.9 3.5 - 5.5 mmol/L    Chloride 97 (L) 100 - 111 mmol/L    CO2 29 21 - 32 mmol/L    Anion gap 4 3.0 - 18 mmol/L    Glucose 120 (H) 74 - 99 mg/dL    BUN 26 (H) 7.0 - 18 MG/DL    Creatinine 1.52 (H) 0.6 - 1.3 MG/DL BUN/Creatinine ratio 17 12 - 20      GFR est AA 39 (L) >60 ml/min/1.73m2    GFR est non-AA 32 (L) >60 ml/min/1.73m2    Calcium 9.1 8.5 - 34.2 MG/DL   METABOLIC PANEL, BASIC    Collection Time: 10/29/21  2:13 AM   Result Value Ref Range    Sodium 131 (L) 136 - 145 mmol/L    Potassium 4.4 3.5 - 5.5 mmol/L    Chloride 98 (L) 100 - 111 mmol/L    CO2 27 21 - 32 mmol/L    Anion gap 6 3.0 - 18 mmol/L    Glucose 90 74 - 99 mg/dL    BUN 32 (H) 7.0 - 18 MG/DL    Creatinine 1.09 0.6 - 1.3 MG/DL    BUN/Creatinine ratio 29 (H) 12 - 20      GFR est AA 58 (L) >60 ml/min/1.73m2    GFR est non-AA 48 (L) >60 ml/min/1.73m2    Calcium 8.7 8.5 - 10.1 MG/DL   CBC WITH AUTOMATED DIFF    Collection Time: 10/29/21  2:13 AM   Result Value Ref Range    WBC 5.6 4.6 - 13.2 K/uL    RBC 3.12 (L) 4.20 - 5.30 M/uL    HGB 9.2 (L) 12.0 - 16.0 g/dL    HCT 27.6 (L) 35.0 - 45.0 %    MCV 88.5 78.0 - 100.0 FL    MCH 29.5 24.0 - 34.0 PG    MCHC 33.3 31.0 - 37.0 g/dL    RDW 12.7 11.6 - 14.5 %    PLATELET 061 927 - 973 K/uL    MPV 9.3 9.2 - 11.8 FL    NEUTROPHILS 56 40 - 73 %    LYMPHOCYTES 33 21 - 52 %    MONOCYTES 9 3 - 10 %    EOSINOPHILS 1 0 - 5 %    BASOPHILS 1 0 - 2 %    ABS. NEUTROPHILS 3.2 1.8 - 8.0 K/UL    ABS. LYMPHOCYTES 1.9 0.9 - 3.6 K/UL    ABS. MONOCYTES 0.5 0.05 - 1.2 K/UL    ABS. EOSINOPHILS 0.0 0.0 - 0.4 K/UL    ABS.  BASOPHILS 0.0 0.0 - 0.1 K/UL    DF AUTOMATED     MAGNESIUM    Collection Time: 10/29/21  2:13 AM   Result Value Ref Range    Magnesium 1.9 1.6 - 2.6 mg/dL   METABOLIC PANEL, BASIC    Collection Time: 10/29/21 12:07 PM   Result Value Ref Range    Sodium 134 (L) 136 - 145 mmol/L    Potassium 4.4 3.5 - 5.5 mmol/L    Chloride 100 100 - 111 mmol/L    CO2 26 21 - 32 mmol/L    Anion gap 8 3.0 - 18 mmol/L    Glucose 92 74 - 99 mg/dL    BUN 29 (H) 7.0 - 18 MG/DL    Creatinine 1.10 0.6 - 1.3 MG/DL    BUN/Creatinine ratio 26 (H) 12 - 20      GFR est AA 57 (L) >60 ml/min/1.73m2    GFR est non-AA 47 (L) >60 ml/min/1.73m2    Calcium 9.0 8.5 - 10.1 MG/DL     Additional Data Reviewed:      Signed By: Milla Murray MD     October 29, 2021

## 2021-10-29 NOTE — PROGRESS NOTES
Discharge planning    Home health orders placed in [facility] for [facility].      SVETLANA Peters, RN  Pager # [phone]  Care Manager

## 2021-10-29 NOTE — PROGRESS NOTES
7379: Bedside shift change report given to Jere Plata RN (oncoming nurse) by Skip Lal RN (offgoing nurse). Report included the following information SBAR, Kardex, Intake/Output, MAR and Cardiac Rhythm NSR. Bedside shift change report given to Skip Lal RN (oncoming nurse) by Jere Plata RN (offgoing nurse). Report included the following information SBAR, Kardex, Intake/Output, MAR and Cardiac Rhythm NSR.

## 2021-10-29 NOTE — PROGRESS NOTES
Progress Note    Joan Potter  80 y.o. Admit Date: 10/24/2021  Active Problems:    Hyponatremia (10/25/2021) POA: Unknown      Severe protein-calorie malnutrition ([address] (10/27/2021) POA: Yes            Subjective:     Patient feels good except mild nausea, continues to drink lot of water &  coffee. Samsca stopped, Giving NS , looks she is on the dry side, Na is on the safe side,BP is on the low side. A comprehensive review of systems was negative except for that written in the History of Present Illness.     Objective:     Visit Vitals  BP (!) 101/42 (BP 1 Location: Left upper arm, BP Patient Position: At rest)   Pulse 70   Temp 98.9 °F (37.2 °C)   Resp 16   Ht 5' 2\" (1.575 m)   Wt 44.2 kg (97 lb 8 oz)   SpO2 98%   BMI 17.83 kg/m²         Intake/Output Summary (Last 24 hours) at 10/29/2021 1328  Last data filed at 10/29/2021 1236  Gross per 24 hour   Intake 1410 ml   Output 2300 ml   Net -890 ml       Current Facility-Administered Medications   Medication Dose Route Frequency Provider Last Rate Last Admin    0.9% sodium chloride infusion  50 mL/hr IntraVENous CONTINUOUS Arlen Sanchez MD 50 mL/hr at 10/28/21 2159 50 mL/hr at 10/28/21 2159    [Held by provider] tolvaptan (SAMSCA) tablet (0.5 X 30 MG) 15 mg  15 mg Oral DAILY Kathrin Lawson MD   15 mg at 10/28/21 0256    sodium chloride (NS) flush 5-40 mL  5-40 mL IntraVENous Q8H Nettie Zavala NP   10 mL at 10/28/21 2158    sodium chloride (NS) flush 5-40 mL  5-40 mL IntraVENous PRN Ming Zavala NP        acetaminophen (TYLENOL) tablet 650 mg  650 mg Oral Q6H PRN Ming Zavala NP   650 mg at 10/27/21 2122    Or    acetaminophen (TYLENOL) suppository 650 mg  650 mg Rectal Q6H PRN Ming Zavala NP        polyethylene glycol (MIRALAX) packet 17 g  17 g Oral DAILY PRN Nettie Zavala NP        ondansetron (ZOFRAN ODT) tablet 4 mg  4 mg Oral Q8H PRN Nettie Zavala NP   4 mg at 10/28/21 0321    Or    ondansetron (ZOFRAN) injection 4 mg  4 mg IntraVENous Q6H PRN Nettie Zavala NP   4 mg at 10/28/21 2158    ezetimibe (ZETIA) tablet 10 mg  10 mg Oral DAILY Daniel Lundy MD   10 mg at 10/29/21 0840    lubiPROStone (AMITIZA) capsule 8 mcg  8 mcg Oral BID Daniel Lundy MD   8 mcg at 10/29/21 0840    pantoprazole (PROTONIX) tablet 40 mg  40 mg Oral ACB Daniel Lundy MD   40 mg at 10/29/21 0840        Physical Exam:     Physical Exam:   General:  Alert, cooperative, no distress, appears stated age. Neck: Supple, symmetrical, trachea midline, no adenopathy, thyroid: no enlargement/tenderness/nodules, no carotid bruit and no JVD. Lungs:   Clear to auscultation bilaterally. Heart:  Regular rate and rhythm, S1, S2 normal, no murmur, click, rub or gallop. Abdomen:   Soft, non-tender. Bowel sounds normal. No masses,  No organomegaly. Extremities: Extremities normal, atraumatic, no cyanosis or edema. Skin: Skin color, texture, turgor normal. No rashes or lesions         Data Review:    CBC w/Diff    Recent Labs     10/29/21  0213 10/28/21  0609 10/28/21  0609   WBC 5.6  --  4.7   RBC 3.12*  --  3.30*   HGB 9.2*  --  9.7*   HCT 27.6*  --  29.2*   MCV 88.5   < > 88.5   MCH 29.5   < > 29.4   MCHC 33.3   < > 33.2   RDW 12.7   < > 12.5    < > = values in this interval not displayed. Recent Labs     10/29/21  0213 10/28/21  0609 10/28/21  0609   MONOS 9  --  9   EOS 1  --  1   BASOS 1   < > 1   RDW 12.7   < > 12.5    < > = values in this interval not displayed.         Comprehensive Metabolic Profile    Recent Labs     10/29/21  1207 10/29/21  0213 10/28/21  1801   * 131* 130*   K 4.4 4.4 4.9    98* 97*   CO2 26 27 29   BUN 29* 32* 26*   CREA 1.10 1.09 1.52*    Recent Labs     10/29/21  1207 10/29/21  0213 10/28/21  1801   CA 9.0 8.7 9.1                        Impression:       Active Hospital Problems    Diagnosis Date Noted    Severe protein-calorie malnutrition ([facility] [address] 10/27/2021    Hyponatremia 10/25/2021      DC IVF. Start Nacl tab. Plan: If Remained safe may be DC'D tomorrow.       Maximo Patel MD

## 2021-10-29 NOTE — PROGRESS NOTES
Problem: Self Care Deficits Care Plan (Adult)  Goal: *Acute Goals and Plan of Care (Insert Text)  Description: Occupational Therapy Goals  Initiated 10/27/2021 within 7 day(s). 1.  Patient will perform grooming with supervision/set-up. 2.  Patient will perform bathing with supervision/set-up. 3.  Patient will perform upper body dressing and lower body dressing with supervision/set-up. 4.  Patient will perform toilet transfers with supervision/set-up using RW. 5.  Patient will perform all aspects of toileting with supervision/set-up. 6.  Patient will participate in upper extremity therapeutic exercise/activities with contact guard assist for 8 minutes. 7.  Patient will utilize energy conservation techniques during functional activities with min verbal cues. Prior Level of Function: SBA with sponge bath, assist with washing hair, furniture walking for toilet transfers & use of bedside commode at night time     Outcome: Resolved/Met     OCCUPATIONAL THERAPY TREATMENT/DISCHARGE    Patient: Ricardo Sousa (49 y.o. female)  Date: 10/29/2021  Diagnosis: Hyponatremia [E87.1] <principal problem not specified>  Precautions: Fall, Aspiration, Skin      Chart, occupational therapy assessment, plan of care, and goals were reviewed. ASSESSMENT:  Patient cleared to participate in OT treatment by RN. Upon entering the room, patient was supine in bed, alert, and agreeable to participate in OT session, requesting to get washed up. Patient performed bed mobility with modified independence and ambulated to bathroom with contact guard assist using rolling walker for tremors. Patient supervision for sit <> stand and toilet transfer. Patient modified independent - supervision with all selfcare tasks this session, meeting POC goals. Patient with no further complaints this session regarding ADLs. Patient left semi reclined in bed, all needs met and call bell in reach.  Patient and RN notified of goals being met and OT signing off at this time. PLAN:  Patient will be discharged from occupational therapy at this time. Rationale for discharge:  [x] Goals Achieved  [] [address]  [] Patient not participating in therapy  [] Other:  Discharge Recommendations:  Home with continued support and Home Health  Further Equipment Recommendations for Discharge:  transfer bench and rolling walker     SUBJECTIVE:   Patient stated Soraya Gil only let me take 1 shower a week at home, I would really like to take a bath    OBJECTIVE DATA SUMMARY:   Cognitive/Behavioral Status:  Neurologic State: Alert  Orientation Level: Oriented X4  Cognition: Follows commands  Safety/Judgement: Fall prevention, Good awareness of safety precautions (d/t body tremors)    Functional Mobility and Transfers for ADLs:   Bed Mobility:  Supine to Sit: Modified independent; Additional time  Sit to Supine: Modified independent; Additional time  Scooting: Modified independent     Transfers:  Sit to Stand: Supervision  Stand to Sit: Supervision   Toilet Transfer : Supervision    Balance:  Sitting: Intact  Standing: Impaired; With support  Standing - Static: Good  Standing - Dynamic : Fair (+)    ADL Intervention:  Grooming  Grooming Assistance: Set-up  Position Performed: Seated in chair (in bathroom at sink)  Washing Face: Supervision  Washing Hands: Supervision  Brushing/Combing Hair: Supervision    Upper Body Bathing  Bathing Assistance: Supervision  Position Performed: Seated in chair (IN BATHROOM AT [facility])    Lower Body Bathing  Bathing Assistance: Supervision;Stand-by assistance  Perineal  : Stand-by assistance (standing)  Lower Body : Supervision  Position Performed: Seated in chair (in bathroom at sink)    Upper Body Dressing Assistance  Dressing Assistance: Modified independent  [facility] Gown: Modified independent (doffing and donning new)    Lower Body Dressing Assistance  Dressing Assistance: Supervision  Socks: Supervision  Leg Crossed Method Used: Yes  Position Performed: Seated in chair    Cognitive Retraining  Safety/Judgement: Fall prevention;Good awareness of safety precautions (d/t body tremors)    Pain:  Pain level pre-treatment: 0/10   Pain level post-treatment: 0/10   Pain Intervention(s): Medication (see MAR); Response to intervention: Nurse notified, See doc flow    Activity Tolerance:    Fair+      Please refer to the flowsheet for vital signs taken during this treatment. After treatment:   []  Patient left in no apparent distress sitting up in chair  [x]  Patient left in no apparent distress in bed  [x]  Call bell left within reach  [x]  Nursing notified  []  Caregiver present  []  Bed alarm activated    COMMUNICATION/EDUCATION:   [x]      Role of Occupational Therapy in the acute care setting  [x]      Home safety education was provided and the patient/caregiver indicated understanding. [x]      Patient/family have participated as able and agree with findings and recommendations. []      Patient is unable to participate in plan of care at this time. Thank you for allowing me to assist in the care of this patient.   Brandon Jarvis, OTR/L  Time Calculation: 53 mins
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
## 2021-10-29 NOTE — ROUTINE PROCESS
1900: Bedside and Verbal shift change report given to Sandra Vega RN (oncoming nurse) by Karrie Manzano RN (offgoing nurse). Report included the following information SBAR, Kardex and Cardiac Rhythm NSR.     0700: Bedside and Verbal shift change report given to Sandra Vega RN (oncoming nurse) by Keira Toney RN (offgoing nurse).  Report included the following information SBAR, Kardex and Cardiac Rhythm NSR

## 2021-10-30 VITALS
RESPIRATION RATE: 18 BRPM | HEART RATE: 64 BPM | HEIGHT: 62 IN | BODY MASS INDEX: 17.94 KG/M2 | TEMPERATURE: 98 F | WEIGHT: 97.5 LBS | OXYGEN SATURATION: 97 % | DIASTOLIC BLOOD PRESSURE: 48 MMHG | SYSTOLIC BLOOD PRESSURE: 114 MMHG

## 2021-10-30 LAB
ANION GAP SERPL CALC-SCNC: 2 MMOL/L (ref 3–18)
ANION GAP SERPL CALC-SCNC: 3 MMOL/L (ref 3–18)
ANION GAP SERPL CALC-SCNC: 5 MMOL/L (ref 3–18)
BASOPHILS # BLD: 0 K/UL (ref 0–0.1)
BASOPHILS NFR BLD: 1 % (ref 0–2)
BUN SERPL-MCNC: 26 MG/DL (ref 7–18)
BUN SERPL-MCNC: 32 MG/DL (ref 7–18)
BUN SERPL-MCNC: 36 MG/DL (ref 7–18)
BUN/CREAT SERPL: 23 (ref 12–20)
BUN/CREAT SERPL: 31 (ref 12–20)
BUN/CREAT SERPL: 34 (ref 12–20)
CALCIUM SERPL-MCNC: 8.7 MG/DL (ref 8.5–10.1)
CALCIUM SERPL-MCNC: 9.1 MG/DL (ref 8.5–10.1)
CALCIUM SERPL-MCNC: 9.1 MG/DL (ref 8.5–10.1)
CHLORIDE SERPL-SCNC: 102 MMOL/L (ref 100–111)
CHLORIDE SERPL-SCNC: 103 MMOL/L (ref 100–111)
CHLORIDE SERPL-SCNC: 99 MMOL/L (ref 100–111)
CO2 SERPL-SCNC: 27 MMOL/L (ref 21–32)
CO2 SERPL-SCNC: 29 MMOL/L (ref 21–32)
CO2 SERPL-SCNC: 30 MMOL/L (ref 21–32)
CREAT SERPL-MCNC: 1.04 MG/DL (ref 0.6–1.3)
CREAT SERPL-MCNC: 1.07 MG/DL (ref 0.6–1.3)
CREAT SERPL-MCNC: 1.12 MG/DL (ref 0.6–1.3)
DIFFERENTIAL METHOD BLD: ABNORMAL
EOSINOPHIL # BLD: 0.1 K/UL (ref 0–0.4)
EOSINOPHIL NFR BLD: 1 % (ref 0–5)
ERYTHROCYTE [DISTWIDTH] IN BLOOD BY AUTOMATED COUNT: 12.8 % (ref 11.6–14.5)
GLUCOSE SERPL-MCNC: 109 MG/DL (ref 74–99)
GLUCOSE SERPL-MCNC: 135 MG/DL (ref 74–99)
GLUCOSE SERPL-MCNC: 93 MG/DL (ref 74–99)
HCT VFR BLD AUTO: 27.3 % (ref 35–45)
HGB BLD-MCNC: 8.7 G/DL (ref 12–16)
LYMPHOCYTES # BLD: 1.9 K/UL (ref 0.9–3.6)
LYMPHOCYTES NFR BLD: 40 % (ref 21–52)
MAGNESIUM SERPL-MCNC: 2 MG/DL (ref 1.6–2.6)
MCH RBC QN AUTO: 28.8 PG (ref 24–34)
MCHC RBC AUTO-ENTMCNC: 31.9 G/DL (ref 31–37)
MCV RBC AUTO: 90.4 FL (ref 78–100)
MONOCYTES # BLD: 0.5 K/UL (ref 0.05–1.2)
MONOCYTES NFR BLD: 10 % (ref 3–10)
NEUTS SEG # BLD: 2.4 K/UL (ref 1.8–8)
NEUTS SEG NFR BLD: 49 % (ref 40–73)
PLATELET # BLD AUTO: 237 K/UL (ref 135–420)
PMV BLD AUTO: 8.9 FL (ref 9.2–11.8)
POTASSIUM SERPL-SCNC: 4.3 MMOL/L (ref 3.5–5.5)
POTASSIUM SERPL-SCNC: 4.4 MMOL/L (ref 3.5–5.5)
POTASSIUM SERPL-SCNC: 4.6 MMOL/L (ref 3.5–5.5)
RBC # BLD AUTO: 3.02 M/UL (ref 4.2–5.3)
SODIUM SERPL-SCNC: 132 MMOL/L (ref 136–145)
SODIUM SERPL-SCNC: 134 MMOL/L (ref 136–145)
SODIUM SERPL-SCNC: 134 MMOL/L (ref 136–145)
WBC # BLD AUTO: 4.9 K/UL (ref 4.6–13.2)

## 2021-10-30 PROCEDURE — 83735 ASSAY OF MAGNESIUM: CPT

## 2021-10-30 PROCEDURE — 74011250637 HC RX REV CODE- 250/637: Performed by: INTERNAL MEDICINE

## 2021-10-30 PROCEDURE — 74011250637 HC RX REV CODE- 250/637: Performed by: EMERGENCY MEDICINE

## 2021-10-30 PROCEDURE — 36415 COLL VENOUS BLD VENIPUNCTURE: CPT

## 2021-10-30 PROCEDURE — 99239 HOSP IP/OBS DSCHRG MGMT >30: CPT | Performed by: INTERNAL MEDICINE

## 2021-10-30 PROCEDURE — 85025 COMPLETE CBC W/AUTO DIFF WBC: CPT

## 2021-10-30 PROCEDURE — 80048 BASIC METABOLIC PNL TOTAL CA: CPT

## 2021-10-30 RX ORDER — LORATADINE 10 MG/1
10 CAPSULE, LIQUID FILLED ORAL DAILY
Qty: 30 CAPSULE | Refills: 0 | Status: SHIPPED | OUTPATIENT
Start: 2021-10-30 | End: 2021-11-29

## 2021-10-30 RX ORDER — SODIUM CHLORIDE 1 G/1
1 TABLET ORAL 2 TIMES DAILY
Qty: 60 TABLET | Refills: 0 | Status: SHIPPED | OUTPATIENT
Start: 2021-10-30 | End: 2021-11-29

## 2021-10-30 RX ADMIN — Medication 10 ML: at 06:56

## 2021-10-30 RX ADMIN — LUBIPROSTONE 8 MCG: 8 CAPSULE, GELATIN COATED ORAL at 08:33

## 2021-10-30 RX ADMIN — EZETIMIBE 10 MG: 10 TABLET ORAL at 08:32

## 2021-10-30 RX ADMIN — PANTOPRAZOLE SODIUM 40 MG: 40 TABLET, DELAYED RELEASE ORAL at 08:33

## 2021-10-30 RX ADMIN — SODIUM CHLORIDE 1000 MG: 1 TABLET ORAL at 08:33

## 2021-10-30 NOTE — ROUTINE PROCESS
1900: Bedside and Verbal shift change report given to Janey Butler RN (oncoming nurse) by Biju Simmons RN (offgoing nurse). Report included the following information SBAR, Kardex and Cardiac Rhythm NSR.     0700: Bedside and Verbal shift change report given to , RN (oncoming nurse) by Biju Simmons RN (offgoing nurse). Report included the following information SBAR, Kardex and Cardiac Rhythm NSR.

## 2021-10-30 NOTE — PROGRESS NOTES
Progress Note    Tristin Avendano  80 y.o. Admit Date: 10/24/2021  Active Problems:    Hyponatremia (10/25/2021) POA: Unknown      Severe protein-calorie malnutrition ([address] (10/27/2021) POA: Yes            Subjective:     Patient feels good, started Sodium Chloride tab & tolerating well,Continues to drink large volume of liquid       A comprehensive review of systems was negative except for that written in the History of Present Illness.     Objective:     Visit Vitals  BP (!) 112/49 (BP 1 Location: Left upper arm, BP Patient Position: At rest)   Pulse 61   Temp 98 °F (36.7 °C)   Resp 18   Ht 5' 2\" (1.575 m)   Wt 44.2 kg (97 lb 8 oz)   SpO2 97%   BMI 17.83 kg/m²         Intake/Output Summary (Last 24 hours) at 10/30/2021 1253  Last data filed at 10/30/2021 1135  Gross per 24 hour   Intake 997.5 ml   Output 1470 ml   Net -472.5 ml       Current Facility-Administered Medications   Medication Dose Route Frequency Provider Last Rate Last Admin    sodium chloride soluble tablet 1,000 mg  1 g Oral BID Rhea Vu MD   1,000 mg at 10/30/21 4682    sodium chloride (NS) flush 5-40 mL  5-40 mL IntraVENous Q8H Nettie Zavala NP   10 mL at 10/30/21 0656    sodium chloride (NS) flush 5-40 mL  5-40 mL IntraVENous PRN Oxnaa Zavala, NIYA        acetaminophen (TYLENOL) tablet 650 mg  650 mg Oral Q6H PRN Oxana Zavala NP   650 mg at 10/27/21 2122    Or    acetaminophen (TYLENOL) suppository 650 mg  650 mg Rectal Q6H PRN Oxana Zavala NP        polyethylene glycol (MIRALAX) packet 17 g  17 g Oral DAILY PRN Nettie Zavala NP        ondansetron (ZOFRAN ODT) tablet 4 mg  4 mg Oral Q8H PRN Nettie Zavala NP   4 mg at 10/28/21 0321    Or    ondansetron (ZOFRAN) injection 4 mg  4 mg IntraVENous Q6H PRN Oxana Zavala, NIYA   4 mg at 10/29/21 2154    ezetimibe (ZETIA) tablet 10 mg  10 mg Oral DAILY Daniel Lundy MD   10 mg at 10/30/21 0832    lubiPROStone (AMITIZA) capsule 8 mcg  8 mcg Oral BID Daniel Lundy MD   8 mcg at 10/30/21 9094    pantoprazole (PROTONIX) tablet 40 mg  40 mg Oral ACB Daniel Lundy MD   40 mg at 10/30/21 9957        Physical Exam:     Physical Exam:   General:  Alert, cooperative, no distress, appears stated age. Mouth/Throat: Lips, mucosa, and tongue normal. Teeth and gums normal.   Neck: Supple, symmetrical, trachea midline, no adenopathy, thyroid: no enlargement/tenderness/nodules, no carotid bruit and no JVD. Lungs:   Clear to auscultation bilaterally. Heart:  Regular rate and rhythm, S1, S2 normal, no murmur, click, rub or gallop. Abdomen:   Soft, non-tender. Bowel sounds normal. No masses,  No organomegaly. Extremities: Extremities normal, atraumatic, no cyanosis or edema   Skin: Skin color, texture, turgor normal. No rashes or lesions         Data Review:    CBC w/Diff    Recent Labs     10/30/21  0540 10/29/21  0213 10/29/21  0213 10/28/21  0609 10/28/21  0609   WBC 4.9  --  5.6  --  4.7   RBC 3.02*  --  3.12*  --  3.30*   HGB 8.7*  --  9.2*  --  9.7*   HCT 27.3*  --  27.6*  --  29.2*   MCV 90.4   < > 88.5   < > 88.5   MCH 28.8   < > 29.5   < > 29.4   MCHC 31.9   < > 33.3   < > 33.2   RDW 12.8   < > 12.7   < > 12.5    < > = values in this interval not displayed. Recent Labs     10/30/21  0540 10/29/21  0213 10/29/21  0213 10/28/21  0609 10/28/21  0609   MONOS 10  --  9  --  9   EOS 1  --  1  --  1   BASOS 1   < > 1   < > 1   RDW 12.8   < > 12.7   < > 12.5    < > = values in this interval not displayed.         Comprehensive Metabolic Profile    Recent Labs     10/30/21  0540 10/30/21  0000 10/29/21  1752   * 132* 132*   K 4.3 4.6 4.7    102 101   CO2 29 27 26   BUN 32* 36* 32*   CREA 1.04 1.07 1.16    Recent Labs     10/30/21  0540 10/30/21  0000 10/29/21  1752   CA 9.1 8.7 8.4*                        Impression:       Active Hospital Problems    Diagnosis Date Noted    Severe protein-calorie malnutrition ([facility] [address] 10/27/2021    Hyponatremia 10/25/2021      Na has normalized ,K is ok.       Plan:     OK to DC her with Nacl Tab BID,      Sheila Arevalo MD

## 2021-10-30 NOTE — PROGRESS NOTES
Discharge order noted for today. Pt has been accepted to [facility] agency. Transport has been arranged through [facility]. Patient's discharge summary and home health  orders have been forwarded to [address]  [address] [facility] Updated bedside RN, Prashant Mendosa,  to the transition plan. Discharge information has been documented on the AVS.     Spoke with Katie Fernandez brother/med assistant who states patient does not have a rolling walker at the home. Rolling walker delivered to patient in her room. Paperwork signed and returned to [facility] office for Mary.   Alvin Kehr RN - Outcomes Manager  [phone]

## 2021-10-30 NOTE — DISCHARGE SUMMARY
[facility]  [facility]charge Summary       Patient: Beau Epley Age: 80 y.o. : 1934 MR#: [id number] SSN: [id number]  PCP on record: Sandee Mclean NP  Admit date: 10/24/2021  Discharge date: 10/30/2021    Consults:  -MITESH Salazar.-nephrology  - Ms Carmitazoe Sarah., NP- palliative medicine  Procedures:  -     Significant Diagnostic Studies: -CT head 10/25/21:  IMPRESSION     No acute findings or significant interval change. -CT abd pelvis 10/25/21:  IMPRESSION     1.  Multiple renal cysts.     2. Moderate hiatal hernia.     3. No acute abnormalities involving the small bowel colon. Moderate  diverticulosis colon.     4.  Small pericardial effusion. -CXR 10/25/21:  IMPRESSION  No acute cardiopulmonary findings. 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    Vitamin B12 deficiency (dietary) anemia D51.8    Anemia D64.9    Elevated blood pressure reading without diagnosis of hypertension R03.0    Gait abnormality R26.9    Vasovagal syncope R55    Cognitive decline R41.89    Hyponatremia E87.1    Severe protein-calorie malnutrition [facility]) E43       Hospital Course by Problem     Is an 70-year-old female transferred from [facility] emergency room where she had presented with complaints of nausea and vomiting associated with epigastric abdominal pain with radiation to the left upper quadrant. She also complained of having dark-colored stools which she described as tarry.  In the emergency room she was noted to have low sodium of 118 per nephrologist her sodium had increased to 127/8 hours with bolus of normal saline. She was evaluated by the nephrologist the impression that her sodium was likely related to volume. Despite the rapid correction she did okay and her fluid was discontinued initially. She was started on salt tablets eventually and with these measurements her sodium to new to improve with her last sodium of 134. Her hemoglobin remained stable and there were no additional reports of melena or hematochezia. She remained hemodynamically stable. She is to be discharged on a salt tablet regimen. She is to have close follow-up with her primary care physician to continue to monitor her sodium levels. On date of discharge patient reports feeling well. She has no complaints and wanted to go home.           Today's examination of the patient revealed:     Subjective:     Objective:   VS:   Visit Vitals  BP (!) 112/49 (BP 1 Location: Left upper arm, BP Patient Position: At rest)   Pulse 61   Temp 98 °F (36.7 °C)   Resp 18   Ht 5' 2\" (1.575 m)   Wt 44.2 kg (97 lb 8 oz)   SpO2 97%   BMI 17.83 kg/m²      Tmax/24hrs: Temp (24hrs), Av.8 °F (36.6 °C), Min:97 °F (36.1 °C), Max:98 °F (36.7 °C)     Input/Output:     Intake/Output Summary (Last 24 hours) at 10/30/2021 1309  Last data filed at 10/30/2021 1135  Gross per 24 hour   Intake 997.5 ml   Output 1470 ml   Net -472.5 ml       General:  Alert, awake, in nad  Cardiovascular:  rrr no murmurs  Pulmonary:  ctab  GI:  abd soft, nt, nd  Extremities:  No edema  Additional:  Neuro no focal abnormalities    Labs:    Recent Results (from the past 24 hour(s))   METABOLIC PANEL, BASIC    Collection Time: 10/29/21  5:52 PM   Result Value Ref Range    Sodium 132 (L) 136 - 145 mmol/L    Potassium 4.7 3.5 - 5.5 mmol/L    Chloride 101 100 - 111 mmol/L    CO2 26 21 - 32 mmol/L    Anion gap 5 3.0 - 18 mmol/L    Glucose 127 (H) 74 - 99 mg/dL    BUN 32 (H) 7.0 - 18 MG/DL    Creatinine 1.16 0.6 - 1.3 MG/DL    BUN/Creatinine ratio 28 (H) 12 - 20      GFR est AA 54 (L) >60 ml/min/1.73m2    GFR est non-AA 44 (L) >60 ml/min/1.73m2    Calcium 8.4 (L) 8.5 - 12.3 MG/DL   METABOLIC PANEL, BASIC    Collection Time: 10/30/21 12:00 AM   Result Value Ref Range    Sodium 132 (L) 136 - 145 mmol/L    Potassium 4.6 3.5 - 5.5 mmol/L    Chloride 102 100 - 111 mmol/L    CO2 27 21 - 32 mmol/L    Anion gap 3 3.0 - 18 mmol/L    Glucose 109 (H) 74 - 99 mg/dL    BUN 36 (H) 7.0 - 18 MG/DL    Creatinine 1.07 0.6 - 1.3 MG/DL    BUN/Creatinine ratio 34 (H) 12 - 20      GFR est AA 59 (L) >60 ml/min/1.73m2    GFR est non-AA 49 (L) >60 ml/min/1.73m2    Calcium 8.7 8.5 - 04.9 MG/DL   METABOLIC PANEL, BASIC    Collection Time: 10/30/21  5:40 AM   Result Value Ref Range    Sodium 134 (L) 136 - 145 mmol/L    Potassium 4.3 3.5 - 5.5 mmol/L    Chloride 103 100 - 111 mmol/L    CO2 29 21 - 32 mmol/L    Anion gap 2 (L) 3.0 - 18 mmol/L    Glucose 93 74 - 99 mg/dL    BUN 32 (H) 7.0 - 18 MG/DL    Creatinine 1.04 0.6 - 1.3 MG/DL    BUN/Creatinine ratio 31 (H) 12 - 20      GFR est AA >60 >60 ml/min/1.73m2    GFR est non-AA 50 (L) >60 ml/min/1.73m2    Calcium 9.1 8.5 - 10.1 MG/DL   CBC WITH AUTOMATED DIFF    Collection Time: 10/30/21  5:40 AM   Result Value Ref Range    WBC 4.9 4.6 - 13.2 K/uL    RBC 3.02 (L) 4.20 - 5.30 M/uL    HGB 8.7 (L) 12.0 - 16.0 g/dL    HCT 27.3 (L) 35.0 - 45.0 %    MCV 90.4 78.0 - 100.0 FL    MCH 28.8 24.0 - 34.0 PG    MCHC 31.9 31.0 - 37.0 g/dL    RDW 12.8 11.6 - 14.5 %    PLATELET 748 751 - 888 K/uL    MPV 8.9 (L) 9.2 - 11.8 FL    NEUTROPHILS 49 40 - 73 %    LYMPHOCYTES 40 21 - 52 %    MONOCYTES 10 3 - 10 %    EOSINOPHILS 1 0 - 5 %    BASOPHILS 1 0 - 2 %    ABS. NEUTROPHILS 2.4 1.8 - 8.0 K/UL    ABS. LYMPHOCYTES 1.9 0.9 - 3.6 K/UL    ABS. MONOCYTES 0.5 0.05 - 1.2 K/UL    ABS. EOSINOPHILS 0.1 0.0 - 0.4 K/UL    ABS.  BASOPHILS 0.0 0.0 - 0.1 K/UL    DF AUTOMATED     MAGNESIUM    Collection Time: 10/30/21  5:40 AM   Result Value Ref Range    Magnesium 2.0 1.6 - 2.6 mg/dL     Additional Data Reviewed:     Condition on discharge:stable   Disposition:    []Home   []Home with Home Health   []SNF/NH   []Rehab   []Home with family   []Alternate Facility:______group home______________      Discharge Medications:     Current Discharge Medication List      START taking these medications    Details   sodium chloride 1,000 mg soluble tablet Take 1 Tablet by mouth two (2) times a day for 30 days. Qty: 60 Tablet, Refills: 0         CONTINUE these medications which have NOT CHANGED    Details   ezetimibe (ZETIA) 10 mg tablet Take 10 mg by mouth daily. lubiPROStone (AMITIZA) 8 mcg capsule Take 8 mcg by mouth two (2) times a day. omeprazole (PRILOSEC) 20 mg capsule Take 20 mg by mouth daily. senna (Senna) 8.6 mg tablet Take 1 Tab by mouth daily. Qty: 30 Tab, Refills: 0      polyethylene glycol (Miralax) 17 gram/dose powder Take 17 g by mouth two (2) times a day. 1 tablespoon with 8 oz of water daily  Qty: 507 g, Refills: 0      raNITIdine (ZANTAC) 150 mg tablet Take 1 Tab by mouth two (2) times a day. Qty: 60 Tab, Refills: 2      acetaminophen (TYLENOL EX STR ARTHRITIS PAIN) 500 mg tablet Take 1 Tab by mouth every six (6) hours as needed. Qty: 60 Tab, Refills: 1      diphenhydrAMINE (BENADRYL ALLERGY) 25 mg tablet Take 1 Tab by mouth every six (6) hours as needed. Qty: 30 Tab, Refills: 2         STOP taking these medications       propranolol LA (INDERAL LA) 60 mg SR capsule Comments:   Reason for Stopping:         magnesium citrate solution Comments:   Reason for Stopping:         cefdinir (OMNICEF) 300 mg capsule Comments:   Reason for Stopping: Follow-up Appointments:   1.  Your PCP: Miguel Angel Cr NP, within 7-10days      >30 minutes spent coordinating this discharge (review instructions/follow-up, prescriptions, preparing report for sign off)    Signed:  Verenice Milner MD  10/30/2021  1:09 PM

## 2021-10-30 NOTE — PROGRESS NOTES
1900: shift change report given from Jena Dela Cruz RN to ongoing nurse STACY López. Report included the following information SBAR, Kardex, Intake/Output and MAR.    [id number] Patient tolerating ADL's such as toileting without assist from bed.     0700: shift change report given to Alyse Mercado RN by [facility]. Report included the following information SBAR, Kardex and Intake/Output.

## 2021-11-01 ENCOUNTER — TELEPHONE (OUTPATIENT)
Dept: FAMILY MEDICINE CLINIC | Age: 86
End: 2021-11-01

## 2021-11-01 NOTE — TELEPHONE ENCOUNTER
Care Transitions Initial Follow Up Call    Outreach made within 2 business days of discharge: Yes, left message for patient to return call to the office.      Patient: Sunny Green Patient : 1934   MRN: [id number]  Reason for Admission: Hyponatremia  Discharge Date: 10/30/21         Rebekah Arora LPN

## 2021-11-01 NOTE — TELEPHONE ENCOUNTER
----- Message from Irene Webster sent at 11/1/2021 10:06 AM EDT -----  Regarding: [facility] TouchBase  Contact following patient for [facility] touch base call within 2 days of discharge, as well as a [facility] appointment within 7 days of discharge.

## 2021-12-13 ENCOUNTER — APPOINTMENT (OUTPATIENT)
Dept: GENERAL RADIOLOGY | Age: 86
DRG: 948 | End: 2021-12-13
Attending: EMERGENCY MEDICINE
Payer: MEDICARE

## 2021-12-13 ENCOUNTER — HOSPITAL ENCOUNTER (INPATIENT)
Age: 86
LOS: 3 days | Discharge: NURSING, REHAB OR LONG-TERM CARE | DRG: 948 | End: 2021-12-18
Attending: EMERGENCY MEDICINE | Admitting: FAMILY MEDICINE
Payer: MEDICARE

## 2021-12-13 DIAGNOSIS — R07.9 CHEST PAIN, UNSPECIFIED TYPE: Primary | ICD-10-CM

## 2021-12-13 DIAGNOSIS — R25.1 SHAKING: ICD-10-CM

## 2021-12-13 DIAGNOSIS — R25.1 TREMOR: ICD-10-CM

## 2021-12-13 DIAGNOSIS — R11.2 NAUSEA AND VOMITING, INTRACTABILITY OF VOMITING NOT SPECIFIED, UNSPECIFIED VOMITING TYPE: ICD-10-CM

## 2021-12-13 DIAGNOSIS — E87.1 CHRONIC HYPONATREMIA: ICD-10-CM

## 2021-12-13 LAB
ALBUMIN SERPL-MCNC: 3.7 G/DL (ref 3.4–5)
ALBUMIN/GLOB SERPL: 1.1 {RATIO} (ref 0.8–1.7)
ALP SERPL-CCNC: 94 U/L (ref 45–117)
ALT SERPL-CCNC: 14 U/L (ref 13–56)
ANION GAP SERPL CALC-SCNC: 4 MMOL/L (ref 3–18)
AST SERPL-CCNC: 17 U/L (ref 10–38)
ATRIAL RATE: 77 BPM
BASOPHILS # BLD: 0.1 K/UL (ref 0–0.1)
BASOPHILS NFR BLD: 1 % (ref 0–2)
BILIRUB SERPL-MCNC: 0.4 MG/DL (ref 0.2–1)
BUN SERPL-MCNC: 16 MG/DL (ref 7–18)
BUN/CREAT SERPL: 19 (ref 12–20)
CALCIUM SERPL-MCNC: 9.3 MG/DL (ref 8.5–10.1)
CALCULATED P AXIS, ECG09: 81 DEGREES
CALCULATED R AXIS, ECG10: 21 DEGREES
CALCULATED T AXIS, ECG11: 66 DEGREES
CHLORIDE SERPL-SCNC: 103 MMOL/L (ref 100–111)
CK MB CFR SERPL CALC: NORMAL % (ref 0–4)
CK MB CFR SERPL CALC: NORMAL % (ref 0–4)
CK MB SERPL-MCNC: <1 NG/ML (ref 5–25)
CK MB SERPL-MCNC: <1 NG/ML (ref 5–25)
CK SERPL-CCNC: 37 U/L (ref 26–192)
CK SERPL-CCNC: 39 U/L (ref 26–192)
CO2 SERPL-SCNC: 30 MMOL/L (ref 21–32)
CREAT SERPL-MCNC: 0.84 MG/DL (ref 0.6–1.3)
DIAGNOSIS, 93000: NORMAL
DIFFERENTIAL METHOD BLD: ABNORMAL
EOSINOPHIL # BLD: 0.1 K/UL (ref 0–0.4)
EOSINOPHIL NFR BLD: 2 % (ref 0–5)
ERYTHROCYTE [DISTWIDTH] IN BLOOD BY AUTOMATED COUNT: 13 % (ref 11.6–14.5)
GLOBULIN SER CALC-MCNC: 3.3 G/DL (ref 2–4)
GLUCOSE SERPL-MCNC: 96 MG/DL (ref 74–99)
HCT VFR BLD AUTO: 32.9 % (ref 35–45)
HGB BLD-MCNC: 10.6 G/DL (ref 12–16)
IMM GRANULOCYTES # BLD AUTO: 0 K/UL (ref 0–0.04)
IMM GRANULOCYTES NFR BLD AUTO: 1 % (ref 0–0.5)
INR PPP: 0.9 (ref 0.8–1.2)
LYMPHOCYTES # BLD: 1.3 K/UL (ref 0.9–3.6)
LYMPHOCYTES NFR BLD: 30 % (ref 21–52)
MCH RBC QN AUTO: 28.9 PG (ref 24–34)
MCHC RBC AUTO-ENTMCNC: 32.2 G/DL (ref 31–37)
MCV RBC AUTO: 89.6 FL (ref 78–100)
MONOCYTES # BLD: 0.2 K/UL (ref 0.05–1.2)
MONOCYTES NFR BLD: 5 % (ref 3–10)
NEUTS SEG # BLD: 2.7 K/UL (ref 1.8–8)
NEUTS SEG NFR BLD: 61 % (ref 40–73)
NRBC # BLD: 0 K/UL (ref 0–0.01)
NRBC BLD-RTO: 0 PER 100 WBC
P-R INTERVAL, ECG05: 162 MS
PLATELET # BLD AUTO: 219 K/UL (ref 135–420)
PMV BLD AUTO: 9 FL (ref 9.2–11.8)
POTASSIUM SERPL-SCNC: 4.1 MMOL/L (ref 3.5–5.5)
PROT SERPL-MCNC: 7 G/DL (ref 6.4–8.2)
PROTHROMBIN TIME: 12 SEC (ref 11.5–15.2)
Q-T INTERVAL, ECG07: 364 MS
QRS DURATION, ECG06: 76 MS
QTC CALCULATION (BEZET), ECG08: 411 MS
RBC # BLD AUTO: 3.67 M/UL (ref 4.2–5.3)
SODIUM SERPL-SCNC: 137 MMOL/L (ref 136–145)
TROPONIN-HIGH SENSITIVITY: 6 NG/L (ref 0–54)
TROPONIN-HIGH SENSITIVITY: 7 NG/L (ref 0–54)
VENTRICULAR RATE, ECG03: 77 BPM
WBC # BLD AUTO: 4.4 K/UL (ref 4.6–13.2)

## 2021-12-13 PROCEDURE — 85610 PROTHROMBIN TIME: CPT

## 2021-12-13 PROCEDURE — 85025 COMPLETE CBC W/AUTO DIFF WBC: CPT

## 2021-12-13 PROCEDURE — 80053 COMPREHEN METABOLIC PANEL: CPT

## 2021-12-13 PROCEDURE — 99285 EMERGENCY DEPT VISIT HI MDM: CPT

## 2021-12-13 PROCEDURE — 99222 1ST HOSP IP/OBS MODERATE 55: CPT | Performed by: FAMILY MEDICINE

## 2021-12-13 PROCEDURE — 71045 X-RAY EXAM CHEST 1 VIEW: CPT

## 2021-12-13 PROCEDURE — 93005 ELECTROCARDIOGRAM TRACING: CPT

## 2021-12-13 PROCEDURE — 84484 ASSAY OF TROPONIN QUANT: CPT

## 2021-12-13 PROCEDURE — 74011250636 HC RX REV CODE- 250/636: Performed by: EMERGENCY MEDICINE

## 2021-12-13 PROCEDURE — 96372 THER/PROPH/DIAG INJ SC/IM: CPT

## 2021-12-13 PROCEDURE — 82550 ASSAY OF CK (CPK): CPT

## 2021-12-13 RX ORDER — ENOXAPARIN SODIUM 100 MG/ML
40 INJECTION SUBCUTANEOUS DAILY
Status: DISCONTINUED | OUTPATIENT
Start: 2021-12-14 | End: 2021-12-18 | Stop reason: HOSPADM

## 2021-12-13 RX ORDER — SODIUM CHLORIDE 0.9 % (FLUSH) 0.9 %
5-40 SYRINGE (ML) INJECTION EVERY 8 HOURS
Status: DISCONTINUED | OUTPATIENT
Start: 2021-12-13 | End: 2021-12-18 | Stop reason: HOSPADM

## 2021-12-13 RX ORDER — EZETIMIBE 10 MG/1
10 TABLET ORAL DAILY
Status: DISCONTINUED | OUTPATIENT
Start: 2021-12-14 | End: 2021-12-18 | Stop reason: HOSPADM

## 2021-12-13 RX ORDER — SENNOSIDES 8.6 MG/1
1 TABLET ORAL DAILY
Status: DISCONTINUED | OUTPATIENT
Start: 2021-12-14 | End: 2021-12-18 | Stop reason: HOSPADM

## 2021-12-13 RX ORDER — ACETAMINOPHEN 650 MG/1
650 SUPPOSITORY RECTAL
Status: DISCONTINUED | OUTPATIENT
Start: 2021-12-13 | End: 2021-12-18 | Stop reason: HOSPADM

## 2021-12-13 RX ORDER — LUBIPROSTONE 8 UG/1
8 CAPSULE, GELATIN COATED ORAL 2 TIMES DAILY
Status: DISCONTINUED | OUTPATIENT
Start: 2021-12-13 | End: 2021-12-18 | Stop reason: HOSPADM

## 2021-12-13 RX ORDER — ONDANSETRON 4 MG/1
4 TABLET, ORALLY DISINTEGRATING ORAL
Status: DISCONTINUED | OUTPATIENT
Start: 2021-12-13 | End: 2021-12-18 | Stop reason: HOSPADM

## 2021-12-13 RX ORDER — SODIUM CHLORIDE 0.9 % (FLUSH) 0.9 %
5-40 SYRINGE (ML) INJECTION AS NEEDED
Status: DISCONTINUED | OUTPATIENT
Start: 2021-12-13 | End: 2021-12-18 | Stop reason: HOSPADM

## 2021-12-13 RX ORDER — MIDAZOLAM HYDROCHLORIDE 1 MG/ML
2 INJECTION, SOLUTION INTRAMUSCULAR; INTRAVENOUS ONCE
Status: COMPLETED | OUTPATIENT
Start: 2021-12-13 | End: 2021-12-13

## 2021-12-13 RX ORDER — ACETAMINOPHEN 325 MG/1
650 TABLET ORAL
Status: DISCONTINUED | OUTPATIENT
Start: 2021-12-13 | End: 2021-12-18 | Stop reason: HOSPADM

## 2021-12-13 RX ORDER — PANTOPRAZOLE SODIUM 20 MG/1
20 TABLET, DELAYED RELEASE ORAL
Status: DISCONTINUED | OUTPATIENT
Start: 2021-12-14 | End: 2021-12-18 | Stop reason: HOSPADM

## 2021-12-13 RX ORDER — ONDANSETRON 2 MG/ML
4 INJECTION INTRAMUSCULAR; INTRAVENOUS
Status: DISCONTINUED | OUTPATIENT
Start: 2021-12-13 | End: 2021-12-18 | Stop reason: HOSPADM

## 2021-12-13 RX ORDER — POLYETHYLENE GLYCOL 3350 17 G/17G
17 POWDER, FOR SOLUTION ORAL DAILY PRN
Status: DISCONTINUED | OUTPATIENT
Start: 2021-12-13 | End: 2021-12-18 | Stop reason: HOSPADM

## 2021-12-13 RX ADMIN — MIDAZOLAM HYDROCHLORIDE 2 MG: 1 INJECTION, SOLUTION INTRAMUSCULAR; INTRAVENOUS at 13:55

## 2021-12-13 NOTE — ED TRIAGE NOTES
Pt arrived via EMS from home with c/o right sided chest pain and SOB  Pt has home health RN per EMS  21 R AC PIV established by EMS  EKG completed with Christo Schafer MD at bedside

## 2021-12-13 NOTE — ED NOTES
Pt ready for discharge, states she has been unable to walk for 2 days d/t tremors  Pt usually ambulatory with walker  Attempted to ambulate pt, unable to ambulate d/t tremors  Will medicate and then attempt again  Updated MD

## 2021-12-13 NOTE — ED NOTES
Merle Simmons, client caregiver, [phone], need to be informed of client discharge or admit status as she is contact for client sons.

## 2021-12-13 NOTE — PROGRESS NOTES
[facility] paged CM said patient will need home health. Per old chart, patient is Active with [facility]. No home health orders in, at this time.          Ayde Skinner RN  Case Management [phone]

## 2021-12-13 NOTE — ED NOTES
Blood collected and walked to lab  Pt assisted on to bedpan  Call light in reach, pt to call when she is ready

## 2021-12-14 PROBLEM — R07.9 CHEST PAIN: Status: ACTIVE | Noted: 2021-12-14

## 2021-12-14 PROBLEM — R53.1 WEAKNESS GENERALIZED: Status: ACTIVE | Noted: 2021-12-14

## 2021-12-14 LAB
ANION GAP SERPL CALC-SCNC: 5 MMOL/L (ref 3–18)
APPEARANCE UR: CLEAR
BACTERIA URNS QL MICRO: NEGATIVE /HPF
BILIRUB UR QL: NEGATIVE
BUN SERPL-MCNC: 16 MG/DL (ref 7–18)
BUN/CREAT SERPL: 24 (ref 12–20)
CALCIUM SERPL-MCNC: 8.7 MG/DL (ref 8.5–10.1)
CHLORIDE SERPL-SCNC: 102 MMOL/L (ref 100–111)
CO2 SERPL-SCNC: 27 MMOL/L (ref 21–32)
COLOR UR: YELLOW
CREAT SERPL-MCNC: 0.66 MG/DL (ref 0.6–1.3)
EPITH CASTS URNS QL MICRO: ABNORMAL /LPF (ref 0–5)
ERYTHROCYTE [DISTWIDTH] IN BLOOD BY AUTOMATED COUNT: 12.9 % (ref 11.6–14.5)
GLUCOSE SERPL-MCNC: 94 MG/DL (ref 74–99)
GLUCOSE UR STRIP.AUTO-MCNC: NEGATIVE MG/DL
HCT VFR BLD AUTO: 29.5 % (ref 35–45)
HGB BLD-MCNC: 9.5 G/DL (ref 12–16)
HGB UR QL STRIP: ABNORMAL
KETONES UR QL STRIP.AUTO: NEGATIVE MG/DL
LEUKOCYTE ESTERASE UR QL STRIP.AUTO: NEGATIVE
MCH RBC QN AUTO: 29.1 PG (ref 24–34)
MCHC RBC AUTO-ENTMCNC: 32.2 G/DL (ref 31–37)
MCV RBC AUTO: 90.2 FL (ref 78–100)
NITRITE UR QL STRIP.AUTO: NEGATIVE
NRBC # BLD: 0 K/UL (ref 0–0.01)
NRBC BLD-RTO: 0 PER 100 WBC
PH UR STRIP: 6.5 [PH] (ref 5–8)
PLATELET # BLD AUTO: 229 K/UL (ref 135–420)
PMV BLD AUTO: 9.5 FL (ref 9.2–11.8)
POTASSIUM SERPL-SCNC: 3.6 MMOL/L (ref 3.5–5.5)
PROT UR STRIP-MCNC: NEGATIVE MG/DL
RBC # BLD AUTO: 3.27 M/UL (ref 4.2–5.3)
RBC #/AREA URNS HPF: ABNORMAL /HPF (ref 0–5)
SODIUM SERPL-SCNC: 134 MMOL/L (ref 136–145)
SP GR UR REFRACTOMETRY: 1 (ref 1–1.03)
UROBILINOGEN UR QL STRIP.AUTO: 0.2 EU/DL (ref 0.2–1)
WBC # BLD AUTO: 5.3 K/UL (ref 4.6–13.2)
WBC URNS QL MICRO: ABNORMAL /HPF (ref 0–5)

## 2021-12-14 PROCEDURE — 97161 PT EVAL LOW COMPLEX 20 MIN: CPT

## 2021-12-14 PROCEDURE — APPSS30 APP SPLIT SHARED TIME 16-30 MINUTES: Performed by: PHYSICIAN ASSISTANT

## 2021-12-14 PROCEDURE — 80048 BASIC METABOLIC PNL TOTAL CA: CPT

## 2021-12-14 PROCEDURE — 81001 URINALYSIS AUTO W/SCOPE: CPT

## 2021-12-14 PROCEDURE — 85027 COMPLETE CBC AUTOMATED: CPT

## 2021-12-14 PROCEDURE — 97530 THERAPEUTIC ACTIVITIES: CPT

## 2021-12-14 PROCEDURE — 97166 OT EVAL MOD COMPLEX 45 MIN: CPT

## 2021-12-14 PROCEDURE — 99232 SBSQ HOSP IP/OBS MODERATE 35: CPT | Performed by: HOSPITALIST

## 2021-12-14 PROCEDURE — 36415 COLL VENOUS BLD VENIPUNCTURE: CPT

## 2021-12-14 PROCEDURE — 74011250636 HC RX REV CODE- 250/636: Performed by: FAMILY MEDICINE

## 2021-12-14 PROCEDURE — 97535 SELF CARE MNGMENT TRAINING: CPT

## 2021-12-14 PROCEDURE — 99222 1ST HOSP IP/OBS MODERATE 55: CPT | Performed by: INTERNAL MEDICINE

## 2021-12-14 PROCEDURE — 74011250637 HC RX REV CODE- 250/637: Performed by: FAMILY MEDICINE

## 2021-12-14 RX ORDER — TRAMADOL HYDROCHLORIDE 50 MG/1
50 TABLET ORAL
Status: COMPLETED | OUTPATIENT
Start: 2021-12-14 | End: 2021-12-16

## 2021-12-14 RX ADMIN — ENOXAPARIN SODIUM 40 MG: 100 INJECTION SUBCUTANEOUS at 08:41

## 2021-12-14 RX ADMIN — LUBIPROSTONE 8 MCG: 8 CAPSULE, GELATIN COATED ORAL at 08:42

## 2021-12-14 RX ADMIN — Medication 10 ML: at 06:17

## 2021-12-14 RX ADMIN — LUBIPROSTONE 8 MCG: 8 CAPSULE, GELATIN COATED ORAL at 19:03

## 2021-12-14 RX ADMIN — Medication 10 ML: at 00:26

## 2021-12-14 RX ADMIN — SENNOSIDES 8.6 MG: 8.6 TABLET, COATED ORAL at 08:42

## 2021-12-14 RX ADMIN — Medication 10 ML: at 14:00

## 2021-12-14 RX ADMIN — PANTOPRAZOLE SODIUM 20 MG: 20 TABLET, DELAYED RELEASE ORAL at 08:41

## 2021-12-14 RX ADMIN — EZETIMIBE 10 MG: 10 TABLET ORAL at 08:42

## 2021-12-14 NOTE — PROGRESS NOTES
Problem: Self Care Deficits Care Plan (Adult)  Goal: *Acute Goals and Plan of Care (Insert Text)  Description: Occupational Therapy Goals  Initiated 12/14/2021 within 7 day(s). 1.  Patient will perform grooming with supervision/set-up. 2.  Patient will perform bathing with supervision/set-up. 3.  Patient will perform upper body dressing and lower body dressing with supervision/set-up. 4.  Patient will perform toilet transfers with supervision/set-up using RW. 5.  Patient will perform all aspects of toileting with supervision/set-up. 6.  Patient will participate in upper extremity therapeutic exercise/activities with supervision/set-up for 8 minutes. 7.  Patient will utilize energy conservation techniques during functional activities with min verbal cues. Prior Level of Function: pt reports she was Supv-set up with ADLs and functional mobility using RW up until this past Sunday when all over body tremors began to worsen resulting in increased assist required by staff at group home e.g. ADLs     Outcome: Progressing Towards Goal   OCCUPATIONAL THERAPY EVALUATION    Patient: Chidi Garcia (80 y.o. female)  Date: 12/14/2021  Primary Diagnosis: No admission diagnoses are documented for this encounter. Precautions:   Fall  PLOF:  pt reports she was Supv-set up with ADLs and functional mobility using RW up until this past Sunday when all over body tremors began to worsen resulting in increased assist required by staff at group home e.g. ADLs    ASSESSMENT :  Nursing/RN cleared for pt to participate in OT evaluation and tx session. Patient presents sitting upright on ED stretcher, finished eating her lunch meal with Mod I, BUE tremors noted, although no spillage noted using standard utensils. Bed mobility: CGA supine <-> sit edge of bed, pt washed her face with SBA, reports she is unable to perform LB dressing e.g. doff and don slipper socks d/t weakness.  Functional transfer to bedside commode using RW deferred d/t noted HR tachy seated edge of ED stretcher, resolved once lying supine. Patient reports recently performing bedside commode transfer with nursing assist. Patient resting comfortably lying semi reclined on ED stretcher, call bell within reach & pt verbalized understanding and provided return demonstration to utilize for assist e.g. functional transfers in order to prevent falls. Patient will benefit from skilled intervention to address the above impairments. Patient's rehabilitation potential is considered to be Good  Factors which may influence rehabilitation potential include:   []             None noted  []             Mental ability/status  [x]             Medical condition  []             Home/family situation and support systems  []             Safety awareness  []             Pain tolerance/management  []             Other:      PLAN :  Recommendations and Planned Interventions:   [x]               Self Care Training                  [x]      Therapeutic Activities  [x]               Functional Mobility Training   []      Cognitive Retraining  [x]               Therapeutic Exercises           [x]      Endurance Activities  [x]               Balance Training                    [x]      Neuromuscular Re-Education  []               Visual/Perceptual Training     [x]      Home Safety Training  [x]               Patient Education                   [x]      Family Training/Education  []               Other (comment):    Frequency/Duration: Patient will be followed by occupational therapy 3-5 times a week to address goals. Discharge Recommendations: [facility] versus Group home with assist  Further Equipment Recommendations for Discharge: bedside commode, rolling walker, and wheelchair      SUBJECTIVE:   Patient stated I just used the bedside commode with the nurse.     OBJECTIVE DATA SUMMARY:     Past Medical History:   Diagnosis Date    Anemia NEC     Asthma     Colitis DDD (degenerative disc disease), cervical     Fibrocystic breast     GERD (gastroesophageal reflux disease)     Headache     HTN (hypertension)     Hyperlipidemia     OA (osteoarthritis)     Osteoporosis     PAF (paroxysmal atrial fibrillation) ([facility] [address] 1995    Pneumonia     Thyroid nodule     Vitamin D deficiency 2/7/2011     Past Surgical History:   Procedure Laterality Date    BREAST SURGERY PROCEDURE UNLISTED      cysts removed    CARDIAC SURG PROCEDURE UNLIST      cardiac catherization    HX GYN      hysterectomy    HX HEENT      cataract surgery bilaterally    HX TOTAL ABDOMINAL HYSTERECTOMY       Barriers to Learning/Limitations: None  Compensate with: visual, verbal, tactile, kinesthetic cues/model    Home Situation:   Home Situation  Home Environment: [facility] Name: [employer]  Living Alone: No  Support Systems: Adult Group Home  Current DME Used/Available at Home: Maxim Lisandra, rolling, Shower chair, Grab bars  Tub or Shower Type: Shower  []  Right hand dominant   []  Left hand dominant    Cognitive/Behavioral Status:  Neurologic State: Alert  Orientation Level: Oriented X4  Cognition: Follows commands  Safety/Judgement: Fall prevention    Skin: appears intact  Edema: none noted    Vision/Perceptual:         Corrective Lenses: Glasses    Coordination: BUE  Coordination: Generally decreased, functional (BUE tremors)  Fine Motor Skills-Upper: Left Intact; Right Intact (BUE tremors)    Gross Motor Skills-Upper: Left Intact;  Right Intact (BUE tremors)    Balance:  Sitting: Impaired; High guard  Sitting - Static: Fair (occasional)  Sitting - Dynamic: Fair (occasional)    Strength: BUE  Strength: Generally decreased, functional     Tone & Sensation: BUE  Tone: Normal  Sensation: Intact     Range of Motion: BUE  AROM: Generally decreased, functional     Functional Mobility and Transfers for ADLs:  Bed Mobility:     Supine to Sit: Contact guard assistance  Sit to Supine: Contact guard assistance Transfers: Toilet Transfer : Other (comment) (NT d/t tachycardia)      ADL Assessment:   Feeding: Modified independent    Oral Facial Hygiene/Grooming: Stand-by assistance    Bathing: Moderate assistance    Upper Body Dressing: Moderate assistance    Lower Body Dressing: Maximum assistance    Toileting: Moderate assistance     ADL Intervention:  Feeding  Feeding Assistance: Modified independent    Grooming  Position Performed: Seated edge of bed  Washing Face: Stand-by assistance      Cognitive Retraining  Safety/Judgement: Fall prevention    Pain:  Pain level pre-treatment: 0/10   Pain level post-treatment: 0/10     Activity Tolerance:   poor  Please refer to the flowsheet for vital signs taken during this treatment. After treatment:   [] Patient left in no apparent distress sitting up in chair  [x] Patient left in no apparent distress in bed  [x] Call bell left within reach  [x] Nursing notified  [] Caregiver present  [] Bed alarm activated    COMMUNICATION/EDUCATION:   [x] Role of Occupational Therapy in the acute care setting  [x] Home safety education was provided and the patient/caregiver indicated understanding. [x] Patient/family have participated as able in goal setting and plan of care. [x] Patient/family agree to work toward stated goals and plan of care. [] Patient understands intent and goals of therapy, but is neutral about his/her participation. [] Patient is unable to participate in goal setting and plan of care. Thank you for this referral.  Bess Monet  Time Calculation: 23 mins    Eval Complexity: History: MEDIUM Complexity : Expanded review of history including physical, cognitive and psychosocial  history ; Examination: MEDIUM Complexity : 3-5 performance deficits relating to physical, cognitive , or psychosocial skils that result in activity limitations and / or participation restrictions;    Decision Making:MEDIUM Complexity : Patient may present with comorbidities that affect occupational performnce.  Miniml to moderate modification of tasks or assistance (eg, physical or verbal ) with assesment(s) is necessary to enable patient to complete evaluation

## 2021-12-14 NOTE — H&P
Hospitalist Admission Note    NAME: Sunny Green   :  1934   MRN:  [id number]     Date:  2021     Patient PCP: Rita Mart NP  ________________________________________________________________________    My assessment of this patient's clinical condition and my plan of care is as follows. Assessment / Plan:  1. Chest pain, noncardiac  2. Inability to ambulate  3. Leukopenia, mild  4. Anemia  5. Advanced age    3. Admit to medical bed. 2. Resume home meds as appropriate. 3. PT/OT evaluations. Will await opinions, but anticipate need for skilled nursing placement at discharge. 4. Early care management involvement. 5. Further plan pending overall clinical course. Code Status: Full  DVT Prophylaxis: Lovenox          Subjective:   CHIEF COMPLAINT: Inability to ambulate    HISTORY OF PRESENT ILLNESS:     Crystal Aguirre is a 80 y.o.  female who presented to the emergency department initially early this morning for evaluation of chest discomfort. Work-up was essentially unremarkable and patient was being prepped to be discharged home from the ED when it was found that she was unable to walk. She apparently has a caregiver but patient is significantly weak and it was felt that her return to the home environment would be unsafe. Patient is being referred for hospital admission for PT/OT assessment and evaluation for possible placement.       Past Medical History:   Diagnosis Date    Anemia NEC     Asthma     Colitis     DDD (degenerative disc disease), cervical     Fibrocystic breast     GERD (gastroesophageal reflux disease)     Headache     HTN (hypertension)     Hyperlipidemia     OA (osteoarthritis)     Osteoporosis     PAF [facility]paroxysmal atrial fibrillation) ([facility] [address]     Pneumonia     Thyroid nodule     Vitamin D deficiency 2011        Past Surgical History:   Procedure Laterality Date    BREAST SURGERY PROCEDURE UNLISTED      cysts removed    CARDIAC SURG PROCEDURE UNLIST      cardiac catherization    HX GYN      hysterectomy    HX HEENT      cataract surgery bilaterally    HX TOTAL ABDOMINAL HYSTERECTOMY         Social History     Tobacco Use    Smoking status: Never Smoker    Smokeless tobacco: Never Used   Substance Use Topics    Alcohol use: No        Family History   Problem Relation Age of Onset    Hypertension Other     Diabetes Other     Heart Disease Other     Cancer Other         stomach & colon    Diabetes Mother     Heart Attack Mother     Stroke Mother     Heart Attack Father     Heart Failure Father     Arthritis-rheumatoid Father     Other Brother         aneuysm-ruptured    Parkinson's Disease Brother      Allergies   Allergen Reactions    Latex, Natural Rubber Unknown (comments)    Asa-Acetaminophen-Caff-Potass Shortness of Breath     Patient is only allergic to ASA    Aspirin Shortness of Breath and Other (comments)     Patient states this caused her stomach to bleed internal    Erythromycin Other (comments)     bleeding    Iodine And Iodide Containing Products Unknown (comments)    Lemon Unknown (comments)    Other Medication Unknown (comments)     Pt not sure of allergies states \"I'm allergic to more but not sure of name\"    Pcn [Penicillins] Swelling    Shellfish Containing Products Unknown (comments)    Sulfa (Sulfonamide Antibiotics) Unknown (comments)        Prior to Admission medications    Medication Sig Start Date End Date Taking? Authorizing Provider   ezetimibe (ZETIA) 10 mg tablet Take 10 mg by mouth daily. 8/30/21   Other, MD Bo   lubiPROStone (AMITIZA) 8 mcg capsule Take 8 mcg by mouth two (2) times a day. 8/24/21   Bo Vance MD   omeprazole (PRILOSEC) 20 mg capsule Take 20 mg by mouth daily. 10/11/21   Bo Vance MD   senna (Senna) 8.6 mg tablet Take 1 Tab by mouth daily. 1/15/21   REHAN Oro   polyethylene glycol (Miralax) 17 gram/dose powder Take 17 g by mouth two (2) times a day. 1 tablespoon with 8 oz of water daily 1/5/21   Evelia Melo MD   raNITIdine (ZANTAC) 150 mg tablet Take 1 Tab by mouth two (2) times a day. 11/18/16   Sonu Sharma DO   acetaminophen (TYLENOL EX STR ARTHRITIS PAIN) 500 mg tablet Take 1 Tab by mouth every six (6) hours as needed. Patient taking differently: Take 500 mg by mouth every eight (8) hours as needed. 11/18/16   Sonu Sharma DO   diphenhydrAMINE (BENADRYL ALLERGY) 25 mg tablet Take 1 Tab by mouth every six (6) hours as needed. Patient taking differently: Take 50 mg by mouth every six (6) hours as needed. 11/18/16   Sonu Sharma DO       REVIEW OF SYSTEMS:     Total of 12 systems reviewed as follows:       POSITIVE= bolded text  Negative = text not underlined  General:  fever, chills, sweats, generalized weakness, weight loss/gain,      loss of appetite, malaise  Eyes:    blurred vision, eye pain, loss of vision, double vision  ENT:    rhinorrhea, pharyngitis   Respiratory:   cough, sputum production, SOB, QUIJANO, wheezing, pleuritic pain   Cardiology:   chest pain, palpitations, orthopnea, PND, edema, syncope   Gastrointestinal:  abdominal pain , N/V, diarrhea, dysphagia, constipation, bleeding   Genitourinary:  frequency, urgency, dysuria, hematuria, incontinence   Muskuloskeletal :  arthralgia, myalgia, back pain  Hematology:  easy bruising, nose or gum bleeding, lymphadenopathy   Dermatological: rash, ulceration, pruritis, color change / jaundice  Endocrine:   hot flashes or polydipsia   Neurological:  headache, dizziness, confusion, focal weakness, paresthesia,     Speech difficulties, memory loss, gait difficulty  Psychological: Feelings of anxiety, depression, agitation    Objective:   VITALS:    Visit Vitals  BP (!) 126/55 (BP 1 Location: Left upper arm)   Pulse 67   Temp 98.6 °F (37 °C)   Resp 16   Ht 5' 2\" (1.575 m)   Wt 45.4 kg (100 lb)   SpO2 98%   BMI 18.29 kg/m²       PHYSICAL EXAM:    General:    In NAD. Nontoxic-appearing. HEENT: Head NCAT. Pupils equal round sclerae anicteric. Neck:  Supple, trachea midline. Lungs:   Clear, no wheezes. Effort nonlabored, no accessory muscle use. Chest wall:  No chest wall tenderness. Chest expansion equal and symmetrical bilaterally. Heart:   RRR. Abdomen:   Soft, NT/ND. Bowel sounds normoactive. Extremities: Warm, no edema. No evidence for ischemia. Skin:     Not pale. Not Jaundiced  No rashes   Psych:  Mood normal.  Calm, no agitation. Neurologic: Awake and alert, moves extremities spontaneously. _______________________________________________________________________  Care Plan discussed with:    Comments   Patient X    Family      RN     Care Manager                    Consultant:      _______________________________________________________________________  Expected  Disposition:   Home     HH/PT/OT/RN    SNF/LTC X   ARU    ________________________________________________________________________        Tests/Procedures:   CXR:  IMPRESSION  No radiographic evidence of acute cardiopulmonary process. No interval change     Sonu Robbins MD   R1 Radiology Resident     All findings discussed with attending radiologist. I have personally reviewed  all images and agree with the interpretation above.         LAB DATA REVIEWED:    Recent Results (from the past 24 hour(s))   EKG, 12 LEAD, INITIAL    Collection Time: 12/13/21  9:32 AM   Result Value Ref Range    Ventricular Rate 77 BPM    Atrial Rate 77 BPM    P-R Interval 162 ms    QRS Duration 76 ms    Q-T Interval 364 ms    QTC Calculation (Bezet) 411 ms    Calculated P Axis 81 degrees    Calculated R Axis 21 degrees    Calculated T Axis 66 degrees    Diagnosis       Normal sinus rhythm  Normal ECG  When compared with ECG of 24-OCT-2021 23:52,  No significant change was found  Confirmed by Ginger Marquez MD, Sheryle Scotland ([id number]) on 12/13/2021 12:33:41 PM     CBC WITH AUTOMATED DIFF    Collection Time: 12/13/21  9:40 AM Result Value Ref Range    WBC 4.4 (L) 4.6 - 13.2 K/uL    RBC 3.67 (L) 4.20 - 5.30 M/uL    HGB 10.6 (L) 12.0 - 16.0 g/dL    HCT 32.9 (L) 35.0 - 45.0 %    MCV 89.6 78.0 - 100.0 FL    MCH 28.9 24.0 - 34.0 PG    MCHC 32.2 31.0 - 37.0 g/dL    RDW 13.0 11.6 - 14.5 %    PLATELET 991 699 - 041 K/uL    MPV 9.0 (L) 9.2 - 11.8 FL    NRBC 0.0 0  WBC    ABSOLUTE NRBC 0.00 0.00 - 0.01 K/uL    NEUTROPHILS 61 40 - 73 %    LYMPHOCYTES 30 21 - 52 %    MONOCYTES 5 3 - 10 %    EOSINOPHILS 2 0 - 5 %    BASOPHILS 1 0 - 2 %    IMMATURE GRANULOCYTES 1 (H) 0.0 - 0.5 %    ABS. NEUTROPHILS 2.7 1.8 - 8.0 K/UL    ABS. LYMPHOCYTES 1.3 0.9 - 3.6 K/UL    ABS. MONOCYTES 0.2 0.05 - 1.2 K/UL    ABS. EOSINOPHILS 0.1 0.0 - 0.4 K/UL    ABS. BASOPHILS 0.1 0.0 - 0.1 K/UL    ABS. IMM. GRANS. 0.0 0.00 - 0.04 K/UL    DF AUTOMATED     PROTHROMBIN TIME + INR    Collection Time: 12/13/21  9:40 AM   Result Value Ref Range    Prothrombin time 12.0 11.5 - 15.2 sec    INR 0.9 0.8 - 1.2     TROPONIN-HIGH SENSITIVITY    Collection Time: 12/13/21  9:40 AM   Result Value Ref Range    Troponin-High Sensitivity 6 0 - 54 ng/L   CKMB PROFILE    Collection Time: 12/13/21  9:40 AM   Result Value Ref Range    CK 39 26 - 192 U/L    CK - MB <1.0 <3.6 ng/ml    CK-MB Index  0.0 - 4.0 %     CALCULATION NOT PERFORMED WHEN RESULT IS BELOW LINEAR LIMIT   METABOLIC PANEL, COMPREHENSIVE    Collection Time: 12/13/21  9:40 AM   Result Value Ref Range    Sodium 137 136 - 145 mmol/L    Potassium 4.1 3.5 - 5.5 mmol/L    Chloride 103 100 - 111 mmol/L    CO2 30 21 - 32 mmol/L    Anion gap 4 3.0 - 18 mmol/L    Glucose 96 74 - 99 mg/dL    BUN 16 7.0 - 18 MG/DL    Creatinine 0.84 0.6 - 1.3 MG/DL    BUN/Creatinine ratio 19 12 - 20      GFR est AA >60 >60 ml/min/1.73m2    GFR est non-AA >60 >60 ml/min/1.73m2    Calcium 9.3 8.5 - 10.1 MG/DL    Bilirubin, total 0.4 0.2 - 1.0 MG/DL    ALT (SGPT) 14 13 - 56 U/L    AST (SGOT) 17 10 - 38 U/L    Alk.  phosphatase 94 45 - 117 U/L    Protein, total 7.0 6.4 - 8.2 g/dL    Albumin 3.7 3.4 - 5.0 g/dL    Globulin 3.3 2.0 - 4.0 g/dL    A-G Ratio 1.1 0.8 - 1.7     CARDIAC PANEL,(CK, CKMB & TROPONIN)    Collection Time: 12/13/21 12:12 PM   Result Value Ref Range    CK - MB <1.0 <3.6 ng/ml    CK-MB Index  0.0 - 4.0 %     CALCULATION NOT PERFORMED WHEN RESULT IS BELOW LINEAR LIMIT    CK 37 26 - 192 U/L    Troponin-High Sensitivity 7 0 - 54 ng/L       Heather Wilkins MD  [address] [place] [address]      Disclaimer: Sections of this note are dictated utilizing voice recognition software and minor typographical errors may be present. If questions arise, please do not hesitate to contact me or call our department.

## 2021-12-14 NOTE — PROGRESS NOTES
Problem: Mobility Impaired (Adult and Pediatric)  Goal: *Acute Goals and Plan of Care (Insert Text)  Description: Physical Therapy Goals  Initiated 12/14/2021 and to be accomplished within 7 day(s)  1. Patient will move from supine to sit and sit to supine , scoot up and down, and roll side to side in bed with supervision/set-up. 2.  Patient will transfer from bed to chair and chair to bed with minimal assistance/contact guard assist using the least restrictive device. 3.  Patient will perform sit to stand with minimal assistance/contact guard assist.  4.  Patient will ambulate with minimal assistance/contact guard assist for 50 feet with the least restrictive device. 5.  Assess stairs as appropriate or needed for discharge. PLOF: Pt reporting she lives in group home with assist, increased assist over the last few days. Pt uses RW, lives in 1 story house with 3 ELINA with handrails. Outcome: Progressing Towards Goal   PHYSICAL THERAPY EVALUATION    Patient: Chano Lomeli (22 y.o. female)  Date: 12/14/2021  Primary Diagnosis: No admission diagnoses are documented for this encounter. Precautions:  Fall    ASSESSMENT :  Pt cleared to participate in PT session, pt received semi-reclined in bed and agreeable to therapy session. Based on the objective data described below, the patient presents with decreased endurance, decreased strength, decreased balance reactions, gait deviations, and decreased independence in functional mobility. Pt completing bed mobility with modA. Pt sitting on EOB with fair sitting balance. Javier to stand and Javier for walking SPT to [facility]. Pt sitting on BS with supervision. Standing with Javier and Javier for balance for pericare. Pt returned to bed with Javier. HR increasing to 120-167 in sitting on BSC on cardiac monitoring unsure of true reading. Pt with tremor in BUEs and intermittently in trunk.   Pt positioned for comfort and educated to call for assist before getting up, pt verbalized understanding. Pt left with all needs met and call bell in reach. RN notified of position and participation. Patient will benefit from skilled intervention to address the above impairments. Patient's rehabilitation potential is considered to be Fair  Factors which may influence rehabilitation potential include:   []         None noted  []         Mental ability/status  [x]         Medical condition  [x]         Home/family situation and support systems  []         Safety awareness  []         Pain tolerance/management  []         Other:      PLAN :  Recommendations and Planned Interventions:   [x]           Bed Mobility Training             []    Neuromuscular Re-Education  [x]           Transfer Training                   []    Orthotic/Prosthetic Training  [x]           Gait Training                          []    Modalities  [x]           Therapeutic Exercises           []    Edema Management/Control  [x]           Therapeutic Activities            [x]    Family Training/Education  [x]           Patient Education  []           Other (comment):    Frequency/Duration: Patient will be followed by physical therapy 1-2 times per day/4-7 days per week to address goals. Discharge Recommendations: Laureano Zaragoza  Further Equipment Recommendations for Discharge: rolling walker     SUBJECTIVE:   Patient stated I need more help.     OBJECTIVE DATA SUMMARY:     Past Medical History:   Diagnosis Date    Anemia NEC     Asthma     Colitis     DDD (degenerative disc disease), cervical     Fibrocystic breast     GERD (gastroesophageal reflux disease)     Headache     HTN (hypertension)     Hyperlipidemia     OA (osteoarthritis)     Osteoporosis     PAF (paroxysmal atrial fibrillation) ([facility] [address] 1995    Pneumonia     Thyroid nodule     Vitamin D deficiency 2/7/2011     Past Surgical History:   Procedure Laterality Date    BREAST SURGERY PROCEDURE UNLISTED      cysts removed    CARDIAC SURG PROCEDURE UNLIST      cardiac catherization    HX GYN      hysterectomy    HX HEENT      cataract surgery bilaterally    HX TOTAL ABDOMINAL HYSTERECTOMY       Barriers to Learning/Limitations: yes;  physical  Compensate with: Visual Cues, Verbal Cues, and Tactile Cues  Home Situation:  Home Situation  [facility] [employer] [facility]  Living Alone: No  Support Systems: Adult Group Home  Current DME Used/Available at Home: Walker, rolling, Shower chair, Grab bars  Tub or Shower Type: Shower  Critical Behavior:  Neurologic State: Alert  Orientation Level: Oriented X4  Cognition: Follows commands  Safety/Judgement: Fall prevention  Psychosocial  Patient Behaviors: Calm; Cooperative    Strength:    Strength: Generally decreased, functional    Tone & Sensation:   Tone: Normal    Sensation: Intact    Range Of Motion:  AROM: Generally decreased, functional    Posture:  Posture (WDL): Exceptions to WDL     Functional Mobility:  Bed Mobility:     Supine to Sit: Moderate assistance  Sit to Supine: Minimum assistance  Scooting: Contact guard assistance  Transfers:  Sit to Stand: Minimum assistance  Stand to Sit: Minimum assistance  Stand Pivot Transfers: Minimum assistance       Balance:   Sitting: Impaired; With support  Sitting - Static: Fair (occasional)  Sitting - Dynamic: Fair (occasional)  Standing: Impaired; With support  Standing - Static: Fair  Standing - Dynamic : Poor    Pain:  Pain level pre-treatment: 0/10   Pain level post-treatment: 0/10     Activity Tolerance:   Fair tolerance     Please refer to the flowsheet for vital signs taken during this treatment.   After treatment:   []         Patient left in no apparent distress sitting up in chair  [x]         Patient left in no apparent distress in bed  [x]         Call bell left within reach  [x]         Nursing notified  []         Caregiver present  []         Bed alarm activated  []         SCDs applied    COMMUNICATION/EDUCATION:   [x] Role of Physical Therapy in the acute care setting. [x]         Fall prevention education was provided and the patient/caregiver indicated understanding. [x]         Patient/family have participated as able in goal setting and plan of care. [x]         Patient/family agree to work toward stated goals and plan of care. []         Patient understands intent and goals of therapy, but is neutral about his/her participation. []         Patient is unable to participate in goal setting/plan of care: ongoing with therapy staff.  []         Other:     Thank you for this referral.  Maverick Torrez, PT   Time Calculation: 31 mins      Eval Complexity: History: MEDIUM  Complexity : 1-2 comorbidities / personal factors will impact the outcome/ POC Exam:LOW Complexity : 1-2 Standardized tests and measures addressing body structure, function, activity limitation and / or participation in recreation  Presentation: LOW Complexity : Stable, uncomplicated  Clinical Decision Making:Low Complexity low  Overall Complexity:LOW

## 2021-12-14 NOTE — CONSULTS
Cardiology Initial Patient Referral Note    Cardiology referral request from Dr. Hari Oscar for evaluation and management/treatment of chest pain     Date of  Admission: 12/13/2021  9:41 AM   Primary Care Physician:  Emmanuel Turner NP    Attending Cardiologist: Dr. Noam Duncan:     Hospital Problems  Date Reviewed: 1/9/2020          Codes Class Noted POA    * (Principal) Chest pain ICD-10-CM: R07.9  ICD-9-CM: 786.50  12/14/2021 Unknown              1. Atypical chest pain- non cardiac. Likely musculoskeletal. Recommend pain management   2. Paroxysmal Atrial Fibrillation-diagnosed over 20 years ago. maintaining NSR. Holter monitor 2018 showed NSR with PAC's and short runs supraventricular tachycardia   3. (ECHO 2018) EF 60-65% No obvious wall motion abnormalities identified in the views obtained. NST 2018 showed a normal left ventricular ejection fraction with a very mild distal inferior reversible defect which could represent branch vessel disease, but was overall a low risk study. 4. Borderline hypertension not on HTN medications   5. Generalized weakness- w/u per primary team            Primary cardiologist seen by Dr Inna Vieyra in the past      Plan:     Hemodynamically stable   Chest pain is atypical. MI has been r/o.  troponin HS times x2 are unremarkable  Chest pain likely musculoskeletal. Will give tramadol  50 mg every 6 hours x 2 doses. No further  cardiac work up needed  at this time       Plan as above. Clinically it is clearly musculoskeletal pain. Echo in the past had normal ejection fraction and no wall motion abnormalities. No signs of CHF. Will follow briefly. Thanks for the consultation. History of Present Illness: This is a 80 y.o. female admitted for chest pain. Patient with PMHx of PAF, asthma, GERD, palpitations. The patient presented to the ED with c/o chest wall pain. The patient states it started last night has been going on since then.  She reports pain is aching/sharp in character comes from lower breast area to her axillary area. Patient chest wall pain is associated with bilateral arm shaking which is controllable with the patient tries to control her arms. No nausea no vomiting no diaphoresis no fevers no chills no radiation. No cough. In the ED her initial work-up was essentially unremarkable and patient was being prepped to be discharged home from the ED when it was found that she was unable to walk. She apparently has a caregiver but patient is significantly weak and it was felt that her return to the home environment would be unsafe. She leaves in a group home and ambulates with a walker. On my exam patient resting comfortable in bed no distress noted.  No other complaibns voiced         Review of Symptoms:  Except as stated above include:  Constitutional:  negative  Respiratory:  negative  Cardiovascular:atypical chest pain   Gastrointestinal: negative  Genitourinary:  negative  Musculoskeletal:  Negative  Neurological:  Negative  Dermatological:  Negative  Endocrinological: Negative  Psychological:  Negative         Past Medical History:     Past Medical History:   Diagnosis Date    Anemia NEC     Asthma     Colitis     DDD (degenerative disc disease), cervical     Fibrocystic breast     GERD (gastroesophageal reflux disease)     Headache     HTN (hypertension)     Hyperlipidemia     OA (osteoarthritis)     Osteoporosis     [facility]    Pneumonia     Thyroid nodule     Vitamin D deficiency 2/7/2011         Social History:     Social History     Socioeconomic History    Marital status:    Tobacco Use    Smoking status: Never Smoker    Smokeless tobacco: Never Used   Substance and Sexual Activity    Alcohol use: No    Drug use: No        Family History:     Family History   Problem Relation Age of Onset    Hypertension Other     Diabetes Other     Heart Disease Other     Cancer Other stomach & colon    Diabetes Mother     Heart Attack Mother     Stroke Mother     Heart Attack Father     Heart Failure Father     Arthritis-rheumatoid Father     Other Brother         aneuysm-ruptured    Parkinson's Disease Brother         Medications: Allergies   Allergen Reactions    Latex, Natural Rubber Unknown (comments)    Asa-Acetaminophen-Caff-Potass Shortness of Breath     Patient is only allergic to ASA    Aspirin Shortness of Breath and Other (comments)     Patient states this caused her stomach to bleed internal    Erythromycin Other (comments)     bleeding    Iodine And Iodide Containing Products Unknown (comments)    Lemon Unknown (comments)    Other Medication Unknown (comments)     Pt not sure of allergies states \"I'm allergic to more but not sure of name\"    Pcn [Penicillins] Swelling    Shellfish Containing Products Unknown (comments)    Sulfa (Sulfonamide Antibiotics) Unknown (comments)        Current Facility-Administered Medications   Medication Dose Route Frequency    sodium chloride (NS) flush 5-40 mL  5-40 mL IntraVENous Q8H    sodium chloride (NS) flush 5-40 mL  5-40 mL IntraVENous PRN    acetaminophen (TYLENOL) tablet 650 mg  650 mg Oral Q6H PRN    Or    acetaminophen (TYLENOL) suppository 650 mg  650 mg Rectal Q6H PRN    polyethylene glycol (MIRALAX) packet 17 g  17 g Oral DAILY PRN    ondansetron (ZOFRAN ODT) tablet 4 mg  4 mg Oral Q8H PRN    Or    ondansetron (ZOFRAN) injection 4 mg  4 mg IntraVENous Q6H PRN    enoxaparin (LOVENOX) injection 40 mg  40 mg SubCUTAneous DAILY    ezetimibe (ZETIA) tablet 10 mg  10 mg Oral DAILY    lubiPROStone (AMITIZA) capsule 8 mcg  8 mcg Oral BID    pantoprazole (PROTONIX) tablet 20 mg  20 mg Oral ACB    senna (SENOKOT) tablet 8.6 mg  1 Tablet Oral DAILY     Current Outpatient Medications   Medication Sig    ezetimibe (ZETIA) 10 mg tablet Take 10 mg by mouth daily.     lubiPROStone (AMITIZA) 8 mcg capsule Take 8 mcg by mouth two (2) times a day.  omeprazole (PRILOSEC) 20 mg capsule Take 20 mg by mouth daily.  senna (Senna) 8.6 mg tablet Take 1 Tab by mouth daily.  polyethylene glycol (Miralax) 17 gram/dose powder Take 17 g by mouth two (2) times a day. 1 tablespoon with 8 oz of water daily    raNITIdine (ZANTAC) 150 mg tablet Take 1 Tab by mouth two (2) times a day.  acetaminophen (TYLENOL EX STR ARTHRITIS PAIN) 500 mg tablet Take 1 Tab by mouth every six (6) hours as needed. (Patient taking differently: Take 500 mg by mouth every eight (8) hours as needed.)    diphenhydrAMINE (BENADRYL ALLERGY) 25 mg tablet Take 1 Tab by mouth every six (6) hours as needed. (Patient taking differently: Take 50 mg by mouth every six (6) hours as needed.)         Physical Exam:     Visit Vitals  BP (!) 101/56 (BP 1 Location: Left upper arm, BP Patient Position: Lying)   Pulse 69   Temp 97.8 °F (36.6 °C)   Resp 16   Ht 5' 2\" (1.575 m)   Wt 45.4 kg (100 lb)   SpO2 97%   BMI 18.29 kg/m²       TELE: normal sinus rhythm    BP Readings from Last 3 Encounters:   12/14/21 (!) 101/56   10/30/21 (!) 114/48   01/15/21 98/69     Pulse Readings from Last 3 Encounters:   12/14/21 69   10/30/21 64   01/15/21 93     Wt Readings from Last 3 Encounters:   12/13/21 45.4 kg (100 lb)   10/26/21 44.2 kg (97 lb 8 oz)   01/09/20 47.6 kg (105 lb)       General:  alert, cooperative, no distress, appears stated age  Neck:  no stridor, no carotid bruit, no JVD  Lungs:  clear to auscultation bilaterally  Heart: chest wall left sided area tender to touch. regular rate and rhythm, S1, S2 normal, no murmur, click, rub or gallop  Abdomen:  abdomen is soft without significant tenderness, masses, organomegaly or guarding  Extremities:  extremities normal, atraumatic, no cyanosis or edema  Skin: Warm and dry.  no hyperpigmentation, vitiligo, or suspicious lesions  Neuro: alert, oriented x3, affect appropriate, no focal neurological deficits, moves all extremities well, no involuntary movements, reflexes at knee and ankle intact       Data Review:     Recent Labs     21  0352 21  0940   WBC 5.3 4.4*   HGB 9.5* 10.6*   HCT 29.5* 32.9*    219     Recent Labs     21  0352 21  0940   * 137   K 3.6 4.1    103   CO2 27 30   GLU 94 96   BUN 16 16   CREA 0.66 0.84   CA 8.7 9.3   ALB  --  3.7   ALT  --  14   INR  --  0.9       Results for orders placed or performed during the hospital encounter of 21   EKG, 12 LEAD, INITIAL   Result Value Ref Range    Ventricular Rate 77 BPM    Atrial Rate 77 BPM    P-R Interval 162 ms    QRS Duration 76 ms    Q-T Interval 364 ms    QTC Calculation (Bezet) 411 ms    Calculated P Axis 81 degrees    Calculated R Axis 21 degrees    Calculated T Axis 66 degrees    Diagnosis       Normal sinus rhythm  Normal ECG  When compared with ECG of 24-OCT-2021 23:52,  No significant change was found  Confirmed by Llai Bueno MD, Ro Bonilla ([id number]) on 2021 12:33:41 PM     Results for orders placed or performed in visit on 18   AMB POC EKG ROUTINE W/ 12 LEADS, INTER & REP    Impression    See progress note. Results for orders placed or performed during the hospital encounter of 05/10/18   ECG HOLTER MONITOR, UP TO [facility]                    [address] [facility], [address]                                         Test Date:    2018  Jacek Bui Name:     Nubia Concepcion             Department:     Patient ID:   [id number]                Room:           Gender:       Female                   Technician:     :          1934               Requested By: Dr. Kavin Garcia Number:                          Sharyn MD:   Deric Parker MD                    Interpretive Statements  Orpatty Darnell was in Sinus Rhythm.     The average heart rate, excluding ectopy, was 78 BPM with a minimum of 58 BPM   at  09:42 D2 and a maximum of 115 BPM at  13:58 D1. Heart beats, including ectopy, totaled 039931 beats. VENTRICULAR ECTOPICS totaled 7  averaging  0.2 per hour  with 7 single, 0   paired, 0 trigeminy and 0 R on T.    SUPRAVENTRICULAR ECTOPICS totaled 140  averaging  3.4 per hour  ,with 121   single and 6 paired beats. SUPRAVENTRICULAR TACHYCARDIA occurred 4 times. The fastest run was at 163 BPM and occurred at 14:26 D1 with 4 beats. The longest run was 4 beats at 14:26 D1 at a rate of 163 BPM.    Interpretation:    1. Rhythm is sinus with average heart rate of 78 and range of 58 to 115.  2. LA and QRS are within normal limits. 3. (7) single VEs.  4. (121) single SVEs, (6) paired, (4) runs of SVT. 5. Patient diary was submitted, no patient triggered events noted.                 Signed:____________________________________ Date: ____________________    Electronically signed by Tirso Fischer MD on May 20 2018  2:07PM CDT       All Cardiac Markers in the last 24 hours:    Lab Results   Component Value Date/Time    CPK 37 12/13/2021 12:12 PM    CKMB <1.0 12/13/2021 12:12 PM    CKND1  12/13/2021 12:12 PM     CALCULATION NOT PERFORMED WHEN RESULT IS BELOW LINEAR LIMIT       Last Lipid:    Lab Results   Component Value Date/Time    Cholesterol, total 238 (H) 03/06/2018 12:15 PM    HDL Cholesterol 48 03/06/2018 12:15 PM    LDL, calculated 148.2 (H) 03/06/2018 12:15 PM    Triglyceride 209 (H) 03/06/2018 12:15 PM    CHOL/HDL Ratio 5.0 03/06/2018 12:15 PM       Cardiographics:     EKG Results     Procedure 720 Value Units Date/Time    EKG, 12 LEAD, INITIAL [[id number]] Collected: 12/13/21 0932    Order Status: Completed Updated: 12/13/21 1233     Ventricular Rate 77 BPM      Atrial Rate 77 BPM      P-R Interval 162 ms      QRS Duration 76 ms      Q-T Interval 364 ms      QTC Calculation (Bezet) 411 ms      Calculated P Axis 81 degrees      Calculated R Axis 21 degrees      Calculated T Axis 66 degrees Diagnosis --     Normal sinus rhythm  Normal ECG  When compared with ECG of 24-OCT-2021 23:52,  No significant change was found  Confirmed by Nile Kayser, MD, Alpa Ferrera ([id number]) on 12/13/2021 12:33:41 PM      EKG, 12 LEAD, INITIAL [[id number]]     Order Status: Completed                   XR Results (most recent):  Results from [place] encounter on 12/13/21    XR CHEST PORT    Narrative  EXAM: XR CHEST PORT    INDICATION: 80 years Female. Chest pain. ADDITIONAL HISTORY: Patient arrived PMS from home with complaint of right-sided  chest pain and shortness of breath. TECHNIQUE: Frontal view of the chest.    COMPARISON: 10/24/2021. FINDINGS:    The cardiac silhouette is upper limits of normal, unchanged compared to prior. Pulmonary vasculature appears within normal limits. Aortic arch atherosclerotic  calcifications. Lungs are hyperinflated, unchanged. No confluent airspace opacity is  appreciated. No definite evidence of pleural effusion or pneumothorax. Bilateral  periapical scarring. Osseous structures are unchanged in appearance. Impression  No radiographic evidence of acute cardiopulmonary process. No interval change    Lea Padilla MD  R1 Radiology Resident    All findings discussed with attending radiologist. I have personally reviewed  all images and agree with the interpretation above. Signed By: NIYA Keita    December 14, 2021    I have personally and independently evaluated and examined the patient. All relevant labs and testing data are reviewed. I have personally reviewed all the diagnostic labs, available EKG imaging x-rays and other diagnostic data and incorporated them into my care. I am referencing APC's note. I participated in medical decision making. Care plan discussed and updated after review.   Mervin Estes MD

## 2021-12-14 NOTE — PROGRESS NOTES
[facility] Hospitalist Group  Progress Note    Patient: Celestina Holloway Age: 80 y.o. : 1934 MR#: [id number] SSN: [id number]  Date: 2021         Assessment/Plan:   Principal Problem:    Chest pain (2021)    Active Problems:    Weakness generalized (2021)      Chest pain  - appreciate cardiology  - trop negative, EKG normal, CXR neg  - likely MSK per cards. PRN pain control    Weakness  - PT/OT recs SNF  - check UA      DVT Prophylaxis:  [x]Lovenox  []Hep SQ  []SCDs  []Coumadin   []On Heparin gtt []PO anticoagulant    Anticipated discharge: tomorrow, SNF    Subjective:     Pt s/e @ bedside. No major events overnight. Pt offers no complaints this AM. Denies CP or SOB. Denies abd pain. Just complains of being cold.     Objective:   VS:   Visit Vitals  /72   Pulse 84   Temp 97.8 °F (36.6 °C)   Resp 16   Ht 5' 2\" (1.575 m)   Wt 45.4 kg (100 lb)   SpO2 95%   BMI 18.29 kg/m²      Tmax/24hrs: Temp (24hrs), Av °F (36.7 °C), Min:97.6 °F (36.4 °C), Max:98.6 °F (37 °C)  No intake or output data in the 24 hours ending 21 1622    General Appearance: NAD, conversant  HENT: normocephalic/atraumatic, moist mucus membranes  Neck: No JVD, supple  Lungs: CTA with normal respiratory effort  CV: RRR, no m/r/g  Abdomen: soft, non-tender, normal bowel sounds  Extremities: no cyanosis, no peripheral edema  Skin: Normal color, intact      Current Facility-Administered Medications   Medication Dose Route Frequency    traMADoL (ULTRAM) tablet 50 mg  50 mg Oral Q6H PRN    sodium chloride (NS) flush 5-40 mL  5-40 mL IntraVENous Q8H    sodium chloride (NS) flush 5-40 mL  5-40 mL IntraVENous PRN    acetaminophen (TYLENOL) tablet 650 mg  650 mg Oral Q6H PRN    Or    acetaminophen (TYLENOL) suppository 650 mg  650 mg Rectal Q6H PRN    polyethylene glycol (MIRALAX) packet 17 g  17 g Oral DAILY PRN    ondansetron (ZOFRAN ODT) tablet 4 mg  4 mg Oral Q8H PRN    Or    ondansetron (ZOFRAN) injection 4 mg  4 mg IntraVENous Q6H PRN    enoxaparin (LOVENOX) injection 40 mg  40 mg SubCUTAneous DAILY    ezetimibe (ZETIA) tablet 10 mg  10 mg Oral DAILY    lubiPROStone (AMITIZA) capsule 8 mcg  8 mcg Oral BID    pantoprazole (PROTONIX) tablet 20 mg  20 mg Oral ACB    senna (SENOKOT) tablet 8.6 mg  1 Tablet Oral DAILY     Current Outpatient Medications   Medication Sig    ezetimibe (ZETIA) 10 mg tablet Take 10 mg by mouth daily.  lubiPROStone (AMITIZA) 8 mcg capsule Take 8 mcg by mouth two (2) times a day.  omeprazole (PRILOSEC) 20 mg capsule Take 20 mg by mouth daily.  senna (Senna) 8.6 mg tablet Take 1 Tab by mouth daily.  polyethylene glycol (Miralax) 17 gram/dose powder Take 17 g by mouth two (2) times a day. 1 tablespoon with 8 oz of water daily    raNITIdine (ZANTAC) 150 mg tablet Take 1 Tab by mouth two (2) times a day.  acetaminophen (TYLENOL EX STR ARTHRITIS PAIN) 500 mg tablet Take 1 Tab by mouth every six (6) hours as needed. (Patient taking differently: Take 500 mg by mouth every eight (8) hours as needed.)    diphenhydrAMINE (BENADRYL ALLERGY) 25 mg tablet Take 1 Tab by mouth every six (6) hours as needed.  (Patient taking differently: Take 50 mg by mouth every six (6) hours as needed.)        Labs:    Recent Results (from the past 24 hour(s))   METABOLIC PANEL, BASIC    Collection Time: 12/14/21  3:52 AM   Result Value Ref Range    Sodium 134 (L) 136 - 145 mmol/L    Potassium 3.6 3.5 - 5.5 mmol/L    Chloride 102 100 - 111 mmol/L    CO2 27 21 - 32 mmol/L    Anion gap 5 3.0 - 18 mmol/L    Glucose 94 74 - 99 mg/dL    BUN 16 7.0 - 18 MG/DL    Creatinine 0.66 0.6 - 1.3 MG/DL    BUN/Creatinine ratio 24 (H) 12 - 20      GFR est AA >60 >60 ml/min/1.73m2    GFR est non-AA >60 >60 ml/min/1.73m2    Calcium 8.7 8.5 - 10.1 MG/DL   CBC W/O DIFF    Collection Time: 12/14/21  3:52 AM   Result Value Ref Range    WBC 5.3 4.6 - 13.2 K/uL    RBC 3.27 (L) 4.20 - 5.30 M/uL HGB 9.5 (L) 12.0 - 16.0 g/dL    HCT 29.5 (L) 35.0 - 45.0 %    MCV 90.2 78.0 - 100.0 FL    MCH 29.1 24.0 - 34.0 PG    MCHC 32.2 31.0 - 37.0 g/dL    RDW 12.9 11.6 - 14.5 %    PLATELET 019 317 - 454 K/uL    MPV 9.5 9.2 - 11.8 FL    NRBC 0.0 0  WBC    ABSOLUTE NRBC 0.00 0.00 - 0.01 K/uL       Imaging:  No results found.       Signed By: SOURAV Preston DR.[facility]  Hospitalist Division  Office:  [phone]  Pager: [phone]         December 14, 2021 4:22 PM

## 2021-12-15 PROCEDURE — 74011250637 HC RX REV CODE- 250/637: Performed by: FAMILY MEDICINE

## 2021-12-15 PROCEDURE — 2709999900 HC NON-CHARGEABLE SUPPLY

## 2021-12-15 PROCEDURE — APPSS15 APP SPLIT SHARED TIME 0-15 MINUTES: Performed by: PHYSICIAN ASSISTANT

## 2021-12-15 PROCEDURE — 74011250637 HC RX REV CODE- 250/637: Performed by: NURSE PRACTITIONER

## 2021-12-15 PROCEDURE — 65270000029 HC RM PRIVATE

## 2021-12-15 PROCEDURE — 99232 SBSQ HOSP IP/OBS MODERATE 35: CPT | Performed by: HOSPITALIST

## 2021-12-15 PROCEDURE — 74011250636 HC RX REV CODE- 250/636: Performed by: FAMILY MEDICINE

## 2021-12-15 PROCEDURE — 99232 SBSQ HOSP IP/OBS MODERATE 35: CPT | Performed by: INTERNAL MEDICINE

## 2021-12-15 RX ORDER — DIPHENHYDRAMINE HCL 25 MG
25 CAPSULE ORAL
Status: DISCONTINUED | OUTPATIENT
Start: 2021-12-15 | End: 2021-12-18 | Stop reason: HOSPADM

## 2021-12-15 RX ADMIN — PANTOPRAZOLE SODIUM 20 MG: 20 TABLET, DELAYED RELEASE ORAL at 08:39

## 2021-12-15 RX ADMIN — ENOXAPARIN SODIUM 40 MG: 100 INJECTION SUBCUTANEOUS at 08:39

## 2021-12-15 RX ADMIN — LUBIPROSTONE 8 MCG: 8 CAPSULE, GELATIN COATED ORAL at 18:30

## 2021-12-15 RX ADMIN — Medication 10 ML: at 08:41

## 2021-12-15 RX ADMIN — LUBIPROSTONE 8 MCG: 8 CAPSULE, GELATIN COATED ORAL at 08:52

## 2021-12-15 RX ADMIN — TRAMADOL HYDROCHLORIDE 50 MG: 50 TABLET, COATED ORAL at 21:40

## 2021-12-15 RX ADMIN — Medication 40 ML: at 14:00

## 2021-12-15 RX ADMIN — ONDANSETRON 4 MG: 4 TABLET, ORALLY DISINTEGRATING ORAL at 20:52

## 2021-12-15 RX ADMIN — EZETIMIBE 10 MG: 10 TABLET ORAL at 08:52

## 2021-12-15 RX ADMIN — SENNOSIDES 8.6 MG: 8.6 TABLET, COATED ORAL at 08:52

## 2021-12-15 NOTE — PROGRESS NOTES
Case management following for SNF placement. Met with pt at the bedside of ED, bed FT1. Call also placed to pt's caregiver, Teddy Garcia ([phone]) per the pt's request. Confirmed with Mrs. Hammer that she would prefer that pt get rehabbed before returning to her group home. Pt is agreeable to SNF plan as well and has requested [facility] as her first preference. Pt is agreeable to being posted out to [facility], and [facility] facilities- referrals were sent via [facility]. Full assessment note to follow.            Delia Balderas, MSN, RN, ACM-RN     [phone]- pager  [phone]- main office

## 2021-12-15 NOTE — ED NOTES
Patient up to bedside commode to void and have bowel movement. No distress noted, vital signs stable.

## 2021-12-15 NOTE — PROGRESS NOTES
[facility] Hospitalist Group  Progress Note    Patient: Blake Lewis Age: 80 y.o. : 1934 MR#: [id number] SSN: [id number]  Date: 12/15/2021         Assessment/Plan:   Principal Problem:    Chest pain (2021)    Active Problems:    Weakness generalized (2021)      Chest pain  - appreciate cardiology  - trop negative, EKG normal, CXR neg  - likely MSK per cards. PRN pain control    Weakness  - PT/OT recs SNF  - UA is normal      DVT Prophylaxis:  [x]Lovenox  []Hep SQ  []SCDs  []Coumadin   []On Heparin gtt []PO anticoagulant    Anticipated discharge: SNF when bed is available    Subjective:     Pt s/e @ bedside. No major events overnight. Pt reports mild nausea this morning. Denies CP or SOB. Denies abd pain.     Objective:   VS:   Visit Vitals  BP (!) 118/54   Pulse 74   Temp 98.2 °F (36.8 °C)   Resp 16   Ht 5' 2\" (1.575 m)   Wt 45.4 kg (100 lb)   SpO2 96%   BMI 18.29 kg/m²      Tmax/24hrs: Temp (24hrs), Av.1 °F (36.7 °C), Min:97.6 °F (36.4 °C), Max:98.5 °F (36.9 °C)  No intake or output data in the 24 hours ending 12/15/21 1620    General Appearance: NAD, conversant  HENT: normocephalic/atraumatic, moist mucus membranes  Neck: No JVD, supple  Lungs: CTA with normal respiratory effort  CV: RRR, no m/r/g  Abdomen: soft, non-tender, normal bowel sounds  Extremities: no cyanosis, no peripheral edema  Skin: Normal color, intact      Current Facility-Administered Medications   Medication Dose Route Frequency    diphenhydrAMINE (BENADRYL) capsule 25 mg  25 mg Oral Q6H PRN    traMADoL (ULTRAM) tablet 50 mg  50 mg Oral Q6H PRN    sodium chloride (NS) flush 5-40 mL  5-40 mL IntraVENous Q8H    sodium chloride (NS) flush 5-40 mL  5-40 mL IntraVENous PRN    acetaminophen (TYLENOL) tablet 650 mg  650 mg Oral Q6H PRN    Or    acetaminophen (TYLENOL) suppository 650 mg  650 mg Rectal Q6H PRN    polyethylene glycol (MIRALAX) packet 17 g  17 g Oral DAILY PRN    ondansetron (ZOFRAN ODT) tablet 4 mg  4 mg Oral Q8H PRN    Or    ondansetron (ZOFRAN) injection 4 mg  4 mg IntraVENous Q6H PRN    enoxaparin (LOVENOX) injection 40 mg  40 mg SubCUTAneous DAILY    ezetimibe (ZETIA) tablet 10 mg  10 mg Oral DAILY    lubiPROStone (AMITIZA) capsule 8 mcg  8 mcg Oral BID    pantoprazole (PROTONIX) tablet 20 mg  20 mg Oral ACB    senna (SENOKOT) tablet 8.6 mg  1 Tablet Oral DAILY     Current Outpatient Medications   Medication Sig    ezetimibe (ZETIA) 10 mg tablet Take 10 mg by mouth daily.  lubiPROStone (AMITIZA) 8 mcg capsule Take 8 mcg by mouth two (2) times a day.  omeprazole (PRILOSEC) 20 mg capsule Take 20 mg by mouth daily.  senna (Senna) 8.6 mg tablet Take 1 Tab by mouth daily.  polyethylene glycol (Miralax) 17 gram/dose powder Take 17 g by mouth two (2) times a day. 1 tablespoon with 8 oz of water daily    acetaminophen (TYLENOL EX STR ARTHRITIS PAIN) 500 mg tablet Take 1 Tab by mouth every six (6) hours as needed. (Patient taking differently: Take 500 mg by mouth every eight (8) hours as needed.)    diphenhydrAMINE (BENADRYL ALLERGY) 25 mg tablet Take 1 Tab by mouth every six (6) hours as needed. (Patient taking differently: Take 50 mg by mouth every six (6) hours as needed.)        Labs:    Recent Results (from the past 24 hour(s))   URINALYSIS W/MICROSCOPIC    Collection Time: 12/14/21  7:34 PM   Result Value Ref Range    Color YELLOW      Appearance CLEAR      Specific gravity 1.005 1.005 - 1.030      pH (UA) 6.5 5.0 - 8.0      Protein Negative NEG mg/dL    Glucose Negative NEG mg/dL    Ketone Negative NEG mg/dL    Bilirubin Negative NEG      Blood SMALL (A) NEG      Urobilinogen 0.2 0.2 - 1.0 EU/dL    Nitrites Negative NEG      Leukocyte Esterase Negative NEG      WBC 3 to 5 0 - 5 /hpf    RBC 3 to 5 0 - 5 /hpf    Epithelial cells FEW 0 - 5 /lpf    Bacteria Negative NEG /hpf       Imaging:  No results found.       Signed By: [facility] [id number]  Office:  [phone]  Pager: [phone]         December 15, 2021 4:22 PM

## 2021-12-15 NOTE — PROGRESS NOTES
Cardiology Progress Note    Admit Date: 12/13/2021  Attending Cardiologist: Dr. Jcoelyn Steinberg:     Hospital Problems  Date Reviewed: 1/9/2020          Codes Class Noted POA    * (Principal) Chest pain ICD-10-CM: R07.9  ICD-9-CM: 786.50  12/14/2021 Unknown        Weakness generalized ICD-10-CM: R53.1  ICD-9-CM: 780.79  12/14/2021 Unknown                    1. Atypical chest pain- non cardiac. Likely musculoskeletal. Recommend pain management   2. Paroxysmal Atrial Fibrillation-diagnosed over 20 years ago. maintaining NSR. Holter monitor (May 2018) was essentially normal.  3. (ECHO 2018) EF 60-65% No obvious wall motion abnormalities identified in the views obtained. NST 2018 showed a normal left ventricular ejection fraction with a very mild distal inferior reversible defect which could represent branch vessel disease, but was overall a low risk study. 4. Borderline Hypertension not on HTN medications   5. Generalized weakness/deconditioning -PT/OT following. Recommend SNF after discharge    6. Advanced Age              Primary cardiologist seen by Dr Buffy Dobson:      Hemodynamically stable   Chest pain is atypical. MI has been r/o.  troponin HS times x2 are unremarkable  Chest pain likely musculoskeletal.  Tramadol  50 mg every 6 hours x 2 doses as needed  No further  cardiac work up needed  at this time. Will sign off please call us if needed       Subjective:     Chest wall pain improving. She c/o SOB.  She feels a little stronger today     Objective:      Patient Vitals for the past 8 hrs:   Pulse Resp BP SpO2   12/15/21 0600 (!) 56 17 (!) 111/47 96 %   12/15/21 0300 82 16 119/63 100 %         Patient Vitals for the past 96 hrs:   Weight   12/13/21 0944 45.4 kg (100 lb)       TELE: normal sinus rhythm             Current Facility-Administered Medications   Medication Dose Route Frequency Last Admin    traMADoL (ULTRAM) tablet 50 mg  50 mg Oral Q6H PRN      sodium chloride (NS) flush 5-40 mL  5-40 mL IntraVENous Q8H 10 mL at 12/15/21 0841    sodium chloride (NS) flush 5-40 mL  5-40 mL IntraVENous PRN      acetaminophen (TYLENOL) tablet 650 mg  650 mg Oral Q6H PRN      Or    acetaminophen (TYLENOL) suppository 650 mg  650 mg Rectal Q6H PRN      polyethylene glycol (MIRALAX) packet 17 g  17 g Oral DAILY PRN      ondansetron (ZOFRAN ODT) tablet 4 mg  4 mg Oral Q8H PRN      Or    ondansetron (ZOFRAN) injection 4 mg  4 mg IntraVENous Q6H PRN      enoxaparin (LOVENOX) injection 40 mg  40 mg SubCUTAneous DAILY 40 mg at 12/15/21 0839    ezetimibe (ZETIA) tablet 10 mg  10 mg Oral DAILY 10 mg at 12/14/21 0842    lubiPROStone (AMITIZA) capsule 8 mcg  8 mcg Oral BID 8 mcg at 12/14/21 1903    pantoprazole (PROTONIX) tablet 20 mg  20 mg Oral ACB 20 mg at 12/15/21 0839    senna (SENOKOT) tablet 8.6 mg  1 Tablet Oral DAILY 8.6 mg at 12/14/21 6486     Current Outpatient Medications   Medication Sig Dispense    ezetimibe (ZETIA) 10 mg tablet Take 10 mg by mouth daily.  lubiPROStone (AMITIZA) 8 mcg capsule Take 8 mcg by mouth two (2) times a day.  omeprazole (PRILOSEC) 20 mg capsule Take 20 mg by mouth daily.  senna (Senna) 8.6 mg tablet Take 1 Tab by mouth daily. 30 Tab    polyethylene glycol (Miralax) 17 gram/dose powder Take 17 g by mouth two (2) times a day. 1 tablespoon with 8 oz of water daily 507 g    raNITIdine (ZANTAC) 150 mg tablet Take 1 Tab by mouth two (2) times a day. 60 Tab    acetaminophen (TYLENOL EX STR ARTHRITIS PAIN) 500 mg tablet Take 1 Tab by mouth every six (6) hours as needed. (Patient taking differently: Take 500 mg by mouth every eight (8) hours as needed.) 60 Tab    diphenhydrAMINE (BENADRYL ALLERGY) 25 mg tablet Take 1 Tab by mouth every six (6) hours as needed.  (Patient taking differently: Take 50 mg by mouth every six (6) hours as needed.) 30 Tab       No intake or output data in the 24 hours ending 12/15/21 0852    Physical Exam:  General:  alert, cooperative, no distress, appears stated age  Neck:  no stridor, no carotid bruit, no JVD  Lungs:  clear to auscultation bilaterally  Heart:  regular rate and rhythm, S1, S2 normal, no murmur, click, rub or gallop  Abdomen:  abdomen is soft without significant tenderness, masses, organomegaly or guarding  Extremities:  extremities normal, atraumatic, no cyanosis or edema    Visit Vitals  BP (!) 111/47   Pulse (!) 56   Temp 98.5 °F (36.9 °C)   Resp 17   Ht 5' 2\" (1.575 m)   Wt 45.4 kg (100 lb)   SpO2 96%   BMI 18.29 kg/m²       Data Review:     Labs: Results:       Chemistry Recent Labs     12/14/21 0352 12/13/21  0940   GLU 94 96   * 137   K 3.6 4.1    103   CO2 27 30   BUN 16 16   CREA 0.66 0.84   CA 8.7 9.3   AGAP 5 4   BUCR 24* 19   AP  --  94   TP  --  7.0   ALB  --  3.7   GLOB  --  3.3   AGRAT  --  1.1      CBC w/Diff Recent Labs     12/14/21 0352 12/13/21  0940   WBC 5.3 4.4*   RBC 3.27* 3.67*   HGB 9.5* 10.6*   HCT 29.5* 32.9*    219   GRANS  --  61   LYMPH  --  30   EOS  --  2      Cardiac Enzymes No results found for: CPK, CK, CKMMB, CKMB, RCK3, CKMBT, CKNDX, CKND1, RAKESH, TROPT, TROIQ, GO, TROPT, TNIPOC, BNP, BNPP   Coagulation Recent Labs     12/13/21  0940   PTP 12.0   INR 0.9       Lipid Panel Lab Results   Component Value Date/Time    Cholesterol, total 238 (H) 03/06/2018 12:15 PM    HDL Cholesterol 48 03/06/2018 12:15 PM    LDL, calculated 148.2 (H) 03/06/2018 12:15 PM    VLDL, calculated 41.8 03/06/2018 12:15 PM    Triglyceride 209 (H) 03/06/2018 12:15 PM    CHOL/HDL Ratio 5.0 03/06/2018 12:15 PM      BNP No results found for: BNP, BNPP, XBNPT   Liver Enzymes Recent Labs     12/13/21  0940   TP 7.0   ALB 3.7   AP 94      Thyroid Studies Lab Results   Component Value Date/Time    TSH 1.52 10/25/2021 08:15 AM          Signed By: NIYA Avitia    December 15, 2021

## 2021-12-16 PROBLEM — R11.2 NAUSEA & VOMITING: Status: ACTIVE | Noted: 2021-12-16

## 2021-12-16 PROBLEM — R25.1 TREMOR: Status: ACTIVE | Noted: 2021-12-16

## 2021-12-16 LAB
ANION GAP SERPL CALC-SCNC: 6 MMOL/L (ref 3–18)
ATRIAL RATE: 67 BPM
BUN SERPL-MCNC: 13 MG/DL (ref 7–18)
BUN/CREAT SERPL: 18 (ref 12–20)
CALCIUM SERPL-MCNC: 8.8 MG/DL (ref 8.5–10.1)
CALCULATED P AXIS, ECG09: 71 DEGREES
CALCULATED R AXIS, ECG10: 6 DEGREES
CALCULATED T AXIS, ECG11: 67 DEGREES
CHLORIDE SERPL-SCNC: 94 MMOL/L (ref 100–111)
CO2 SERPL-SCNC: 27 MMOL/L (ref 21–32)
CREAT SERPL-MCNC: 0.72 MG/DL (ref 0.6–1.3)
DIAGNOSIS, 93000: NORMAL
GLUCOSE SERPL-MCNC: 114 MG/DL (ref 74–99)
P-R INTERVAL, ECG05: 172 MS
POTASSIUM SERPL-SCNC: 4 MMOL/L (ref 3.5–5.5)
Q-T INTERVAL, ECG07: 422 MS
QRS DURATION, ECG06: 70 MS
QTC CALCULATION (BEZET), ECG08: 445 MS
SODIUM SERPL-SCNC: 127 MMOL/L (ref 136–145)
VENTRICULAR RATE, ECG03: 67 BPM

## 2021-12-16 PROCEDURE — 74011250637 HC RX REV CODE- 250/637: Performed by: FAMILY MEDICINE

## 2021-12-16 PROCEDURE — 80048 BASIC METABOLIC PNL TOTAL CA: CPT

## 2021-12-16 PROCEDURE — 74011250636 HC RX REV CODE- 250/636: Performed by: FAMILY MEDICINE

## 2021-12-16 PROCEDURE — 36415 COLL VENOUS BLD VENIPUNCTURE: CPT

## 2021-12-16 PROCEDURE — 99233 SBSQ HOSP IP/OBS HIGH 50: CPT | Performed by: FAMILY MEDICINE

## 2021-12-16 PROCEDURE — 97530 THERAPEUTIC ACTIVITIES: CPT

## 2021-12-16 PROCEDURE — 97535 SELF CARE MNGMENT TRAINING: CPT

## 2021-12-16 PROCEDURE — 74011250637 HC RX REV CODE- 250/637: Performed by: PHYSICIAN ASSISTANT

## 2021-12-16 PROCEDURE — 93005 ELECTROCARDIOGRAM TRACING: CPT

## 2021-12-16 PROCEDURE — 65270000029 HC RM PRIVATE

## 2021-12-16 PROCEDURE — APPSS30 APP SPLIT SHARED TIME 16-30 MINUTES: Performed by: PHYSICIAN ASSISTANT

## 2021-12-16 PROCEDURE — 74011250637 HC RX REV CODE- 250/637: Performed by: NURSE PRACTITIONER

## 2021-12-16 PROCEDURE — 2709999900 HC NON-CHARGEABLE SUPPLY

## 2021-12-16 RX ORDER — PROPRANOLOL HYDROCHLORIDE 60 MG/1
60 CAPSULE, EXTENDED RELEASE ORAL DAILY
COMMUNITY
Start: 2021-12-12 | End: 2021-12-18

## 2021-12-16 RX ORDER — PROPRANOLOL HYDROCHLORIDE 60 MG/1
60 CAPSULE, EXTENDED RELEASE ORAL DAILY
Status: DISCONTINUED | OUTPATIENT
Start: 2021-12-17 | End: 2021-12-18 | Stop reason: HOSPADM

## 2021-12-16 RX ORDER — SODIUM CHLORIDE 1 G/1
2 TABLET ORAL DAILY
Status: DISCONTINUED | OUTPATIENT
Start: 2021-12-16 | End: 2021-12-18 | Stop reason: HOSPADM

## 2021-12-16 RX ORDER — SODIUM CHLORIDE TAB 1 GM 1 G
2 TAB MISCELLANEOUS DAILY
COMMUNITY
Start: 2021-11-23

## 2021-12-16 RX ORDER — SODIUM CHLORIDE TAB 1 GM 1 G
2 TAB MISCELLANEOUS DAILY
Status: DISCONTINUED | OUTPATIENT
Start: 2021-12-17 | End: 2021-12-16

## 2021-12-16 RX ADMIN — LUBIPROSTONE 8 MCG: 8 CAPSULE, GELATIN COATED ORAL at 09:20

## 2021-12-16 RX ADMIN — SENNOSIDES 8.6 MG: 8.6 TABLET, COATED ORAL at 09:20

## 2021-12-16 RX ADMIN — Medication 5 ML: at 14:00

## 2021-12-16 RX ADMIN — EZETIMIBE 10 MG: 10 TABLET ORAL at 09:20

## 2021-12-16 RX ADMIN — PANTOPRAZOLE SODIUM 20 MG: 20 TABLET, DELAYED RELEASE ORAL at 09:20

## 2021-12-16 RX ADMIN — SODIUM CHLORIDE 2000 MG: 1 TABLET ORAL at 18:15

## 2021-12-16 RX ADMIN — ENOXAPARIN SODIUM 40 MG: 100 INJECTION SUBCUTANEOUS at 09:21

## 2021-12-16 RX ADMIN — ONDANSETRON 4 MG: 4 TABLET, ORALLY DISINTEGRATING ORAL at 09:20

## 2021-12-16 RX ADMIN — ONDANSETRON 4 MG: 4 TABLET, ORALLY DISINTEGRATING ORAL at 18:16

## 2021-12-16 RX ADMIN — Medication 10 ML: at 02:39

## 2021-12-16 RX ADMIN — TRAMADOL HYDROCHLORIDE 50 MG: 50 TABLET, COATED ORAL at 04:27

## 2021-12-16 RX ADMIN — Medication 10 ML: at 22:59

## 2021-12-16 RX ADMIN — ONDANSETRON 4 MG: 2 INJECTION INTRAMUSCULAR; INTRAVENOUS at 02:38

## 2021-12-16 NOTE — PROGRESS NOTES
Comprehensive Nutrition Assessment    Type and Reason for Visit: Initial,Positive nutrition screen    Nutrition Recommendations/Plan:   - Downgrade diet to pureed and remove diabetic restriction (no h/o DM with stable blood sugars). - Change oral nutrition supplement from Glucerna Shake TID to Magic Cup BID    Nutrition Assessment:  Pt unable to tolerate diet due to being edentulous and not having her dentures with her. Reports good appetite outside of difficulty chewing diet, however, endorses nausea. Dislikes he current supplement, will add Magic Cup to trial.    Malnutrition Assessment:  Malnutrition Status:  Insufficient data      Nutrition History and Allergies: PMH: HTN. GERD, HLD, OA, vitamin D deficiency, anemia. Presents with chest pain, found to be unremarkable, admitted for deconditioning. Resides in group home. Allergy to shellfish, lemon, and latex. Reports good meal intake PTA. Denies weight change. Estimated Daily Nutrient Needs:  Energy (kcal): 2966-2847; Weight Used for Energy Requirements: Current  Protein (g): 36-45; Weight Used for Protein Requirements: Current (0.8-1)  Fluid (ml/day): 4248-5067; Method Used for Fluid Requirements: 1 ml/kcal      Nutrition Related Findings:  BM 12/15. Meds: Zofran prn, protonix      Wounds:    None       Current Nutrition Therapies:  ADULT ORAL NUTRITION SUPPLEMENT Breakfast, Lunch, Dinner; Diabetic Supplement  ADULT DIET Easy to Chew; 5 carb choices (75 gm/meal);  Low Sodium (2 gm)    Anthropometric Measures:  · Height:  5' 2\" (157.5 cm)  · Current Body Wt:  45 kg (99 lb 3.3 oz)   · Admission Body Wt:  100 lb 1.4 oz    · Usual Body Wt:  45.4 kg (100 lb)     · Ideal Body Wt:  110 lbs:  90.2 %   · BMI Category:  Underweight (BMI less than 22) age over 72       Nutrition Diagnosis:   · Inadequate oral intake related to biting/chewing (masticatory) difficulty as evidenced by poor dentition      Nutrition Interventions:   Food and/or Nutrient Delivery: Modify current diet,Modify oral nutrition supplement  Nutrition Education and Counseling: Education not indicated,No recommendations at this time  Coordination of Nutrition Care: Continue to monitor while inpatient    Goals:  PO nutrition intake will meet >75% of patient estimated nutritional needs within the next 7 days.        Nutrition Monitoring and Evaluation:   Behavioral-Environmental Outcomes: None identified  Food/Nutrient Intake Outcomes: Food and nutrient intake,Supplement intake,Diet advancement/tolerance  Physical Signs/Symptoms Outcomes: Biochemical data,Chewing or swallowing,Meal time behavior,Nutrition focused physical findings    Discharge Planning:    Continue current diet     Electronically signed by Stacie Newberry RD on 12/16/2021 at 2:31 PM    Contact: [phone]

## 2021-12-16 NOTE — PROGRESS NOTES
conducted an initial consultation and Spiritual Assessment for Beronica, who is a 80 y.o.,female. Patients Primary Language is: [place]. According to the patients EMR Mandaeism Affiliation is: Oriental orthodox. The reason the Patient came to the hospital is:   Patient Active Problem List    Diagnosis Date Noted    Nausea & vomiting 12/16/2021    Chest pain 12/14/2021    Weakness generalized 12/14/2021    Severe protein-calorie malnutrition ([address] 10/27/2021    Chronic hyponatremia 10/25/2021    Gait abnormality 05/15/2018    Vasovagal syncope 05/15/2018    Cognitive decline 05/15/2018    Anemia 04/03/2018    Elevated blood pressure reading without diagnosis of hypertension 04/03/2018    Vitamin B12 deficiency (dietary) anemia 10/18/2017    Acquired absence of both cervix and uterus 11/18/2016    SOB (shortness of breath) 10/13/2016    GERD (gastroesophageal reflux disease) 03/04/2011    AR (allergic rhinitis) 02/21/2011    Thyroid nodule 02/07/2011    Vitamin D deficiency 02/07/2011    Hyperlipidemia 02/07/2011        The  provided the following Interventions:   attempted to Initiate a relationship of care and support with patient in room 474 today. Found patient very nervous and not in a condition where she could communicate now. Patient may be taken to rehab later but now is not a good time to visit. There is an advance directive on file for this patient. Provided information about Spiritual Care Services. Offered prayer and assurance of continued prayers on patients behalf. The following outcomes were achieved:  Patient shared limited information about her medical narrative and spiritual journey/beliefs. Patient processed feeling about current hospitalization. Patient expressed gratitude for pastoral care visit. Assessment:  Patient does not have any Baptist/cultural needs that will affect patients preferences in health care.   There are no further spiritual or Taoism issues which require Spiritual Care Services interventions at this time. Plan:  Chaplains will continue to follow and will provide pastoral care on an as needed/requested basis    . Jacek Barry   Spiritual Care   [phone]

## 2021-12-16 NOTE — PROGRESS NOTES
[facility] Hospitalist Group  Progress Note    Patient: Tristin Avendano Age: 80 y.o. : 1934 MR#: [id number] SSN: [id number]  Date: 2021         Assessment/Plan:   Principal Problem:    Chest pain (2021)    Active Problems:    Chronic hyponatremia (10/25/2021)      Weakness generalized (2021)      Nausea & vomiting (2021)      Tremor (2021)      Addendum: BMP noted. Will resume Na tablets    Chest pain  - appreciate cardiology  - trop negative, EKG normal, CXR neg  - likely MSK per cards. PRN pain control    Chronic hyponatremia?  - was recently discharged on salt tablets as recommended by nephrology  - checking BMP and will resume pending results. Weakness  - PT/OT recs SNF  - UA is normal    N/V  - started vomiting during exam, no abd pain  - current medications include amitiza - will hold  - will check BMP  - will start IV fluids if patient is unable to tolerate PO trial after zofran    Tremor  - patient states this has been present for 2 years. Stated she was worked up for [employer]" in [address] [facility] at that time, although currently she is presenting with an action tremor.  - tremor waxes and wanes  - I have spoken with neurology - recommend referral to outpatient movement disorder specialist and can try a beta blocker  - propranolol has been on hold due to borderline low BP. Will resume and monitor BP      DVT Prophylaxis:  [x]Lovenox  []Hep SQ  []SCDs  []Coumadin   []On Heparin gtt []PO anticoagulant    Anticipated discharge: SNF when bed is available    Subjective:     Pt s/e @ bedside. No major events overnight. Pt reports N/V. Denies CP or SOB. Denies abd pain.     Objective:   VS:   Visit Vitals  BP (!) 103/56 (BP 1 Location: Left upper arm, BP Patient Position: At rest)   Pulse 70   Temp 98 °F (36.7 °C)   Resp 19   Ht 5' 2\" (1.575 m)   Wt 45.4 kg (100 lb)   SpO2 96%   BMI 18.29 kg/m²      Tmax/24hrs: Temp (24hrs), Av.9 °F (36.6 °C), Min:97.3 °F (36.3 °C), Max:98.6 °F (37 °C)      Intake/Output Summary (Last 24 hours) at 12/16/2021 1032  Last data filed at 12/16/2021 0933  Gross per 24 hour   Intake 240 ml   Output 100 ml   Net 140 ml       General Appearance: NAD, conversant, actively vomiting  HENT: normocephalic/atraumatic, moist mucus membranes  Neck: No JVD, supple  Lungs: CTA with normal respiratory effort  CV: RRR, no m/r/g  Abdomen: soft, non-tender, normal bowel sounds  Extremities: no cyanosis, no peripheral edema  Skin: Normal color, intact      Current Facility-Administered Medications   Medication Dose Route Frequency    sodium chloride soluble tablet 2,000 mg  2 g Oral DAILY    [START ON 12/17/2021] propranolol LA (INDERAL LA) capsule 60 mg  60 mg Oral DAILY    diphenhydrAMINE (BENADRYL) capsule 25 mg  25 mg Oral Q6H PRN    sodium chloride (NS) flush 5-40 mL  5-40 mL IntraVENous Q8H    sodium chloride (NS) flush 5-40 mL  5-40 mL IntraVENous PRN    acetaminophen (TYLENOL) tablet 650 mg  650 mg Oral Q6H PRN    Or    acetaminophen (TYLENOL) suppository 650 mg  650 mg Rectal Q6H PRN    polyethylene glycol (MIRALAX) packet 17 g  17 g Oral DAILY PRN    ondansetron (ZOFRAN ODT) tablet 4 mg  4 mg Oral Q8H PRN    Or    ondansetron (ZOFRAN) injection 4 mg  4 mg IntraVENous Q6H PRN    enoxaparin (LOVENOX) injection 40 mg  40 mg SubCUTAneous DAILY    ezetimibe (ZETIA) tablet 10 mg  10 mg Oral DAILY    [Held by provider] lubiPROStone (AMITIZA) capsule 8 mcg  8 mcg Oral BID    pantoprazole (PROTONIX) tablet 20 mg  20 mg Oral ACB    senna (SENOKOT) tablet 8.6 mg  1 Tablet Oral DAILY        Labs:    Recent Results (from the past 24 hour(s))   EKG, 12 LEAD, INITIAL    Collection Time: 12/16/21  9:00 AM   Result Value Ref Range    Ventricular Rate 67 BPM    Atrial Rate 67 BPM    P-R Interval 172 ms    QRS Duration 70 ms    Q-T Interval 422 ms    QTC Calculation (Bezet) 445 ms    Calculated P Axis 71 degrees    Calculated R Axis 6 degrees Calculated T Axis 67 degrees    Diagnosis       Normal sinus rhythm  Cannot rule out Anterior infarct , age undetermined  Abnormal ECG  When compared with ECG of 13-DEC-2021 09:32,  Nonspecific T wave abnormality, worse in Anterolateral leads  Confirmed by Xiomara Hannah MD, Sean Miranda ([id number]) on 12/16/2021 9:42:31 AM         Imaging:  No results found.       Signed By: Savanna Cheung PA-C [facility]  [facility]  Office:  [phone]  Pager: [phone]         December 16, 2021 4:22 PM

## 2021-12-16 NOTE — ROUTINE PROCESS
Bedside and Verbal shift change report given to Tg Nguyen LPN (oncoming nurse) by Mike Hutton RN (offgoing nurse). Report included the following information SBAR, Kardex, Intake/Output, MAR and Recent Results.

## 2021-12-16 NOTE — PROGRESS NOTES
PT attempted to see pt for therapy treatment session. Pt had just been placed on bed pan by nursing. Pt stated \"I need a while to use it, it takes me a while\"  Nursing and pt also stated she was having issues with nausea and vomiting today. Physical Therapy will follow-up at later time as schedule allows.   Thank you, Lesli Schwartz, PT

## 2021-12-16 NOTE — ED NOTES
TRANSFER - OUT REPORT:    Verbal report given to Ta chambers RN(name) on Beronica  being transferred to [address] [place] [address](unit) for routine progression of care       Report consisted of patients Situation, Background, Assessment and   Recommendations(SBAR). Information from the following report(s) SBAR was reviewed with the receiving nurse. Lines:   Peripheral IV 12/13/21 Right Antecubital (Active)   Site Assessment Clean, dry, & intact 12/13/21 0941   Phlebitis Assessment 0 12/13/21 0941   Infiltration Assessment 0 12/13/21 0941   Dressing Status Clean, dry, & intact 12/13/21 0941   Dressing Type Tape;Transparent 12/13/21 0941   Hub Color/Line Status Blue;Flushed;Patent 12/13/21 0941   Action Taken Blood drawn 12/13/21 0941       Peripheral IV 12/13/21 Right Antecubital (Active)        Opportunity for questions and clarification was provided.       Patient transported with:   transport

## 2021-12-16 NOTE — ED NOTES
[facility] Patient resting comfortably on stretcher, denies any new complaints at this time. Vital signs are stable.

## 2021-12-16 NOTE — PROGRESS NOTES
Problem: Falls - Risk of  Goal: *Absence of Falls  Description: Document Walt Farmer Fall Risk and appropriate interventions in the flowsheet.   Outcome: Progressing Towards Goal  Note: Fall Risk Interventions:  Mobility Interventions: Bed/chair exit alarm,Patient to call before getting OOB              Elimination Interventions: Bed/chair exit alarm              Problem: Patient Education: Go to Patient Education Activity  Goal: Patient/Family Education  Outcome: Progressing Towards Goal     Problem: Patient Education: Go to Patient Education Activity  Goal: Patient/Family Education  Outcome: Progressing Towards Goal     Problem: Patient Education: Go to Patient Education Activity  Goal: Patient/Family Education  Outcome: Progressing Towards Goal     Problem: Pain  Goal: *Control of Pain  Outcome: Progressing Towards Goal  Goal: *PALLIATIVE CARE:  Alleviation of Pain  Outcome: Progressing Towards Goal     Problem: Patient Education: Go to Patient Education Activity  Goal: Patient/Family Education  Outcome: Progressing Towards Goal

## 2021-12-17 LAB
ANION GAP SERPL CALC-SCNC: 4 MMOL/L (ref 3–18)
BUN SERPL-MCNC: 18 MG/DL (ref 7–18)
BUN/CREAT SERPL: 23 (ref 12–20)
CALCIUM SERPL-MCNC: 8.6 MG/DL (ref 8.5–10.1)
CHLORIDE SERPL-SCNC: 98 MMOL/L (ref 100–111)
CO2 SERPL-SCNC: 30 MMOL/L (ref 21–32)
CREAT SERPL-MCNC: 0.77 MG/DL (ref 0.6–1.3)
GLUCOSE SERPL-MCNC: 91 MG/DL (ref 74–99)
POTASSIUM SERPL-SCNC: 4.4 MMOL/L (ref 3.5–5.5)
SODIUM SERPL-SCNC: 132 MMOL/L (ref 136–145)

## 2021-12-17 PROCEDURE — APPSS30 APP SPLIT SHARED TIME 16-30 MINUTES: Performed by: PHYSICIAN ASSISTANT

## 2021-12-17 PROCEDURE — 65270000029 HC RM PRIVATE

## 2021-12-17 PROCEDURE — 99232 SBSQ HOSP IP/OBS MODERATE 35: CPT | Performed by: FAMILY MEDICINE

## 2021-12-17 PROCEDURE — 74011250636 HC RX REV CODE- 250/636: Performed by: FAMILY MEDICINE

## 2021-12-17 PROCEDURE — 97116 GAIT TRAINING THERAPY: CPT

## 2021-12-17 PROCEDURE — 74011250637 HC RX REV CODE- 250/637: Performed by: PHYSICIAN ASSISTANT

## 2021-12-17 PROCEDURE — 74011250637 HC RX REV CODE- 250/637: Performed by: FAMILY MEDICINE

## 2021-12-17 PROCEDURE — 2709999900 HC NON-CHARGEABLE SUPPLY

## 2021-12-17 PROCEDURE — 97535 SELF CARE MNGMENT TRAINING: CPT

## 2021-12-17 PROCEDURE — 80048 BASIC METABOLIC PNL TOTAL CA: CPT

## 2021-12-17 PROCEDURE — 97530 THERAPEUTIC ACTIVITIES: CPT

## 2021-12-17 PROCEDURE — 36415 COLL VENOUS BLD VENIPUNCTURE: CPT

## 2021-12-17 RX ADMIN — SENNOSIDES 8.6 MG: 8.6 TABLET, COATED ORAL at 11:02

## 2021-12-17 RX ADMIN — ONDANSETRON 4 MG: 4 TABLET, ORALLY DISINTEGRATING ORAL at 11:01

## 2021-12-17 RX ADMIN — PANTOPRAZOLE SODIUM 20 MG: 20 TABLET, DELAYED RELEASE ORAL at 11:01

## 2021-12-17 RX ADMIN — ACETAMINOPHEN 650 MG: 325 TABLET ORAL at 14:54

## 2021-12-17 RX ADMIN — DIPHENHYDRAMINE HYDROCHLORIDE 25 MG: 25 CAPSULE ORAL at 22:37

## 2021-12-17 RX ADMIN — EZETIMIBE 10 MG: 10 TABLET ORAL at 11:02

## 2021-12-17 RX ADMIN — SODIUM CHLORIDE 2000 MG: 1 TABLET ORAL at 11:00

## 2021-12-17 RX ADMIN — DIPHENHYDRAMINE HYDROCHLORIDE 25 MG: 25 CAPSULE ORAL at 11:01

## 2021-12-17 RX ADMIN — PROPRANOLOL HYDROCHLORIDE 60 MG: 60 CAPSULE, EXTENDED RELEASE ORAL at 11:00

## 2021-12-17 RX ADMIN — ACETAMINOPHEN 650 MG: 325 TABLET ORAL at 22:37

## 2021-12-17 RX ADMIN — ENOXAPARIN SODIUM 40 MG: 100 INJECTION SUBCUTANEOUS at 11:02

## 2021-12-17 RX ADMIN — Medication 10 ML: at 22:39

## 2021-12-17 RX ADMIN — Medication 10 ML: at 05:42

## 2021-12-17 NOTE — PROGRESS NOTES
Problem: Self Care Deficits Care Plan (Adult)  Goal: *Acute Goals and Plan of Care (Insert Text)  Description: Occupational Therapy Goals  Initiated 12/14/2021 within 7 day(s). 1.  Patient will perform grooming with supervision/set-up. 2.  Patient will perform bathing with supervision/set-up. 3.  Patient will perform upper body dressing and lower body dressing with supervision/set-up. 4.  Patient will perform toilet transfers with supervision/set-up using RW. 5.  Patient will perform all aspects of toileting with supervision/set-up. 6.  Patient will participate in upper extremity therapeutic exercise/activities with supervision/set-up for 8 minutes. 7.  Patient will utilize energy conservation techniques during functional activities with min verbal cues. Prior Level of Function: pt reports she was Supv-set up with ADLs and functional mobility using RW up until this past Sunday when all over body tremors began to worsen resulting in increased assist required by staff at group home e.g. ADLs     Outcome: Progressing Towards Goal   OCCUPATIONAL THERAPY TREATMENT    Patient: Herrera Brito (89 y.o. female)  Date: 12/17/2021  Diagnosis: Chest pain [R07.9] Chest pain       Precautions: Fall    Chart, occupational therapy assessment, plan of care, and goals were reviewed. ASSESSMENT:  Patient motivated to participate in therapy session. She was able to sit on EOB from supine with min assist in prep for functional tasks. Extra time needed for all activities secondary to essential tremor in her extremities. LB dressing practiced while seated on EOB as well as grooming. Min assist needed overall for self care tasks in sitting. Patient transferred to the [facility] for toileting with min assist and was able to perform hygiene while min assist provided for balance in standing. She returned to supine in bed with HOB elevated at the end of the session.   Recommend continued therapy at rehab to maximize her functional independence for safe return to group home. Progression toward goals:  [x]          Improving appropriately and progressing toward goals  []          Improving slowly and progressing toward goals  []          Not making progress toward goals and plan of care will be adjusted     PLAN:  Patient continues to benefit from skilled intervention to address the above impairments. Continue treatment per established plan of care. Discharge Recommendations:  Rehab  Further Equipment Recommendations for Discharge:  N/A; she has a RW     SUBJECTIVE:   Patient stated I have to go to the bathroom.     OBJECTIVE DATA SUMMARY:   Cognitive/Behavioral Status:  Neurologic State: Alert  Orientation Level: Oriented X4  Cognition: Follows commands  Safety/Judgement: Fall prevention    Functional Mobility and Transfers for ADLs:   Bed Mobility:  Supine to Sit: Minimum assistance  Sit to Supine: Minimum assistance      Transfers:  Sit to Stand: Minimum assistance  Stand to Sit: Minimum assistance   Toilet Transfer : Minimum assistance (to [facility])    Balance:  Sitting: Intact; With support  Sitting - Static: Good (unsupported)  Sitting - Dynamic: Fair (occasional)  Standing: Impaired    ADL Intervention:  Grooming  Position Performed: Seated edge of bed  Brushing/Combing Hair: Set-up; Supervision (Additional time)    Lower Body Dressing Assistance  Socks: Set-up; Supervision  Leg Crossed Method Used: Yes  Position Performed: Seated edge of bed    Toileting  Toileting Assistance: Minimum assistance (for balance in standing and clothing management)  Bladder Hygiene: Set-up; Supervision  Clothing Management: Minimum assistance    Cognitive Retraining  Safety/Judgement: Fall prevention    Pain:  Pain level pre-treatment: 0/10   Pain level post-treatment: 0/10  Pain Intervention(s): NA  Response to intervention: NA    Activity Tolerance:    Good  Please refer to the flowsheet for vital signs taken during this treatment.   After treatment:   []  Patient left in no apparent distress sitting up in chair  [x]  Patient left in no apparent distress in bed  [x]  Call bell left within reach  [x]  Nursing notified  []  Caregiver present  [x]  Bed alarm activated    COMMUNICATION/EDUCATION:   [x] Role of Occupational Therapy in the acute care setting  [x] Home safety education was provided and the patient/caregiver indicated understanding. [x] Patient/family have participated as able in working towards goals and plan of care. [x] Patient/family agree to work toward stated goals and plan of care. [] Patient understands intent and goals of therapy, but is neutral about his/her participation. [] Patient is unable to participate in goal setting and plan of care.       Thank you for this referral.  Nora Sinclair MS OTR/L   Time Calculation: 24 mins

## 2021-12-17 NOTE — PROGRESS NOTES
Transition of Care Plan to SNF/Rehab    SNF/Rehab Transition:  Patient has been accepted to [address] [place] [address] and meets criteria for admission. Patient will transported by [facility] and expected to leave at 1pm Saturday. Communication to Patient/Family:  Spoke with patient and  Rafael Arzola (identified care giver) and they are agreeable to the transition plan. Communication to SNF/Rehab:  Bedside RN, has been notified to update the transition plan to the facility and call report (phone number [phone]). Discharge information has been updated on the AVS.     Discharge Summary will be available to SNF in [facility], and will be placed in Transport Envelope to go with patient to SNF. Nursing Please include all hard scripts for controlled substances, med rec and dc summary, and AVS in packet. Reviewed and confirmed with facility, [address] [place] [address], can manage the patient care needs for the following:     Monica  with (X) only those applicable:    Medication:  [x]  Medications will be available at the facility  []  IV Antibiotics   []  Controlled Substance - hard copy to be sent with patient   []  Weekly Labs   Documents:  [x] Hard RX  [] MAR  [] Kardex  [] AVS  []Transfer Summary  [x]Discharge Summary   Equipment:  []  CPAP/BiPAP  []  Wound Vacuum  []  Falk or Urinary Device  []  PICC/Central Line  []  Nebulizer  []  Ventilator   Treatment:  []Isolation (for MRSA, VRE, etc.)  []Surgical Drain Management  []Tracheostomy Care  []Dressing Changes  []Dialysis with transportation and chair time. []PEG Care  []Oxygen  []Daily Weights for Heart Failure   Dietary:  []Any diet limitations  []Tube Feedings   []Total Parenteral Management (TPN)   Eligible for Medicaid Long Term Services and Supports  Yes:  [] Eligible for medical assistance or will become eligible within 180 days and UAI completed. [] Provider/Patient and/or support system has requested screening.   [x] UAI 02/05/2021 was done at [facility]. [] UAI unavailable at discharge will send once processed to SNF provider. [] UAI unavailable at discharged mailed to patient  No:   [] Private pay and is not financially eligible for Medicaid within the next 180 days. [] Reside out-of-state.   [] A residents of a state owned/operated facility that is licensed  by [facility] and [facility] or [facility]  [] Enrollment in [facility] hospice services  [] [address] [place] [address] [facility]  [] Patient /Family declines to have screening completed or provide financial information for screening     Financial Resources:  Medicaid    [] Initiated and application pending   [] Full coverage     Advanced Care Plan:  []Surrogate Decision Maker of Care  []POA  [x]Communicated Code Status  Full    Other

## 2021-12-17 NOTE — PROGRESS NOTES
Problem: Mobility Impaired (Adult and Pediatric)  Goal: *Acute Goals and Plan of Care (Insert Text)  Description: Physical Therapy Goals  Initiated 12/14/2021 and to be accomplished within 7 day(s)  1. Patient will move from supine to sit and sit to supine , scoot up and down, and roll side to side in bed with supervision/set-up. 2.  Patient will transfer from bed to chair and chair to bed with minimal assistance/contact guard assist using the least restrictive device. 3.  Patient will perform sit to stand with minimal assistance/contact guard assist.  4.  Patient will ambulate with minimal assistance/contact guard assist for 50 feet with the least restrictive device. 5.  Assess stairs as appropriate or needed for discharge. PLOF: Pt reporting she lives in group home with assist, increased assist over the last few days. Pt uses RW, lives in 1 story house with 3 ELINA with handrails. Outcome: Progressing Towards Goal     PHYSICAL THERAPY TREATMENT    Patient: Holley Plascencia (26 y.o. female)  Date: 12/17/2021  Diagnosis: Chest pain [R07.9] Chest pain       Precautions: Fall      ASSESSMENT:  Patient received semi relined in bed and agreeable to PT treatment. She requests to use BSC. CGA for supine to sit and min A for SPT to the commode. Patient completes toileting in sitting with supervision. Sit to stand with min A to RW. She ambulates 6 ft with short, shuffling steps with min A, visible weakness, and increased tremors. Patient completes LE exercises with additional time due to decreased coordination. She is able to scoot laterally at EOB with SB/CGA for return to supine. Patient is motivated to return to PLOF. Call bell in reach and bed alarm activated. to PT treatment.    Progression toward goals:   []      Improving appropriately and progressing toward goals  [x]      Improving slowly and progressing toward goals  []      Not making progress toward goals and plan of care will be adjusted PLAN:  Patient continues to benefit from skilled intervention to address the above impairments. Continue treatment per established plan of care. Discharge Recommendations:  Rehab  Further Equipment Recommendations for Discharge:  bedside commode and rolling walker     SUBJECTIVE:   Patient stated My legs are so weak, they just give out on me .     OBJECTIVE DATA SUMMARY:   Critical Behavior:  Neurologic State: Alert  Orientation Level: Oriented X4  Cognition: Follows commands  Safety/Judgement: Fall prevention  Functional Mobility Training:  Bed Mobility:     Supine to Sit: Contact guard assistance;Minimum assistance  Sit to Supine: Contact guard assistance  Scooting: Minimum assistance         Transfers:  Sit to Stand: Minimum assistance  Stand to Sit: Minimum assistance  Stand Pivot Transfers: Minimum assistance (bed to commode)                        Balance:  Sitting: Impaired; With support  Sitting - Static: Good (unsupported)  Sitting - Dynamic: Fair (occasional)  Standing: Impaired; With support  Standing - Static: Fair  Standing - Dynamic : Fair ((-))             Ambulation/Gait Training:  Distance (ft): 6 Feet (ft)  Assistive Device: Walker, rolling  Ambulation - Level of Assistance: Minimal assistance;Assist x1  Gait Abnormalities: Decreased step clearance;Shuffling gait  Speed/Merlyn: Slow;Shuffled;Pace decreased (<100 feet/min)  Step Length: Left shortened;Right shortened    Therapeutic Exercises:         EXERCISE   Sets   Reps   Active Active Assist   Passive Self ROM   Comments   Ankle Pumps    [] [] [] []    Quad Sets/Glut Sets    [] [] [] [] Hold for 5 secs   Hamstring Sets   [] [] [] []    Short Arc Quads   [] [] [] []    Heel Slides   [] [] [] []    Straight Leg Raises   [] [] [] []    Hip Add   [] [] [] [] Hold for 5 secs, w/ pillow squeeze   Long Arc Quads 1 5 [x] [] [] []    Seated Marching 1 6 [x] [] [] []    Standing Marching   [] [] [] []       [] [] [] []        Pain:  Pain level pre-treatment: 6/10 abdominal pain   Pain level post-treatment: 6/10   Pain Intervention(s): Medication (see MAR); Rest, Ice, Repositioning   Response to intervention: Nurse notified, See doc flow    Activity Tolerance:   Fair   Please refer to the flowsheet for vital signs taken during this treatment. After treatment:   [] Patient left in no apparent distress sitting up in chair  [x] Patient left in no apparent distress in bed  [x] Call bell left within reach  [x] Nursing notified  [] Caregiver present  [x] Bed alarm activated  [] SCDs applied      COMMUNICATION/EDUCATION:   [x]         Role of Physical Therapy in the acute care setting. [x]         Fall prevention education was provided and the patient/caregiver indicated understanding. [x]         Patient/family have participated as able in working toward goals and plan of care. [x]         Patient/family agree to work toward stated goals and plan of care. []         Patient understands intent and goals of therapy, but is neutral about his/her participation.   []         Patient is unable to participate in stated goals/plan of care: ongoing with therapy staff.  []         Other:        Matti Trujillo PT   Time Calculation: 23 mins

## 2021-12-17 NOTE — PROGRESS NOTES
CM spoke with Maggie Jj at [facility], patient is scheduled for transport for 1pm tomorrow to transport patient to [address] CM called and spoke with patient and updated her on discharge plan for tomorrow, and she is agreeable to the discharge plan for tomorrow. JOSEPH called and spoke with Ailynquinten Nelson Simmons [phone], and updated her on discharge plan for tomorrow, and she is agreeable to the discharge plan for tomorrow. JOSEPH called and spoke with Andrea Licea with [facility], [address], and updated her with [facility] time. Rylee Sat Dr. Yuri Palacios and updated with 1pm transport time for tomorrow to SNF.            Winnie Catalan, RN  Case Management [phone]

## 2021-12-17 NOTE — PROGRESS NOTES
Reason for Admission:  Chest pain [R07.9]                 RUR Score:    12%            Plan for utilizing home health:    Pt needs skilled rehab before returning home to group [place]. They use [facility] if needed. Likelihood of Readmission:   LOW                         Transition of Care Plan:              Initial assessment completed with patient. Cognitive status of patient: oriented to time, place, person and situation. Face sheet information confirmed:  yes. The patient designates sonSharda to participate in her discharge plan and to receive any needed information. This patient lives in a group home [facility] [place]  [phone]). Patient is not able to navigate steps as needed. Prior to hospitalization, patient was considered to be independent with ADLs/IADLS : no . If not independent,  patient needs assist with : dressing, bathing, food preparation, cooking and toileting    Patient has a current ACP document on file: yes      Healthcare Decision Maker:   Primary Decision Maker: Erika Love - [phone]    Secondary Decision Maker: Halley Eulogio - Ivan - [phone]    Click here to complete [address] including selection of the Healthcare Decision Maker Relationship (ie \"Primary\")    The group home staff may be available to transport patient home upon discharge. The patient already has Nestora Dom available in the home. Patient is not currently active with home health. Patient has stayed in a skilled nursing facility or rehab. Was  stay within last 60 days : no. This patient is on dialysis :no    Currently, the discharge plan is SNF. Pt states she does not want [address], she would like to go to [address] [place] [address] if possible. The patient states that she can obtain her medications from the pharmacy, and take her medications as directed. Patient's current insurance is Medicare and limited Medicaid.        Care Management Interventions  PCP Verified by CM:  Yes  Mode of Transport at Discharge: Self  Transition of Care Consult (CM Consult): Discharge Planning,SNF  Support Systems: Adult Group Home,Child(ambrose)  Confirm Follow Up Transport: Family  Discharge Location  Discharge Placement: Skilled nursing facility        Tara Bain RN - Outcomes Manager  [phone]

## 2021-12-17 NOTE — PROGRESS NOTES
Talha Martinez with [facility] called , they can accept patient for [address] over the weekend.          Darline Mandujano, RN  Case Management [phone]

## 2021-12-17 NOTE — PROGRESS NOTES
Bedside shift change report given to [facility] RN(oncoming nurse) by [facility] LPN (offgoing nurse). Report included the following information SBAR, Kardex, Intake/Output, MAR and Recent Results.

## 2021-12-17 NOTE — PROGRESS NOTES
[facility] Hospitalist Group  Progress Note    Patient: Tom Rich Age: 80 y.o. : 1934 MR#: [id number] SSN: [id number]  Date: 2021         Assessment/Plan:   Principal Problem:    Chest pain (2021)    Active Problems:    Chronic hyponatremia (10/25/2021)      Weakness generalized (2021)      Nausea & vomiting (2021)      Tremor (2021)      Chest pain  - cardiology was consulted  - trop negative, EKG normal, CXR neg  - likely MSK per cards. PRN pain control  - resolved    Chronic hyponatremia  - was recently discharged on salt tablets as recommended by nephrology. These have been resumed  - monitor BMP    Weakness  - PT/OT recs SNF  - UA is normal    N/V  - current medications include amitiza - will hold  - vomiting has resolved    Tremor  - patient states this has been present for 2 years. Stated she was worked up for [employer]" in [address] [facility] at that time, although currently she is presenting with an action tremor.  - tremor waxes and wanes  - I have spoken with neurology - recommend referral to outpatient movement disorder specialist and can try a beta blocker  - continue propranolol      DVT Prophylaxis:  [x]Lovenox  []Hep SQ  []SCDs  []Coumadin   []On Heparin gtt []PO anticoagulant    Anticipated discharge: SNF when bed is available    Subjective:     Pt s/e @ bedside. No major events overnight. Pt reports nausea, no vomiting. Denies SOB, chest pain, abd pain.     Objective:   VS:   Visit Vitals  BP (!) 136/56 (BP 1 Location: Left upper arm, BP Patient Position: At rest)   Pulse 70   Temp 98.3 °F (36.8 °C)   Resp 18   Ht 5' 2\" (1.575 m)   Wt 45.4 kg (100 lb)   SpO2 96%   BMI 18.29 kg/m²      Tmax/24hrs: Temp (24hrs), Av.8 °F (36.6 °C), Min:97.3 °F (36.3 °C), Max:98.3 °F (36.8 °C)      Intake/Output Summary (Last 24 hours) at 2021 1249  Last data filed at 2021 1000  Gross per 24 hour   Intake 720 ml   Output 1025 ml   Net -305 ml General Appearance: NAD, conversant  HENT: normocephalic/atraumatic, moist mucus membranes  Neck: No JVD, supple  Lungs: CTA with normal respiratory effort  CV: RRR, no m/r/g  Abdomen: soft, non-tender, normal bowel sounds  Extremities: no cyanosis, no peripheral edema  Skin: Normal color, intact  Neuro: action tremor      Current Facility-Administered Medications   Medication Dose Route Frequency    sodium chloride soluble tablet 2,000 mg  2 g Oral DAILY    propranolol LA (INDERAL LA) capsule 60 mg  60 mg Oral DAILY    diphenhydrAMINE (BENADRYL) capsule 25 mg  25 mg Oral Q6H PRN    sodium chloride (NS) flush 5-40 mL  5-40 mL IntraVENous Q8H    sodium chloride (NS) flush 5-40 mL  5-40 mL IntraVENous PRN    acetaminophen (TYLENOL) tablet 650 mg  650 mg Oral Q6H PRN    Or    acetaminophen (TYLENOL) suppository 650 mg  650 mg Rectal Q6H PRN    polyethylene glycol (MIRALAX) packet 17 g  17 g Oral DAILY PRN    ondansetron (ZOFRAN ODT) tablet 4 mg  4 mg Oral Q8H PRN    Or    ondansetron (ZOFRAN) injection 4 mg  4 mg IntraVENous Q6H PRN    enoxaparin (LOVENOX) injection 40 mg  40 mg SubCUTAneous DAILY    ezetimibe (ZETIA) tablet 10 mg  10 mg Oral DAILY    [Held by provider] lubiPROStone (AMITIZA) capsule 8 mcg  8 mcg Oral BID    pantoprazole (PROTONIX) tablet 20 mg  20 mg Oral ACB    senna (SENOKOT) tablet 8.6 mg  1 Tablet Oral DAILY        Labs:    No results found for this or any previous visit (from the past 24 hour(s)). Imaging:  No results found.       Signed By: SOURAV Preston DR.S [facility]  Hospitalist Division  Office:  [phone]  Pager: [phone]         December 17, 2021 4:22 PM

## 2021-12-17 NOTE — PROGRESS NOTES
CM spoke with patient, she has not been Covid vaccinated. CM uploaded patient to [facility] with clinicals and sent booking request to  Xochitl Kam. CM called and spoke with Honorio Stinson with [address] [place] [address], [address] [place], [place] [address]YvonneColumbiabishnu Kam updated her on patient, let her know that patient is medically ready for discharge, and asked if she could review for  Xochitl Kam.            Minerva Mills, RN  Case Management [phone]

## 2021-12-17 NOTE — PROGRESS NOTES
Rima Boone and updated with SNF discharge tomorrow.          Nichole Jade, RN  Case Management [phone]

## 2021-12-17 NOTE — PROGRESS NOTES
CM placed Transport Envelope with PCS form, and 2 facesheets on front of patient's chart, and updated Starr Dickson RN on discharge plan for tomorrow.            Michelle Woodward, RN  Case Management [phone]

## 2021-12-18 VITALS
DIASTOLIC BLOOD PRESSURE: 76 MMHG | OXYGEN SATURATION: 97 % | TEMPERATURE: 98.1 F | WEIGHT: 100 LBS | SYSTOLIC BLOOD PRESSURE: 126 MMHG | HEART RATE: 61 BPM | BODY MASS INDEX: 18.4 KG/M2 | HEIGHT: 62 IN | RESPIRATION RATE: 18 BRPM

## 2021-12-18 LAB
ANION GAP SERPL CALC-SCNC: 8 MMOL/L (ref 3–18)
BUN SERPL-MCNC: 22 MG/DL (ref 7–18)
BUN/CREAT SERPL: 30 (ref 12–20)
CALCIUM SERPL-MCNC: 8.5 MG/DL (ref 8.5–10.1)
CHLORIDE SERPL-SCNC: 99 MMOL/L (ref 100–111)
CO2 SERPL-SCNC: 26 MMOL/L (ref 21–32)
CREAT SERPL-MCNC: 0.73 MG/DL (ref 0.6–1.3)
GLUCOSE SERPL-MCNC: 85 MG/DL (ref 74–99)
POTASSIUM SERPL-SCNC: 4.2 MMOL/L (ref 3.5–5.5)
SODIUM SERPL-SCNC: 133 MMOL/L (ref 136–145)

## 2021-12-18 PROCEDURE — 2709999900 HC NON-CHARGEABLE SUPPLY

## 2021-12-18 PROCEDURE — 36415 COLL VENOUS BLD VENIPUNCTURE: CPT

## 2021-12-18 PROCEDURE — 74011250637 HC RX REV CODE- 250/637: Performed by: FAMILY MEDICINE

## 2021-12-18 PROCEDURE — 99239 HOSP IP/OBS DSCHRG MGMT >30: CPT | Performed by: FAMILY MEDICINE

## 2021-12-18 PROCEDURE — 74011250636 HC RX REV CODE- 250/636: Performed by: FAMILY MEDICINE

## 2021-12-18 PROCEDURE — 74011250637 HC RX REV CODE- 250/637: Performed by: PHYSICIAN ASSISTANT

## 2021-12-18 PROCEDURE — 80048 BASIC METABOLIC PNL TOTAL CA: CPT

## 2021-12-18 RX ORDER — ONDANSETRON 4 MG/1
4 TABLET, ORALLY DISINTEGRATING ORAL
Qty: 30 TABLET | Refills: 0 | Status: SHIPPED | OUTPATIENT
Start: 2021-12-18

## 2021-12-18 RX ORDER — PROPRANOLOL HYDROCHLORIDE 60 MG/1
60 CAPSULE, EXTENDED RELEASE ORAL DAILY
Qty: 30 CAPSULE | Refills: 0 | Status: SHIPPED | OUTPATIENT
Start: 2021-12-18

## 2021-12-18 RX ADMIN — ACETAMINOPHEN 650 MG: 325 TABLET ORAL at 09:18

## 2021-12-18 RX ADMIN — PROPRANOLOL HYDROCHLORIDE 60 MG: 60 CAPSULE, EXTENDED RELEASE ORAL at 09:22

## 2021-12-18 RX ADMIN — PANTOPRAZOLE SODIUM 20 MG: 20 TABLET, DELAYED RELEASE ORAL at 09:18

## 2021-12-18 RX ADMIN — ONDANSETRON 4 MG: 4 TABLET, ORALLY DISINTEGRATING ORAL at 09:17

## 2021-12-18 RX ADMIN — SENNOSIDES 8.6 MG: 8.6 TABLET, COATED ORAL at 09:17

## 2021-12-18 RX ADMIN — ENOXAPARIN SODIUM 40 MG: 100 INJECTION SUBCUTANEOUS at 09:18

## 2021-12-18 RX ADMIN — Medication 10 ML: at 06:51

## 2021-12-18 RX ADMIN — EZETIMIBE 10 MG: 10 TABLET ORAL at 09:18

## 2021-12-18 RX ADMIN — SODIUM CHLORIDE 2000 MG: 1 TABLET ORAL at 09:17

## 2021-12-18 NOTE — DISCHARGE INSTRUCTIONS
Patient Education     DISCHARGE SUMMARY from Nurse    PATIENT INSTRUCTIONS:    After general anesthesia or intravenous sedation, for 24 hours or while taking prescription Narcotics:  · Limit your activities  · Do not drive and operate hazardous machinery  · Do not make important personal or business decisions  · Do  not drink alcoholic beverages  · If you have not urinated within 8 hours after discharge, please contact your surgeon on call. Report the following to your surgeon:  · Excessive pain, swelling, redness or odor of or around the surgical area  · Temperature over 100.5  · Nausea and vomiting lasting longer than 4 hours or if unable to take medications  · Any signs of decreased circulation or nerve impairment to extremity: change in color, persistent  numbness, tingling, coldness or increase pain  · Any questions    What to do at Home:  Recommended activity: Activity as tolerated, SNF    If you experience any of the following symptoms nausea, vomiting, diarrhea, shortness of breath, fever over 101, dizziness, fainting, abnormal bleeding, severe pain/ headaches  please follow up with primary care Physician or 911 for emergencies. *  Please give a list of your current medications to your Primary Care Provider. *  Please update this list whenever your medications are discontinued, doses are      changed, or new medications (including over-the-counter products) are added. *  Please carry medication information at all times in case of emergency situations. These are general instructions for a healthy lifestyle:    No smoking/ No tobacco products/ Avoid exposure to second hand smoke  Surgeon General's Warning:  Quitting smoking now greatly reduces serious risk to your health.     Obesity, smoking, and sedentary lifestyle greatly increases your risk for illness    A healthy diet, regular physical exercise & weight monitoring are important for maintaining a healthy lifestyle    You may be retaining fluid if you have a history of heart failure or if you experience any of the following symptoms:  Weight gain of 3 pounds or more overnight or 5 pounds in a week, increased swelling in our hands or feet or shortness of breath while lying flat in bed. Please call your doctor as soon as you notice any of these symptoms; do not wait until your next office visit. The discharge information has been reviewed with the patient and Viktoriya Ng LPN. The patient and Viktoriya Ng LPN verbalized understanding. Discharge medications reviewed with the patient and Viktoriya Ng LPN and appropriate educational materials and side effects teaching were provided. ___________________________________________________________________________________________________________________________________     Musculoskeletal Chest Pain: Care Instructions  Your Care Instructions     Chest pain is not always a sign that something is wrong with your heart or that you have another serious problem. The doctor thinks your chest pain is caused by strained muscles or ligaments, inflamed chest cartilage, or another problem in your chest, rather than by your heart. You may need more tests to find the cause of your chest pain. Follow-up care is a key part of your treatment and safety. Be sure to make and go to all appointments, and call your doctor if you are having problems. It's also a good idea to know your test results and keep a list of the medicines you take. How can you care for yourself at home? · Take pain medicines exactly as directed. ? If the doctor gave you a prescription medicine for pain, take it as prescribed. ? If you are not taking a prescription pain medicine, ask your doctor if you can take an over-the-counter medicine. · Rest and protect the sore area. · Stop, change, or take a break from any activity that may be causing your pain or soreness. · Put ice or a cold pack on the sore area for 10 to 20 minutes at a time.  Try to do this every 1 to 2 hours for the next 3 days (when you are awake) or until the swelling goes down. Put a thin cloth between the ice and your skin. · After 2 or 3 days, apply a heating pad set on low or a warm cloth to the area that hurts. Some doctors suggest that you go back and forth between hot and cold. · Do not wrap or tape your ribs for support. This may cause you to take smaller breaths, which could increase your risk of lung problems. · Mentholated creams such as Bengay or Icy Hot may soothe sore muscles. Follow the instructions on the package. · Follow your doctor's instructions for exercising. · Gentle stretching and massage may help you get better faster. Stretch slowly to the point just before pain begins, and hold the stretch for at least 15 to 30 seconds. Do this 3 or 4 times a day. Stretch just after you have applied heat. · As your pain gets better, slowly return to your normal activities. Any increased pain may be a sign that you need to rest a while longer. When should you call for help? Call 911 anytime you think you may need emergency care. For example, call if:    · You have chest pain or pressure. This may occur with:  ? Sweating. ? Shortness of breath. ? Nausea or vomiting. ? Pain that spreads from the chest to the neck, jaw, or one or both shoulders or arms. ? Dizziness or lightheadedness. ? A fast or uneven pulse. After calling 911, chew 1 adult-strength aspirin. Wait for an ambulance. Do not try to drive yourself.     · You have sudden chest pain and shortness of breath, or you cough up blood. Call your doctor now or seek immediate medical care if:    · You have any trouble breathing.     · Your chest pain gets worse.     · Your chest pain occurs consistently with exercise and is relieved by rest.   Watch closely for changes in your health, and be sure to contact your doctor if:    · Your chest pain does not get better after 1 week. Where can you learn more?   Go to http://www.gray.com/  Enter [id number] in the search box to learn more about \"Musculoskeletal Chest Pain: Care Instructions. \"  Current as of: July 1, 2021               Content Version: 13.0  © 2006-2021 [employer]. Care instructions adapted under license by [employer] (which disclaims liability or warranty for this information). If you have questions about a medical condition or this instruction, always ask your healthcare professional. [place] [address] any warranty or liability for your use of this information. Patient Education        Nausea and Vomiting: Care Instructions  Your Care Instructions     When you are nauseated, you may feel weak and sweaty and notice a lot of saliva in your mouth. Nausea often leads to vomiting. Most of the time you do not need to worry about nausea and vomiting, but they can be signs of other illnesses. Two common causes of nausea and vomiting are stomach flu and food poisoning. Nausea and vomiting from viral stomach flu will usually start to improve within 24 hours. Nausea and vomiting from food poisoning may last from 12 to 48 hours. The doctor has checked you carefully, but problems can develop later. If you notice any problems or new symptoms, get medical treatment right away. Follow-up care is a key part of your treatment and safety. Be sure to make and go to all appointments, and call your doctor if you are having problems. It's also a good idea to know your test results and keep a list of the medicines you take. How can you care for yourself at home? · To prevent dehydration, drink plenty of fluids. Choose water and other clear liquids until you feel better. If you have kidney, heart, or liver disease and have to limit fluids, talk with your doctor before you increase the amount of fluids you drink. · Rest in bed until you feel better.   · When you are able to eat, try clear soups, mild foods, and liquids until all symptoms are gone for 12 to 48 hours. Other good choices include dry toast, crackers, cooked cereal, and gelatin dessert, such as Jell-O. When should you call for help? Call 911 anytime you think you may need emergency care. For example, call if:    · You passed out (lost consciousness). Call your doctor now or seek immediate medical care if:    · You have symptoms of dehydration, such as:  ? Dry eyes and a dry mouth. ? Passing only a little urine. ? Feeling thirstier than usual.     · You have new or worsening belly pain.     · You have a new or higher fever.     · You vomit blood or what looks like coffee grounds. Watch closely for changes in your health, and be sure to contact your doctor if:    · You have ongoing nausea and vomiting.     · Your vomiting is getting worse.     · Your vomiting lasts longer than 2 days.     · You are not getting better as expected. Where can you learn more? Go to http://www.drummond.com/  Enter H591 in the search box to learn more about \"Nausea and Vomiting: Care Instructions. \"  Current as of: July 1, 2021               Content Version: 13.0  © 8128-8447 [employer]. Care instructions adapted under license by [employer] (which disclaims liability or warranty for this information). If you have questions about a medical condition or this instruction, always ask your healthcare professional. [place] [address] any warranty or liability for your use of this information. Patient Education        Weakness: Care Instructions  Your Care Instructions     Weakness is a lack of physical or muscle strength. You may feel that you need to make extra effort to move your arms, legs, or other muscles. Generalized weakness means that you feel weak in most areas of your body. Another type of weakness may affect just one muscle or group of muscles.   You may feel weak and tired after you have done too much activity, such as taking an extra-long hike. This is not a serious problem. It often goes away on its own. Feeling weak can also be caused by medical conditions like thyroid problems, depression, or a virus. Sometimes the cause can be serious. Your doctor may want to do more tests to try to find the cause of the weakness. The doctor has checked you carefully, but problems can develop later. If you notice any problems or new symptoms, get medical treatment right away. Follow-up care is a key part of your treatment and safety. Be sure to make and go to all appointments, and call your doctor if you are having problems. It's also a good idea to know your test results and keep a list of the medicines you take. How can you care for yourself at home? · Rest when you feel tired. · Be safe with medicines. If your doctor prescribed medicine, take it exactly as prescribed. Call your doctor if you think you are having a problem with your medicine. You will get more details on the specific medicines your doctor prescribes. · Do not skip meals. Eating a balanced diet may increase your energy level. · Get some physical activity every day, but do not get too tired. When should you call for help? Call your doctor now or seek immediate medical care if:    · You have new or worse weakness.     · You are dizzy or lightheaded, or you feel like you may faint. Watch closely for changes in your health, and be sure to contact your doctor if:    · You do not get better as expected. Where can you learn more? Go to http://www.gray.com/  Enter V492 in the search box to learn more about \"Weakness: Care Instructions. \"  Current as of: July 1, 2021               Content Version: 13.0  © 6143-5297 [employer]. Care instructions adapted under license by [employer] (which disclaims liability or warranty for this information).  If you have questions about a medical condition or this instruction, always ask your healthcare professional. [place] [address] any warranty or liability for your use of this information. Patient armband removed and shredded    Patient Education        Benign Essential Tremor: Care Instructions  Your Care Instructions     Benign essential tremor is a medical term for shaking that you can't control. Your hand or fingers may shake when you lift a cup or point at something. Or your voice may shake when you speak. This type of tremor is not harmful. It is not caused by a stroke or Parkinson's disease. Some things can affect how much you shake. For example, drinking or eating something with caffeine may make tremors worse for a while. Some medicines also can increase tremors. These include antidepressants and too much thyroid replacement. Talk to your doctor if you think one of your medicines makes your tremors worse. If you are self-conscious about your tremors, there are some things you can do to reduce them or make them less noticeable. This includes taking medicine. Follow-up care is a key part of your treatment and safety. Be sure to make and go to all appointments, and call your doctor if you are having problems. It's also a good idea to know your test results and keep a list of the medicines you take. How can you care for yourself at home? · Take your medicines exactly as prescribed. Call your doctor if you think you are having a problem with your medicine. Some medicines that help control tremors have to be taken every day, even if you are not having tremors. You will get more details on the specific medicines your doctor prescribes. · Get plenty of rest.  · Eat a balanced, healthy diet. · Try to reduce stress. Regular exercise and massages may help. · Limit alcohol. Heavy drinking can make your tremors worse. · Avoid drinks or foods with caffeine if they make your tremors worse. These include tea, cola, coffee, and chocolate.   · Wear a heavy bracelet or watch. This adds a little weight to your hand. The extra weight may reduce tremors. · Drink from cups or glasses that are only half full. You may also want to try drinking with a straw. When should you call for help? Watch closely for changes in your health, and be sure to contact your doctor if:    · You notice your tremors are getting worse.     · You can't do your everyday activities because of your tremors.     · You are sad and embarrassed about your shaking. Where can you learn more? Go to http://www.gray.com/  Enter B746 in the search box to learn more about \"Benign Essential Tremor: Care Instructions. \"  Current as of: April 8, 2021               Content Version: 13.0  © 5032-3504 [employer]. Care instructions adapted under license by [employer] (which disclaims liability or warranty for this information). If you have questions about a medical condition or this instruction, always ask your healthcare professional. [place] [address] any warranty or liability for your use of this information. Patient Education      Ondansetron (Zofran, Zofran ODT, Josefina Gondola) - (By mouth, Into the mouth)   Why this medicine is used:   Prevents nausea and vomiting. Contact a nurse or doctor right away if you have:  · Fast, pounding, or uneven heartbeat  · Lightheadedness or fainting  · Trouble breathing     Common side effects:  · Headache, tiredness  · Constipation, diarrhea  © 2017 [facility] Information is for End User's use only and may not be sold, redistributed or otherwise used for commercial purposes.

## 2021-12-18 NOTE — PROGRESS NOTES
Transition of Care Plan to SNF/Rehab     SNF/Rehab Transition:  Patient has been accepted to [address] [place] [address] and meets criteria for admission. Patient will transported by [facility] and expected to leave at 1pm Saturday.     Communication to Patient/Family:  Spoke with patient and  Kathya Pagan (identified care giver) and they are agreeable to the transition plan.     Communication to SNF/Rehab:  Bedside RN, has been notified to update the transition plan to the facility and call report (phone number [phone]). Discharge information has been updated on the AVS.     Discharge Summary will be available to SNF in [facility], and will be placed in Transport Envelope to go with patient to SNF.        Nursing Please include all hard scripts for controlled substances, med rec and dc summary, and AVS in packet.      Reviewed and confirmed with facility, [address] [place] [address], can manage the patient care needs for the following:      Shakeel with (X) only those applicable:     Medication:  [x]? Medications will be available at the facility  []? IV Antibiotics   []? Controlled Substance - hard copy to be sent with patient   []? Weekly Labs   Documents:  [x]? Hard RX  []? MAR  []? Kardex  []? AVS  []? Transfer Summary  [x]? Discharge Summary   Equipment:  []?  CPAP/BiPAP  []? Wound Vacuum  []? Falk or Urinary Device  []? PICC/Central Line  []? Nebulizer  []? Ventilator   Treatment:  []? Isolation (for MRSA, VRE, etc.)  []? Surgical Drain Management  []? Tracheostomy Care  []? Dressing Changes  []? Dialysis with transportation and chair time. []?PEG Care  []? Oxygen  []? Daily Weights for Heart Failure   Dietary:  []? Any diet limitations  []? Tube Feedings   []? Total Parenteral Management (TPN)   Eligible for Medicaid Long Term Services and Supports  Yes:  []? Eligible for medical assistance or will become eligible within 180 days and UAI completed. []?  Provider/Patient and/or support system has requested screening. [x]? UAI 02/05/2021 was done at [facility]. []? UAI unavailable at discharge will send once processed to SNF provider. []? UAI unavailable at discharged mailed to patient  No:   []? Private pay and is not financially eligible for Medicaid within the next 180 days. []? Reside out-of-state. []? A residents of a state owned/operated facility that is licensed  by [address] [facility] or [facility]  []? Enrollment in Medicaid hospice services  []? Non [place] citizen  []? Patient /Family declines to have screening completed or provide financial information for screening      Financial Resources:  Medicaid    []? Initiated and application pending   []? Full coverage      Advanced Care Plan:  []? Surrogate Decision Maker of Care  []? POA  [x]? Communicated Code Status  Full    Other

## 2021-12-18 NOTE — ROUTINE PROCESS
Bedside and Verbal shift change report given to [facility] [[email or web address]] AND [facility] (oncoming nurse) by Mayra Scheuermann, RN   (offgoing nurse). Report included the following information SBAR, Kardex, Intake/Output, MAR and Recent Results.

## 2021-12-18 NOTE — PROGRESS NOTES
Report given to Lisette Kingston LPN from [facility] transport at 1130- 1200.

## 2021-12-18 NOTE — DISCHARGE SUMMARY
Discharge Summary      Patient: Aurora Camejo MRN: [id number]  CSN: [id number]    YOB: 1934  Age: 80 y.o. Sex: female    DOA: 12/13/2021 LOS:  LOS: 5 days   Discharge Date: 12/18/21     Admission Diagnoses: Chest pain [R07.9]    Discharge Diagnoses:    1. Musculoskeletal chest pain  2. Nausea and vomiting  3. Chronic hyponatremia  4. Benign essential tremor  5. Severe debility and generalized weakness      Discharge Condition: Stable    PHYSICAL EXAM  Visit Vitals  /61 (BP 1 Location: Right upper arm, BP Patient Position: Sitting)   Pulse (!) 58   Temp 97.8 °F (36.6 °C)   Resp 20   Ht 5' 2\" (1.575 m)   Wt 45.4 kg (100 lb)   SpO2 96%   BMI 18.29 kg/m²     General Appearance: NAD, conversant  HENT: normocephalic/atraumatic, moist mucus membranes  Neck: No JVD, supple  Lungs: CTA with normal respiratory effort  CV: RRR, no m/r/g  Abdomen: soft, non-tender, normal bowel sounds  Extremities: no cyanosis, no peripheral edema  Skin: Normal color, intact  Neuro: Significant intention tremor of hospitals Course:   Ms. Shreyas Chamberlain is a 80year-old female with a PMHx of HTN, HLD, paroxysmal atrial fib, GERD, DDD, and OA who presented to the ED with complaints of chest discomfort. She reported her pain had begun the night prior, had been intermittent since then had resolved without intervention. She reported associated bilateral arm shaking which was uncontrollable and had been going on for the previous couple of years. In addition, she complained of intermittent nausea and vomiting but denied any further complaints. In the ED, her vitals were overall reassuring as were her labs with troponin negative x2. An EKG showed sinus rhythm at 77 bpm with no ST elevation or depression and a CXR showed no acute process. The ED was going to discharge her home but when staff got her out of the bed, she was unable to walk.   She reported having a caregiver at home, but it was felt that returning to her home in her severely debilitated state would be unsafe. She was then admitted for musculoskeletal chest pain and severe debility and generalized weakness. Ms. Essence Zhong was monitored overnight and continued to complain of intermittent chest pain in addition to nausea and vomiting. Cardiology was consulted regarding her chest pain and reported that it was atypical, that MI has been ruled out, that her chest pain was clearly musculoskeletal in nature and no further cardiac work-up was needed. Further work-up done after admission included a UA that was reassuring and a BMP where her sodium had dropped to 127. She was restarted on previously prescribed salt tabs along with Zofran and Protonix and had improvement in her sodium but her nausea remained fairly persistent. Over the course of her admission, Ms. Essence hZong reported her biggest complaint was her uncontrollable bilateral upper extremity tremors. She reported having this worked up 2 years prior and was not given an answer as to why it was occurring, other than it was not Parkinson's. This was discussed with neurology who recommended an outpatient referral to a movement disorder specialist and a trial of a beta-blocker. Ms. Essence Zhong was then started on a trial of previously prescribed propranolol. PT and OT evaluated her and recommended SNF and she was accepted to [address] [place] [address]. On 12/18/2021, Ms. Essence Zhong had remained hemodynamically stable. Return precautions were discussed and she was given instructions and prescriptions for the medications as below. She was also given instructions to follow up with her PCP and was then discharged to SNF. Consults:   Cardiology-Dr. Epi Dotson MD       Significant Diagnostic Studies:   CXR 12/13/2021:  No radiographic evidence of acute cardiopulmonary process. No interval change.       Procedures Performed: None      Discharge Medications:  Current Discharge Medication List      START taking these medications    Details   ondansetron (ZOFRAN ODT) 4 mg disintegrating tablet Take 1 Tablet by mouth every eight (8) hours as needed for Nausea or Vomiting. Qty: 30 Tablet, Refills: 0  Start date: 12/18/2021         CONTINUE these medications which have CHANGED    Details   propranolol LA (INDERAL LA) 60 mg SR capsule Take 1 Capsule by mouth daily. Qty: 30 Capsule, Refills: 0  Start date: 12/18/2021         CONTINUE these medications which have NOT CHANGED    Details   acetaminophen (TYLENOL EX STR ARTHRITIS PAIN) 500 mg tablet Take 1 Tab by mouth every six (6) hours as needed. Qty: 60 Tab, Refills: 1      sodium chloride 1 gram tablet Take 2 g by mouth daily. ezetimibe (ZETIA) 10 mg tablet Take 10 mg by mouth daily. lubiPROStone (AMITIZA) 8 mcg capsule Take 8 mcg by mouth two (2) times a day. omeprazole (PRILOSEC) 20 mg capsule Take 20 mg by mouth daily. senna (Senna) 8.6 mg tablet Take 1 Tab by mouth daily. Qty: 30 Tab, Refills: 0      polyethylene glycol (Miralax) 17 gram/dose powder Take 17 g by mouth two (2) times a day. 1 tablespoon with 8 oz of water daily  Qty: 507 g, Refills: 0      diphenhydrAMINE (BENADRYL ALLERGY) 25 mg tablet Take 1 Tab by mouth every six (6) hours as needed.   Qty: 30 Tab, Refills: 2         STOP taking these medications       raNITIdine (ZANTAC) 150 mg tablet Comments:   Reason for Stopping:                Activity: activity as tolerated    Diet: Cardiac Diet    Wound Care: None needed      Follow-up Information     Follow up With Specialties Details Why Contact Info    Agnieszka Roberson NP Nurse Practitioner Schedule an appointment as soon as possible for a visit in 1 week F/U  [address]  [address]  [address]  [phone]      [facility] [address] [place] [address]  [phone]           Minutes spent on discharge: >30 minutes spent coordinating this discharge (review instructions/follow-up, prescriptions, preparing report for sign off)          THERESA Anderson, DO   December 18, 2021       [facility] medical dictation software was used for portions of this report. Unintended errors may occur.

## 2021-12-20 ENCOUNTER — TELEPHONE (OUTPATIENT)
Dept: FAMILY MEDICINE CLINIC | Age: 86
End: 2021-12-20

## 2021-12-22 ENCOUNTER — PATIENT OUTREACH (OUTPATIENT)
Dept: CASE MANAGEMENT | Age: 86
End: 2021-12-22

## 2021-12-22 NOTE — PROGRESS NOTES
Post Acute Facility Update     *The information contained in this note was received during a weekly care coordination call with the post acute facility*    This patient was discharged from [facility] to [address] [place] [address],[address] [facility] ([facility])   on 12/18/21. Hospital Discharge Diagnosis per Dr. Ana Maria Ramirez:    1. Musculoskeletal chest pain  2. Nausea and vomiting  3. Chronic hyponatremia  4. Benign essential tremor  5. Severe debility and generalized weakness    Current Facility: [address] [place] [address] ([facility])  Anticipated Discharge Date: 1/7/21    Facility Nursing Update: doing well  Facility Social Work Update: Patient lives in [address] [place] [address] with Caregivers. Bundle Program Status: none  Upper Extremity Assistance: Stand by Assist - Presence and Cueing  Lower Extremity Assistance: Minimal Assistance   Bed Mobility: Minimal Assistance   Transfers: Minimal Assistance   Ambulation: Minimal Assistance   How far (in feet) is the patient ambulating?  25  Device Used: walker  Barriers to Discharge: none  Other:      Was active with [employer] at group home PTA

## 2021-12-29 ENCOUNTER — PATIENT OUTREACH (OUTPATIENT)
Dept: CASE MANAGEMENT | Age: 86
End: 2021-12-29

## 2021-12-29 NOTE — PROGRESS NOTES
Physician Progress Note      PATIENT:               Wing Chacon  [facility] #:                  [id number]  :                       1934  ADMIT DATE:       2021 9:41 AM  DISCH DATE:        2021 12:17 PM  RESPONDING  PROVIDER #:        Marlon Crowell MD          QUERY TEXT:    Dear  Hospitalist  Pt admitted with weakness. Pt noted to have Hyponatremia If possible, please document in progress notes and discharge summary the relationship, if any, between weakness  and hyponatremia. The medical record reflects the following:  Risk Factors: lives in   group home;  Clinical Indicators: chronic  hyponatremia;   12/15- Patient assisted onto bedside commode, steady gait noted. The ED was going to discharge her home but when staff got her out of the bed, she was unable to walk. She reported having a caregiver at home, but it was felt that returning to her home in her severely debilitated state would be unsafe. She was then admitted for musculoskeletal chest pain and severe debility and generalized weakness  sodium had dropped to 127. She was restarted on previously prescribed salt tabs along with Zofran and Protonix and had improvement in her sodium . Treatment: salt tablets   resumed   , was recently discharged on them    Thank you,   Dina Garcia   RN   CCDS  x [phone]  Options provided:  -- Weakness  due to hyponatremia  -- Weakness  unrelated to hyponatremia  -- Other - I will add my own diagnosis  -- Disagree - Not applicable / Not valid  -- Disagree - Clinically unable to determine / Unknown  -- Refer to Clinical Documentation Reviewer    PROVIDER RESPONSE TEXT:    This patient has weakness , unrelated to hyponatremia.     Query created by: Rocco Avila on 2021 3:54 PM      Electronically signed by:  Marlon Crowell MD 2021 1:50 PM

## 2021-12-29 NOTE — PROGRESS NOTES
Post Acute Facility Update     *The information contained in this note was received during a weekly care coordination call with the post acute facility*    This patient was discharged from [facility] to [facility] ([facility])   on 12/18/21. Hospital Discharge Diagnosis per Dr. Mitchell London:    1. Musculoskeletal chest pain  2. Nausea and vomiting  3. Chronic hyponatremia  4. Benign essential tremor  5. Severe debility and generalized weakness    Current Facility: [address] [place] [address] ([facility])  Anticipated Discharge Date: 1/7/21    Facility Nursing Update: doing well  Facility Social Work Update: Patient lives in [address] [place] [address] with Caregivers. Bundle Program Status: none  Upper Extremity Assistance: Stand by Assist - Presence and Cueing  Lower Extremity Assistance: Contact Guard Assist - hands on patient for balance   Bed Mobility: Stand by Assist - Presence and Cueing  Transfers: Stand by Assist - Presence and Cueing  Ambulation: Stand by Assist - Presence and Cueing  How far (in feet) is the patient ambulating?  50  Device Used: walker  Barriers to Discharge: none  Other:      Was active with [employer] at [email or web address] home PTA

## 2022-01-06 ENCOUNTER — PATIENT OUTREACH (OUTPATIENT)
Dept: CASE MANAGEMENT | Age: 87
End: 2022-01-06

## 2022-01-06 NOTE — PROGRESS NOTES
Post Acute Facility Update     *The information contained in this note was received during a weekly care coordination call with the post acute facility*    This patient was discharged from [facility] to [address] [place] [address],[address] [place] ([facility])   on 12/18/21. Hospital Discharge Diagnosis per Dr. Ulysses Jarrett:    1. Musculoskeletal chest pain  2. Nausea and vomiting  3. Chronic hyponatremia  4. Benign essential tremor  5. Severe debility and generalized weakness    Current Facility: [address] [place] [address] ([facility])  Anticipated Discharge Date: 1/16/21    Facility Nursing Update: covid + now, doing ok  Facility Social Work Update: Patient lives in [address] [place] [address] with Caregivers. Bundle Program Status: none  Upper Extremity Assistance: Stand by Assist - Presence and Cueing  Lower Extremity Assistance: Contact Guard Assist - hands on patient for balance   Bed Mobility: Stand by Assist - Presence and Cueing  Transfers: Stand by Assist - Presence and Cueing  Ambulation: Stand by Assist - Presence and Cueing  How far (in feet) is the patient ambulating?  50  Device Used: walker  Barriers to Discharge: discharge pushed back due to Covid +   Other:      Was active with [employer] at [facility] [employer] PTA

## 2022-01-13 ENCOUNTER — PATIENT OUTREACH (OUTPATIENT)
Dept: CASE MANAGEMENT | Age: 87
End: 2022-01-13

## 2022-01-13 NOTE — PROGRESS NOTES
Post Acute Facility Update     *The information contained in this note was received during a weekly care coordination call with the post acute facility*    This patient was discharged from [facility] to [address] [place] [address],[address] [facility] ([facility])   on 12/18/21. Hospital Discharge Diagnosis per Dr. Zahraa Mcfarland:    1. Musculoskeletal chest pain  2. Nausea and vomiting  3. Chronic hyponatremia  4. Benign essential tremor  5. Severe debility and generalized weakness    Current Facility: [address] [place] [address] ([facility])  Anticipated Discharge Date: 1/14/21    Facility Nursing Update: covid + now, doing ok  Facility Social Work Update: Patient lives in [address] [place] [address] with Caregivers. Bundle Program Status: none  Upper Extremity Assistance: Stand by Assist - Presence and Cueing  Lower Extremity Assistance: Stand by Assist - Presence and Cueing  Bed Mobility: Stand by Assist - Presence and Cueing  Transfers: Stand by Assist - Presence and Cueing  Ambulation: Stand by Assist - Presence and Cueing  How far (in feet) is the patient ambulating? 50 ft x 2  Device Used: walker  Barriers to Discharge:     Other:      Was active with [facility] [place] at group [place] PTA

## 2022-01-15 ENCOUNTER — HOSPITAL ENCOUNTER (EMERGENCY)
Age: 87
Discharge: NURSING, REHAB OR LONG-TERM CARE | End: 2022-01-16
Attending: EMERGENCY MEDICINE
Payer: MEDICARE

## 2022-01-15 DIAGNOSIS — R06.02 SOB (SHORTNESS OF BREATH): Primary | ICD-10-CM

## 2022-01-15 PROCEDURE — 99285 EMERGENCY DEPT VISIT HI MDM: CPT

## 2022-01-15 RX ORDER — PRIMIDONE 50 MG/1
25 TABLET ORAL
COMMUNITY

## 2022-01-16 ENCOUNTER — APPOINTMENT (OUTPATIENT)
Dept: GENERAL RADIOLOGY | Age: 87
End: 2022-01-16
Attending: EMERGENCY MEDICINE
Payer: MEDICARE

## 2022-01-16 VITALS
HEIGHT: 63 IN | BODY MASS INDEX: 16.83 KG/M2 | HEART RATE: 74 BPM | DIASTOLIC BLOOD PRESSURE: 62 MMHG | OXYGEN SATURATION: 96 % | RESPIRATION RATE: 18 BRPM | TEMPERATURE: 98 F | SYSTOLIC BLOOD PRESSURE: 144 MMHG | WEIGHT: 95 LBS

## 2022-01-16 LAB
ALBUMIN SERPL-MCNC: 3.1 G/DL (ref 3.4–5)
ALBUMIN/GLOB SERPL: 1 {RATIO} (ref 0.8–1.7)
ALP SERPL-CCNC: 76 U/L (ref 45–117)
ALT SERPL-CCNC: 14 U/L (ref 13–56)
ANION GAP SERPL CALC-SCNC: 6 MMOL/L (ref 3–18)
APTT PPP: 27.1 SEC (ref 23–36.4)
AST SERPL-CCNC: 12 U/L (ref 10–38)
BASOPHILS # BLD: 0 K/UL (ref 0–0.1)
BASOPHILS NFR BLD: 1 % (ref 0–2)
BILIRUB SERPL-MCNC: 0.3 MG/DL (ref 0.2–1)
BUN SERPL-MCNC: 11 MG/DL (ref 7–18)
BUN/CREAT SERPL: 14 (ref 12–20)
CALCIUM SERPL-MCNC: 8.5 MG/DL (ref 8.5–10.1)
CHLORIDE SERPL-SCNC: 104 MMOL/L (ref 100–111)
CO2 SERPL-SCNC: 27 MMOL/L (ref 21–32)
CREAT SERPL-MCNC: 0.78 MG/DL (ref 0.6–1.3)
DIFFERENTIAL METHOD BLD: ABNORMAL
EOSINOPHIL # BLD: 0 K/UL (ref 0–0.4)
EOSINOPHIL NFR BLD: 1 % (ref 0–5)
ERYTHROCYTE [DISTWIDTH] IN BLOOD BY AUTOMATED COUNT: 12.9 % (ref 11.6–14.5)
GLOBULIN SER CALC-MCNC: 3.2 G/DL (ref 2–4)
GLUCOSE SERPL-MCNC: 100 MG/DL (ref 74–99)
HCT VFR BLD AUTO: 28.4 % (ref 35–45)
HGB BLD-MCNC: 9.3 G/DL (ref 12–16)
IMM GRANULOCYTES # BLD AUTO: 0 K/UL (ref 0–0.04)
IMM GRANULOCYTES NFR BLD AUTO: 0 % (ref 0–0.5)
INR PPP: 1 (ref 0.8–1.2)
LYMPHOCYTES # BLD: 1.5 K/UL (ref 0.9–3.6)
LYMPHOCYTES NFR BLD: 25 % (ref 21–52)
MCH RBC QN AUTO: 28.6 PG (ref 24–34)
MCHC RBC AUTO-ENTMCNC: 32.7 G/DL (ref 31–37)
MCV RBC AUTO: 87.4 FL (ref 78–100)
MONOCYTES # BLD: 0.5 K/UL (ref 0.05–1.2)
MONOCYTES NFR BLD: 8 % (ref 3–10)
NEUTS SEG # BLD: 4 K/UL (ref 1.8–8)
NEUTS SEG NFR BLD: 66 % (ref 40–73)
NRBC # BLD: 0 K/UL (ref 0–0.01)
NRBC BLD-RTO: 0 PER 100 WBC
PLATELET # BLD AUTO: 349 K/UL (ref 135–420)
PMV BLD AUTO: 8.7 FL (ref 9.2–11.8)
POTASSIUM SERPL-SCNC: 3.5 MMOL/L (ref 3.5–5.5)
PROT SERPL-MCNC: 6.3 G/DL (ref 6.4–8.2)
PROTHROMBIN TIME: 13.2 SEC (ref 11.5–15.2)
RBC # BLD AUTO: 3.25 M/UL (ref 4.2–5.3)
SODIUM SERPL-SCNC: 137 MMOL/L (ref 136–145)
TROPONIN-HIGH SENSITIVITY: 12 NG/L (ref 0–54)
WBC # BLD AUTO: 6.1 K/UL (ref 4.6–13.2)

## 2022-01-16 PROCEDURE — 85610 PROTHROMBIN TIME: CPT

## 2022-01-16 PROCEDURE — 85730 THROMBOPLASTIN TIME PARTIAL: CPT

## 2022-01-16 PROCEDURE — 84484 ASSAY OF TROPONIN QUANT: CPT

## 2022-01-16 PROCEDURE — 85025 COMPLETE CBC W/AUTO DIFF WBC: CPT

## 2022-01-16 PROCEDURE — 80053 COMPREHEN METABOLIC PANEL: CPT

## 2022-01-16 PROCEDURE — 93005 ELECTROCARDIOGRAM TRACING: CPT

## 2022-01-16 PROCEDURE — 71046 X-RAY EXAM CHEST 2 VIEWS: CPT

## 2022-01-16 NOTE — ED NOTES
Patient feeling much better. I have reviewed discharge instructions with the patient and caregiver. The patient and caregiver verbalized understanding. Patient armband removed and given to patient to take home.   Patient was informed of the privacy risks if armband lost or stolen  Current Discharge Medication List

## 2022-01-16 NOTE — ED TRIAGE NOTES
EMS states \"that patient has been experiencing shortness of breath since around 2030 tonight\". When EMS arrived on scene patients lungs were clear bilaterally with a pulse ox reading of 99% on room air. Since arriving in the ED patient pulse ox reading is 98% on room air. Patient talking in full and complete sentences with no respiratory effort or accessory muscle use noted during interview.

## 2022-01-16 NOTE — ED PROVIDER NOTES
HPI is a 61-year-old female who complains of having some mild shortness of breath that started just even. Paramedics were called and upon arrival patient was in no respiratory distress. Patient states she had chronic cardiac arrhythmia for many years. States she was on medication. She stop taking it because she felt fine.     Past Medical History:   Diagnosis Date    Anemia NEC     Asthma     Colitis     DDD (degenerative disc disease), cervical     Fibrocystic breast     GERD (gastroesophageal reflux disease)     Headache     HTN (hypertension)     Hyperlipidemia     OA (osteoarthritis)     Osteoporosis     PAF (paroxysmal atrial fibrillation) ([facility] [address] 1995    Pneumonia     Thyroid nodule     Vitamin D deficiency 2/7/2011       Past Surgical History:   Procedure Laterality Date    HX GYN      hysterectomy    HX HEENT      cataract surgery bilaterally    HX TOTAL ABDOMINAL HYSTERECTOMY      NC BREAST SURGERY PROCEDURE UNLISTED      cysts removed    NC CARDIAC SURG PROCEDURE UNLIST      cardiac catherization         Family History:   Problem Relation Age of Onset    Hypertension Other     Diabetes Other     Heart Disease Other     Cancer Other         stomach & colon    Diabetes Mother     Heart Attack Mother     Stroke Mother     Heart Attack Father     Heart Failure Father     Arthritis-rheumatoid Father     Other Brother         aneuysm-ruptured    Parkinson's Disease Brother        Social History     Socioeconomic History    Marital status:      Spouse name: Not on file    Number of children: Not on file    Years of education: Not on file    Highest education level: Not on file   Occupational History    Not on file   Tobacco Use    Smoking status: Never Smoker    Smokeless tobacco: Never Used   Vaping Use    Vaping Use: Never used   Substance and Sexual Activity    Alcohol use: No    Drug use: No    Sexual activity: Not on file   Other Topics Concern    Not on file   Social History Narrative    Not on file     Social Determinants of Health     Financial Resource Strain:     Difficulty of Paying Living Expenses: Not on file   Food Insecurity:     Worried About Running Out of Food in the Last Year: Not on file    Tino of Food in the Last Year: Not on file   Transportation Needs:     Lack of Transportation (Medical): Not on file    Lack of Transportation (Non-Medical): Not on file   Physical Activity:     Days of Exercise per Week: Not on file    Minutes of Exercise per Session: Not on file   Stress:     Feeling of Stress : Not on file   Social Connections:     Frequency of Communication with Friends and Family: Not on file    Frequency of Social Gatherings with Friends and Family: Not on file    Attends Yazidism Services: Not on file    Active Member of [address] [place] [address] [employer] or Organizations: Not on file    Attends Club or Organization Meetings: Not on file    Marital Status: Not on file   Intimate Partner Violence:     Fear of Current or Ex-Partner: Not on file    Emotionally Abused: Not on file    Physically Abused: Not on file    Sexually Abused: Not on file   Housing Stability:     Unable to Pay for Housing in the Last Year: Not on file    Number of [place] in the Last Year: Not on file    Unstable Housing in the Last Year: Not on file         ALLERGIES: Latex, natural rubber; Asa-acetaminophen-caff-potass; Aspirin; Erythromycin; Iodine and iodide containing products; Lemon; Other medication; Pcn [penicillins]; Shellfish containing products; and Sulfa (sulfonamide antibiotics)    Review of Systems   Constitutional: Negative. HENT: Negative. Eyes: Negative. Respiratory: Negative. Cardiovascular: Negative. Gastrointestinal: Negative. Endocrine: Negative. Genitourinary: Negative. Musculoskeletal: Negative. Skin: Negative. Allergic/Immunologic: Negative. Neurological: Negative.          (+) generlize tremor   Hematological: Negative. Psychiatric/Behavioral: Negative. All other systems reviewed and are negative. Vitals:    01/15/22 2327   BP: (!) 156/59   Pulse: 92   Resp: 21   Temp: 98.2 °F (36.8 °C)   SpO2: 100%   Weight: 43.1 kg (95 lb)   Height: 5' 2.5\" (1.588 m)            Physical Exam  Vitals and nursing note reviewed. Constitutional:       General: She is not in acute distress. Appearance: She is well-developed. She is not diaphoretic. HENT:      Head: Normocephalic. Right Ear: External ear normal.      Left Ear: External ear normal.      Mouth/Throat:      Pharynx: No oropharyngeal exudate. Eyes:      General: No scleral icterus. Right eye: No discharge. Left eye: No discharge. Conjunctiva/sclera: Conjunctivae normal.      Pupils: Pupils are equal, round, and reactive to light. Neck:      Thyroid: No thyromegaly. Vascular: No JVD. Trachea: No tracheal deviation. Cardiovascular:      Rate and Rhythm: Normal rate and regular rhythm. Heart sounds: Normal heart sounds. No murmur heard. No friction rub. No gallop. Pulmonary:      Effort: Pulmonary effort is normal. No respiratory distress. Breath sounds: Normal breath sounds. No stridor. No wheezing or rales. Chest:      Chest wall: No tenderness. Abdominal:      General: Bowel sounds are normal. There is no distension. Palpations: Abdomen is soft. There is no mass. Tenderness: There is no abdominal tenderness. There is no guarding or rebound. Musculoskeletal:         General: No tenderness. Normal range of motion. Cervical back: Normal range of motion and neck supple. Lymphadenopathy:      Cervical: No cervical adenopathy. Skin:     General: Skin is warm and dry. Coloration: Skin is not pale. Findings: No erythema or rash. Neurological:      Mental Status: She is alert and oriented to person, place, and time. Cranial Nerves: No cranial nerve deficit.       Motor: No abnormal muscle tone. Coordination: Coordination normal.      Deep Tendon Reflexes: Reflexes normal.      Comments: (+)  Generalize tremors          MDM  Number of Diagnoses or Management Options     Amount and/or Complexity of Data Reviewed  Clinical lab tests: ordered and reviewed  Tests in the radiology section of CPT®: ordered and reviewed         Procedures     Lab test: Interpreted by me    EKG: Interpreted by me    Chest x-ray: Interpreted by me    Hospital course: Patient remained stable in the ER evaluation with no evidence of shortness of breath or hypoxia. Diagnosis: Shortness of breath resolved    Present: DC home: Follow-up PCP in 2 days: Return to ER as needed. Dictation disclaimer:  Please note that this dictation was completed with [employer], the computer voice recognition software. Quite often unanticipated grammatical, syntax, homophones, and other interpretive errors are inadvertently transcribed by the computer software. Please disregard these errors. Please excuse any errors that have escaped final proofreading.

## 2022-01-16 NOTE — ED NOTES
Patient waiting for [facility], awake alert, not in any acute distress, vital signs remain stable. Will continue to monitor.

## 2022-01-17 ENCOUNTER — PATIENT OUTREACH (OUTPATIENT)
Dept: CASE MANAGEMENT | Age: 87
End: 2022-01-17

## 2022-01-17 LAB
ATRIAL RATE: 75 BPM
CALCULATED P AXIS, ECG09: 77 DEGREES
CALCULATED R AXIS, ECG10: -3 DEGREES
CALCULATED T AXIS, ECG11: 55 DEGREES
DIAGNOSIS, 93000: NORMAL
P-R INTERVAL, ECG05: 164 MS
Q-T INTERVAL, ECG07: 394 MS
QRS DURATION, ECG06: 74 MS
QTC CALCULATION (BEZET), ECG08: 439 MS
VENTRICULAR RATE, ECG03: 75 BPM

## 2022-01-17 NOTE — PROGRESS NOTES
Attempted to reach patient in Transition of Care follow up. Patient was discharged from [address] [place] [address] back to [employer] home\" where patient lives normally. No answer on pt's number. No PHI signed for caregiver. Episode resolved at this time due to inability to reach patient.

## 2022-02-06 ENCOUNTER — APPOINTMENT (OUTPATIENT)
Dept: GENERAL RADIOLOGY | Age: 87
End: 2022-02-06
Attending: STUDENT IN AN ORGANIZED HEALTH CARE EDUCATION/TRAINING PROGRAM
Payer: MEDICARE

## 2022-02-06 ENCOUNTER — APPOINTMENT (OUTPATIENT)
Dept: CT IMAGING | Age: 87
End: 2022-02-06
Attending: STUDENT IN AN ORGANIZED HEALTH CARE EDUCATION/TRAINING PROGRAM
Payer: MEDICARE

## 2022-02-06 ENCOUNTER — HOSPITAL ENCOUNTER (EMERGENCY)
Age: 87
Discharge: HOME | End: 2022-02-06
Attending: STUDENT IN AN ORGANIZED HEALTH CARE EDUCATION/TRAINING PROGRAM
Payer: MEDICARE

## 2022-02-06 VITALS
DIASTOLIC BLOOD PRESSURE: 71 MMHG | WEIGHT: 95 LBS | BODY MASS INDEX: 17.48 KG/M2 | HEIGHT: 62 IN | SYSTOLIC BLOOD PRESSURE: 146 MMHG | HEART RATE: 98 BPM | RESPIRATION RATE: 16 BRPM | OXYGEN SATURATION: 97 % | TEMPERATURE: 99.1 F

## 2022-02-06 DIAGNOSIS — R06.02 SHORTNESS OF BREATH: ICD-10-CM

## 2022-02-06 DIAGNOSIS — G25.0 BENIGN ESSENTIAL TREMOR: Primary | ICD-10-CM

## 2022-02-06 DIAGNOSIS — D64.9 ANEMIA, UNSPECIFIED TYPE: ICD-10-CM

## 2022-02-06 DIAGNOSIS — R51.9 ACUTE NONINTRACTABLE HEADACHE, UNSPECIFIED HEADACHE TYPE: ICD-10-CM

## 2022-02-06 LAB
ALBUMIN SERPL-MCNC: 4 G/DL (ref 3.4–5)
ALBUMIN/GLOB SERPL: 1.1 {RATIO} (ref 0.8–1.7)
ALP SERPL-CCNC: 83 U/L (ref 45–117)
ALT SERPL-CCNC: 15 U/L (ref 13–56)
ANION GAP SERPL CALC-SCNC: 4 MMOL/L (ref 3–18)
AST SERPL-CCNC: 16 U/L (ref 10–38)
ATRIAL RATE: 100 BPM
BASOPHILS # BLD: 0.1 K/UL (ref 0–0.1)
BASOPHILS NFR BLD: 1 % (ref 0–2)
BILIRUB SERPL-MCNC: 0.3 MG/DL (ref 0.2–1)
BNP SERPL-MCNC: 215 PG/ML (ref 0–1800)
BUN SERPL-MCNC: 11 MG/DL (ref 7–18)
BUN/CREAT SERPL: 13 (ref 12–20)
CALCIUM SERPL-MCNC: 9.6 MG/DL (ref 8.5–10.1)
CALCULATED P AXIS, ECG09: 86 DEGREES
CALCULATED R AXIS, ECG10: 20 DEGREES
CALCULATED T AXIS, ECG11: 97 DEGREES
CHLORIDE SERPL-SCNC: 99 MMOL/L (ref 100–111)
CO2 SERPL-SCNC: 31 MMOL/L (ref 21–32)
CREAT SERPL-MCNC: 0.87 MG/DL (ref 0.6–1.3)
DIAGNOSIS, 93000: NORMAL
DIFFERENTIAL METHOD BLD: ABNORMAL
EOSINOPHIL # BLD: 0 K/UL (ref 0–0.4)
EOSINOPHIL NFR BLD: 1 % (ref 0–5)
ERYTHROCYTE [DISTWIDTH] IN BLOOD BY AUTOMATED COUNT: 13.5 % (ref 11.6–14.5)
GLOBULIN SER CALC-MCNC: 3.5 G/DL (ref 2–4)
GLUCOSE SERPL-MCNC: 101 MG/DL (ref 74–99)
HCT VFR BLD AUTO: 34.3 % (ref 35–45)
HGB BLD-MCNC: 11 G/DL (ref 12–16)
IMM GRANULOCYTES # BLD AUTO: 0 K/UL (ref 0–0.04)
IMM GRANULOCYTES NFR BLD AUTO: 0 % (ref 0–0.5)
LYMPHOCYTES # BLD: 1.4 K/UL (ref 0.9–3.6)
LYMPHOCYTES NFR BLD: 29 % (ref 21–52)
MCH RBC QN AUTO: 27.8 PG (ref 24–34)
MCHC RBC AUTO-ENTMCNC: 32.1 G/DL (ref 31–37)
MCV RBC AUTO: 86.8 FL (ref 78–100)
MONOCYTES # BLD: 0.3 K/UL (ref 0.05–1.2)
MONOCYTES NFR BLD: 7 % (ref 3–10)
NEUTS SEG # BLD: 2.9 K/UL (ref 1.8–8)
NEUTS SEG NFR BLD: 62 % (ref 40–73)
NRBC # BLD: 0 K/UL (ref 0–0.01)
NRBC BLD-RTO: 0 PER 100 WBC
P-R INTERVAL, ECG05: 144 MS
PLATELET # BLD AUTO: 296 K/UL (ref 135–420)
PMV BLD AUTO: 9.1 FL (ref 9.2–11.8)
POTASSIUM SERPL-SCNC: 4.3 MMOL/L (ref 3.5–5.5)
PROT SERPL-MCNC: 7.5 G/DL (ref 6.4–8.2)
Q-T INTERVAL, ECG07: 326 MS
QRS DURATION, ECG06: 74 MS
QTC CALCULATION (BEZET), ECG08: 420 MS
RBC # BLD AUTO: 3.95 M/UL (ref 4.2–5.3)
SODIUM SERPL-SCNC: 134 MMOL/L (ref 136–145)
TROPONIN-HIGH SENSITIVITY: 7 NG/L (ref 0–54)
VENTRICULAR RATE, ECG03: 100 BPM
WBC # BLD AUTO: 4.6 K/UL (ref 4.6–13.2)

## 2022-02-06 PROCEDURE — 85025 COMPLETE CBC W/AUTO DIFF WBC: CPT

## 2022-02-06 PROCEDURE — 70450 CT HEAD/BRAIN W/O DYE: CPT

## 2022-02-06 PROCEDURE — 71045 X-RAY EXAM CHEST 1 VIEW: CPT

## 2022-02-06 PROCEDURE — 84484 ASSAY OF TROPONIN QUANT: CPT

## 2022-02-06 PROCEDURE — 83880 ASSAY OF NATRIURETIC PEPTIDE: CPT

## 2022-02-06 PROCEDURE — 96374 THER/PROPH/DIAG INJ IV PUSH: CPT

## 2022-02-06 PROCEDURE — 74011250637 HC RX REV CODE- 250/637: Performed by: STUDENT IN AN ORGANIZED HEALTH CARE EDUCATION/TRAINING PROGRAM

## 2022-02-06 PROCEDURE — 80053 COMPREHEN METABOLIC PANEL: CPT

## 2022-02-06 PROCEDURE — 99283 EMERGENCY DEPT VISIT LOW MDM: CPT

## 2022-02-06 PROCEDURE — 74011250636 HC RX REV CODE- 250/636: Performed by: STUDENT IN AN ORGANIZED HEALTH CARE EDUCATION/TRAINING PROGRAM

## 2022-02-06 PROCEDURE — 93005 ELECTROCARDIOGRAM TRACING: CPT

## 2022-02-06 PROCEDURE — 83735 ASSAY OF MAGNESIUM: CPT

## 2022-02-06 RX ORDER — DIPHENHYDRAMINE HCL 25 MG
25 CAPSULE ORAL
Status: COMPLETED | OUTPATIENT
Start: 2022-02-06 | End: 2022-02-06

## 2022-02-06 RX ORDER — HALOPERIDOL 5 MG/ML
2 INJECTION INTRAMUSCULAR
Status: COMPLETED | OUTPATIENT
Start: 2022-02-06 | End: 2022-02-06

## 2022-02-06 RX ORDER — ACETAMINOPHEN 500 MG
1000 TABLET ORAL ONCE
Status: COMPLETED | OUTPATIENT
Start: 2022-02-06 | End: 2022-02-06

## 2022-02-06 RX ADMIN — HALOPERIDOL LACTATE 2 MG: 5 INJECTION, SOLUTION INTRAMUSCULAR at 14:04

## 2022-02-06 RX ADMIN — ACETAMINOPHEN 1000 MG: 500 TABLET ORAL at 13:24

## 2022-02-06 RX ADMIN — DIPHENHYDRAMINE HYDROCHLORIDE 25 MG: 25 CAPSULE ORAL at 13:24

## 2022-02-06 NOTE — ED PROVIDER NOTES
EMERGENCY DEPARTMENT HISTORY AND PHYSICAL EXAM      Date: 2/6/2022  Patient Name: Crystal Overton    History of Presenting Illness     Chief Complaint   Patient presents with    Other       History (Context): Crystal Overton is a 80 y.o. female with a past medical history significant for hypertension, paroxysmal A. fib, GERD, headaches, hypertension, hyperlipidemia, and benign essential tremors comes into the ED today due to worsening of her tremors. Patient states that tremors have been ongoing for 2 years and has been seen by neurology for this without an etiology for her symptoms. She states that she takes all her medication prescribed to her appropriately and has not missed any doses. She states over the past day she has had worsening of her tremors. She states symptoms mainly involve the upper extremities bilaterally however she does admit to some tremors to the bilateral lower extremities to a lesser degree. She states associated headache with her symptoms but denies any recent falls, numbness, tingling, or weakness. She states she is also felt dyspneic over the past day and believes she may have \"fluid on her lungs. \"  Patient does admit to following up with neurology for evaluation recently. She states her symptoms are severe in quality. She denies any alleviating or exacerbating factors for her tremors. Of note patient does admit to feeling of tremors are affecting her speech      PCP: None    Current Facility-Administered Medications   Medication Dose Route Frequency Provider Last Rate Last Admin    diphenhydrAMINE (BENADRYL) capsule 25 mg  25 mg Oral NOW Misael Carver, DO        acetaminophen (TYLENOL) tablet 1,000 mg  1,000 mg Oral ONCE Misael Carver, DO         Current Outpatient Medications   Medication Sig Dispense Refill    primidone (MYSOLINE) 50 mg tablet Take 25 mg by mouth nightly.  propranolol LA (INDERAL LA) 60 mg SR capsule Take 1 Capsule by mouth daily. (Patient not taking: Reported on 1/15/2022) 30 Capsule 0    ondansetron (ZOFRAN ODT) 4 mg disintegrating tablet Take 1 Tablet by mouth every eight (8) hours as needed for Nausea or Vomiting. 30 Tablet 0    sodium chloride 1 gram tablet Take 2 g by mouth daily.  ezetimibe (ZETIA) 10 mg tablet Take 10 mg by mouth daily.  lubiPROStone (AMITIZA) 8 mcg capsule Take 8 mcg by mouth two (2) times a day.  omeprazole (PRILOSEC) 20 mg capsule Take 20 mg by mouth daily.  senna (Senna) 8.6 mg tablet Take 1 Tab by mouth daily. (Patient not taking: Reported on 1/15/2022) 30 Tab 0    polyethylene glycol (Miralax) 17 gram/dose powder Take 17 g by mouth two (2) times a day. 1 tablespoon with 8 oz of water daily (Patient not taking: Reported on 1/15/2022) 507 g 0    acetaminophen (TYLENOL EX STR ARTHRITIS PAIN) 500 mg tablet Take 1 Tab by mouth every six (6) hours as needed. (Patient not taking: Reported on 1/15/2022) 60 Tab 1    diphenhydrAMINE (BENADRYL ALLERGY) 25 mg tablet Take 1 Tab by mouth every six (6) hours as needed.  (Patient not taking: Reported on 1/15/2022) 30 Tab 2       Past History     Past Medical History:   Past Medical History:   Diagnosis Date    Anemia NEC     Asthma     Colitis     DDD (degenerative disc disease), cervical     Fibrocystic breast     GERD (gastroesophageal reflux disease)     Headache     HTN (hypertension)     Hyperlipidemia     OA (osteoarthritis)     Osteoporosis     PAF (paroxysmal atrial fibrillation) ([facility] [address] 1995    Pneumonia     Thyroid nodule     Vitamin D deficiency 2/7/2011       Past Surgical History:  Past Surgical History:   Procedure Laterality Date    HX GYN      hysterectomy    HX HEENT      cataract surgery bilaterally    HX TOTAL ABDOMINAL HYSTERECTOMY      OH BREAST SURGERY PROCEDURE UNLISTED      cysts removed    OH CARDIAC SURG PROCEDURE UNLIST      cardiac catherization       Family History:  Family History   Problem Relation Age of Onset    Hypertension Other     Diabetes Other     Heart Disease Other     Cancer Other         stomach & colon    Diabetes Mother     Heart Attack Mother     Stroke Mother     Heart Attack Father     Heart Failure Father    Meza Arthritis-rheumatoid Father     Other Brother         aneuysm-ruptured    Parkinson's Disease Brother        Social History:   Social History     Tobacco Use    Smoking status: Never Smoker    Smokeless tobacco: Never Used   Vaping Use    Vaping Use: Never used   Substance Use Topics    Alcohol use: No    Drug use: No       Allergies: Allergies   Allergen Reactions    Latex, Natural Rubber Unknown (comments)    Asa-Acetaminophen-Caff-Potass Shortness of Breath     Patient is only allergic to ASA    Aspirin Shortness of Breath and Other (comments)     Patient states this caused her stomach to bleed internal    Erythromycin Other (comments)     bleeding    Iodine And Iodide Containing Products Unknown (comments)    Lemon Unknown (comments)    Other Medication Unknown (comments)     Pt not sure of allergies states \"I'm allergic to more but not sure of name\"    Pcn [Penicillins] Swelling    Shellfish Containing Products Unknown (comments)    Sulfa (Sulfonamide Antibiotics) Unknown (comments)       PMH, PSH, family history, social history, allergies reviewed with the patient with significant items noted above. Review of Systems   Review of Systems   Constitutional: Negative for chills and fever. HENT: Negative for sore throat. Eyes: Negative for visual disturbance. Respiratory: Negative for shortness of breath. Cardiovascular: Negative for chest pain. Gastrointestinal: Negative for abdominal pain, nausea and vomiting. Genitourinary: Negative for difficulty urinating. Musculoskeletal: Negative for myalgias. Skin: Negative for rash. Neurological: Positive for tremors, speech difficulty and headaches.  Negative for dizziness, facial asymmetry, weakness, light-headedness and numbness. Physical Exam     Vitals:    02/06/22 1233   BP: (!) 146/71   Pulse: 98   Resp: 16   Temp: 99.1 °F (37.3 °C)   SpO2: 97%   Weight: 43.1 kg (95 lb)   Height: 5' 1.5\" (1.562 m)       Physical Exam  Vitals and nursing note reviewed. Constitutional:       General: She is not in acute distress. Appearance: Normal appearance. She is not ill-appearing or toxic-appearing. HENT:      Head: Normocephalic and atraumatic. Mouth/Throat:      Mouth: Mucous membranes are moist.   Eyes:      General: No scleral icterus. Conjunctiva/sclera: Conjunctivae normal.   Cardiovascular:      Rate and Rhythm: Regular rhythm. Tachycardia present. Pulses: Normal pulses. Comments: Normal peripheral perfusion  Pulmonary:      Effort: Pulmonary effort is normal. No respiratory distress. Abdominal:      General: There is no distension. Palpations: Abdomen is soft. Tenderness: There is no abdominal tenderness. Musculoskeletal:         General: No deformity. Normal range of motion. Cervical back: Normal range of motion and neck supple. Skin:     General: Skin is warm and dry. Findings: No rash. Neurological:      Mental Status: She is alert and oriented to person, place, and time. Mental status is at baseline. Sensory: No sensory deficit. Comments: Patient with bilateral upper extremity tremors. Appears to be with intention as well as at rest.  No pill-rolling tremor visualized. By placing patients hands on her lap her tremors to her extremity would stop however would begin to have tremors to her head. Psychiatric:         Mood and Affect: Mood normal.         Thought Content:  Thought content normal.         Diagnostic Study Results     Labs -     Recent Results (from the past 12 hour(s))   EKG, 12 LEAD, INITIAL    Collection Time: 02/06/22 12:49 PM   Result Value Ref Range    Ventricular Rate 100 BPM    Atrial Rate 100 BPM    P-R Interval 144 ms    QRS Duration 74 ms    Q-T Interval 326 ms    QTC Calculation (Bezet) 420 ms    Calculated P Axis 86 degrees    Calculated R Axis 20 degrees    Calculated T Axis 97 degrees    Diagnosis       Normal sinus rhythm  Nonspecific T wave abnormality  Abnormal ECG  When compared with ECG of 16-JAN-2022 01:40,  Inverted T waves have replaced nonspecific T wave abnormality in Lateral   leads        Labs Reviewed   CBC WITH AUTOMATED DIFF   METABOLIC PANEL, COMPREHENSIVE   TROPONIN-HIGH SENSITIVITY   MAGNESIUM   NT-PRO BNP       Radiologic Studies -   XR CHEST PORT    (Results Pending)     CT Results  (Last 48 hours)    None        CXR Results  (Last 48 hours)    None          The laboratory results, imaging results, and other diagnostic exams were reviewed in the EMR. Medical Decision Making   I am the first provider for this patient. I reviewed the vital signs, available nursing notes, past medical history, past surgical history, family history and social history. Vital Signs-Reviewed the patient's vital signs. ED EKG interpretation:  Rhythm: sinus tachycardia; and regular . Rate (approx.): 100; Axis: normal; P wave: normal; QRS interval: normal ; ST/T wave: T wave inverted; Other findings: abnormal ekg. This EKG was interpreted by Noah Tineo D.O. Records Reviewed: Personally, on initial evaluation    MDM:   Edith Poppy presents with complaint of tremors, dyspnea, headache  DDX includes but is not limited to: Benign essential tremor, hyponatremia, pulmonary edema    Patient overall well-appearing, no acute distress, and vital signs grossly within normal limits with exception to slight tachycardia. Will obtain lab work and imaging for further evaluation of patients complaint. Will continue to monitor and evaluate patient while in the ED.       Orders as below:  Orders Placed This Encounter    XR CHEST PORT    CBC WITH AUTOMATED DIFF    COMPREHENSIVE METABOLIC PANEL    TROPONIN-HIGH SENSITIVITY    MAGNESIUM    PRO-BNP    EKG, 12 LEAD, INITIAL    diphenhydrAMINE (BENADRYL) capsule 25 mg    acetaminophen (TYLENOL) tablet 1,000 mg        ED Course:   ED Course as of 02/06/22 2031   Sun Feb 06, 2022   1422 Patient's lab work significant for hemoglobin 11.0, otherwise labs grossly within normal limits. Chest x-ray does not show any acute cardiopulmonary abnormalities. Patient was unable to lay still for CT scan initially and therefore provided patient with 2 mg IV Haldol. Patient appears better and will now attempt CT scan. We will continue to monitor patient. [DV]   1520 Patient CT the head does not show any acute intracranial abnormalities. Will monitor patient for improvement of her mental status post Haldol administration. Will likely discharge patient home following with return precautions and follow-up recommendations. We will continue to monitor patient. [DV]   4229 Patient without any evidence of tremors following Haldol administration. We will continue to monitor patient. [DV]   1612 Patient appears to be at her cognitive baseline. Asking for food. Tolerating p.o. intake appropriately. Will discharge patient back to her care provider with return precautions and follow-up recommendations. Care provider stated that patient has had recent increased dose of medication. Will recommend patient follow-up with neurology for further evaluation. Patient verbalized understanding and was without any further questions. [DV]      ED Course User Index  [DV] Keon Brar DO           Procedures:  Procedures        Diagnosis and Disposition     CLINICAL IMPRESSION:  No diagnosis found. Current Discharge Medication List          Disposition: Home    Patient condition at time of disposition: Stable    DISCHARGE NOTE:   Pt has been reexamined. Patient has no new complaints, changes, or physical findings. Care plan outlined and precautions discussed.   Results were reviewed with the patient. All medications were reviewed with the patient. All of pt's questions and concerns were addressed. Alarm symptoms and return precautions associated with chief complaint and evaluation were reviewed with the patient in detail. The patient demonstrated adequate understanding. Patient was instructed to follow up with PCP, as well as strict return precautions to the ED upon further deterioration. Patient is ready to go home. The patient is happy with this plan            Dragon Disclaimer     Please note that this dictation was completed with [employer], the computer voice recognition software. Quite often unanticipated grammatical, syntax, homophones, and other interpretive errors are inadvertently transcribed by the computer software. Please disregard these errors. Please excuse any errors that have escaped final proofreading. Hayden ANDERSON.

## 2022-02-06 NOTE — ED TRIAGE NOTES
Patient with uncontrolled treamors x 2 weeks that are getting worse, patient sttes that she has been checked for Parkinsons and it was negative

## 2022-02-07 LAB — MAGNESIUM SERPL-MCNC: 1.8 MG/DL (ref 1.6–2.6)

## 2022-03-10 ENCOUNTER — HOSPITAL ENCOUNTER (EMERGENCY)
Age: 87
Discharge: HOME | End: 2022-03-10
Attending: EMERGENCY MEDICINE
Payer: MEDICARE

## 2022-03-10 VITALS
BODY MASS INDEX: 17.66 KG/M2 | WEIGHT: 95 LBS | TEMPERATURE: 98.5 F | HEART RATE: 78 BPM | RESPIRATION RATE: 19 BRPM | DIASTOLIC BLOOD PRESSURE: 87 MMHG | SYSTOLIC BLOOD PRESSURE: 135 MMHG | OXYGEN SATURATION: 100 %

## 2022-03-10 DIAGNOSIS — G25.0 BENIGN ESSENTIAL TREMOR: Primary | ICD-10-CM

## 2022-03-10 LAB
ALBUMIN SERPL-MCNC: 3.8 G/DL (ref 3.4–5)
ALBUMIN/GLOB SERPL: 1.1 {RATIO} (ref 0.8–1.7)
ALP SERPL-CCNC: 71 U/L (ref 45–117)
ALT SERPL-CCNC: 20 U/L (ref 13–56)
ANION GAP SERPL CALC-SCNC: 6 MMOL/L (ref 3–18)
AST SERPL-CCNC: 17 U/L (ref 10–38)
ATRIAL RATE: 88 BPM
BASOPHILS # BLD: 0 K/UL (ref 0–0.1)
BASOPHILS NFR BLD: 1 % (ref 0–2)
BILIRUB SERPL-MCNC: 0.3 MG/DL (ref 0.2–1)
BUN SERPL-MCNC: 8 MG/DL (ref 7–18)
BUN/CREAT SERPL: 11 (ref 12–20)
CALCIUM SERPL-MCNC: 9.1 MG/DL (ref 8.5–10.1)
CALCULATED R AXIS, ECG10: 32 DEGREES
CALCULATED T AXIS, ECG11: 97 DEGREES
CHLORIDE SERPL-SCNC: 93 MMOL/L (ref 100–111)
CO2 SERPL-SCNC: 30 MMOL/L (ref 21–32)
CREAT SERPL-MCNC: 0.72 MG/DL (ref 0.6–1.3)
DIAGNOSIS, 93000: NORMAL
DIFFERENTIAL METHOD BLD: ABNORMAL
EOSINOPHIL # BLD: 0 K/UL (ref 0–0.4)
EOSINOPHIL NFR BLD: 1 % (ref 0–5)
ERYTHROCYTE [DISTWIDTH] IN BLOOD BY AUTOMATED COUNT: 14.3 % (ref 11.6–14.5)
GLOBULIN SER CALC-MCNC: 3.5 G/DL (ref 2–4)
GLUCOSE SERPL-MCNC: 102 MG/DL (ref 74–99)
HCT VFR BLD AUTO: 34.2 % (ref 35–45)
HGB BLD-MCNC: 11.1 G/DL (ref 12–16)
IMM GRANULOCYTES # BLD AUTO: 0 K/UL (ref 0–0.04)
IMM GRANULOCYTES NFR BLD AUTO: 0 % (ref 0–0.5)
LYMPHOCYTES # BLD: 0.9 K/UL (ref 0.9–3.6)
LYMPHOCYTES NFR BLD: 23 % (ref 21–52)
MAGNESIUM SERPL-MCNC: 1.9 MG/DL (ref 1.6–2.6)
MCH RBC QN AUTO: 28.4 PG (ref 24–34)
MCHC RBC AUTO-ENTMCNC: 32.5 G/DL (ref 31–37)
MCV RBC AUTO: 87.5 FL (ref 78–100)
MONOCYTES # BLD: 0.3 K/UL (ref 0.05–1.2)
MONOCYTES NFR BLD: 7 % (ref 3–10)
NEUTS SEG # BLD: 2.8 K/UL (ref 1.8–8)
NEUTS SEG NFR BLD: 69 % (ref 40–73)
NRBC # BLD: 0 K/UL (ref 0–0.01)
NRBC BLD-RTO: 0 PER 100 WBC
PLATELET # BLD AUTO: 277 K/UL (ref 135–420)
PMV BLD AUTO: 9 FL (ref 9.2–11.8)
POTASSIUM SERPL-SCNC: 4 MMOL/L (ref 3.5–5.5)
PROT SERPL-MCNC: 7.3 G/DL (ref 6.4–8.2)
Q-T INTERVAL, ECG07: 352 MS
QRS DURATION, ECG06: 66 MS
QTC CALCULATION (BEZET), ECG08: 423 MS
RBC # BLD AUTO: 3.91 M/UL (ref 4.2–5.3)
SODIUM SERPL-SCNC: 129 MMOL/L (ref 136–145)
TROPONIN-HIGH SENSITIVITY: 8 NG/L (ref 0–54)
VENTRICULAR RATE, ECG03: 87 BPM
WBC # BLD AUTO: 4.1 K/UL (ref 4.6–13.2)

## 2022-03-10 PROCEDURE — 93005 ELECTROCARDIOGRAM TRACING: CPT

## 2022-03-10 PROCEDURE — 85025 COMPLETE CBC W/AUTO DIFF WBC: CPT

## 2022-03-10 PROCEDURE — 83735 ASSAY OF MAGNESIUM: CPT

## 2022-03-10 PROCEDURE — 99284 EMERGENCY DEPT VISIT MOD MDM: CPT

## 2022-03-10 PROCEDURE — 74011250636 HC RX REV CODE- 250/636: Performed by: EMERGENCY MEDICINE

## 2022-03-10 PROCEDURE — 80053 COMPREHEN METABOLIC PANEL: CPT

## 2022-03-10 PROCEDURE — 84484 ASSAY OF TROPONIN QUANT: CPT

## 2022-03-10 RX ADMIN — SODIUM CHLORIDE 500 ML: 9 INJECTION, SOLUTION INTRAVENOUS at 14:37

## 2022-03-10 NOTE — DISCHARGE INSTRUCTIONS
You were evaluated in the ER today for concerns of a worsening tremor. You were evaluated with lab work that noted low sodium and chloride. You were treated with IV fluids with improvement of symptoms. You were then discharged home. Please follow-up with your primary care provider and neurologist as needed. Please return to the ER if you have worsening of symptoms, lightheadedness, loss of consciousness, fever, chest pain.

## 2022-03-10 NOTE — ED PROVIDER NOTES
EMERGENCY DEPARTMENT HISTORY AND PHYSICAL EXAM    2:09 PM      Date: 3/10/2022  Patient Name: Cali Sherman    History of Presenting Illness     No chief complaint on file. History Provided By: patient    Additional History (Context): Cali Sherman is a 80 y.o. female  with a past medical history significant for paroxysmal A. fib, headaches, hypertension, hyperlipidemia, and benign essential tremors  presenting to the ED today due to worsening of her tremors. Patient states that she is had tremors for the past 2 years and is followed by neurology outpatient patient. She endorses taking all of her medications and has not missed any doses. She states that this morning when she woke up she was shaking more than normal which persisted even after taking her medications. This concerned her fellow housemates who called EMS. She states that the symptoms are mainly in the upper extremities bilaterally but she does have some in the lower extremities as well. She denies denies any recent falls, numbness, tingling, or weakness. She denies any alleviating or exacerbating factors for her tremors. She additionally endorses a general crampy abdominal pain that has been present since this morning. Denies any changes in diet or bowel movements. Denies any fever, chills, chest pain, headache, lightheadedness, dizziness, syncope. PCP: None    Chief Complaint: Tremors  Duration: Since this morning        Current Outpatient Medications   Medication Sig Dispense Refill    primidone (MYSOLINE) 50 mg tablet Take 25 mg by mouth nightly.  propranolol LA (INDERAL LA) 60 mg SR capsule Take 1 Capsule by mouth daily. (Patient not taking: Reported on 1/15/2022) 30 Capsule 0    ondansetron (ZOFRAN ODT) 4 mg disintegrating tablet Take 1 Tablet by mouth every eight (8) hours as needed for Nausea or Vomiting. 30 Tablet 0    sodium chloride 1 gram tablet Take 2 g by mouth daily.       ezetimibe (ZETIA) 10 mg tablet Take 10 mg by mouth daily.  lubiPROStone (AMITIZA) 8 mcg capsule Take 8 mcg by mouth two (2) times a day.  omeprazole (PRILOSEC) 20 mg capsule Take 20 mg by mouth daily.  senna (Senna) 8.6 mg tablet Take 1 Tab by mouth daily. (Patient not taking: Reported on 1/15/2022) 30 Tab 0    polyethylene glycol (Miralax) 17 gram/dose powder Take 17 g by mouth two (2) times a day. 1 tablespoon with 8 oz of water daily (Patient not taking: Reported on 1/15/2022) 507 g 0    acetaminophen (TYLENOL EX STR ARTHRITIS PAIN) 500 mg tablet Take 1 Tab by mouth every six (6) hours as needed. (Patient not taking: Reported on 1/15/2022) 60 Tab 1    diphenhydrAMINE (BENADRYL ALLERGY) 25 mg tablet Take 1 Tab by mouth every six (6) hours as needed.  (Patient not taking: Reported on 1/15/2022) 30 Tab 2       Past History     Past Medical History:  Past Medical History:   Diagnosis Date    Anemia NEC     Asthma     Colitis     DDD (degenerative disc disease), cervical     Fibrocystic breast     GERD (gastroesophageal reflux disease)     Headache     HTN (hypertension)     Hyperlipidemia     OA (osteoarthritis)     Osteoporosis     PAF (paroxysmal atrial fibrillation) ([facility] [address] 1995    Pneumonia     Thyroid nodule     Vitamin D deficiency 2/7/2011       Past Surgical History:  Past Surgical History:   Procedure Laterality Date    HX GYN      hysterectomy    HX HEENT      cataract surgery bilaterally    HX TOTAL ABDOMINAL HYSTERECTOMY      NM BREAST SURGERY PROCEDURE UNLISTED      cysts removed    NM CARDIAC SURG PROCEDURE UNLIST      cardiac catherization       Family History:  Family History   Problem Relation Age of Onset    Hypertension Other     Diabetes Other     Heart Disease Other     Cancer Other         stomach & colon    Diabetes Mother     Heart Attack Mother     Stroke Mother     Heart Attack Father     Heart Failure Father     Arthritis-rheumatoid Father     Other Brother aneuysm-ruptured    Parkinson's Disease Brother        Social History:  Social History     Tobacco Use    Smoking status: Never Smoker    Smokeless tobacco: Never Used   Vaping Use    Vaping Use: Never used   Substance Use Topics    Alcohol use: No    Drug use: No       Allergies: Allergies   Allergen Reactions    Latex, Natural Rubber Unknown (comments)    Asa-Acetaminophen-Caff-Potass Shortness of Breath     Patient is only allergic to ASA    Aspirin Shortness of Breath and Other (comments)     Patient states this caused her stomach to bleed internal    Erythromycin Other (comments)     bleeding    Iodine And Iodide Containing Products Unknown (comments)    Lemon Unknown (comments)    Other Medication Unknown (comments)     Pt not sure of allergies states \"I'm allergic to more but not sure of name\"    Pcn [Penicillins] Swelling    Shellfish Containing Products Unknown (comments)    Sulfa (Sulfonamide Antibiotics) Unknown (comments)         Review of Systems     Review of Systems   Constitutional: Negative for activity change, appetite change, chills, fatigue and fever. HENT: Negative for congestion, sinus pressure, sinus pain and sore throat. Eyes: Negative for pain. Respiratory: Negative for cough, chest tightness, shortness of breath and wheezing. Cardiovascular: Negative for chest pain, palpitations and leg swelling. Gastrointestinal: Positive for abdominal pain. Negative for constipation, diarrhea, nausea and vomiting. Genitourinary: Negative for difficulty urinating, dysuria and flank pain. Musculoskeletal: Negative for arthralgias, joint swelling and myalgias. Bilateral upper and lower extremity tremors   Skin: Negative for rash and wound. Neurological: Positive for tremors. Negative for dizziness, seizures, syncope, light-headedness and headaches.          Physical Exam       Patient Vitals for the past 12 hrs:   Temp Pulse Resp BP SpO2   03/10/22 1601 -- 78 19 135/87 100 %   03/10/22 1353 98.5 °F (36.9 °C) -- -- -- --   03/10/22 1348 -- 71 17 (!) 147/58 100 %   03/10/22 1330 -- 70 15 (!) 165/141 100 %   03/10/22 1146 -- 78 20 -- 100 %   03/10/22 1145 98.1 °F (36.7 °C) 83 24 129/85 99 %       IPVITALS  Patient Vitals for the past 24 hrs:   BP Temp Pulse Resp SpO2 Weight   03/10/22 1601 135/87 -- 78 19 100 % --   03/10/22 1353 -- 98.5 °F (36.9 °C) -- -- -- --   03/10/22 1348 (!) 147/58 -- 71 17 100 % --   03/10/22 1330 (!) 165/141 -- 70 15 100 % --   03/10/22 1146 -- -- 78 20 100 % --   03/10/22 1145 -- -- -- -- -- 43.1 kg (95 lb)   03/10/22 1145 129/85 98.1 °F (36.7 °C) 83 24 99 % --       Physical Exam  Constitutional:       General: She is not in acute distress. Appearance: Normal appearance. She is not ill-appearing, toxic-appearing or diaphoretic. HENT:      Head: Normocephalic and atraumatic. Eyes:      Extraocular Movements: Extraocular movements intact. Conjunctiva/sclera: Conjunctivae normal.      Pupils: Pupils are equal, round, and reactive to light. Cardiovascular:      Rate and Rhythm: Normal rate and regular rhythm. Pulses: Normal pulses. Heart sounds: Normal heart sounds. Pulmonary:      Effort: Pulmonary effort is normal.      Breath sounds: Normal breath sounds. Abdominal:      General: Abdomen is flat. Bowel sounds are normal.      Palpations: Abdomen is soft. Tenderness: There is abdominal tenderness. Musculoskeletal:         General: No tenderness or deformity. Normal range of motion. Cervical back: Normal range of motion. No rigidity or tenderness. Neurological:      General: No focal deficit present. Mental Status: She is alert and oriented to person, place, and time. Cranial Nerves: No cranial nerve deficit. Sensory: No sensory deficit.       Comments: Patient noted to have bilateral upper and lower extremity tremors that have improvement with deliberate movements   Psychiatric:         Mood and Affect: Mood normal.         Behavior: Behavior normal.         Thought Content: Thought content normal.         Judgment: Judgment normal.           Diagnostic Study Results   Labs -  Recent Results (from the past 24 hour(s))   CBC WITH AUTOMATED DIFF    Collection Time: 03/10/22 11:00 AM   Result Value Ref Range    WBC 4.1 (L) 4.6 - 13.2 K/uL    RBC 3.91 (L) 4.20 - 5.30 M/uL    HGB 11.1 (L) 12.0 - 16.0 g/dL    HCT 34.2 (L) 35.0 - 45.0 %    MCV 87.5 78.0 - 100.0 FL    MCH 28.4 24.0 - 34.0 PG    MCHC 32.5 31.0 - 37.0 g/dL    RDW 14.3 11.6 - 14.5 %    PLATELET 925 532 - 228 K/uL    MPV 9.0 (L) 9.2 - 11.8 FL    NRBC 0.0 0  WBC    ABSOLUTE NRBC 0.00 0.00 - 0.01 K/uL    NEUTROPHILS 69 40 - 73 %    LYMPHOCYTES 23 21 - 52 %    MONOCYTES 7 3 - 10 %    EOSINOPHILS 1 0 - 5 %    BASOPHILS 1 0 - 2 %    IMMATURE GRANULOCYTES 0 0.0 - 0.5 %    ABS. NEUTROPHILS 2.8 1.8 - 8.0 K/UL    ABS. LYMPHOCYTES 0.9 0.9 - 3.6 K/UL    ABS. MONOCYTES 0.3 0.05 - 1.2 K/UL    ABS. EOSINOPHILS 0.0 0.0 - 0.4 K/UL    ABS. BASOPHILS 0.0 0.0 - 0.1 K/UL    ABS. IMM. GRANS. 0.0 0.00 - 0.04 K/UL    DF AUTOMATED     METABOLIC PANEL, COMPREHENSIVE    Collection Time: 03/10/22 11:00 AM   Result Value Ref Range    Sodium 129 (L) 136 - 145 mmol/L    Potassium 4.0 3.5 - 5.5 mmol/L    Chloride 93 (L) 100 - 111 mmol/L    CO2 30 21 - 32 mmol/L    Anion gap 6 3.0 - 18 mmol/L    Glucose 102 (H) 74 - 99 mg/dL    BUN 8 7.0 - 18 MG/DL    Creatinine 0.72 0.6 - 1.3 MG/DL    BUN/Creatinine ratio 11 (L) 12 - 20      GFR est AA >60 >60 ml/min/1.73m2    GFR est non-AA >60 >60 ml/min/1.73m2    Calcium 9.1 8.5 - 10.1 MG/DL    Bilirubin, total 0.3 0.2 - 1.0 MG/DL    ALT (SGPT) 20 13 - 56 U/L    AST (SGOT) 17 10 - 38 U/L    Alk.  phosphatase 71 45 - 117 U/L    Protein, total 7.3 6.4 - 8.2 g/dL    Albumin 3.8 3.4 - 5.0 g/dL    Globulin 3.5 2.0 - 4.0 g/dL    A-G Ratio 1.1 0.8 - 1.7     TROPONIN-HIGH SENSITIVITY    Collection Time: 03/10/22 11:00 AM   Result Value Ref Range Troponin-High Sensitivity 8 0 - 54 ng/L   MAGNESIUM    Collection Time: 03/10/22 11:00 AM   Result Value Ref Range    Magnesium 1.9 1.6 - 2.6 mg/dL   EKG, 12 LEAD, INITIAL    Collection Time: 03/10/22 11:04 AM   Result Value Ref Range    Ventricular Rate 87 BPM    Atrial Rate 88 BPM    QRS Duration 66 ms    Q-T Interval 352 ms    QTC Calculation (Bezet) 423 ms    Calculated R Axis 32 degrees    Calculated T Axis 97 degrees    Diagnosis       Sinus rhythm    ST & T wave abnormality, consider inferolateral ischemia  Abnormal ECG  When compared with ECG of 06-FEB-2022 12:49,    ST now depressed in Inferior leads  T wave inversion less evident in Lateral leads  Confirmed by Aurelia Jc ([id number]) on 3/10/2022 12:21:43 PM  Also confirmed by Aurelia Jc ([phone]),  Brie Caldwell ([id number])  on   3/10/2022 1:34:57 PM         Radiologic Studies -   No orders to display     No results found. Medications ordered:   Medications   sodium chloride 0.9 % bolus infusion 500 mL (500 mL IntraVENous New Bag 3/10/22 [id number])         Medical Decision Making   Initial Medical Decision Making and DDx:  Patient presented to ER with concern of worsening bilateral upper and lower extremity tremors. Patient known to have benign essential tremor and endorses taking AM medications. Differential diagnosis included benign essential tremor, hyponatremia, infection. Patient was evaluated with CBC, CMP, EKG. patient did not have temperature, leukocytosis to suggest infection. Patient was noted to have sodium level of 129 and chloride level of 93. A 500 mL bolus of normal saline was given with improvement of symptoms. Patient still noted to have tremor but was consistent with baseline. Plan to discharge home back to the care provider. Will recommend patient follow-up with neurology for further evaluation. Discussed treatment plan and return precautions with patient who is understanding and amenable to discharge home.      ED Course: Progress Notes, Reevaluation, and Consults:         I am the first provider for this patient. I reviewed the vital signs, available nursing notes, past medical history, past surgical history, family history and social history. Patient Vitals for the past 12 hrs:   Temp Pulse Resp BP SpO2   03/10/22 1601 -- 78 19 135/87 100 %   03/10/22 1353 98.5 °F (36.9 °C) -- -- -- --   03/10/22 1348 -- 71 17 (!) 147/58 100 %   03/10/22 1330 -- 70 15 (!) 165/141 100 %   03/10/22 1146 -- 78 20 -- 100 %   03/10/22 1145 98.1 °F (36.7 °C) 83 24 129/85 99 %       Vital Signs-Reviewed the patient's vital signs. Pulse Oximetry Analysis, Cardiac Monitor, 12 lead ekg:      Interpreted by the EP. Records Reviewed: Nursing notes reviewed (Time of Review: 2:09 PM)    Procedures:   Critical Care Time:   Aspirin: (was aspirin given for stroke?)    Diagnosis     Clinical Impression:   1. Benign essential tremor        Disposition:       Follow-up Information     Follow up With Specialties Details Why Contact Info    Your primary care provider  Schedule an appointment as soon as possible for a visit  As needed, If symptoms worsen     Your neurologist  Schedule an appointment as soon as possible for a visit  As needed     [facility] EMERGENCY DEPT Emergency Medicine Go to  As needed, If symptoms worsen [address] [facility] [id number]  [phone]           Patient's Medications   Start Taking    No medications on file   Continue Taking    ACETAMINOPHEN (TYLENOL EX STR ARTHRITIS PAIN) 500 MG TABLET    Take 1 Tab by mouth every six (6) hours as needed. DIPHENHYDRAMINE (BENADRYL ALLERGY) 25 MG TABLET    Take 1 Tab by mouth every six (6) hours as needed. EZETIMIBE (ZETIA) 10 MG TABLET    Take 10 mg by mouth daily. LUBIPROSTONE (AMITIZA) 8 MCG CAPSULE    Take 8 mcg by mouth two (2) times a day. OMEPRAZOLE (PRILOSEC) 20 MG CAPSULE    Take 20 mg by mouth daily.     ONDANSETRON (ZOFRAN ODT) 4 MG DISINTEGRATING TABLET    Take 1 Tablet by mouth every eight (8) hours as needed for Nausea or Vomiting. POLYETHYLENE GLYCOL (MIRALAX) 17 GRAM/DOSE POWDER    Take 17 g by mouth two (2) times a day. 1 tablespoon with 8 oz of water daily    PRIMIDONE (MYSOLINE) 50 MG TABLET    Take 25 mg by mouth nightly. PROPRANOLOL LA (INDERAL LA) 60 MG SR CAPSULE    Take 1 Capsule by mouth daily. SENNA (SENNA) 8.6 MG TABLET    Take 1 Tab by mouth daily. SODIUM CHLORIDE 1 GRAM TABLET    Take 2 g by mouth daily.    These Medications have changed    No medications on file   Stop Taking    No medications on file     _______________________________    Notes:    Patricia Avalos DO using Dragon dictation      _______________________________

## 2022-03-10 NOTE — ED TRIAGE NOTES
Per rescue patient states that she is having lower abdomen pain. She is having difficulty urinating. Patient is alert and oriented at time of triage. Patient continues to have tremors.

## 2022-03-18 PROBLEM — E43 SEVERE PROTEIN-CALORIE MALNUTRITION (HCC): Status: ACTIVE | Noted: 2021-10-27

## 2022-03-18 PROBLEM — R53.1 WEAKNESS GENERALIZED: Status: ACTIVE | Noted: 2021-12-14

## 2022-03-19 PROBLEM — R26.9 GAIT ABNORMALITY: Status: ACTIVE | Noted: 2018-05-15

## 2022-03-19 PROBLEM — R25.1 TREMOR: Status: ACTIVE | Noted: 2021-12-16

## 2022-03-19 PROBLEM — D64.9 ANEMIA: Status: ACTIVE | Noted: 2018-04-03

## 2022-03-19 PROBLEM — E87.1 CHRONIC HYPONATREMIA: Status: ACTIVE | Noted: 2021-10-25

## 2022-03-19 PROBLEM — R55 VASOVAGAL SYNCOPE: Status: ACTIVE | Noted: 2018-05-15

## 2022-03-19 PROBLEM — R41.89 COGNITIVE DECLINE: Status: ACTIVE | Noted: 2018-05-15

## 2022-03-19 PROBLEM — R11.2 NAUSEA & VOMITING: Status: ACTIVE | Noted: 2021-12-16

## 2022-03-19 PROBLEM — R03.0 ELEVATED BLOOD PRESSURE READING WITHOUT DIAGNOSIS OF HYPERTENSION: Status: ACTIVE | Noted: 2018-04-03

## 2022-03-19 PROBLEM — D51.8 VITAMIN B12 DEFICIENCY (DIETARY) ANEMIA: Status: ACTIVE | Noted: 2017-10-18

## 2022-03-20 PROBLEM — R07.9 CHEST PAIN: Status: ACTIVE | Noted: 2021-12-14

## 2022-05-29 ENCOUNTER — HOSPITAL ENCOUNTER (EMERGENCY)
Age: 87
Discharge: HOME | End: 2022-05-29
Attending: EMERGENCY MEDICINE
Payer: MEDICARE

## 2022-05-29 VITALS
HEIGHT: 65 IN | BODY MASS INDEX: 16.66 KG/M2 | HEART RATE: 87 BPM | OXYGEN SATURATION: 98 % | WEIGHT: 100 LBS | SYSTOLIC BLOOD PRESSURE: 164 MMHG | RESPIRATION RATE: 18 BRPM | DIASTOLIC BLOOD PRESSURE: 87 MMHG | TEMPERATURE: 98.5 F

## 2022-05-29 DIAGNOSIS — G25.2 COARSE TREMORS: Primary | ICD-10-CM

## 2022-05-29 LAB
ANION GAP SERPL CALC-SCNC: 4 MMOL/L (ref 3–18)
ATRIAL RATE: 79 BPM
BUN SERPL-MCNC: 16 MG/DL (ref 7–18)
BUN/CREAT SERPL: 25 (ref 12–20)
CALCIUM SERPL-MCNC: 9.2 MG/DL (ref 8.5–10.1)
CALCULATED P AXIS, ECG09: 76 DEGREES
CALCULATED R AXIS, ECG10: 49 DEGREES
CALCULATED T AXIS, ECG11: 59 DEGREES
CHLORIDE SERPL-SCNC: 97 MMOL/L (ref 100–111)
CO2 SERPL-SCNC: 32 MMOL/L (ref 21–32)
CREAT SERPL-MCNC: 0.63 MG/DL (ref 0.6–1.3)
DIAGNOSIS, 93000: NORMAL
GLUCOSE SERPL-MCNC: 92 MG/DL (ref 74–99)
P-R INTERVAL, ECG05: 152 MS
POTASSIUM SERPL-SCNC: 4.2 MMOL/L (ref 3.5–5.5)
Q-T INTERVAL, ECG07: 364 MS
QRS DURATION, ECG06: 66 MS
QTC CALCULATION (BEZET), ECG08: 417 MS
SODIUM SERPL-SCNC: 133 MMOL/L (ref 136–145)
TROPONIN-HIGH SENSITIVITY: 6 NG/L (ref 0–54)
VENTRICULAR RATE, ECG03: 79 BPM

## 2022-05-29 PROCEDURE — 99284 EMERGENCY DEPT VISIT MOD MDM: CPT

## 2022-05-29 PROCEDURE — 96374 THER/PROPH/DIAG INJ IV PUSH: CPT

## 2022-05-29 PROCEDURE — 74011250636 HC RX REV CODE- 250/636: Performed by: EMERGENCY MEDICINE

## 2022-05-29 PROCEDURE — 80048 BASIC METABOLIC PNL TOTAL CA: CPT

## 2022-05-29 PROCEDURE — 93005 ELECTROCARDIOGRAM TRACING: CPT

## 2022-05-29 PROCEDURE — 84484 ASSAY OF TROPONIN QUANT: CPT

## 2022-05-29 RX ORDER — DIPHENHYDRAMINE HYDROCHLORIDE 50 MG/ML
25 INJECTION, SOLUTION INTRAMUSCULAR; INTRAVENOUS ONCE
Status: COMPLETED | OUTPATIENT
Start: 2022-05-29 | End: 2022-05-29

## 2022-05-29 RX ADMIN — DIPHENHYDRAMINE HYDROCHLORIDE 25 MG: 50 INJECTION, SOLUTION INTRAMUSCULAR; INTRAVENOUS at 14:11

## 2022-05-29 NOTE — ED TRIAGE NOTES
Patient with tremors that she has had for about 2 years, while having tremors if ou touch the patients extremities the shaking stops, when talking tremors stop

## 2022-05-29 NOTE — ED PROVIDER NOTES
EMERGENCY DEPARTMENT HISTORY AND PHYSICAL EXAM    12:58 PM patient seen at this time on arrival in EMS stretcher in [facility] to expedite care      Date: 5/29/2022  Patient Name: Fanta Hauser    History of Presenting Illness     Chief Complaint   Patient presents with    Other         History Provided By: patient    Additional History (Context): Fanta Hauser is a 80 y.o. female presents with history of tremors has worsening tremors today. She said these for least 2 years she is unaware of a more specific diagnosis or any medications that help her. Bilateral upper extremity high-frequency movements and shaking her head side to side as well there is no lower extremity movements no truncal or neck movements. No pain. Her speech is very good. .  Patient did complain of chest pain to medics, EMS twelve-lead EKG was negative for acute process. Patient unconcerned. PCP: None    Chief Complaint:   Duration:    Timing:    Location:   Quality:   Severity:   Modifying Factors:   Associated Symptoms:       Current Facility-Administered Medications   Medication Dose Route Frequency Provider Last Rate Last Admin    diphenhydrAMINE (BENADRYL) injection 25 mg  25 mg IntraVENous ONCE Robert Dickerson MD         Current Outpatient Medications   Medication Sig Dispense Refill    primidone (MYSOLINE) 50 mg tablet Take 25 mg by mouth nightly.  propranolol LA (INDERAL LA) 60 mg SR capsule Take 1 Capsule by mouth daily. (Patient not taking: Reported on 1/15/2022) 30 Capsule 0    ondansetron (ZOFRAN ODT) 4 mg disintegrating tablet Take 1 Tablet by mouth every eight (8) hours as needed for Nausea or Vomiting. 30 Tablet 0    sodium chloride 1 gram tablet Take 2 g by mouth daily.  ezetimibe (ZETIA) 10 mg tablet Take 10 mg by mouth daily.  lubiPROStone (AMITIZA) 8 mcg capsule Take 8 mcg by mouth two (2) times a day.  omeprazole (PRILOSEC) 20 mg capsule Take 20 mg by mouth daily.       senna (Senna) 8.6 mg tablet Take 1 Tab by mouth daily. (Patient not taking: Reported on 1/15/2022) 30 Tab 0    polyethylene glycol (Miralax) 17 gram/dose powder Take 17 g by mouth two (2) times a day. 1 tablespoon with 8 oz of water daily (Patient not taking: Reported on 1/15/2022) 507 g 0    acetaminophen (TYLENOL EX STR ARTHRITIS PAIN) 500 mg tablet Take 1 Tab by mouth every six (6) hours as needed. (Patient not taking: Reported on 1/15/2022) 60 Tab 1    diphenhydrAMINE (BENADRYL ALLERGY) 25 mg tablet Take 1 Tab by mouth every six (6) hours as needed.  (Patient not taking: Reported on 1/15/2022) 30 Tab 2       Past History     Past Medical History:  Past Medical History:   Diagnosis Date    Anemia NEC     Asthma     Colitis     DDD (degenerative disc disease), cervical     Fibrocystic breast     GERD (gastroesophageal reflux disease)     Headache     HTN (hypertension)     Hyperlipidemia     OA (osteoarthritis)     Osteoporosis     PAF (paroxysmal atrial fibrillation) ([facility] [address] 1995    Pneumonia     Thyroid nodule     Vitamin D deficiency 2/7/2011       Past Surgical History:  Past Surgical History:   Procedure Laterality Date    HX GYN      hysterectomy    HX HEENT      cataract surgery bilaterally    HX TOTAL ABDOMINAL HYSTERECTOMY      PA BREAST SURGERY PROCEDURE UNLISTED      cysts removed    PA CARDIAC SURG PROCEDURE UNLIST      cardiac catherization       Family History:  Family History   Problem Relation Age of Onset    Hypertension Other     Diabetes Other     Heart Disease Other     Cancer Other         stomach & colon    Diabetes Mother     Heart Attack Mother     Stroke Mother     Heart Attack Father     Heart Failure Father    Meza Arthritis-rheumatoid Father     Other Brother         aneuysm-ruptured    Parkinson's Disease Brother        Social History:  Social History     Tobacco Use    Smoking status: Never Smoker    Smokeless tobacco: Never Used   Vaping Use    Vaping Use: Never used   Substance Use Topics    Alcohol use: No    Drug use: No       Allergies: Allergies   Allergen Reactions    Latex, Natural Rubber Unknown (comments)    Asa-Acetaminophen-Caff-Potass Shortness of Breath     Patient is only allergic to ASA    Aspirin Shortness of Breath and Other (comments)     Patient states this caused her stomach to bleed internal    Erythromycin Other (comments)     bleeding    Iodine And Iodide Containing Products Unknown (comments)    Lemon Unknown (comments)    Other Medication Unknown (comments)     Pt not sure of allergies states \"I'm allergic to more but not sure of name\"    Pcn [Penicillins] Swelling    Shellfish Containing Products Unknown (comments)    Sulfa (Sulfonamide Antibiotics) Unknown (comments)         Review of Systems     Review of Systems   Constitutional: Negative for diaphoresis and fever. HENT: Negative for congestion and sore throat. Eyes: Negative for pain and itching. Respiratory: Negative for cough and shortness of breath. Cardiovascular: Negative for chest pain and palpitations. Gastrointestinal: Negative for abdominal pain and diarrhea. Endocrine: Negative for polydipsia and polyuria. Genitourinary: Negative for dysuria and hematuria. Musculoskeletal: Negative for arthralgias and myalgias. Skin: Negative for rash and wound. Neurological: Negative for seizures and syncope. Hematological: Does not bruise/bleed easily. Psychiatric/Behavioral: Negative for agitation and hallucinations. Physical Exam       Patient Vitals for the past 12 hrs:   Temp Pulse Resp BP SpO2   05/29/22 1315 98.5 °F (36.9 °C) 87 18 (!) 164/87 98 %       IPVITALS  Patient Vitals for the past 24 hrs:   BP Temp Pulse Resp SpO2 Height Weight   05/29/22 1315 (!) 164/87 98.5 °F (36.9 °C) 87 18 98 % 5' 5\" (1.651 m) 45.4 kg (100 lb)       Physical Exam  Vitals and nursing note reviewed.    Constitutional:       General: She is not in acute distress. Appearance: She is well-developed. HENT:      Head: Normocephalic and atraumatic. Eyes:      General: No scleral icterus. Conjunctiva/sclera: Conjunctivae normal.   Neck:      Vascular: No JVD. Cardiovascular:      Rate and Rhythm: Normal rate and regular rhythm. Pulmonary:      Effort: Pulmonary effort is normal. No respiratory distress. Musculoskeletal:         General: Normal range of motion. Cervical back: Normal range of motion and neck supple. Skin:     General: Skin is warm and dry. Neurological:      Mental Status: She is alert. Comments: Coarse bilateral hand tremor and extinguishes with intentional movement, inconsistently occurring. No tremor of her lower extremities trunk. Occasionally shakes her head back-and-forth. Psychiatric:         Thought Content: Thought content normal.         Judgment: Judgment normal.           Diagnostic Study Results   Labs -  No results found for this or any previous visit (from the past 24 hour(s)). Radiologic Studies -   No orders to display     No results found. Medications ordered:   Medications   diphenhydrAMINE (BENADRYL) injection 25 mg (has no administration in time range)         Medical Decision Making   Initial Medical Decision Making and DDx:  Not suggestive of seizure activity there is no small muscle fasciculation, doubt alcohol withdrawal benzo withdrawal etc.  The tremor does extinguish with intention. Does not appear to be pill-rolling or parkinsonian. Not chorea or ballism. Will try benadryl      ED Course: Progress Notes, Reevaluation, and Consults:  ED Course as of 05/29/22 1803   Sun May 29, 2022   1759 Sinus rhythm sinus arrhythmia the rate is 79 no acute process [CB]      ED Course User Index  [CB] Bard Jeanine MD     6:05 PM Pt reevaluated at this time. Discussed results and findings, as well as, diagnosis and plan for discharge. Follow up with doctors/services listed.   Return to the emergency department for alarming symptoms. Pt verbalizes understanding and agreement with plan. All questions addressed. Appears much more comfortable when I reassess her in the results waiting area, tremors have subsided a bit she says they are improved and they never go away. Agreeable to plan for discharge. I am the first provider for this patient. I reviewed the vital signs, available nursing notes, past medical history, past surgical history, family history and social history. Patient Vitals for the past 12 hrs:   Temp Pulse Resp BP SpO2   05/29/22 1315 98.5 °F (36.9 °C) 87 18 (!) 164/87 98 %       Vital Signs-Reviewed the patient's vital signs. Pulse Oximetry Analysis, Cardiac Monitor, 12 lead ekg:      Interpreted by the EP. Records Reviewed: Nursing notes reviewed (Time of Review: 12:58 PM)    Procedures:   Critical Care Time:   Aspirin: (was aspirin given for stroke?)    Diagnosis     Clinical Impression: No diagnosis found. Disposition:       Follow-up Information    None          Patient's Medications   Start Taking    No medications on file   Continue Taking    ACETAMINOPHEN (TYLENOL EX STR ARTHRITIS PAIN) 500 MG TABLET    Take 1 Tab by mouth every six (6) hours as needed. DIPHENHYDRAMINE (BENADRYL ALLERGY) 25 MG TABLET    Take 1 Tab by mouth every six (6) hours as needed. EZETIMIBE (ZETIA) 10 MG TABLET    Take 10 mg by mouth daily. LUBIPROSTONE (AMITIZA) 8 MCG CAPSULE    Take 8 mcg by mouth two (2) times a day. OMEPRAZOLE (PRILOSEC) 20 MG CAPSULE    Take 20 mg by mouth daily. ONDANSETRON (ZOFRAN ODT) 4 MG DISINTEGRATING TABLET    Take 1 Tablet by mouth every eight (8) hours as needed for Nausea or Vomiting. POLYETHYLENE GLYCOL (MIRALAX) 17 GRAM/DOSE POWDER    Take 17 g by mouth two (2) times a day. 1 tablespoon with 8 oz of water daily    PRIMIDONE (MYSOLINE) 50 MG TABLET    Take 25 mg by mouth nightly.     PROPRANOLOL LA (INDERAL LA) 60 MG SR CAPSULE    Take 1 Capsule by mouth daily. SENNA (SENNA) 8.6 MG TABLET    Take 1 Tab by mouth daily. SODIUM CHLORIDE 1 GRAM TABLET    Take 2 g by mouth daily.    These Medications have changed    No medications on file   Stop Taking    No medications on file     _______________________________    Notes:    Karlee Gomez MD using Dragon dictation      _______________________________

## 2022-06-10 ENCOUNTER — OFFICE VISIT (OUTPATIENT)
Dept: CARDIOLOGY CLINIC | Age: 87
End: 2022-06-10
Payer: MEDICARE

## 2022-06-10 VITALS
OXYGEN SATURATION: 98 % | HEIGHT: 65 IN | SYSTOLIC BLOOD PRESSURE: 128 MMHG | DIASTOLIC BLOOD PRESSURE: 68 MMHG | BODY MASS INDEX: 15.16 KG/M2 | WEIGHT: 91 LBS | HEART RATE: 70 BPM

## 2022-06-10 DIAGNOSIS — R06.02 SOB (SHORTNESS OF BREATH): ICD-10-CM

## 2022-06-10 DIAGNOSIS — R25.1 TREMOR: ICD-10-CM

## 2022-06-10 DIAGNOSIS — E78.5 HYPERLIPIDEMIA, UNSPECIFIED HYPERLIPIDEMIA TYPE: ICD-10-CM

## 2022-06-10 DIAGNOSIS — R41.89 COGNITIVE DECLINE: ICD-10-CM

## 2022-06-10 DIAGNOSIS — R60.9 SWELLING: Primary | ICD-10-CM

## 2022-06-10 PROCEDURE — G8419 CALC BMI OUT NRM PARAM NOF/U: HCPCS | Performed by: INTERNAL MEDICINE

## 2022-06-10 PROCEDURE — 1101F PT FALLS ASSESS-DOCD LE1/YR: CPT | Performed by: INTERNAL MEDICINE

## 2022-06-10 PROCEDURE — G8427 DOCREV CUR MEDS BY ELIG CLIN: HCPCS | Performed by: INTERNAL MEDICINE

## 2022-06-10 PROCEDURE — 99204 OFFICE O/P NEW MOD 45 MIN: CPT | Performed by: INTERNAL MEDICINE

## 2022-06-10 PROCEDURE — 1090F PRES/ABSN URINE INCON ASSESS: CPT | Performed by: INTERNAL MEDICINE

## 2022-06-10 PROCEDURE — G8510 SCR DEP NEG, NO PLAN REQD: HCPCS | Performed by: INTERNAL MEDICINE

## 2022-06-10 PROCEDURE — G8536 NO DOC ELDER MAL SCRN: HCPCS | Performed by: INTERNAL MEDICINE

## 2022-06-10 PROCEDURE — 1123F ACP DISCUSS/DSCN MKR DOCD: CPT | Performed by: INTERNAL MEDICINE

## 2022-06-10 PROCEDURE — 93000 ELECTROCARDIOGRAM COMPLETE: CPT | Performed by: INTERNAL MEDICINE

## 2022-06-10 RX ORDER — ESCITALOPRAM OXALATE 5 MG/1
5 TABLET ORAL DAILY
COMMUNITY
Start: 2022-04-11

## 2022-06-10 NOTE — PROGRESS NOTES
Marilou Santana presents today for   Chief Complaint   Patient presents with   Abdifatah Reis Scotty Patient     last seen 9/17/18    Chest Pain     tightness in the left side of chest    Palpitations     racing, skipping, and fluttering     Shortness of Breath    Leg Swelling       Marilou Santana preferred language for health care discussion is english/other. Is someone accompanying this pt? yes    Is the patient using any DME equipment during OV? wheelchair    Depression Screening:  3 most recent PHQ Screens 6/10/2022   Little interest or pleasure in doing things Not at all   Feeling down, depressed, irritable, or hopeless Not at all   Total Score PHQ 2 0       Learning Assessment:  Learning Assessment 6/10/2022   PRIMARY LEARNER Patient   HIGHEST LEVEL OF EDUCATION - PRIMARY LEARNER  -   BARRIERS PRIMARY LEARNER -   CO-LEARNER CAREGIVER -   PRIMARY LANGUAGE ENGLISH   LEARNER PREFERENCE PRIMARY DEMONSTRATION   ANSWERED BY patient   RELATIONSHIP SELF       Abuse Screening:  Abuse Screening Questionnaire 6/10/2022   Do you ever feel afraid of your partner? N   Are you in a relationship with someone who physically or mentally threatens you? N   Is it safe for you to go home? Y       Fall Risk  Fall Risk Assessment, last 12 mths 6/10/2022   Able to walk? Yes   Fall in past 12 months? 0   Do you feel unsteady? 0   Are you worried about falling 0   Number of falls in past 12 months -   Fall with injury? -           Pt currently taking Anticoagulant therapy? no    Pt currently taking Antiplatelet therapy ? no      Coordination of Care:  1. Have you been to the ER, urgent care clinic since your last visit? Hospitalized since your last visit? no    2. Have you seen or consulted any other health care providers outside of the [address] [place] [address] since your last visit? Include any pap smears or colon screening.  no

## 2022-06-10 NOTE — PROGRESS NOTES
HISTORY OF PRESENT ILLNESS  Sue Pandya is a 80 y.o. female. New Patient  Associated symptoms include shortness of breath. Pertinent negatives include no chest pain, no abdominal pain and no headaches. Shortness of Breath  Associated symptoms include leg swelling. Pertinent negatives include no fever, no headaches, no cough, no wheezing, no PND, no orthopnea, no chest pain, no vomiting, no abdominal pain, no rash and no claudication. Leg Swelling  Associated symptoms include shortness of breath. Pertinent negatives include no chest pain, no abdominal pain and no headaches. Patient presents for a new office visit. She has a remote history of paroxysmal atrial fibrillation greater than 20 years ago. She was following with another cardiologist, but has not been seen in over 7-8 years. She also has a history of borderline hypertension, dyslipidemia, and GERD. The patient underwent a Holter monitor which was essentially normal in May 2018. She also underwent an echocardiogram in May 2018 which showed preserved LV systolic function, EF 35-69% with severe left atrial enlargement. Lastly, a pharmacologic nuclear stress test from 2018 showed a normal left ventricular ejection fraction with a very mild distal inferior reversible defect which could represent branch vessel disease, but was overall a low risk study. She has not been seen by cardiology in over 3-1/2 years. She has since been diagnosed with an ill-defined tremor and is followed closely by neurology. She also reports progressive memory loss. She was referred back here by her PCP for evaluation of leg swelling and shortness of breath. She was hospitalized at the end of December 2021 for atypical chest pain, hyponatremia and severe debility and weakness. She did not undergo any additional cardiac testing during her hospital stay.   She reports that her leg swelling is progressive throughout the day, but will improve if she elevates her legs. She recently started wearing compression stockings and her leg swelling significantly improved. She will get shortness of breath primarily when she is having excessive tremors, but denies any further chest pain or pressure. She denies any orthopnea or PND. She has had several chest x-rays which were unremarkable. Past Medical History:   Diagnosis Date    Anemia NEC     Asthma     Colitis     DDD (degenerative disc disease), cervical     Fibrocystic breast     GERD (gastroesophageal reflux disease)     Headache     HTN (hypertension)     Hyperlipidemia     OA (osteoarthritis)     Osteoporosis     PAF [facility]oxysmal [facility] fibrillation) [facility] 1995    Pneumonia     Thyroid nodule     Vitamin D deficiency 2/7/2011     Current Outpatient Medications   Medication Sig Dispense Refill    escitalopram oxalate (LEXAPRO) 5 mg tablet Take 5 mg by mouth daily.  primidone (MYSOLINE) 50 mg tablet Take 25 mg by mouth nightly.  ondansetron (ZOFRAN ODT) 4 mg disintegrating tablet Take 1 Tablet by mouth every eight (8) hours as needed for Nausea or Vomiting. 30 Tablet 0    sodium chloride 1 gram tablet Take 2 g by mouth daily.  ezetimibe (ZETIA) 10 mg tablet Take 10 mg by mouth daily.  lubiPROStone (AMITIZA) 8 mcg capsule Take 8 mcg by mouth two (2) times a day.  omeprazole (PRILOSEC) 20 mg capsule Take 20 mg by mouth daily.  polyethylene glycol (Miralax) 17 gram/dose powder Take 17 g by mouth two (2) times a day. 1 tablespoon with 8 oz of water daily 507 g 0    acetaminophen (TYLENOL EX STR ARTHRITIS PAIN) 500 mg tablet Take 1 Tab by mouth every six (6) hours as needed. 60 Tab 1    propranolol LA (INDERAL LA) 60 mg SR capsule Take 1 Capsule by mouth daily. (Patient not taking: Reported on 1/15/2022) 30 Capsule 0    senna (Senna) 8.6 mg tablet Take 1 Tab by mouth daily.  (Patient not taking: Reported on 1/15/2022) 30 Tab 0    diphenhydrAMINE (BENADRYL ALLERGY) 25 mg tablet Take 1 Tab by mouth every six (6) hours as needed. (Patient not taking: Reported on 1/15/2022) 30 Tab 2     Allergies   Allergen Reactions    Latex, Natural Rubber Unknown (comments)    Asa-Acetaminophen-Caff-Potass Shortness of Breath     Patient is only allergic to ASA    Aspirin Shortness of Breath and Other (comments)     Patient states this caused her stomach to bleed internal    Erythromycin Other (comments)     bleeding    Iodine And Iodide Containing Products Unknown (comments)    Lemon Unknown (comments)    Other Medication Unknown (comments)     Pt not sure of allergies states \"I'm allergic to more but not sure of name\"    Pcn [Penicillins] Swelling    Shellfish Containing Products Unknown (comments)    Sulfa (Sulfonamide Antibiotics) Unknown (comments)      Social History     Tobacco Use    Smoking status: Never Smoker    Smokeless tobacco: Never Used   Vaping Use    Vaping Use: Never used   Substance Use Topics    Alcohol use: No    Drug use: No     Family History   Problem Relation Age of Onset    Hypertension Other     Diabetes Other     Heart Disease Other     Cancer Other         stomach & colon    Diabetes Mother     Heart Attack Mother     Stroke Mother     Heart Attack Father     Heart Failure Father     Arthritis-rheumatoid Father     Other Brother         aneuysm-ruptured    Parkinson's Disease Brother        Review of Systems   Constitutional: Negative for chills, fever and weight loss. HENT: Negative for nosebleeds. Eyes: Negative for blurred vision and double vision. Respiratory: Positive for shortness of breath. Negative for cough and wheezing. Cardiovascular: Positive for leg swelling. Negative for chest pain, palpitations, orthopnea, claudication and PND. Gastrointestinal: Negative for abdominal pain, heartburn, nausea and vomiting. Genitourinary: Negative for dysuria and hematuria. Musculoskeletal: Negative for falls and myalgias. Skin: Negative for rash. Neurological: Positive for tremors. Negative for dizziness, focal weakness and headaches. Endo/Heme/Allergies: Does not bruise/bleed easily. Psychiatric/Behavioral: Negative for substance abuse. Visit Vitals  /68 (BP 1 Location: Left upper arm, BP Patient Position: Sitting, BP Cuff Size: Adult)   Pulse 70   Ht 5' 5\" (1.651 m)   Wt 41.3 kg (91 lb)   SpO2 98%   BMI 15.14 kg/m²       Physical Exam  Constitutional:       Appearance: She is well-developed. HENT:      Head: Normocephalic and atraumatic. Eyes:      Conjunctiva/sclera: Conjunctivae normal.   Neck:      Vascular: No carotid bruit or JVD. Cardiovascular:      Rate and Rhythm: Normal rate and regular rhythm. Pulses: Normal pulses. Heart sounds: S1 normal and S2 normal. Heart sounds are distant. No murmur heard. No gallop. Pulmonary:      Effort: Pulmonary effort is normal.      Breath sounds: Normal breath sounds. No wheezing or rales. Abdominal:      General: Bowel sounds are normal.      Palpations: Abdomen is soft. Tenderness: There is no abdominal tenderness. Musculoskeletal:         General: Swelling ( Trace bilateral lower extremity to the shins) present. No tenderness or deformity. Cervical back: Neck supple. Skin:     General: Skin is warm and dry. Neurological:      Mental Status: She is alert and oriented to person, place, and time. Comments: Resting tremor right greater than left   Psychiatric:         Behavior: Behavior normal.         Thought Content: Thought content normal.       EKG: Normal sinus rhythm, normal axis, poor R wave progression, no ST or T-wave abnormalities concerning for ischemia. Compared to his previous EKG, baseline artifact is no longer present. ASSESSMENT and PLAN    Leg swelling. I suspect this is due to dependent edema since his symptoms significantly improved after wearing compression stockings.   For completeness, have recommended a follow-up echocardiogram since it has been 4 years since her last study. Shortness of breath. Unlikely cardiac in etiology, but I would like to evaluate for any pulmonary hypertension with an echocardiogram as described above. Paroxysmal atrial fibrillation. This was diagnosed over 20 years ago. She is in sinus rhythm today. She has had no recurrence documented since that time. Resting tremor. Patient does exhibit some parkinsonian-like features and has been having memory loss as well. I will defer further work-up to her neurologist.    As long as her echocardiogram is unremarkable, patient can follow-up in the future as needed.

## 2022-06-12 ENCOUNTER — APPOINTMENT (OUTPATIENT)
Dept: GENERAL RADIOLOGY | Age: 87
End: 2022-06-12
Attending: STUDENT IN AN ORGANIZED HEALTH CARE EDUCATION/TRAINING PROGRAM
Payer: MEDICARE

## 2022-06-12 ENCOUNTER — HOSPITAL ENCOUNTER (EMERGENCY)
Age: 87
Discharge: HOME | End: 2022-06-12
Attending: STUDENT IN AN ORGANIZED HEALTH CARE EDUCATION/TRAINING PROGRAM
Payer: MEDICARE

## 2022-06-12 VITALS
RESPIRATION RATE: 19 BRPM | TEMPERATURE: 99.2 F | DIASTOLIC BLOOD PRESSURE: 42 MMHG | HEIGHT: 65 IN | OXYGEN SATURATION: 99 % | HEART RATE: 55 BPM | SYSTOLIC BLOOD PRESSURE: 123 MMHG | BODY MASS INDEX: 14.99 KG/M2 | WEIGHT: 90 LBS

## 2022-06-12 DIAGNOSIS — R07.89 ATYPICAL CHEST PAIN: ICD-10-CM

## 2022-06-12 DIAGNOSIS — G25.2 COARSE TREMORS: Primary | ICD-10-CM

## 2022-06-12 LAB
ALBUMIN SERPL-MCNC: 3.2 G/DL (ref 3.4–5)
ALBUMIN/GLOB SERPL: 1 {RATIO} (ref 0.8–1.7)
ALP SERPL-CCNC: 85 U/L (ref 45–117)
ALT SERPL-CCNC: 28 U/L (ref 13–56)
ANION GAP SERPL CALC-SCNC: 7 MMOL/L (ref 3–18)
AST SERPL-CCNC: 20 U/L (ref 10–38)
BASOPHILS # BLD: 0 K/UL (ref 0–0.1)
BASOPHILS NFR BLD: 1 % (ref 0–2)
BILIRUB SERPL-MCNC: 0.2 MG/DL (ref 0.2–1)
BNP SERPL-MCNC: 1343 PG/ML (ref 0–1800)
BUN SERPL-MCNC: 16 MG/DL (ref 7–18)
BUN/CREAT SERPL: 24 (ref 12–20)
CALCIUM SERPL-MCNC: 8.6 MG/DL (ref 8.5–10.1)
CHLORIDE SERPL-SCNC: 96 MMOL/L (ref 100–111)
CO2 SERPL-SCNC: 30 MMOL/L (ref 21–32)
CREAT SERPL-MCNC: 0.67 MG/DL (ref 0.6–1.3)
DIFFERENTIAL METHOD BLD: ABNORMAL
EOSINOPHIL # BLD: 0 K/UL (ref 0–0.4)
EOSINOPHIL NFR BLD: 1 % (ref 0–5)
ERYTHROCYTE [DISTWIDTH] IN BLOOD BY AUTOMATED COUNT: 14.5 % (ref 11.6–14.5)
GLOBULIN SER CALC-MCNC: 3.2 G/DL (ref 2–4)
GLUCOSE SERPL-MCNC: 96 MG/DL (ref 74–99)
HCT VFR BLD AUTO: 28.5 % (ref 35–45)
HGB BLD-MCNC: 9.5 G/DL (ref 12–16)
IMM GRANULOCYTES # BLD AUTO: 0 K/UL (ref 0–0.04)
IMM GRANULOCYTES NFR BLD AUTO: 0 % (ref 0–0.5)
LYMPHOCYTES # BLD: 1.3 K/UL (ref 0.9–3.6)
LYMPHOCYTES NFR BLD: 35 % (ref 21–52)
MAGNESIUM SERPL-MCNC: 1.8 MG/DL (ref 1.6–2.6)
MCH RBC QN AUTO: 29.5 PG (ref 24–34)
MCHC RBC AUTO-ENTMCNC: 33.3 G/DL (ref 31–37)
MCV RBC AUTO: 88.5 FL (ref 78–100)
MONOCYTES # BLD: 0.3 K/UL (ref 0.05–1.2)
MONOCYTES NFR BLD: 7 % (ref 3–10)
NEUTS SEG # BLD: 2.2 K/UL (ref 1.8–8)
NEUTS SEG NFR BLD: 58 % (ref 40–73)
NRBC # BLD: 0 K/UL (ref 0–0.01)
NRBC BLD-RTO: 0 PER 100 WBC
PLATELET # BLD AUTO: 228 K/UL (ref 135–420)
PMV BLD AUTO: 9.3 FL (ref 9.2–11.8)
POTASSIUM SERPL-SCNC: 4.2 MMOL/L (ref 3.5–5.5)
PROT SERPL-MCNC: 6.4 G/DL (ref 6.4–8.2)
RBC # BLD AUTO: 3.22 M/UL (ref 4.2–5.3)
SODIUM SERPL-SCNC: 133 MMOL/L (ref 136–145)
TROPONIN-HIGH SENSITIVITY: 5 NG/L (ref 0–54)
WBC # BLD AUTO: 3.8 K/UL (ref 4.6–13.2)

## 2022-06-12 PROCEDURE — 83880 ASSAY OF NATRIURETIC PEPTIDE: CPT

## 2022-06-12 PROCEDURE — 99285 EMERGENCY DEPT VISIT HI MDM: CPT

## 2022-06-12 PROCEDURE — 80053 COMPREHEN METABOLIC PANEL: CPT

## 2022-06-12 PROCEDURE — 93005 ELECTROCARDIOGRAM TRACING: CPT

## 2022-06-12 PROCEDURE — 71045 X-RAY EXAM CHEST 1 VIEW: CPT

## 2022-06-12 PROCEDURE — 85025 COMPLETE CBC W/AUTO DIFF WBC: CPT

## 2022-06-12 PROCEDURE — 83735 ASSAY OF MAGNESIUM: CPT

## 2022-06-12 PROCEDURE — 84484 ASSAY OF TROPONIN QUANT: CPT

## 2022-06-12 NOTE — DISCHARGE INSTRUCTIONS
Please return to the ER with any new or worsening symptoms or any other concerns. Please return to the Emergency Department if you develop a fever, chills, cannot eat or drink due to nausea or vomiting, or if any of your symptoms worsen. Also, It is extremely important that you follow-up with a primary care physician and if you do not have one currently use the contact information provided to obtain an appointment. If none was provided please call the number on the back of your insurance card to locate a Primary care doctor. Many offices have \"cancellation lists\" that you can ask to be placed on; should a patient with an earlier appointment cancel you will be notified to take their place. Please return to the Emergency Room immediately if your symptoms worsen. Please carefully read all discharge instructions    [email or web address]    What are GoodRx coupons? GoodRx coupons will help you pay less than the cash price for your prescription. Siobhan [employer] free to use and are accepted at virtually every [place] pharmacy. Your pharmacist will know how to enter the codes on the coupon to pull up the lowest discount available.

## 2022-06-12 NOTE — ED TRIAGE NOTES
Pt arrives via EMS from group home with c/o tremors, CP & SOB. The tremors are chronic but pt reports they are worse today. CP & SOB are secondary to tremors. Received 50mg Benadryl en route. Per EMS, tremors have slowed down from initial observation. Pt alert & oriented upon arrival. 20g PIV in left AC.
multiple joints- right knee/right hip/arthritis

## 2022-06-12 NOTE — ED PROVIDER NOTES
EMERGENCY DEPARTMENT HISTORY AND PHYSICAL EXAM    3:12 PM    Date: 6/12/2022  Patient Name: Anam Rea    History of Presenting Illness     Chief Complaint   Patient presents with    Tremors    Chest Pain    Shortness of Breath       History Provided By: Patient  Location/Duration/Severity/Modifying factors   HPI  Anam Rea is a 80 y.o. female with past medical history of coarse tremors presenting for evaluation of the same. She says that for last 2 weeks her tremors have been worse than they usually are, they have been ongoing for years. She has followed with neurology for this. She does not take anything for her tremors, she does not think it makes them better other than IV Benadryl which she received last time she was in the ER. Tremors worsened over the last few weeks and she wanted to come to the ER to have Benadryl. She says that the shaking has made her chest hurt and at times she feels short of breath. She denies any nausea vomiting, cough, fever, abdominal pain, nausea or vomiting. No alleviating or aggravating factors otherwise mentioned    PCP: None    Current Outpatient Medications   Medication Sig Dispense Refill    escitalopram oxalate (LEXAPRO) 5 mg tablet Take 5 mg by mouth daily.  primidone (MYSOLINE) 50 mg tablet Take 25 mg by mouth nightly.  propranolol LA (INDERAL LA) 60 mg SR capsule Take 1 Capsule by mouth daily. (Patient not taking: Reported on 1/15/2022) 30 Capsule 0    ondansetron (ZOFRAN ODT) 4 mg disintegrating tablet Take 1 Tablet by mouth every eight (8) hours as needed for Nausea or Vomiting. 30 Tablet 0    sodium chloride 1 gram tablet Take 2 g by mouth daily.  ezetimibe (ZETIA) 10 mg tablet Take 10 mg by mouth daily.  lubiPROStone (AMITIZA) 8 mcg capsule Take 8 mcg by mouth two (2) times a day.  omeprazole (PRILOSEC) 20 mg capsule Take 20 mg by mouth daily.  senna (Senna) 8.6 mg tablet Take 1 Tab by mouth daily.  (Patient not taking: Reported on 1/15/2022) 30 Tab 0    polyethylene glycol (Miralax) 17 gram/dose powder Take 17 g by mouth two (2) times a day. 1 tablespoon with 8 oz of water daily 507 g 0    acetaminophen (TYLENOL EX STR ARTHRITIS PAIN) 500 mg tablet Take 1 Tab by mouth every six (6) hours as needed. 60 Tab 1    diphenhydrAMINE (BENADRYL ALLERGY) 25 mg tablet Take 1 Tab by mouth every six (6) hours as needed. (Patient not taking: Reported on 1/15/2022) 30 Tab 2       Past History     Past Medical History:  Past Medical History:   Diagnosis Date    Anemia NEC     Asthma     Colitis     DDD (degenerative disc disease), cervical     Fibrocystic breast     GERD (gastroesophageal reflux disease)     Headache     HTN (hypertension)     Hyperlipidemia     OA (osteoarthritis)     Osteoporosis     PAF (paroxysmal atrial fibrillation) ([facility] [address] 1995    Pneumonia     Thyroid nodule     Vitamin D deficiency 2/7/2011       Past Surgical History:  Past Surgical History:   Procedure Laterality Date    HX GYN      hysterectomy    HX HEENT      cataract surgery bilaterally    HX TOTAL ABDOMINAL HYSTERECTOMY      SC BREAST SURGERY PROCEDURE UNLISTED      cysts removed    SC CARDIAC SURG PROCEDURE UNLIST      cardiac catherization       Family History:  Family History   Problem Relation Age of Onset    Hypertension Other     Diabetes Other     Heart Disease Other     Cancer Other         stomach & colon    Diabetes Mother     Heart Attack Mother     Stroke Mother     Heart Attack Father     Heart Failure Father    [facility] Arthritis-rheumatoid Father     Other Brother         aneuysm-ruptured    Parkinson's Disease Brother        Social History:  Social History     Tobacco Use    Smoking status: Never Smoker    Smokeless tobacco: Never Used   Vaping Use    Vaping Use: Never used   Substance Use Topics    Alcohol use: No    Drug use: No       Allergies:   Allergies   Allergen Reactions    Latex, Natural Rubber Unknown (comments)    Asa-Acetaminophen-Caff-Potass Shortness of Breath     Patient is only allergic to ASA    Aspirin Shortness of Breath and Other (comments)     Patient states this caused her stomach to bleed internal    Erythromycin Other (comments)     bleeding    Iodine And Iodide Containing Products Unknown (comments)    Lemon Unknown (comments)    Other Medication Unknown (comments)     Pt not sure of allergies states \"I'm allergic to more but not sure of name\"    Pcn [Penicillins] Swelling    Shellfish Containing Products Unknown (comments)    Sulfa (Sulfonamide Antibiotics) Unknown (comments)       I reviewed and confirmed the above information with patient and updated as necessary. Review of Systems     Review of Systems   Constitutional: Negative for diaphoresis and fever. HENT: Negative for ear pain and sore throat. Eyes:        No acute change in vision   Respiratory: Positive for shortness of breath. Negative for cough. Cardiovascular: Positive for chest pain. Negative for leg swelling. Gastrointestinal: Negative for abdominal pain and vomiting. Genitourinary: Negative for dysuria. Musculoskeletal: Negative for neck pain. Skin: Negative for wound. Neurological: Positive for tremors. Negative for dizziness, seizures, syncope, speech difficulty, weakness, numbness and headaches. Physical Exam     Visit Vitals  BP (!) 123/42   Pulse (!) 55   Temp 99.2 °F (37.3 °C)   Resp 19   Ht 5' 5\" (1.651 m)   Wt 40.8 kg (90 lb)   SpO2 99%   BMI 14.98 kg/m²       Physical Exam  Vitals and nursing note reviewed. Constitutional:       Comments: Adult female sleeping comfortably, awakens easily. While sleeping no tremors noted   HENT:      Mouth/Throat:      Mouth: Mucous membranes are moist.   Eyes:      Pupils: Pupils are equal, round, and reactive to light. Cardiovascular:      Rate and Rhythm: Normal rate and regular rhythm. Pulses: Normal pulses.    Pulmonary: Effort: Pulmonary effort is normal.      Breath sounds: Normal breath sounds. Abdominal:      Palpations: Abdomen is soft. Tenderness: There is no abdominal tenderness. Musculoskeletal:         General: No signs of injury. Normal range of motion. Cervical back: Normal range of motion. Skin:     General: Skin is warm. Neurological:      General: No focal deficit present. Mental Status: She is alert and oriented to person, place, and time. Mental status is at baseline. Cranial Nerves: No cranial nerve deficit. Sensory: No sensory deficit. Motor: No weakness. Comments: Coarse tremors of bilateral upper extremities, otherwise no focal neurologic deficit         Diagnostic Study Results     Labs -  Recent Results (from the past 24 hour(s))   CBC WITH AUTOMATED DIFF    Collection Time: 06/12/22  2:11 PM   Result Value Ref Range    WBC 3.8 (L) 4.6 - 13.2 K/uL    RBC 3.22 (L) 4.20 - 5.30 M/uL    HGB 9.5 (L) 12.0 - 16.0 g/dL    HCT 28.5 (L) 35.0 - 45.0 %    MCV 88.5 78.0 - 100.0 FL    MCH 29.5 24.0 - 34.0 PG    MCHC 33.3 31.0 - 37.0 g/dL    RDW 14.5 11.6 - 14.5 %    PLATELET 273 016 - 950 K/uL    MPV 9.3 9.2 - 11.8 FL    NRBC 0.0 0  WBC    ABSOLUTE NRBC 0.00 0.00 - 0.01 K/uL    NEUTROPHILS 58 40 - 73 %    LYMPHOCYTES 35 21 - 52 %    MONOCYTES 7 3 - 10 %    EOSINOPHILS 1 0 - 5 %    BASOPHILS 1 0 - 2 %    IMMATURE GRANULOCYTES 0 0.0 - 0.5 %    ABS. NEUTROPHILS 2.2 1.8 - 8.0 K/UL    ABS. LYMPHOCYTES 1.3 0.9 - 3.6 K/UL    ABS. MONOCYTES 0.3 0.05 - 1.2 K/UL    ABS. EOSINOPHILS 0.0 0.0 - 0.4 K/UL    ABS. BASOPHILS 0.0 0.0 - 0.1 K/UL    ABS. IMM.  GRANS. 0.0 0.00 - 0.04 K/UL    DF AUTOMATED     METABOLIC PANEL, COMPREHENSIVE    Collection Time: 06/12/22  2:11 PM   Result Value Ref Range    Sodium 133 (L) 136 - 145 mmol/L    Potassium 4.2 3.5 - 5.5 mmol/L    Chloride 96 (L) 100 - 111 mmol/L    CO2 30 21 - 32 mmol/L    Anion gap 7 3.0 - 18 mmol/L    Glucose 96 74 - 99 mg/dL    BUN 16 7.0 - 18 MG/DL    Creatinine 0.67 0.6 - 1.3 MG/DL    BUN/Creatinine ratio 24 (H) 12 - 20      GFR est AA >60 >60 ml/min/1.73m2    GFR est non-AA >60 >60 ml/min/1.73m2    Calcium 8.6 8.5 - 10.1 MG/DL    Bilirubin, total 0.2 0.2 - 1.0 MG/DL    ALT (SGPT) 28 13 - 56 U/L    AST (SGOT) 20 10 - 38 U/L    Alk. phosphatase 85 45 - 117 U/L    Protein, total 6.4 6.4 - 8.2 g/dL    Albumin 3.2 (L) 3.4 - 5.0 g/dL    Globulin 3.2 2.0 - 4.0 g/dL    A-G Ratio 1.0 0.8 - 1.7     TROPONIN-HIGH SENSITIVITY    Collection Time: 06/12/22  2:11 PM   Result Value Ref Range    Troponin-High Sensitivity 5 0 - 54 ng/L   NT-PRO BNP    Collection Time: 06/12/22  2:11 PM   Result Value Ref Range    NT pro-BNP 1,343 0 - 1,800 PG/ML   MAGNESIUM    Collection Time: 06/12/22  2:11 PM   Result Value Ref Range    Magnesium 1.8 1.6 - 2.6 mg/dL   EKG, 12 LEAD, INITIAL    Collection Time: 06/12/22  2:11 PM   Result Value Ref Range    Ventricular Rate 68 BPM    Atrial Rate 68 BPM    P-R Interval 156 ms    QRS Duration 74 ms    Q-T Interval 402 ms    QTC Calculation (Bezet) 427 ms    Calculated P Axis 79 degrees    Calculated R Axis 49 degrees    Calculated T Axis 66 degrees    Diagnosis       Normal sinus rhythm  Septal infarct (cited on or before 29-MAY-2022)  Abnormal ECG  When compared with ECG of 29-MAY-2022 13:59,  No significant change was found           Radiologic Studies -   XR CHEST PORT   Final Result   1. No acute findings                        Medical Decision Making   I am the first provider for this patient. I reviewed the vital signs, available nursing notes, past medical history, past surgical history, family history and social history. Vital Signs-Reviewed the patient's vital signs.     EKG: Nondiagnostic for ischemia    Records Reviewed: Nursing Notes and Old Medical Records (Time of Review: 3:12 PM)    Provider Notes (Medical Decision Making):   MDM  55-year-old female here for evaluation of coarse tremors, likely due to ongoing chronic symptoms. Consider metabolic derangement, low suspicion for ACS or pneumonia    ED Course: Progress Notes, Reevaluation, and Consults:  Patient afebrile and hemodynamically normal  Exam discussed as above  We will screen labs, troponin, EKG and chest x-ray. CBC unremarkable  CMP unremarkable, troponin negative, EKG nondiagnostic for ischemia  NT proBNP negative  Chest x-ray negative    Patient continues to rest without tremors, wakes up and tries to have tremor activity. Encouraged her to follow-up with neurologist.  Signs and symptoms prompting return to the ED were discussed. She was discharged home in stable condition. Procedures    Diagnosis     Clinical Impression:   1. Coarse tremors    2. Atypical chest pain        Disposition: home    Follow-up Information    None          Discharge Medication List as of 6/12/2022  5:17 PM      CONTINUE these medications which have NOT CHANGED    Details   escitalopram oxalate (LEXAPRO) 5 mg tablet Take 5 mg by mouth daily. , Historical Med      primidone (MYSOLINE) 50 mg tablet Take 25 mg by mouth nightly., Historical Med      propranolol LA (INDERAL LA) 60 mg SR capsule Take 1 Capsule by mouth daily. , Normal, Disp-30 Capsule, R-0      ondansetron (ZOFRAN ODT) 4 mg disintegrating tablet Take 1 Tablet by mouth every eight (8) hours as needed for Nausea or Vomiting., Normal, Disp-30 Tablet, R-0      sodium chloride 1 gram tablet Take 2 g by mouth daily. , Historical Med      ezetimibe (ZETIA) 10 mg tablet Take 10 mg by mouth daily. , Historical Med      lubiPROStone (AMITIZA) 8 mcg capsule Take 8 mcg by mouth two (2) times a day., Historical Med      omeprazole (PRILOSEC) 20 mg capsule Take 20 mg by mouth daily. , Historical Med      senna (Senna) 8.6 mg tablet Take 1 Tab by mouth daily. , Normal, Disp-30 Tab, R-0      polyethylene glycol (Miralax) 17 gram/dose powder Take 17 g by mouth two (2) times a day.  1 tablespoon with 8 oz of water daily, Normal, Disp-507 g, R-0      acetaminophen (TYLENOL EX STR ARTHRITIS PAIN) 500 mg tablet Take 1 Tab by mouth every six (6) hours as needed., Normal, Disp-60 Tab, R-1      diphenhydrAMINE (BENADRYL ALLERGY) 25 mg tablet Take 1 Tab by mouth every six (6) hours as needed., Normal, Disp-30 Tab, R-2             Moriah Guerrero MD   Emergency Medicine   June 12, 2022, 3:12 PM     This note is dictated utilizing Dragon voice recognition software. Unfortunately this leads to occasional typographical errors using the voice recognition. I apologize in advance if the situation occurs. If questions occur please do not hesitate to contact me directly.     Juani Guardado MD

## 2022-06-13 LAB
ATRIAL RATE: 68 BPM
CALCULATED P AXIS, ECG09: 79 DEGREES
CALCULATED R AXIS, ECG10: 49 DEGREES
CALCULATED T AXIS, ECG11: 66 DEGREES
DIAGNOSIS, 93000: NORMAL
P-R INTERVAL, ECG05: 156 MS
Q-T INTERVAL, ECG07: 402 MS
QRS DURATION, ECG06: 74 MS
QTC CALCULATION (BEZET), ECG08: 427 MS
VENTRICULAR RATE, ECG03: 68 BPM

## 2022-07-25 ENCOUNTER — HOSPITAL ENCOUNTER (EMERGENCY)
Age: 87
Discharge: HOME | End: 2022-07-25
Attending: EMERGENCY MEDICINE
Payer: MEDICARE

## 2022-07-25 ENCOUNTER — APPOINTMENT (OUTPATIENT)
Dept: CT IMAGING | Age: 87
End: 2022-07-25
Attending: EMERGENCY MEDICINE
Payer: MEDICARE

## 2022-07-25 ENCOUNTER — APPOINTMENT (OUTPATIENT)
Dept: GENERAL RADIOLOGY | Age: 87
End: 2022-07-25
Attending: EMERGENCY MEDICINE
Payer: MEDICARE

## 2022-07-25 VITALS
OXYGEN SATURATION: 97 % | BODY MASS INDEX: 15.14 KG/M2 | SYSTOLIC BLOOD PRESSURE: 117 MMHG | DIASTOLIC BLOOD PRESSURE: 43 MMHG | RESPIRATION RATE: 16 BRPM | WEIGHT: 91 LBS | HEART RATE: 85 BPM | TEMPERATURE: 98.4 F

## 2022-07-25 DIAGNOSIS — M25.561 ACUTE PAIN OF RIGHT KNEE: ICD-10-CM
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
## 2022-07-25 DIAGNOSIS — M25.511 PAIN IN JOINT OF RIGHT SHOULDER: ICD-10-CM

## 2022-07-25 DIAGNOSIS — M25.559 HIP PAIN: ICD-10-CM

## 2022-07-25 LAB
ANION GAP SERPL CALC-SCNC: 4 MMOL/L (ref 3–18)
APPEARANCE UR: CLEAR
APTT PPP: 25.3 SEC (ref 23–36.4)
BACTERIA URNS QL MICRO: NEGATIVE /HPF
BASOPHILS # BLD: 0 K/UL (ref 0–0.1)
BASOPHILS NFR BLD: 1 % (ref 0–2)
BILIRUB UR QL: NEGATIVE
BUN SERPL-MCNC: 23 MG/DL (ref 7–18)
BUN/CREAT SERPL: 25 (ref 12–20)
CALCIUM SERPL-MCNC: 9.2 MG/DL (ref 8.5–10.1)
CHLORIDE SERPL-SCNC: 97 MMOL/L (ref 100–111)
CO2 SERPL-SCNC: 31 MMOL/L (ref 21–32)
COLOR UR: YELLOW
CREAT SERPL-MCNC: 0.92 MG/DL (ref 0.6–1.3)
DIFFERENTIAL METHOD BLD: ABNORMAL
EOSINOPHIL # BLD: 0 K/UL (ref 0–0.4)
EOSINOPHIL NFR BLD: 1 % (ref 0–5)
EPITH CASTS URNS QL MICRO: NORMAL /LPF (ref 0–5)
ERYTHROCYTE [DISTWIDTH] IN BLOOD BY AUTOMATED COUNT: 13.5 % (ref 11.6–14.5)
GLUCOSE SERPL-MCNC: 114 MG/DL (ref 74–99)
GLUCOSE UR STRIP.AUTO-MCNC: NEGATIVE MG/DL
HCT VFR BLD AUTO: 31 % (ref 35–45)
HGB BLD-MCNC: 10.2 G/DL (ref 12–16)
HGB UR QL STRIP: ABNORMAL
IMM GRANULOCYTES # BLD AUTO: 0 K/UL (ref 0–0.04)
IMM GRANULOCYTES NFR BLD AUTO: 0 % (ref 0–0.5)
INR PPP: 0.9 (ref 0.8–1.2)
KETONES UR QL STRIP.AUTO: NEGATIVE MG/DL
LEUKOCYTE ESTERASE UR QL STRIP.AUTO: ABNORMAL
LYMPHOCYTES # BLD: 2 K/UL (ref 0.9–3.6)
LYMPHOCYTES NFR BLD: 41 % (ref 21–52)
MCH RBC QN AUTO: 29.7 PG (ref 24–34)
MCHC RBC AUTO-ENTMCNC: 32.9 G/DL (ref 31–37)
MCV RBC AUTO: 90.1 FL (ref 78–100)
MONOCYTES # BLD: 0.4 K/UL (ref 0.05–1.2)
MONOCYTES NFR BLD: 7 % (ref 3–10)
NEUTS SEG # BLD: 2.4 K/UL (ref 1.8–8)
NEUTS SEG NFR BLD: 50 % (ref 40–73)
NITRITE UR QL STRIP.AUTO: NEGATIVE
NRBC # BLD: 0 K/UL (ref 0–0.01)
NRBC BLD-RTO: 0 PER 100 WBC
PH UR STRIP: 7 [PH] (ref 5–8)
PLATELET # BLD AUTO: 234 K/UL (ref 135–420)
PMV BLD AUTO: 8.8 FL (ref 9.2–11.8)
POTASSIUM SERPL-SCNC: 4.1 MMOL/L (ref 3.5–5.5)
PROT UR STRIP-MCNC: 100 MG/DL
PROTHROMBIN TIME: 13 SEC (ref 11.5–15.2)
RBC # BLD AUTO: 3.44 M/UL (ref 4.2–5.3)
RBC #/AREA URNS HPF: NORMAL /HPF (ref 0–5)
SODIUM SERPL-SCNC: 132 MMOL/L (ref 136–145)
SP GR UR REFRACTOMETRY: 1.01 (ref 1–1.03)
UROBILINOGEN UR QL STRIP.AUTO: 0.2 EU/DL (ref 0.2–1)
WBC # BLD AUTO: 4.8 K/UL (ref 4.6–13.2)
WBC URNS QL MICRO: NORMAL /HPF (ref 0–4)

## 2022-07-25 PROCEDURE — 85025 COMPLETE CBC W/AUTO DIFF WBC: CPT

## 2022-07-25 PROCEDURE — 73552 X-RAY EXAM OF FEMUR 2/>: CPT

## 2022-07-25 PROCEDURE — 73560 X-RAY EXAM OF KNEE 1 OR 2: CPT

## 2022-07-25 PROCEDURE — 73502 X-RAY EXAM HIP UNI 2-3 VIEWS: CPT

## 2022-07-25 PROCEDURE — 99284 EMERGENCY DEPT VISIT MOD MDM: CPT

## 2022-07-25 PROCEDURE — 81001 URINALYSIS AUTO W/SCOPE: CPT

## 2022-07-25 PROCEDURE — 96374 THER/PROPH/DIAG INJ IV PUSH: CPT

## 2022-07-25 PROCEDURE — 85610 PROTHROMBIN TIME: CPT

## 2022-07-25 PROCEDURE — 85730 THROMBOPLASTIN TIME PARTIAL: CPT

## 2022-07-25 PROCEDURE — 96375 TX/PRO/DX INJ NEW DRUG ADDON: CPT

## 2022-07-25 PROCEDURE — 71045 X-RAY EXAM CHEST 1 VIEW: CPT

## 2022-07-25 PROCEDURE — 80048 BASIC METABOLIC PNL TOTAL CA: CPT

## 2022-07-25 PROCEDURE — 96361 HYDRATE IV INFUSION ADD-ON: CPT

## 2022-07-25 PROCEDURE — 73030 X-RAY EXAM OF SHOULDER: CPT

## 2022-07-25 PROCEDURE — 72125 CT NECK SPINE W/O DYE: CPT

## 2022-07-25 PROCEDURE — 74011250636 HC RX REV CODE- 250/636: Performed by: EMERGENCY MEDICINE

## 2022-07-25 PROCEDURE — 70450 CT HEAD/BRAIN W/O DYE: CPT

## 2022-07-25 RX ORDER — MORPHINE SULFATE 4 MG/ML
4 INJECTION INTRAVENOUS
Status: COMPLETED | OUTPATIENT
Start: 2022-07-25 | End: 2022-07-25

## 2022-07-25 RX ORDER — ONDANSETRON 2 MG/ML
4 INJECTION INTRAMUSCULAR; INTRAVENOUS
Status: COMPLETED | OUTPATIENT
Start: 2022-07-25 | End: 2022-07-25

## 2022-07-25 RX ORDER — LORAZEPAM 2 MG/ML
1 INJECTION INTRAMUSCULAR
Status: COMPLETED | OUTPATIENT
Start: 2022-07-25 | End: 2022-07-25

## 2022-07-25 RX ADMIN — LORAZEPAM 1 MG: 2 INJECTION INTRAMUSCULAR; INTRAVENOUS at 01:31

## 2022-07-25 RX ADMIN — ONDANSETRON 4 MG: 2 INJECTION INTRAMUSCULAR; INTRAVENOUS at 01:20

## 2022-07-25 RX ADMIN — SODIUM CHLORIDE 1000 ML: 900 INJECTION, SOLUTION INTRAVENOUS at 01:37

## 2022-07-25 RX ADMIN — MORPHINE SULFATE 4 MG: 4 INJECTION, SOLUTION INTRAMUSCULAR; INTRAVENOUS at 01:21

## 2022-07-25 NOTE — ED PROVIDER NOTES
EMERGENCY DEPARTMENT HISTORY AND PHYSICAL EXAM    1:15 AM  Date: 7/25/2022  Patient Name: Travis Serrano    History of Presenting Illness       History Provided By:     HPI: Travis Serrano is a 80 y.o. female with past medical history as below including coarse tremors, hypertension, hyperlipidemia presents with right-sided hip pain after fall tonight. States that she lost her balance as he was getting off the toilet. Patient unsure whether she hit her head, complains of mild neck pain, right shoulder pain, right hip pain.          PCP: Gurjit Frost MD    Past History     Past Medical History:  Past Medical History:   Diagnosis Date    Anemia NEC     Asthma     Coarse tremors     Colitis     DDD (degenerative disc disease), cervical     Fibrocystic breast     GERD (gastroesophageal reflux disease)     Headache     HTN (hypertension)     Hyperlipidemia     OA (osteoarthritis)     Osteoporosis     PAF (paroxysmal atrial fibrillation) ([facility] [address] 1995    Pneumonia     Thyroid nodule     Vitamin D deficiency 02/07/2011       Past Surgical History:  Past Surgical History:   Procedure Laterality Date    HX GYN      hysterectomy    HX HEENT      cataract surgery bilaterally    HX TOTAL ABDOMINAL HYSTERECTOMY      AK BREAST SURGERY PROCEDURE UNLISTED      cysts removed    AK CARDIAC SURG PROCEDURE UNLIST      cardiac catherization       Family History:  Family History   Problem Relation Age of Onset    Hypertension Other     Diabetes Other     Heart Disease Other     Cancer Other         stomach & colon    Diabetes Mother     Heart Attack Mother     Stroke Mother     Heart Attack Father     Heart Failure Father     Arthritis-rheumatoid Father     Other Brother         aneuysm-ruptured    Parkinson's Disease Brother        Social History:  Social History     Tobacco Use    Smoking status: Never    Smokeless tobacco: Never   Vaping Use    Vaping Use: Never used   Substance Use Topics    Alcohol use: No    Drug use: No       Allergies: Allergies   Allergen Reactions    Latex, Natural Rubber Unknown (comments)    Asa-Acetaminophen-Caff-Potass Shortness of Breath     Patient is only allergic to ASA    Aspirin Shortness of Breath and Other (comments)     Patient states this caused her stomach to bleed internal    Erythromycin Other (comments)     bleeding    Iodine And Iodide Containing Products Unknown (comments)    Lemon Unknown (comments)    Other Medication Unknown (comments)     Pt not sure of allergies states \"I'm allergic to more but not sure of name\"    Pcn [Penicillins] Swelling    Shellfish Containing Products Unknown (comments)    Sulfa (Sulfonamide Antibiotics) Unknown (comments)       Review of Systems   Review of Systems   Constitutional:  Negative for activity change, appetite change and chills. HENT:  Negative for congestion, ear discharge, ear pain and sore throat. Eyes:  Negative for photophobia and pain. Respiratory:  Negative for cough and choking. Cardiovascular:  Negative for palpitations and leg swelling. Gastrointestinal:  Negative for anal bleeding and rectal pain. Endocrine: Negative for polydipsia and polyuria. Genitourinary:  Negative for genital sores and urgency. Musculoskeletal:  Negative for arthralgias and myalgias. Neurological:  Negative for dizziness, seizures and speech difficulty. Psychiatric/Behavioral:  Negative for hallucinations, self-injury and suicidal ideas. Physical Exam     Patient Vitals for the past 12 hrs:   Temp Pulse Resp BP SpO2   07/25/22 0500 -- -- -- (!) 121/51 96 %   07/25/22 0400 -- -- -- (!) 105/33 96 %   07/25/22 0230 -- -- -- (!) 105/31 94 %   07/25/22 0130 -- -- -- (!) 146/57 100 %   07/25/22 0108 98.4 °F (36.9 °C) 85 16 (!) 203/114 99 %       Physical Exam  Vitals and nursing note reviewed. Constitutional:       Appearance: She is well-developed.       Comments: Patient with coarse tremors   HENT:      Head: Normocephalic and atraumatic. Eyes:      General:         Right eye: No discharge. Left eye: No discharge. Cardiovascular:      Rate and Rhythm: Normal rate and regular rhythm. Heart sounds: Normal heart sounds. No murmur heard. Pulmonary:      Effort: Pulmonary effort is normal. No respiratory distress. Breath sounds: Normal breath sounds. No stridor. No wheezing or rales. Chest:      Chest wall: No tenderness. Abdominal:      General: Bowel sounds are normal. There is no distension. Palpations: Abdomen is soft. Tenderness: There is no abdominal tenderness. There is no guarding or rebound. Musculoskeletal:         General: Normal range of motion. Cervical back: Normal range of motion and neck supple. Comments: R hip and knee tenderness-full range of movement  R shoulder tenderness-full range of movement  Neck no midline tenderness, FROM  Upper and lower extremities neurovascular intact   Skin:     General: Skin is warm and dry. Neurological:      Mental Status: She is alert and oriented to person, place, and time. Diagnostic Study Results     Labs -  Recent Results (from the past 12 hour(s))   CBC WITH AUTOMATED DIFF    Collection Time: 07/25/22  1:05 AM   Result Value Ref Range    WBC 4.8 4.6 - 13.2 K/uL    RBC 3.44 (L) 4.20 - 5.30 M/uL    HGB 10.2 (L) 12.0 - 16.0 g/dL    HCT 31.0 (L) 35.0 - 45.0 %    MCV 90.1 78.0 - 100.0 FL    MCH 29.7 24.0 - 34.0 PG    MCHC 32.9 31.0 - 37.0 g/dL    RDW 13.5 11.6 - 14.5 %    PLATELET 994 949 - 523 K/uL    MPV 8.8 (L) 9.2 - 11.8 FL    NRBC 0.0 0  WBC    ABSOLUTE NRBC 0.00 0.00 - 0.01 K/uL    NEUTROPHILS 50 40 - 73 %    LYMPHOCYTES 41 21 - 52 %    MONOCYTES 7 3 - 10 %    EOSINOPHILS 1 0 - 5 %    BASOPHILS 1 0 - 2 %    IMMATURE GRANULOCYTES 0 0.0 - 0.5 %    ABS. NEUTROPHILS 2.4 1.8 - 8.0 K/UL    ABS. LYMPHOCYTES 2.0 0.9 - 3.6 K/UL    ABS. MONOCYTES 0.4 0.05 - 1.2 K/UL    ABS. EOSINOPHILS 0.0 0.0 - 0.4 K/UL    ABS.  BASOPHILS 0.0 0.0 - 0.1 K/UL    ABS. IMM. GRANS. 0.0 0.00 - 0.04 K/UL    DF AUTOMATED     METABOLIC PANEL, BASIC    Collection Time: 07/25/22  1:05 AM   Result Value Ref Range    Sodium 132 (L) 136 - 145 mmol/L    Potassium 4.1 3.5 - 5.5 mmol/L    Chloride 97 (L) 100 - 111 mmol/L    CO2 31 21 - 32 mmol/L    Anion gap 4 3.0 - 18 mmol/L    Glucose 114 (H) 74 - 99 mg/dL    BUN 23 (H) 7.0 - 18 MG/DL    Creatinine 0.92 0.6 - 1.3 MG/DL    BUN/Creatinine ratio 25 (H) 12 - 20      GFR est AA >60 >60 ml/min/1.73m2    GFR est non-AA 58 (L) >60 ml/min/1.73m2    Calcium 9.2 8.5 - 10.1 MG/DL   PROTHROMBIN TIME + INR    Collection Time: 07/25/22  1:05 AM   Result Value Ref Range    Prothrombin time 13.0 11.5 - 15.2 sec    INR 0.9 0.8 - 1.2     PTT    Collection Time: 07/25/22  1:05 AM   Result Value Ref Range    aPTT 25.3 23.0 - 36.4 SEC   URINALYSIS W/ RFLX MICROSCOPIC    Collection Time: 07/25/22  1:50 AM   Result Value Ref Range    Color YELLOW      Appearance CLEAR      Specific gravity 1.008 1.005 - 1.030      pH (UA) 7.0 5.0 - 8.0      Protein 100 (A) NEG mg/dL    Glucose Negative NEG mg/dL    Ketone Negative NEG mg/dL    Bilirubin Negative NEG      Blood SMALL (A) NEG      Urobilinogen 0.2 0.2 - 1.0 EU/dL    Nitrites Negative NEG      Leukocyte Esterase SMALL (A) NEG     URINE MICROSCOPIC ONLY    Collection Time: 07/25/22  1:50 AM   Result Value Ref Range    WBC 0 to 3 0 - 4 /hpf    RBC 4 to 10 0 - 5 /hpf    Epithelial cells FEW 0 - 5 /lpf    Bacteria Negative NEG /hpf       Radiologic Studies -   XR HIP RT W OR WO PELV 2-3 VWS    Result Date: 7/25/2022  Right hip: -No clearly acute findings. Osteopenia limits of aeration of bony detail. Of note, MRI would be more sensitive for identifying a nondisplaced hip fracture. Right femur: -No acute osseous findings. Right knee: -No acute osseous findings. XR FEMUR RT 2 VS    Result Date: 7/25/2022  Right hip: -No clearly acute findings. Osteopenia limits of aeration of bony detail.  Of note, MRI would be more sensitive for identifying a nondisplaced hip fracture. Right femur: -No acute osseous findings. Right knee: -No acute osseous findings. XR KNEE RT MAX 2 VWS    Result Date: 7/25/2022  Right hip: -No clearly acute findings. Osteopenia limits of aeration of bony detail. Of note, MRI would be more sensitive for identifying a nondisplaced hip fracture. Right femur: -No acute osseous findings. Right knee: -No acute osseous findings. CT HEAD WO CONT    Result Date: 7/25/2022  No clearly acute intracranial findings. Mild burden of periventricular low-attenuation, likely chronic microvascular ischemic change. CT SPINE CERV WO CONT    Result Date: 7/25/2022  No acute fracture identified. Grade 1 C3-C4 anterolisthesis is likely degenerative in nature. Notable degenerative changes as above. Medical Decision Making     ED Course: Progress Notes, Reevaluation, and Consults:    1:15 AM Initial assessment performed. The patients presenting problems have been discussed, and they/their family are in agreement with the care plan formulated and outlined with them. I have encouraged them to ask questions as they arise throughout their visit. Provider Notes (Medical Decision Making):   Patient presents with right hip, shoulder pain, knee pain neck pain after fall  Full range of movement in all extremities, no deformity  No neurovascular deficit  CT head spine no acute abnormalities  X-ray hip, femur, knee no fractures  Patient feels well on reassessment  Labs interpreted by me:   No leukocytosis, no electrolyte abnormality  Sodium 132, patient received IV fluids  UA no UTI  Will be discharged with PMD follow-up. Advised to follow-up with orthopedics if symptoms persist              Vital Signs-Reviewed the patient's vital signs. Reviewed pt's pulse ox reading.          Records Reviewed: old medical records  -I am the first provider for this patient.  -I reviewed the vital signs, available nursing notes, past medical history, past surgical history, family history and social history. Current Outpatient Medications   Medication Sig Dispense Refill    escitalopram oxalate (LEXAPRO) 5 mg tablet Take 5 mg by mouth daily. primidone (MYSOLINE) 50 mg tablet Take 25 mg by mouth nightly. propranolol LA (INDERAL LA) 60 mg SR capsule Take 1 Capsule by mouth daily. (Patient not taking: Reported on 1/15/2022) 30 Capsule 0    ondansetron (ZOFRAN ODT) 4 mg disintegrating tablet Take 1 Tablet by mouth every eight (8) hours as needed for Nausea or Vomiting. 30 Tablet 0    sodium chloride 1 gram tablet Take 2 g by mouth daily. ezetimibe (ZETIA) 10 mg tablet Take 10 mg by mouth daily. lubiPROStone (AMITIZA) 8 mcg capsule Take 8 mcg by mouth two (2) times a day. omeprazole (PRILOSEC) 20 mg capsule Take 20 mg by mouth daily. senna (Senna) 8.6 mg tablet Take 1 Tab by mouth daily. (Patient not taking: Reported on 1/15/2022) 30 Tab 0    polyethylene glycol (Miralax) 17 gram/dose powder Take 17 g by mouth two (2) times a day. 1 tablespoon with 8 oz of water daily 507 g 0    acetaminophen (TYLENOL EX STR ARTHRITIS PAIN) 500 mg tablet Take 1 Tab by mouth every six (6) hours as needed. 60 Tab 1    diphenhydrAMINE (BENADRYL ALLERGY) 25 mg tablet Take 1 Tab by mouth every six (6) hours as needed. (Patient not taking: Reported on 1/15/2022) 30 Tab 2        Clinical Impression     Clinical Impression:   1. Fall, initial encounter    2. Hip pain    3. Pain in joint of right shoulder    4. Acute pain of right knee        Disposition: This note was dictated utilizing voice recognition software which may lead to typographical errors. I apologize in advance if the situation occurs. If questions arise please do not hesitate to contact me or call our department.     Climmie Kehr, MD  1:15 AM

## 2022-09-02 ENCOUNTER — HOSPITAL ENCOUNTER (OUTPATIENT)
Dept: LAB | Age: 87
Discharge: HOME | End: 2022-09-02
Payer: MEDICARE

## 2022-09-02 ENCOUNTER — TRANSCRIBE ORDER (OUTPATIENT)
Dept: REGISTRATION | Age: 87
End: 2022-09-02

## 2022-09-02 DIAGNOSIS — N28.9 URETERAL SLUDGE: ICD-10-CM

## 2022-09-02 DIAGNOSIS — E87.1 HYPOSMOLALITY SYNDROME: ICD-10-CM

## 2022-09-02 DIAGNOSIS — E87.1 HYPOSMOLALITY SYNDROME: Primary | ICD-10-CM

## 2022-09-02 LAB
ALBUMIN SERPL-MCNC: 3.1 G/DL (ref 3.4–5)
ANION GAP SERPL CALC-SCNC: 5 MMOL/L (ref 3–18)
APPEARANCE UR: CLEAR
BACTERIA URNS QL MICRO: ABNORMAL /HPF
BILIRUB UR QL: NEGATIVE
BUN SERPL-MCNC: 19 MG/DL (ref 7–18)
BUN/CREAT SERPL: 26 (ref 12–20)
CALCIUM SERPL-MCNC: 8.5 MG/DL (ref 8.5–10.1)
CHLORIDE SERPL-SCNC: 105 MMOL/L (ref 100–111)
CO2 SERPL-SCNC: 30 MMOL/L (ref 21–32)
COLOR UR: YELLOW
CREAT SERPL-MCNC: 0.74 MG/DL (ref 0.6–1.3)
CREAT UR-MCNC: 39 MG/DL (ref 30–125)
EPITH CASTS URNS QL MICRO: ABNORMAL /LPF (ref 0–5)
GLUCOSE SERPL-MCNC: 160 MG/DL (ref 74–99)
GLUCOSE UR STRIP.AUTO-MCNC: NEGATIVE MG/DL
HGB UR QL STRIP: ABNORMAL
KETONES UR QL STRIP.AUTO: NEGATIVE MG/DL
LEUKOCYTE ESTERASE UR QL STRIP.AUTO: NEGATIVE
NITRITE UR QL STRIP.AUTO: NEGATIVE
PH UR STRIP: 6.5 [PH] (ref 5–8)
PHOSPHATE SERPL-MCNC: 3.6 MG/DL (ref 2.5–4.9)
POTASSIUM SERPL-SCNC: 4.4 MMOL/L (ref 3.5–5.5)
PROT UR STRIP-MCNC: 300 MG/DL
PROT UR-MCNC: 257 MG/DL
RBC #/AREA URNS HPF: ABNORMAL /HPF (ref 0–5)
SODIUM SERPL-SCNC: 140 MMOL/L (ref 136–145)
SP GR UR REFRACTOMETRY: 1.01 (ref 1–1.03)
T4 FREE SERPL-MCNC: 0.7 NG/DL (ref 0.7–1.5)
TSH SERPL DL<=0.05 MIU/L-ACNC: 0.84 UIU/ML (ref 0.36–3.74)
UROBILINOGEN UR QL STRIP.AUTO: 0.2 EU/DL (ref 0.2–1)
WBC URNS QL MICRO: ABNORMAL /HPF (ref 0–4)

## 2022-09-02 PROCEDURE — 81001 URINALYSIS AUTO W/SCOPE: CPT

## 2022-09-02 PROCEDURE — 83935 ASSAY OF URINE OSMOLALITY: CPT

## 2022-09-02 PROCEDURE — 80069 RENAL FUNCTION PANEL: CPT

## 2022-09-02 PROCEDURE — 36415 COLL VENOUS BLD VENIPUNCTURE: CPT

## 2022-09-02 PROCEDURE — 84443 ASSAY THYROID STIM HORMONE: CPT

## 2022-09-02 PROCEDURE — 84156 ASSAY OF PROTEIN URINE: CPT

## 2022-09-02 PROCEDURE — 84439 ASSAY OF FREE THYROXINE: CPT

## 2022-09-02 PROCEDURE — 82570 ASSAY OF URINE CREATININE: CPT

## 2022-09-03 LAB — OSMOLALITY UR: 430 MOSMOL/KG

## 2022-09-16 ENCOUNTER — APPOINTMENT (OUTPATIENT)
Dept: CT IMAGING | Age: 87
End: 2022-09-16
Attending: EMERGENCY MEDICINE
Payer: MEDICARE

## 2022-09-16 ENCOUNTER — HOSPITAL ENCOUNTER (EMERGENCY)
Age: 87
Discharge: HOME | End: 2022-09-17
Attending: EMERGENCY MEDICINE
Payer: MEDICARE

## 2022-09-16 DIAGNOSIS — N39.0 URINARY TRACT INFECTION WITHOUT HEMATURIA, SITE UNSPECIFIED: Primary | ICD-10-CM

## 2022-09-16 DIAGNOSIS — S72.114D CLOSED NONDISPLACED FRACTURE OF GREATER TROCHANTER OF RIGHT FEMUR WITH ROUTINE HEALING, SUBSEQUENT ENCOUNTER: ICD-10-CM

## 2022-09-16 LAB
ALBUMIN SERPL-MCNC: 3.5 G/DL (ref 3.4–5)
ALBUMIN/GLOB SERPL: 1.1 {RATIO} (ref 0.8–1.7)
ALP SERPL-CCNC: 105 U/L (ref 45–117)
ALT SERPL-CCNC: 23 U/L (ref 13–56)
ANION GAP SERPL CALC-SCNC: 5 MMOL/L (ref 3–18)
APPEARANCE UR: CLEAR
AST SERPL-CCNC: 16 U/L (ref 10–38)
BACTERIA URNS QL MICRO: NEGATIVE /HPF
BASOPHILS # BLD: 0.1 K/UL (ref 0–0.1)
BASOPHILS NFR BLD: 1 % (ref 0–2)
BILIRUB SERPL-MCNC: 0.1 MG/DL (ref 0.2–1)
BILIRUB UR QL: NEGATIVE
BUN SERPL-MCNC: 22 MG/DL (ref 7–18)
BUN/CREAT SERPL: 28 (ref 12–20)
CALCIUM SERPL-MCNC: 8.8 MG/DL (ref 8.5–10.1)
CHLORIDE SERPL-SCNC: 103 MMOL/L (ref 100–111)
CO2 SERPL-SCNC: 28 MMOL/L (ref 21–32)
COLOR UR: YELLOW
CREAT SERPL-MCNC: 0.8 MG/DL (ref 0.6–1.3)
DIFFERENTIAL METHOD BLD: ABNORMAL
EOSINOPHIL # BLD: 0.1 K/UL (ref 0–0.4)
EOSINOPHIL NFR BLD: 1 % (ref 0–5)
EPITH CASTS URNS QL MICRO: NORMAL /LPF (ref 0–5)
ERYTHROCYTE [DISTWIDTH] IN BLOOD BY AUTOMATED COUNT: 15.2 % (ref 11.6–14.5)
GLOBULIN SER CALC-MCNC: 3.3 G/DL (ref 2–4)
GLUCOSE SERPL-MCNC: 111 MG/DL (ref 74–99)
GLUCOSE UR STRIP.AUTO-MCNC: NEGATIVE MG/DL
HCT VFR BLD AUTO: 30.2 % (ref 35–45)
HGB BLD-MCNC: 9.7 G/DL (ref 12–16)
HGB UR QL STRIP: ABNORMAL
IMM GRANULOCYTES # BLD AUTO: 0 K/UL (ref 0–0.04)
IMM GRANULOCYTES NFR BLD AUTO: 0 % (ref 0–0.5)
KETONES UR QL STRIP.AUTO: NEGATIVE MG/DL
LEUKOCYTE ESTERASE UR QL STRIP.AUTO: NEGATIVE
LIPASE SERPL-CCNC: 101 U/L (ref 73–393)
LYMPHOCYTES # BLD: 1.7 K/UL (ref 0.9–3.6)
LYMPHOCYTES NFR BLD: 35 % (ref 21–52)
MCH RBC QN AUTO: 29.6 PG (ref 24–34)
MCHC RBC AUTO-ENTMCNC: 32.1 G/DL (ref 31–37)
MCV RBC AUTO: 92.1 FL (ref 78–100)
MONOCYTES # BLD: 0.4 K/UL (ref 0.05–1.2)
MONOCYTES NFR BLD: 9 % (ref 3–10)
NEUTS SEG # BLD: 2.6 K/UL (ref 1.8–8)
NEUTS SEG NFR BLD: 54 % (ref 40–73)
NITRITE UR QL STRIP.AUTO: NEGATIVE
NRBC # BLD: 0 K/UL (ref 0–0.01)
NRBC BLD-RTO: 0 PER 100 WBC
PH UR STRIP: 7.5 [PH] (ref 5–8)
PLATELET # BLD AUTO: 239 K/UL (ref 135–420)
PMV BLD AUTO: 9 FL (ref 9.2–11.8)
POTASSIUM SERPL-SCNC: 4.4 MMOL/L (ref 3.5–5.5)
PROT SERPL-MCNC: 6.8 G/DL (ref 6.4–8.2)
PROT UR STRIP-MCNC: 300 MG/DL
RBC # BLD AUTO: 3.28 M/UL (ref 4.2–5.3)
RBC #/AREA URNS HPF: NORMAL /HPF (ref 0–5)
SODIUM SERPL-SCNC: 136 MMOL/L (ref 136–145)
SP GR UR REFRACTOMETRY: 1.01 (ref 1–1.03)
UROBILINOGEN UR QL STRIP.AUTO: 0.2 EU/DL (ref 0.2–1)
WBC # BLD AUTO: 4.9 K/UL (ref 4.6–13.2)
WBC URNS QL MICRO: NORMAL /HPF (ref 0–4)

## 2022-09-16 PROCEDURE — 74176 CT ABD & PELVIS W/O CONTRAST: CPT

## 2022-09-16 PROCEDURE — 83690 ASSAY OF LIPASE: CPT

## 2022-09-16 PROCEDURE — 74011000250 HC RX REV CODE- 250: Performed by: EMERGENCY MEDICINE

## 2022-09-16 PROCEDURE — 96374 THER/PROPH/DIAG INJ IV PUSH: CPT

## 2022-09-16 PROCEDURE — 74011250636 HC RX REV CODE- 250/636: Performed by: EMERGENCY MEDICINE

## 2022-09-16 PROCEDURE — 81001 URINALYSIS AUTO W/SCOPE: CPT

## 2022-09-16 PROCEDURE — 87086 URINE CULTURE/COLONY COUNT: CPT

## 2022-09-16 PROCEDURE — 99284 EMERGENCY DEPT VISIT MOD MDM: CPT

## 2022-09-16 PROCEDURE — 96375 TX/PRO/DX INJ NEW DRUG ADDON: CPT

## 2022-09-16 PROCEDURE — 85025 COMPLETE CBC W/AUTO DIFF WBC: CPT

## 2022-09-16 PROCEDURE — 80053 COMPREHEN METABOLIC PANEL: CPT

## 2022-09-16 RX ORDER — KETOROLAC TROMETHAMINE 15 MG/ML
15 INJECTION, SOLUTION INTRAMUSCULAR; INTRAVENOUS ONCE
Status: COMPLETED | OUTPATIENT
Start: 2022-09-16 | End: 2022-09-16

## 2022-09-16 RX ADMIN — WATER 1 G: 1 INJECTION INTRAMUSCULAR; INTRAVENOUS; SUBCUTANEOUS at 23:11

## 2022-09-16 RX ADMIN — KETOROLAC TROMETHAMINE 15 MG: 15 INJECTION, SOLUTION INTRAMUSCULAR; INTRAVENOUS at 21:48

## 2022-09-17 VITALS
HEART RATE: 59 BPM | OXYGEN SATURATION: 100 % | DIASTOLIC BLOOD PRESSURE: 69 MMHG | TEMPERATURE: 97.8 F | SYSTOLIC BLOOD PRESSURE: 121 MMHG | RESPIRATION RATE: 14 BRPM

## 2022-09-17 PROCEDURE — 74011250636 HC RX REV CODE- 250/636: Performed by: EMERGENCY MEDICINE

## 2022-09-17 PROCEDURE — 96375 TX/PRO/DX INJ NEW DRUG ADDON: CPT

## 2022-09-17 PROCEDURE — 74011250637 HC RX REV CODE- 250/637: Performed by: EMERGENCY MEDICINE

## 2022-09-17 RX ORDER — ONDANSETRON 2 MG/ML
4 INJECTION INTRAMUSCULAR; INTRAVENOUS ONCE
Status: COMPLETED | OUTPATIENT
Start: 2022-09-17 | End: 2022-09-17

## 2022-09-17 RX ORDER — ACETAMINOPHEN 325 MG/1
650 TABLET ORAL
Status: COMPLETED | OUTPATIENT
Start: 2022-09-17 | End: 2022-09-17

## 2022-09-17 RX ORDER — PHENAZOPYRIDINE HYDROCHLORIDE 100 MG/1
100 TABLET, FILM COATED ORAL
Status: COMPLETED | OUTPATIENT
Start: 2022-09-17 | End: 2022-09-17

## 2022-09-17 RX ADMIN — PHENAZOPYRIDINE HYDROCHLORIDE 100 MG: 100 TABLET ORAL at 01:00

## 2022-09-17 RX ADMIN — ACETAMINOPHEN 650 MG: 325 TABLET, FILM COATED ORAL at 08:45

## 2022-09-17 RX ADMIN — ONDANSETRON 4 MG: 2 INJECTION INTRAMUSCULAR; INTRAVENOUS at 01:00

## 2022-09-17 NOTE — ED PROVIDER NOTES
EMERGENCY DEPARTMENT HISTORY AND PHYSICAL EXAM    8:47 PM      Date: 9/16/2022  Patient Name: Holley Plascencia    History of Presenting Illness     No chief complaint on file. History Provided By: Patient and EMS    Additional History (Context): Holley Plascencia is a 80 y.o. female with pelvic pain. Patient states that she has had intermittent episodes for the last month of lower pelvic pain. States that it feels like it is in her vagina and then radiates throughout her abdomen. This has been increasing in severity. EMS gave 2 doses of morphine 4 mg with no real improvement. Patient states that her caretaker took her to her doctor 2 days ago and had been referred to a urologist.  Patient does report a history of a hysterectomy. States that she has had issues with increased urinary frequency since her hysterectomy but that it seems to have worsened and is now having some associated dysuria with the pain. Denies any hematuria. Does report some nausea and vomiting because of the pain itself. Denies any diarrhea. PCP: Joe Bernardo MD    Current Outpatient Medications   Medication Sig Dispense Refill    escitalopram oxalate (LEXAPRO) 5 mg tablet Take 5 mg by mouth daily. primidone (MYSOLINE) 50 mg tablet Take 25 mg by mouth nightly. propranolol LA (INDERAL LA) 60 mg SR capsule Take 1 Capsule by mouth daily. (Patient not taking: Reported on 1/15/2022) 30 Capsule 0    ondansetron (ZOFRAN ODT) 4 mg disintegrating tablet Take 1 Tablet by mouth every eight (8) hours as needed for Nausea or Vomiting. 30 Tablet 0    sodium chloride 1 gram tablet Take 2 g by mouth daily. ezetimibe (ZETIA) 10 mg tablet Take 10 mg by mouth daily. lubiPROStone (AMITIZA) 8 mcg capsule Take 8 mcg by mouth two (2) times a day. omeprazole (PRILOSEC) 20 mg capsule Take 20 mg by mouth daily. senna (Senna) 8.6 mg tablet Take 1 Tab by mouth daily.  (Patient not taking: Reported on 1/15/2022) 30 Tab 0    polyethylene glycol (Miralax) 17 gram/dose powder Take 17 g by mouth two (2) times a day. 1 tablespoon with 8 oz of water daily 507 g 0    acetaminophen (TYLENOL EX STR ARTHRITIS PAIN) 500 mg tablet Take 1 Tab by mouth every six (6) hours as needed. 60 Tab 1    diphenhydrAMINE (BENADRYL ALLERGY) 25 mg tablet Take 1 Tab by mouth every six (6) hours as needed. (Patient not taking: Reported on 1/15/2022) 30 Tab 2       Past History     Past Medical History:  Past Medical History:   Diagnosis Date    Anemia NEC     Asthma     Coarse tremors     Colitis     DDD (degenerative disc disease), cervical     Fibrocystic breast     GERD (gastroesophageal reflux disease)     Headache     HTN (hypertension)     Hyperlipidemia     OA (osteoarthritis)     Osteoporosis     PAF (paroxysmal [facility] fibrillation) ([facility] 1995    Pneumonia     Thyroid nodule     Vitamin D deficiency 02/07/2011       Past Surgical History:  Past Surgical History:   Procedure Laterality Date    HX GYN      hysterectomy    HX HEENT      cataract surgery bilaterally    HX TOTAL ABDOMINAL HYSTERECTOMY      [place] BREAST SURGERY PROCEDURE UNLISTED      cysts removed    [place] CARDIAC SURG PROCEDURE UNLIST      cardiac catherization       Family History:  Family History   Problem Relation Age of Onset    Hypertension Other     Diabetes Other     Heart Disease Other     Cancer Other         stomach & colon    Diabetes Mother     Heart Attack Mother     Stroke Mother     Heart Attack Father     Heart Failure Father     Arthritis-rheumatoid Father     Other Brother         aneuysm-ruptured    Parkinson's Disease Brother        Social History:  Social History     Tobacco Use    Smoking status: Never    Smokeless tobacco: Never   Vaping Use    Vaping Use: Never used   Substance Use Topics    Alcohol use: No    Drug use: No       Allergies:   Allergies   Allergen Reactions    Latex, Natural Rubber Unknown (comments)    Asa-Acetaminophen-Caff-Potass Shortness of Breath Patient is only allergic to ASA    Aspirin Shortness of Breath and Other (comments)     Patient states this caused her stomach to bleed internal    Erythromycin Other (comments)     bleeding    Iodine And Iodide Containing Products Unknown (comments)    Lemon Unknown (comments)    Other Medication Unknown (comments)     Pt not sure of allergies states \"I'm allergic to more but not sure of name\"    Pcn [Penicillins] Swelling    Shellfish Containing Products Unknown (comments)    Sulfa (Sulfonamide Antibiotics) Unknown (comments)         Review of Systems       Review of Systems  Constitutional: No fevers  Eyes: No discharge  ENT: No dental pain  Cardiovascular: No chest pain  Respiratory: No shortness of breath  Gastrointestinal: (+) nausea, (+) vomiting  Musculoskeletal: No joint pain  Skin: No rash  Neurologic: No headache  Genitourinary: (+) dysuria    Physical Exam   Visit Vitals  BP (!) 118/54   Pulse 67   Temp 98.1 °F (36.7 °C)   Resp 24   SpO2 99%         Physical Exam  Vital Signs: Reviewed  Constitutional: Baseline coarse tremors  Head: Normocephalic, atraumatic  Eyes: No scleral injection, no scleral icterus, no conjunctival erythema  ENT: Oropharynx clear, MMM  Neck: Supple, trachea midline  Cardiovascular: RRR, no m/r/g  Pulmonary: No evidence of labored breathing, clear to auscultation bilaterally  Abdominal: Soft mild tenderness diffusely with no focal area that is worse, nondistended, no rebound, no guarding  : No external labial swelling, erythema, no perineal tenderness  Extremities: Warm, well perfused, no edema  Neurological: AAO x 3, nonfocal  Skin: Warm, dry, no rash  Psych: No suicidal ideation    Diagnostic Study Results     Labs -  Recent Results (from the past 12 hour(s))   CBC WITH AUTOMATED DIFF    Collection Time: 09/16/22  8:43 PM   Result Value Ref Range    WBC 4.9 4.6 - 13.2 K/uL    RBC 3.28 (L) 4.20 - 5.30 M/uL    HGB 9.7 (L) 12.0 - 16.0 g/dL    HCT 30.2 (L) 35.0 - 45.0 %    MCV 92.1 78.0 - 100.0 FL    MCH 29.6 24.0 - 34.0 PG    MCHC 32.1 31.0 - 37.0 g/dL    RDW 15.2 (H) 11.6 - 14.5 %    PLATELET 709 499 - 610 K/uL    MPV 9.0 (L) 9.2 - 11.8 FL    NRBC 0.0 0  WBC    ABSOLUTE NRBC 0.00 0.00 - 0.01 K/uL    NEUTROPHILS 54 40 - 73 %    LYMPHOCYTES 35 21 - 52 %    MONOCYTES 9 3 - 10 %    EOSINOPHILS 1 0 - 5 %    BASOPHILS 1 0 - 2 %    IMMATURE GRANULOCYTES 0 0.0 - 0.5 %    ABS. NEUTROPHILS 2.6 1.8 - 8.0 K/UL    ABS. LYMPHOCYTES 1.7 0.9 - 3.6 K/UL    ABS. MONOCYTES 0.4 0.05 - 1.2 K/UL    ABS. EOSINOPHILS 0.1 0.0 - 0.4 K/UL    ABS. BASOPHILS 0.1 0.0 - 0.1 K/UL    ABS. IMM. GRANS. 0.0 0.00 - 0.04 K/UL    DF AUTOMATED     METABOLIC PANEL, COMPREHENSIVE    Collection Time: 09/16/22  8:43 PM   Result Value Ref Range    Sodium 136 136 - 145 mmol/L    Potassium 4.4 3.5 - 5.5 mmol/L    Chloride 103 100 - 111 mmol/L    CO2 28 21 - 32 mmol/L    Anion gap 5 3.0 - 18 mmol/L    Glucose 111 (H) 74 - 99 mg/dL    BUN 22 (H) 7.0 - 18 MG/DL    Creatinine 0.80 0.6 - 1.3 MG/DL    BUN/Creatinine ratio 28 (H) 12 - 20      GFR est AA >60 >60 ml/min/1.73m2    GFR est non-AA >60 >60 ml/min/1.73m2    Calcium 8.8 8.5 - 10.1 MG/DL    Bilirubin, total 0.1 (L) 0.2 - 1.0 MG/DL    ALT (SGPT) 23 13 - 56 U/L    AST (SGOT) 16 10 - 38 U/L    Alk.  phosphatase 105 45 - 117 U/L    Protein, total 6.8 6.4 - 8.2 g/dL    Albumin 3.5 3.4 - 5.0 g/dL    Globulin 3.3 2.0 - 4.0 g/dL    A-G Ratio 1.1 0.8 - 1.7     LIPASE    Collection Time: 09/16/22  8:43 PM   Result Value Ref Range    Lipase 101 73 - 393 U/L   URINALYSIS W/ RFLX MICROSCOPIC    Collection Time: 09/16/22  9:25 PM   Result Value Ref Range    Color YELLOW      Appearance CLEAR      Specific gravity 1.010 1.005 - 1.030      pH (UA) 7.5 5.0 - 8.0      Protein 300 (A) NEG mg/dL    Glucose Negative NEG mg/dL    Ketone Negative NEG mg/dL    Bilirubin Negative NEG      Blood MODERATE (A) NEG      Urobilinogen 0.2 0.2 - 1.0 EU/dL    Nitrites Negative NEG      Leukocyte Esterase Negative NEG     URINE MICROSCOPIC ONLY    Collection Time: 09/16/22  9:25 PM   Result Value Ref Range    WBC 10 to 15 0 - 4 /hpf    RBC 21 to 35 0 - 5 /hpf    Epithelial cells FEW 0 - 5 /lpf    Bacteria Negative NEG /hpf       Radiologic Studies -   CT ABD PELV WO CONT   Final Result      Right greater trochanter slightly comminuted fracture. Extensive colonic diverticulosis. Small to moderate size hiatal hernia. Gallbladder with small amount of layering sludge versus cholelithiasis. Trace pericardial effusion. Medical Decision Making   I am the first provider for this patient. I reviewed the vital signs, available nursing notes, past medical history, past surgical history, family history and social history. Vital Signs-Reviewed the patient's vital signs. EKG: Interpreted by the EP. Records Reviewed: Old Medical Records (Time of Review: 8:47 PM)    ED Course: Progress Notes, Reevaluation, and Consults:  02:00 Talked to radiology. Fracture appears to be more subacute based on history. 02:30 Discussed with her caretaker. Patient has had no additional falls since the last visit in July. Has been ambulating well with her walker. 03:00 Discussed with Dr. Hayden Harris. Agreed that no acute intervention required. Can follow up as an outpatient. Provider Notes (Medical Decision Making): Patient presenting with pelvic pain that has been worsening over the last month. Work-up here did show a urinary tract infection. CT scan was done for this with no finding to explain the pelvic pain but did show a right greater trochanteric fracture that is comminuted. This is likely subacute since patient had a fall at the most 2 months ago. Based on the speaking to the caretaker, patient has had no further falls since denies any she been ambulating well with her walker. I did reassess the patient and her pain is improved. She does have full range of motion at the right hip.   Do not believe that she requires any acute intervention. She will follow-up with orthopedics as an outpatient. They also plan on following up with urology for this pelvic pain which I agree would be beneficial.  Her symptoms almost sound more like an interstitial cystitis versus a new infection. She will be discharged with Keflex. Given strict return instructions and stated understanding. Diagnosis     Clinical Impression:   1. Urinary tract infection without hematuria, site unspecified    2. Closed nondisplaced fracture of greater trochanter of right femur with routine healing, subsequent encounter        Disposition: Discharge    Follow-up Information       Follow up With Specialties Details Why Contact Info    Nova Simon MD Family Medicine Schedule an appointment as soon as possible for a visit  Discuss follow up with urology. [address] [place] [address] Dr Winston Hernández,  Orthopedic Surgery, Hand Surgery Physician Schedule an appointment as soon as possible for a visit in 1 week  [address] [facility] [address]               Patient's Medications   Start Taking    No medications on file   Continue Taking    ACETAMINOPHEN (TYLENOL EX STR ARTHRITIS PAIN) 500 MG TABLET    Take 1 Tab by mouth every six (6) hours as needed. DIPHENHYDRAMINE (BENADRYL ALLERGY) 25 MG TABLET    Take 1 Tab by mouth every six (6) hours as needed. ESCITALOPRAM OXALATE (LEXAPRO) 5 MG TABLET    Take 5 mg by mouth daily. EZETIMIBE (ZETIA) 10 MG TABLET    Take 10 mg by mouth daily. LUBIPROSTONE (AMITIZA) 8 MCG CAPSULE    Take 8 mcg by mouth two (2) times a day. OMEPRAZOLE (PRILOSEC) 20 MG CAPSULE    Take 20 mg by mouth daily. ONDANSETRON (ZOFRAN ODT) 4 MG DISINTEGRATING TABLET    Take 1 Tablet by mouth every eight (8) hours as needed for Nausea or Vomiting. POLYETHYLENE GLYCOL (MIRALAX) 17 GRAM/DOSE POWDER    Take 17 g by mouth two (2) times a day.  1 tablespoon with 8 oz of water daily    PRIMIDONE (MYSOLINE) 50 MG TABLET    Take 25 mg by mouth nightly. PROPRANOLOL LA (INDERAL LA) 60 MG SR CAPSULE    Take 1 Capsule by mouth daily. SENNA (SENNA) 8.6 MG TABLET    Take 1 Tab by mouth daily. SODIUM CHLORIDE 1 GRAM TABLET    Take 2 g by mouth daily. These Medications have changed    No medications on file   Stop Taking    No medications on file       Dictation disclaimer:  Please note that this dictation was completed with [employer], the computer voice recognition software. Quite often unanticipated grammatical, syntax, homophones, and other interpretive errors are inadvertently transcribed by the computer software. Please disregard these errors. Please excuse any errors that have escaped final proofreading.

## 2022-09-17 NOTE — ED NOTES
Assumed care of patient. Laying supine, eyes closed, pale skin, blankets covering to chin. Cardiac, SpO2 and blood pressure monitored. Skin is warm and dry to touch. No respiratory distress observed. IV flushed, saline locked. Will continue to monitor.

## 2022-09-17 NOTE — ED NOTES
Patient discharged to home. All personal belongings given to patient. IV removed. Escorted via stretcher to ambulance by EMS.

## 2022-09-17 NOTE — DISCHARGE INSTRUCTIONS
Your CT scan did show a fracture of your right hip (greater trochanter) although this likely is old and from 2 months ago. Please follow-up with orthopedics as an outpatient.

## 2022-09-18 LAB
BACTERIA SPEC CULT: NORMAL
SERVICE CMNT-IMP: NORMAL

## 2022-10-08 ENCOUNTER — HOSPITAL ENCOUNTER (EMERGENCY)
Age: 87
Discharge: STILL ADMITTED OR ADMINISTRATIVE | End: 2022-10-08

## 2022-10-08 ENCOUNTER — HOSPITAL ENCOUNTER (EMERGENCY)
Age: 87
Discharge: HOME | End: 2022-10-09
Attending: EMERGENCY MEDICINE
Payer: MEDICARE

## 2022-10-08 ENCOUNTER — APPOINTMENT (OUTPATIENT)
Dept: CT IMAGING | Age: 87
End: 2022-10-08
Attending: EMERGENCY MEDICINE
Payer: MEDICARE

## 2022-10-08 DIAGNOSIS — N39.0 URINARY TRACT INFECTION WITHOUT HEMATURIA, SITE UNSPECIFIED: Primary | ICD-10-CM

## 2022-10-08 DIAGNOSIS — S72.001A CLOSED FRACTURE OF RIGHT HIP, INITIAL ENCOUNTER (HCC): ICD-10-CM

## 2022-10-08 LAB
ALBUMIN SERPL-MCNC: 3.9 G/DL (ref 3.4–5)
ALBUMIN/GLOB SERPL: 1.3 {RATIO} (ref 0.8–1.7)
ALP SERPL-CCNC: 117 U/L (ref 45–117)
ALT SERPL-CCNC: 26 U/L (ref 13–56)
ANION GAP SERPL CALC-SCNC: 4 MMOL/L (ref 3–18)
APPEARANCE UR: ABNORMAL
AST SERPL-CCNC: 22 U/L (ref 10–38)
BACTERIA URNS QL MICRO: NEGATIVE /HPF
BASOPHILS # BLD: 0 K/UL (ref 0–0.1)
BASOPHILS NFR BLD: 1 % (ref 0–2)
BILIRUB SERPL-MCNC: 0.2 MG/DL (ref 0.2–1)
BILIRUB UR QL: NEGATIVE
BUN SERPL-MCNC: 17 MG/DL (ref 7–18)
BUN/CREAT SERPL: 19 (ref 12–20)
CALCIUM SERPL-MCNC: 8.6 MG/DL (ref 8.5–10.1)
CHLORIDE SERPL-SCNC: 102 MMOL/L (ref 100–111)
CO2 SERPL-SCNC: 28 MMOL/L (ref 21–32)
COLOR UR: YELLOW
CREAT SERPL-MCNC: 0.88 MG/DL (ref 0.6–1.3)
DIFFERENTIAL METHOD BLD: ABNORMAL
EOSINOPHIL # BLD: 0.1 K/UL (ref 0–0.4)
EOSINOPHIL NFR BLD: 1 % (ref 0–5)
EPITH CASTS URNS QL MICRO: NORMAL /LPF (ref 0–5)
ERYTHROCYTE [DISTWIDTH] IN BLOOD BY AUTOMATED COUNT: 15 % (ref 11.6–14.5)
GLOBULIN SER CALC-MCNC: 3.1 G/DL (ref 2–4)
GLUCOSE SERPL-MCNC: 107 MG/DL (ref 74–99)
GLUCOSE UR STRIP.AUTO-MCNC: NEGATIVE MG/DL
HCT VFR BLD AUTO: 32.7 % (ref 35–45)
HGB BLD-MCNC: 10.6 G/DL (ref 12–16)
HGB UR QL STRIP: ABNORMAL
IMM GRANULOCYTES # BLD AUTO: 0 K/UL (ref 0–0.04)
IMM GRANULOCYTES NFR BLD AUTO: 0 % (ref 0–0.5)
KETONES UR QL STRIP.AUTO: NEGATIVE MG/DL
LEUKOCYTE ESTERASE UR QL STRIP.AUTO: ABNORMAL
LIPASE SERPL-CCNC: 109 U/L (ref 73–393)
LYMPHOCYTES # BLD: 2 K/UL (ref 0.9–3.6)
LYMPHOCYTES NFR BLD: 41 % (ref 21–52)
MAGNESIUM SERPL-MCNC: 1.9 MG/DL (ref 1.6–2.6)
MCH RBC QN AUTO: 30.4 PG (ref 24–34)
MCHC RBC AUTO-ENTMCNC: 32.4 G/DL (ref 31–37)
MCV RBC AUTO: 93.7 FL (ref 78–100)
MONOCYTES # BLD: 0.4 K/UL (ref 0.05–1.2)
MONOCYTES NFR BLD: 8 % (ref 3–10)
NEUTS SEG # BLD: 2.5 K/UL (ref 1.8–8)
NEUTS SEG NFR BLD: 50 % (ref 40–73)
NITRITE UR QL STRIP.AUTO: NEGATIVE
NRBC # BLD: 0 K/UL (ref 0–0.01)
NRBC BLD-RTO: 0 PER 100 WBC
PH UR STRIP: 7 [PH] (ref 5–8)
PLATELET # BLD AUTO: 262 K/UL (ref 135–420)
PMV BLD AUTO: 9.4 FL (ref 9.2–11.8)
POTASSIUM SERPL-SCNC: 5.3 MMOL/L (ref 3.5–5.5)
PROT SERPL-MCNC: 7 G/DL (ref 6.4–8.2)
PROT UR STRIP-MCNC: 300 MG/DL
RBC # BLD AUTO: 3.49 M/UL (ref 4.2–5.3)
RBC #/AREA URNS HPF: NORMAL /HPF (ref 0–5)
SODIUM SERPL-SCNC: 134 MMOL/L (ref 136–145)
SP GR UR REFRACTOMETRY: 1.01 (ref 1–1.03)
UROBILINOGEN UR QL STRIP.AUTO: 0.2 EU/DL (ref 0.2–1)
WBC # BLD AUTO: 5 K/UL (ref 4.6–13.2)
WBC URNS QL MICRO: NORMAL /HPF (ref 0–4)

## 2022-10-08 PROCEDURE — 85025 COMPLETE CBC W/AUTO DIFF WBC: CPT

## 2022-10-08 PROCEDURE — 83735 ASSAY OF MAGNESIUM: CPT

## 2022-10-08 PROCEDURE — 93005 ELECTROCARDIOGRAM TRACING: CPT

## 2022-10-08 PROCEDURE — 96375 TX/PRO/DX INJ NEW DRUG ADDON: CPT

## 2022-10-08 PROCEDURE — 83690 ASSAY OF LIPASE: CPT

## 2022-10-08 PROCEDURE — 80053 COMPREHEN METABOLIC PANEL: CPT

## 2022-10-08 PROCEDURE — 96374 THER/PROPH/DIAG INJ IV PUSH: CPT

## 2022-10-08 PROCEDURE — 99284 EMERGENCY DEPT VISIT MOD MDM: CPT

## 2022-10-08 PROCEDURE — 81001 URINALYSIS AUTO W/SCOPE: CPT

## 2022-10-08 PROCEDURE — 74011250636 HC RX REV CODE- 250/636: Performed by: EMERGENCY MEDICINE

## 2022-10-08 RX ORDER — ONDANSETRON 2 MG/ML
4 INJECTION INTRAMUSCULAR; INTRAVENOUS
Status: COMPLETED | OUTPATIENT
Start: 2022-10-08 | End: 2022-10-08

## 2022-10-08 RX ORDER — MORPHINE SULFATE 2 MG/ML
2 INJECTION, SOLUTION INTRAMUSCULAR; INTRAVENOUS
Status: COMPLETED | OUTPATIENT
Start: 2022-10-08 | End: 2022-10-08

## 2022-10-08 RX ORDER — ONDANSETRON 2 MG/ML
4 INJECTION INTRAMUSCULAR; INTRAVENOUS
Status: DISCONTINUED | OUTPATIENT
Start: 2022-10-08 | End: 2022-10-08 | Stop reason: SDUPTHER

## 2022-10-08 RX ORDER — MORPHINE SULFATE 2 MG/ML
2 INJECTION, SOLUTION INTRAMUSCULAR; INTRAVENOUS
Status: DISCONTINUED | OUTPATIENT
Start: 2022-10-08 | End: 2022-10-08 | Stop reason: SDUPTHER

## 2022-10-08 RX ADMIN — MORPHINE SULFATE 2 MG: 2 INJECTION, SOLUTION INTRAMUSCULAR; INTRAVENOUS at 22:45

## 2022-10-08 RX ADMIN — ONDANSETRON 4 MG: 2 INJECTION INTRAMUSCULAR; INTRAVENOUS at 22:45

## 2022-10-09 ENCOUNTER — APPOINTMENT (OUTPATIENT)
Dept: CT IMAGING | Age: 87
End: 2022-10-09
Attending: EMERGENCY MEDICINE
Payer: MEDICARE

## 2022-10-09 VITALS
OXYGEN SATURATION: 96 % | SYSTOLIC BLOOD PRESSURE: 144 MMHG | HEART RATE: 60 BPM | DIASTOLIC BLOOD PRESSURE: 78 MMHG | WEIGHT: 95 LBS | HEIGHT: 60 IN | RESPIRATION RATE: 17 BRPM | TEMPERATURE: 98 F | BODY MASS INDEX: 18.65 KG/M2

## 2022-10-09 LAB
ATRIAL RATE: 70 BPM
CALCULATED R AXIS, ECG10: 10 DEGREES
CALCULATED T AXIS, ECG11: 139 DEGREES
DIAGNOSIS, 93000: NORMAL
P-R INTERVAL, ECG05: 136 MS
Q-T INTERVAL, ECG07: 394 MS
QRS DURATION, ECG06: 76 MS
QTC CALCULATION (BEZET), ECG08: 425 MS
VENTRICULAR RATE, ECG03: 70 BPM

## 2022-10-09 PROCEDURE — 74011250636 HC RX REV CODE- 250/636: Performed by: EMERGENCY MEDICINE

## 2022-10-09 PROCEDURE — 74176 CT ABD & PELVIS W/O CONTRAST: CPT

## 2022-10-09 PROCEDURE — 96376 TX/PRO/DX INJ SAME DRUG ADON: CPT

## 2022-10-09 PROCEDURE — 74011250637 HC RX REV CODE- 250/637: Performed by: EMERGENCY MEDICINE

## 2022-10-09 RX ORDER — TRAMADOL HYDROCHLORIDE 50 MG/1
50 TABLET ORAL
Qty: 18 TABLET | Refills: 0 | Status: SHIPPED | OUTPATIENT
Start: 2022-10-09 | End: 2022-10-12

## 2022-10-09 RX ORDER — MORPHINE SULFATE 2 MG/ML
2 INJECTION, SOLUTION INTRAMUSCULAR; INTRAVENOUS
Status: COMPLETED | OUTPATIENT
Start: 2022-10-09 | End: 2022-10-09

## 2022-10-09 RX ORDER — NITROFURANTOIN (MACROCRYSTALS) 100 MG/1
100 CAPSULE ORAL 2 TIMES DAILY
Qty: 14 CAPSULE | Refills: 0 | Status: SHIPPED | OUTPATIENT
Start: 2022-10-09 | End: 2022-10-16

## 2022-10-09 RX ORDER — NITROFURANTOIN 25; 75 MG/1; MG/1
100 CAPSULE ORAL ONCE
Status: COMPLETED | OUTPATIENT
Start: 2022-10-09 | End: 2022-10-09

## 2022-10-09 RX ADMIN — NITROFURANTOIN MONOHYDRATE/MACROCRYSTALLINE 100 MG: 25; 75 CAPSULE ORAL at 02:34

## 2022-10-09 RX ADMIN — MORPHINE SULFATE 2 MG: 2 INJECTION, SOLUTION INTRAMUSCULAR; INTRAVENOUS at 03:00

## 2022-10-09 NOTE — ED NOTES
Purposeful rounding completed:  Side rails up x 2:  YES  Bed in low position and wheels locked: YES  Call bell within reach: YES  Comfort addressed: YES    Toileting needs addressed: YES  Plan of care reviewed/updated with patient and or family members: YES  IV site assessed: YES  Pain assessed and addressed: YES

## 2022-10-09 NOTE — ED NOTES
Pt reports urinary frequency and urinary retention. Pt urinates 20-50 ml at a time. Bladder scan shows 190 ml. Pt has 10/10 pain in abdomen.

## 2022-10-09 NOTE — ED NOTES
Pt states she has been having tremors since 2018 and sees PCP regularly for it. She states she takes morphine and benadryl at her doctors office for it.

## 2022-10-09 NOTE — ED NOTES
I have reviewed discharge instructions with the patient and caregiver Michell Bravo. The patient and caregiver verbalized understanding. Current Discharge Medication List        START taking these medications    Details   nitrofurantoin (Macrodantin) 100 mg capsule Take 1 Capsule by mouth two (2) times a day for 7 days. PLEASE PRESCRIBE MACRODANTIN  MG TABLETS  Qty: 14 Capsule, Refills: 0  Start date: 10/9/2022, End date: 10/16/2022      traMADoL (Ultram) 50 mg tablet Take 1 Tablet by mouth every four (4) hours as needed for Pain for up to 3 days. Max Daily Amount: 300 mg.   Qty: 18 Tablet, Refills: 0  Start date: 10/9/2022, End date: 10/12/2022    Associated Diagnoses: Urinary tract infection without hematuria, site unspecified; Closed fracture of right hip, initial encounter ([facility]

## 2022-10-09 NOTE — ED PROVIDER NOTES
Tremors   Associated symptoms include dysuria. 80-year-old female who has a history of tremors for the past 4 years. She complained of having tremors and dysuria. No other complaints. Patient states she slipped and fell 1 week ago. C/O having mild right hip pain.     Past Medical History:   Diagnosis Date    Anemia NEC     Asthma     Coarse tremors     Colitis     DDD (degenerative disc disease), cervical     Fibrocystic breast     GERD (gastroesophageal reflux disease)     Headache     HTN (hypertension)     Hyperlipidemia     OA (osteoarthritis)     Osteoporosis     [facility]    Pneumonia     Thyroid nodule     Vitamin D deficiency 02/07/2011       Past Surgical History:   Procedure Laterality Date    HX GYN      hysterectomy    HX HEENT      cataract surgery bilaterally    HX TOTAL ABDOMINAL HYSTERECTOMY      [place] BREAST SURGERY PROCEDURE UNLISTED      cysts removed    [place] CARDIAC SURG PROCEDURE UNLIST      cardiac catherization         Family History:   Problem Relation Age of Onset    Hypertension Other     Diabetes Other     Heart Disease Other     Cancer Other         stomach & colon    Diabetes Mother     Heart Attack Mother     Stroke Mother     Heart Attack Father     Heart Failure Father     Arthritis-rheumatoid Father     Other Brother         aneuysm-ruptured    Parkinson's Disease Brother        Social History     Socioeconomic History    Marital status:      Spouse name: Not on file    Number of children: Not on file    Years of education: Not on file    Highest education level: Not on file   Occupational History    Not on file   Tobacco Use    Smoking status: Never    Smokeless tobacco: Never   Vaping Use    Vaping Use: Never used   Substance and Sexual Activity    Alcohol use: No    Drug use: No    Sexual activity: Not on file   Other Topics Concern    Not on file   Social History Narrative    Not on file Social Determinants of Health     Financial Resource Strain: Not on file   Food Insecurity: Not on file   Transportation Needs: Not on file   Physical Activity: Not on file   Stress: Not on file   Social Connections: Not on file   Intimate Partner Violence: Not on file   Housing Stability: Not on file         ALLERGIES: Latex, natural rubber; Asa-acetaminophen-caff-potass; Aspirin; Erythromycin; Iodine and iodide containing products; Lemon; Other medication; Pcn [penicillins]; Shellfish containing products; and Sulfa (sulfonamide antibiotics)    Review of Systems   Constitutional: Negative. HENT: Negative. Eyes: Negative. Respiratory: Negative. Cardiovascular: Negative. Gastrointestinal: Negative. Endocrine: Negative. Genitourinary:  Positive for dysuria. Musculoskeletal: Negative. Skin: Negative. Allergic/Immunologic: Negative. Neurological:  Positive for tremors. Hematological: Negative. Psychiatric/Behavioral: Negative. All other systems reviewed and are negative. Vitals:    10/08/22 2046 10/08/22 2306   BP: (!) 181/96 (!) 166/88   Pulse: 74 81   Resp: 24 20   Temp: 98.7 °F (37.1 °C) 98.4 °F (36.9 °C)   SpO2: 100% 97%   Weight:  43.1 kg (95 lb)   Height:  5' (1.524 m)            Physical Exam  Vitals and nursing note reviewed. Constitutional:       General: She is not in acute distress. Appearance: She is well-developed. She is not diaphoretic. HENT:      Head: Normocephalic. Right Ear: External ear normal.      Left Ear: External ear normal.      Mouth/Throat:      Pharynx: No oropharyngeal exudate. Eyes:      General: No scleral icterus. Right eye: No discharge. Left eye: No discharge. Conjunctiva/sclera: Conjunctivae normal.      Pupils: Pupils are equal, round, and reactive to light. Neck:      Thyroid: No thyromegaly. Vascular: No JVD. Trachea: No tracheal deviation.    Cardiovascular:      Rate and Rhythm: Normal rate and regular rhythm. Heart sounds: Normal heart sounds. No murmur heard. No friction rub. No gallop. Pulmonary:      Effort: Pulmonary effort is normal. No respiratory distress. Breath sounds: Normal breath sounds. No stridor. No wheezing or rales. Chest:      Chest wall: No tenderness. Abdominal:      General: Bowel sounds are normal. There is no distension. Palpations: Abdomen is soft. There is no mass. Tenderness: There is no abdominal tenderness. There is no guarding or rebound. Musculoskeletal:         General: No tenderness. Normal range of motion. Cervical back: Normal range of motion and neck supple. Lymphadenopathy:      Cervical: No cervical adenopathy. Skin:     General: Skin is warm and dry. Coloration: Skin is not pale. Findings: No erythema or rash. Neurological:      Mental Status: She is alert and oriented to person, place, and time. Cranial Nerves: No cranial nerve deficit. Motor: No abnormal muscle tone. Coordination: Coordination normal.      Deep Tendon Reflexes: Reflexes normal.      Comments: (+) chronic tremors. MDM         Procedures    Patient diagnosis: UTI, cystitis,muscular skeletal pain, fx hip, dementia    Lab tests: Interpreted by me    EKG: Interpreted by me    CT scan of abdomen: Interpreted by me    Notes: UTI, old chip fracture of pelvis    Disposition: DC home. Follow-up PCP in 2 to 3 days. Rx:macrodantin. Tylenol #3. Return to ER prn. Dictation disclaimer:  Please note that this dictation was completed with [facility], the [employer] voice recognition software. Quite often unanticipated grammatical, syntax, homophones, and other interpretive errors are inadvertently transcribed by the computer software. Please disregard these errors. Please excuse any errors that have escaped final proofreading.

## 2022-10-09 NOTE — ED NOTES
[facility] arrived. Pt back to caregiver group home, awake alert, not in any acute distress.

## 2022-10-14 ENCOUNTER — APPOINTMENT (OUTPATIENT)
Dept: CT IMAGING | Age: 87
End: 2022-10-14
Attending: STUDENT IN AN ORGANIZED HEALTH CARE EDUCATION/TRAINING PROGRAM
Payer: MEDICARE

## 2022-10-14 ENCOUNTER — HOSPITAL ENCOUNTER (EMERGENCY)
Age: 87
Discharge: NURSING, REHAB OR LONG-TERM CARE | End: 2022-10-16
Attending: STUDENT IN AN ORGANIZED HEALTH CARE EDUCATION/TRAINING PROGRAM
Payer: MEDICARE

## 2022-10-14 DIAGNOSIS — R93.41 ABNORMAL CT SCAN, BLADDER: ICD-10-CM

## 2022-10-14 DIAGNOSIS — R10.2 SUPRAPUBIC PAIN, ACUTE: Primary | ICD-10-CM

## 2022-10-14 LAB
ALBUMIN SERPL-MCNC: 3.4 G/DL (ref 3.4–5)
ALBUMIN/GLOB SERPL: 1 {RATIO} (ref 0.8–1.7)
ALP SERPL-CCNC: 87 U/L (ref 45–117)
ALT SERPL-CCNC: 34 U/L (ref 13–56)
ANION GAP SERPL CALC-SCNC: 6 MMOL/L (ref 3–18)
APPEARANCE UR: CLEAR
AST SERPL-CCNC: 24 U/L (ref 10–38)
BACTERIA URNS QL MICRO: ABNORMAL /HPF
BASOPHILS # BLD: 0 K/UL (ref 0–0.1)
BASOPHILS NFR BLD: 1 % (ref 0–2)
BILIRUB SERPL-MCNC: 0.1 MG/DL (ref 0.2–1)
BILIRUB UR QL: NEGATIVE
BUN SERPL-MCNC: 12 MG/DL (ref 7–18)
BUN/CREAT SERPL: 13 (ref 12–20)
CALCIUM SERPL-MCNC: 8.7 MG/DL (ref 8.5–10.1)
CHLORIDE SERPL-SCNC: 102 MMOL/L (ref 100–111)
CO2 SERPL-SCNC: 26 MMOL/L (ref 21–32)
COLOR UR: YELLOW
CREAT SERPL-MCNC: 0.9 MG/DL (ref 0.6–1.3)
DIFFERENTIAL METHOD BLD: ABNORMAL
EOSINOPHIL # BLD: 0.1 K/UL (ref 0–0.4)
EOSINOPHIL NFR BLD: 1 % (ref 0–5)
EPITH CASTS URNS QL MICRO: ABNORMAL /LPF (ref 0–5)
ERYTHROCYTE [DISTWIDTH] IN BLOOD BY AUTOMATED COUNT: 14.7 % (ref 11.6–14.5)
GLOBULIN SER CALC-MCNC: 3.3 G/DL (ref 2–4)
GLUCOSE SERPL-MCNC: 115 MG/DL (ref 74–99)
GLUCOSE UR STRIP.AUTO-MCNC: NEGATIVE MG/DL
HCT VFR BLD AUTO: 30.5 % (ref 35–45)
HGB BLD-MCNC: 9.7 G/DL (ref 12–16)
HGB UR QL STRIP: ABNORMAL
IMM GRANULOCYTES # BLD AUTO: 0 K/UL (ref 0–0.04)
IMM GRANULOCYTES NFR BLD AUTO: 0 % (ref 0–0.5)
KETONES UR QL STRIP.AUTO: NEGATIVE MG/DL
LEUKOCYTE ESTERASE UR QL STRIP.AUTO: NEGATIVE
LIPASE SERPL-CCNC: 115 U/L (ref 73–393)
LYMPHOCYTES # BLD: 2.1 K/UL (ref 0.9–3.6)
LYMPHOCYTES NFR BLD: 41 % (ref 21–52)
MAGNESIUM SERPL-MCNC: 1.7 MG/DL (ref 1.6–2.6)
MCH RBC QN AUTO: 29.3 PG (ref 24–34)
MCHC RBC AUTO-ENTMCNC: 31.8 G/DL (ref 31–37)
MCV RBC AUTO: 92.1 FL (ref 78–100)
MONOCYTES # BLD: 0.4 K/UL (ref 0.05–1.2)
MONOCYTES NFR BLD: 8 % (ref 3–10)
NEUTS SEG # BLD: 2.4 K/UL (ref 1.8–8)
NEUTS SEG NFR BLD: 49 % (ref 40–73)
NITRITE UR QL STRIP.AUTO: NEGATIVE
NRBC # BLD: 0 K/UL (ref 0–0.01)
NRBC BLD-RTO: 0 PER 100 WBC
PH UR STRIP: 7.5 [PH] (ref 5–8)
PLATELET # BLD AUTO: 226 K/UL (ref 135–420)
PMV BLD AUTO: 9.9 FL (ref 9.2–11.8)
POTASSIUM SERPL-SCNC: 4.3 MMOL/L (ref 3.5–5.5)
PROT SERPL-MCNC: 6.7 G/DL (ref 6.4–8.2)
PROT UR STRIP-MCNC: 300 MG/DL
RBC # BLD AUTO: 3.31 M/UL (ref 4.2–5.3)
RBC #/AREA URNS HPF: ABNORMAL /HPF (ref 0–5)
SODIUM SERPL-SCNC: 134 MMOL/L (ref 136–145)
SP GR UR REFRACTOMETRY: 1.01 (ref 1–1.03)
UROBILINOGEN UR QL STRIP.AUTO: 0.2 EU/DL (ref 0.2–1)
WBC # BLD AUTO: 5 K/UL (ref 4.6–13.2)
WBC URNS QL MICRO: ABNORMAL /HPF (ref 0–4)

## 2022-10-14 PROCEDURE — 81001 URINALYSIS AUTO W/SCOPE: CPT

## 2022-10-14 PROCEDURE — 80053 COMPREHEN METABOLIC PANEL: CPT

## 2022-10-14 PROCEDURE — 99284 EMERGENCY DEPT VISIT MOD MDM: CPT

## 2022-10-14 PROCEDURE — 74011250637 HC RX REV CODE- 250/637: Performed by: STUDENT IN AN ORGANIZED HEALTH CARE EDUCATION/TRAINING PROGRAM

## 2022-10-14 PROCEDURE — 74176 CT ABD & PELVIS W/O CONTRAST: CPT

## 2022-10-14 PROCEDURE — 83735 ASSAY OF MAGNESIUM: CPT

## 2022-10-14 PROCEDURE — 87086 URINE CULTURE/COLONY COUNT: CPT

## 2022-10-14 PROCEDURE — 83690 ASSAY OF LIPASE: CPT

## 2022-10-14 PROCEDURE — 85025 COMPLETE CBC W/AUTO DIFF WBC: CPT

## 2022-10-14 RX ORDER — DICYCLOMINE HYDROCHLORIDE 10 MG/1
10 CAPSULE ORAL 2 TIMES DAILY
Qty: 14 CAPSULE | Refills: 0 | Status: SHIPPED | OUTPATIENT
Start: 2022-10-14

## 2022-10-14 RX ORDER — ACETAMINOPHEN 500 MG
1000 TABLET ORAL ONCE
Status: COMPLETED | OUTPATIENT
Start: 2022-10-14 | End: 2022-10-14

## 2022-10-14 RX ORDER — DICYCLOMINE HYDROCHLORIDE 10 MG/1
20 CAPSULE ORAL
Status: COMPLETED | OUTPATIENT
Start: 2022-10-14 | End: 2022-10-14

## 2022-10-14 RX ADMIN — DICYCLOMINE HYDROCHLORIDE 20 MG: 10 CAPSULE ORAL at 20:40

## 2022-10-14 RX ADMIN — ACETAMINOPHEN 1000 MG: 500 TABLET ORAL at 21:10

## 2022-10-14 NOTE — ED TRIAGE NOTES
Pt arrives via EMS from [facility] c/o pain in her vaginal.

## 2022-10-15 PROCEDURE — 74011250637 HC RX REV CODE- 250/637: Performed by: EMERGENCY MEDICINE

## 2022-10-15 PROCEDURE — 94762 N-INVAS EAR/PLS OXIMTRY CONT: CPT

## 2022-10-15 RX ORDER — ACETAMINOPHEN 325 MG/1
650 TABLET ORAL
Status: DISCONTINUED | OUTPATIENT
Start: 2022-10-15 | End: 2022-10-16 | Stop reason: HOSPADM

## 2022-10-15 RX ORDER — ACETAMINOPHEN 325 MG/1
650 TABLET ORAL ONCE
Status: COMPLETED | OUTPATIENT
Start: 2022-10-15 | End: 2022-10-15

## 2022-10-15 RX ADMIN — ACETAMINOPHEN 650 MG: 325 TABLET, FILM COATED ORAL at 23:31

## 2022-10-15 RX ADMIN — ACETAMINOPHEN 650 MG: 325 TABLET, FILM COATED ORAL at 15:53

## 2022-10-15 NOTE — PROGRESS NOTES
Discharge/Transition Planning     1215: Spoke with ED concerning patient. Patient at a group home and not provided appropriate and safe care per patient . She is supposed to go to [facility] Monday and wanted to see if could go earlier   CM will speak with patient   Sent referral request in [address] [place] [address] link to [address]     1300: Spoke with Deejay Sweet at [facility] and verified patient scheduled to go there Monday . They already have [facility]. Deejay Sweet cannot get her in today but will see if can get her bed for tomorrow and let CM know    [phone]: Updated patient on facility likely taking her tomorrow. Patient thankful not going back to home where she came from as feels scared and unsafe there   Updated ED.     Waiting to hear from [address] on definitive bed tomorrow

## 2022-10-15 NOTE — DISCHARGE INSTRUCTIONS
Eccentric urinary bladder wall thickening along the anterior and anterior  lateral left wall. Rule out bladder wall neoplasia.
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
## 2022-10-16 VITALS
HEIGHT: 60 IN | BODY MASS INDEX: 18.65 KG/M2 | DIASTOLIC BLOOD PRESSURE: 106 MMHG | TEMPERATURE: 97.7 F | OXYGEN SATURATION: 98 % | RESPIRATION RATE: 15 BRPM | HEART RATE: 71 BPM | SYSTOLIC BLOOD PRESSURE: 130 MMHG | WEIGHT: 95 LBS

## 2022-10-16 LAB
BACTERIA SPEC CULT: NORMAL
SERVICE CMNT-IMP: NORMAL

## 2022-10-16 PROCEDURE — 74011250637 HC RX REV CODE- 250/637: Performed by: EMERGENCY MEDICINE

## 2022-10-16 RX ADMIN — ACETAMINOPHEN 650 MG: 325 TABLET, FILM COATED ORAL at 05:46

## 2022-10-16 NOTE — PROGRESS NOTES
Discharge/Transition Planning     Spoke with Cece at [facility]. Patient can admit anytime after 2pm.     Will need DC summary or at the least med reconciliation.    [facility]  [address], [address]  Call report to [phone] Keny Sainz RN BSN  /Discharge Planner   [phone]

## 2022-10-16 NOTE — ED NOTES
Personal care provided to pt. Katerine in place. Pt provided with warm blankets. Pt states she is having vaginal pain as well and requesting something for pain. Pt notified she can receive acetaminophen @ 0530. Pt verbalizes understanding.

## 2022-12-04 PROCEDURE — 96374 THER/PROPH/DIAG INJ IV PUSH: CPT

## 2022-12-04 PROCEDURE — 99285 EMERGENCY DEPT VISIT HI MDM: CPT

## 2022-12-05 ENCOUNTER — HOSPITAL ENCOUNTER (INPATIENT)
Age: 87
LOS: 2 days | Discharge: NURSING, REHAB OR LONG-TERM CARE | DRG: 281 | End: 2022-12-07
Attending: STUDENT IN AN ORGANIZED HEALTH CARE EDUCATION/TRAINING PROGRAM | Admitting: INTERNAL MEDICINE
Payer: MEDICARE

## 2022-12-05 ENCOUNTER — APPOINTMENT (OUTPATIENT)
Dept: NON INVASIVE DIAGNOSTICS | Age: 87
DRG: 281 | End: 2022-12-05
Attending: PHYSICIAN ASSISTANT
Payer: MEDICARE

## 2022-12-05 ENCOUNTER — APPOINTMENT (OUTPATIENT)
Dept: GENERAL RADIOLOGY | Age: 87
DRG: 281 | End: 2022-12-05
Payer: MEDICARE

## 2022-12-05 DIAGNOSIS — R77.8 ELEVATED TROPONIN LEVEL NOT DUE TO ACUTE CORONARY SYNDROME: ICD-10-CM

## 2022-12-05 DIAGNOSIS — R07.9 CHEST PAIN, UNSPECIFIED TYPE: Primary | ICD-10-CM

## 2022-12-05 DIAGNOSIS — R10.2 PELVIC PAIN: ICD-10-CM

## 2022-12-05 PROBLEM — I21.4 NSTEMI (NON-ST ELEVATED MYOCARDIAL INFARCTION) (HCC): Status: ACTIVE | Noted: 2022-12-05

## 2022-12-05 LAB
ALBUMIN SERPL-MCNC: 3.3 G/DL (ref 3.4–5)
ALBUMIN/GLOB SERPL: 1.2 {RATIO} (ref 0.8–1.7)
ALP SERPL-CCNC: 107 U/L (ref 45–117)
ALT SERPL-CCNC: 17 U/L (ref 13–56)
ANION GAP SERPL CALC-SCNC: 4 MMOL/L (ref 3–18)
APPEARANCE UR: CLEAR
APTT PPP: 27.8 SEC (ref 23–36.4)
APTT PPP: 63.4 SEC (ref 23–36.4)
AST SERPL-CCNC: 14 U/L (ref 10–38)
ATRIAL RATE: 72 BPM
BACTERIA URNS QL MICRO: NEGATIVE /HPF
BASOPHILS # BLD: 0 K/UL (ref 0–0.1)
BASOPHILS # BLD: 0 K/UL (ref 0–0.1)
BASOPHILS NFR BLD: 0 % (ref 0–2)
BASOPHILS NFR BLD: 1 % (ref 0–2)
BILIRUB SERPL-MCNC: 0.2 MG/DL (ref 0.2–1)
BILIRUB UR QL: NEGATIVE
BNP SERPL-MCNC: 1279 PG/ML (ref 0–1800)
BUN SERPL-MCNC: 23 MG/DL (ref 7–18)
BUN/CREAT SERPL: 31 (ref 12–20)
CALCIUM SERPL-MCNC: 8.8 MG/DL (ref 8.5–10.1)
CALCULATED P AXIS, ECG09: 104 DEGREES
CALCULATED R AXIS, ECG10: 138 DEGREES
CALCULATED T AXIS, ECG11: 111 DEGREES
CHLORIDE SERPL-SCNC: 108 MMOL/L (ref 100–111)
CK SERPL-CCNC: 90 U/L (ref 26–192)
CO2 SERPL-SCNC: 28 MMOL/L (ref 21–32)
COLOR UR: YELLOW
CREAT SERPL-MCNC: 0.75 MG/DL (ref 0.6–1.3)
D DIMER PPP FEU-MCNC: 0.75 UG/ML(FEU)
DIAGNOSIS, 93000: NORMAL
DIFFERENTIAL METHOD BLD: ABNORMAL
DIFFERENTIAL METHOD BLD: ABNORMAL
ECHO AO ASC DIAM: 2.6 CM
ECHO AO ASCENDING AORTA INDEX: 1.95 CM/M2
ECHO AO ROOT DIAM: 2.6 CM
ECHO AO ROOT INDEX: 1.95 CM/M2
ECHO AV MEAN GRADIENT: 4 MMHG
ECHO AV MEAN VELOCITY: 1 M/S
ECHO AV PEAK GRADIENT: 8 MMHG
ECHO AV PEAK VELOCITY: 1.4 M/S
ECHO AV VTI: 29.3 CM
ECHO LA VOL 2C: 39 ML (ref 22–52)
ECHO LA VOL 4C: 28 ML (ref 22–52)
ECHO LA VOLUME AREA LENGTH: 36 ML
ECHO LA VOLUME INDEX A2C: 29 ML/M2 (ref 16–34)
ECHO LA VOLUME INDEX A4C: 21 ML/M2 (ref 16–34)
ECHO LA VOLUME INDEX AREA LENGTH: 27 ML/M2 (ref 16–34)
ECHO LV E' LATERAL VELOCITY: 6 CM/S
ECHO LV E' SEPTAL VELOCITY: 5 CM/S
ECHO LV FRACTIONAL SHORTENING: 40 % (ref 28–44)
ECHO LV INTERNAL DIMENSION DIASTOLE INDEX: 3.01 CM/M2
ECHO LV INTERNAL DIMENSION DIASTOLIC: 4 CM (ref 3.9–5.3)
ECHO LV INTERNAL DIMENSION SYSTOLIC INDEX: 1.8 CM/M2
ECHO LV INTERNAL DIMENSION SYSTOLIC: 2.4 CM
ECHO LV IVSD: 1.1 CM (ref 0.6–0.9)
ECHO LV MASS 2D: 155.4 G (ref 67–162)
ECHO LV MASS INDEX 2D: 116.8 G/M2 (ref 43–95)
ECHO LV POSTERIOR WALL DIASTOLIC: 1.2 CM (ref 0.6–0.9)
ECHO LV RELATIVE WALL THICKNESS RATIO: 0.6
ECHO LVOT AREA: 2.3 CM2
ECHO LVOT DIAM: 1.7 CM
ECHO MV A VELOCITY: 0.83 M/S
ECHO MV E DECELERATION TIME (DT): 202.9 MS
ECHO MV E VELOCITY: 0.75 M/S
ECHO MV E/A RATIO: 0.9
ECHO MV E/E' LATERAL: 12.5
ECHO MV E/E' RATIO (AVERAGED): 13.75
ECHO MV E/E' SEPTAL: 15
ECHO PV MAX VELOCITY: 0.8 M/S
ECHO PV PEAK GRADIENT: 3 MMHG
ECHO RV FREE WALL PEAK S': 10 CM/S
ECHO RV LONGITUDINAL DIMENSION: 2.6 CM
ECHO RV TAPSE: 1.7 CM (ref 1.7–?)
ECHO TV REGURGITANT MAX VELOCITY: 2.65 M/S
ECHO TV REGURGITANT PEAK GRADIENT: 28 MMHG
EOSINOPHIL # BLD: 0.1 K/UL (ref 0–0.4)
EOSINOPHIL # BLD: 0.1 K/UL (ref 0–0.4)
EOSINOPHIL NFR BLD: 1 % (ref 0–5)
EOSINOPHIL NFR BLD: 1 % (ref 0–5)
EPITH CASTS URNS QL MICRO: NORMAL /LPF (ref 0–5)
ERYTHROCYTE [DISTWIDTH] IN BLOOD BY AUTOMATED COUNT: 13.8 % (ref 11.6–14.5)
ERYTHROCYTE [DISTWIDTH] IN BLOOD BY AUTOMATED COUNT: 13.9 % (ref 11.6–14.5)
FLUAV RNA SPEC QL NAA+PROBE: NOT DETECTED
FLUBV RNA SPEC QL NAA+PROBE: NOT DETECTED
GLOBULIN SER CALC-MCNC: 2.8 G/DL (ref 2–4)
GLUCOSE SERPL-MCNC: 116 MG/DL (ref 74–99)
GLUCOSE UR STRIP.AUTO-MCNC: NEGATIVE MG/DL
HCT VFR BLD AUTO: 28 % (ref 35–45)
HCT VFR BLD AUTO: 35.1 % (ref 35–45)
HGB BLD-MCNC: 11.1 G/DL (ref 12–16)
HGB BLD-MCNC: 9 G/DL (ref 12–16)
HGB UR QL STRIP: ABNORMAL
IMM GRANULOCYTES # BLD AUTO: 0 K/UL (ref 0–0.04)
IMM GRANULOCYTES # BLD AUTO: 0 K/UL (ref 0–0.04)
IMM GRANULOCYTES NFR BLD AUTO: 0 % (ref 0–0.5)
IMM GRANULOCYTES NFR BLD AUTO: 0 % (ref 0–0.5)
KETONES UR QL STRIP.AUTO: NEGATIVE MG/DL
LEUKOCYTE ESTERASE UR QL STRIP.AUTO: NEGATIVE
LIPASE SERPL-CCNC: 88 U/L (ref 73–393)
LYMPHOCYTES # BLD: 1.3 K/UL (ref 0.9–3.6)
LYMPHOCYTES # BLD: 1.6 K/UL (ref 0.9–3.6)
LYMPHOCYTES NFR BLD: 24 % (ref 21–52)
LYMPHOCYTES NFR BLD: 33 % (ref 21–52)
MCH RBC QN AUTO: 29.1 PG (ref 24–34)
MCH RBC QN AUTO: 29.6 PG (ref 24–34)
MCHC RBC AUTO-ENTMCNC: 31.6 G/DL (ref 31–37)
MCHC RBC AUTO-ENTMCNC: 32.1 G/DL (ref 31–37)
MCV RBC AUTO: 91.9 FL (ref 78–100)
MCV RBC AUTO: 92.1 FL (ref 78–100)
MONOCYTES # BLD: 0.5 K/UL (ref 0.05–1.2)
MONOCYTES # BLD: 0.6 K/UL (ref 0.05–1.2)
MONOCYTES NFR BLD: 10 % (ref 3–10)
MONOCYTES NFR BLD: 11 % (ref 3–10)
NEUTS SEG # BLD: 2.7 K/UL (ref 1.8–8)
NEUTS SEG # BLD: 3.5 K/UL (ref 1.8–8)
NEUTS SEG NFR BLD: 56 % (ref 40–73)
NEUTS SEG NFR BLD: 63 % (ref 40–73)
NITRITE UR QL STRIP.AUTO: NEGATIVE
NRBC # BLD: 0 K/UL (ref 0–0.01)
NRBC # BLD: 0 K/UL (ref 0–0.01)
NRBC BLD-RTO: 0 PER 100 WBC
NRBC BLD-RTO: 0 PER 100 WBC
P-R INTERVAL, ECG05: 148 MS
PH UR STRIP: 7 [PH] (ref 5–8)
PLATELET # BLD AUTO: 184 K/UL (ref 135–420)
PLATELET # BLD AUTO: 224 K/UL (ref 135–420)
PMV BLD AUTO: 10.1 FL (ref 9.2–11.8)
PMV BLD AUTO: 10.3 FL (ref 9.2–11.8)
POTASSIUM SERPL-SCNC: 3.9 MMOL/L (ref 3.5–5.5)
PROT SERPL-MCNC: 6.1 G/DL (ref 6.4–8.2)
PROT UR STRIP-MCNC: NEGATIVE MG/DL
Q-T INTERVAL, ECG07: 400 MS
QRS DURATION, ECG06: 70 MS
QTC CALCULATION (BEZET), ECG08: 438 MS
RBC # BLD AUTO: 3.04 M/UL (ref 4.2–5.3)
RBC # BLD AUTO: 3.82 M/UL (ref 4.2–5.3)
RBC #/AREA URNS HPF: NORMAL /HPF (ref 0–5)
SARS-COV-2, COV2: NOT DETECTED
SERVICE CMNT-IMP: NORMAL
SODIUM SERPL-SCNC: 140 MMOL/L (ref 136–145)
SP GR UR REFRACTOMETRY: 1.01 (ref 1–1.03)
TROPONIN-HIGH SENSITIVITY: 399 NG/L (ref 0–54)
TROPONIN-HIGH SENSITIVITY: 87 NG/L (ref 0–54)
TROPONIN-HIGH SENSITIVITY: 929 NG/L (ref 0–54)
UROBILINOGEN UR QL STRIP.AUTO: 0.2 EU/DL (ref 0.2–1)
VENTRICULAR RATE, ECG03: 72 BPM
WBC # BLD AUTO: 4.9 K/UL (ref 4.6–13.2)
WBC # BLD AUTO: 5.6 K/UL (ref 4.6–13.2)
WBC URNS QL MICRO: NEGATIVE /HPF (ref 0–4)
WET PREP GENITAL: NORMAL

## 2022-12-05 PROCEDURE — 84484 ASSAY OF TROPONIN QUANT: CPT

## 2022-12-05 PROCEDURE — 93005 ELECTROCARDIOGRAM TRACING: CPT

## 2022-12-05 PROCEDURE — 83690 ASSAY OF LIPASE: CPT

## 2022-12-05 PROCEDURE — 80053 COMPREHEN METABOLIC PANEL: CPT

## 2022-12-05 PROCEDURE — 87636 SARSCOV2 & INF A&B AMP PRB: CPT

## 2022-12-05 PROCEDURE — 82550 ASSAY OF CK (CPK): CPT

## 2022-12-05 PROCEDURE — 99223 1ST HOSP IP/OBS HIGH 75: CPT | Performed by: INTERNAL MEDICINE

## 2022-12-05 PROCEDURE — 71045 X-RAY EXAM CHEST 1 VIEW: CPT

## 2022-12-05 PROCEDURE — 85379 FIBRIN DEGRADATION QUANT: CPT

## 2022-12-05 PROCEDURE — 74011250636 HC RX REV CODE- 250/636: Performed by: STUDENT IN AN ORGANIZED HEALTH CARE EDUCATION/TRAINING PROGRAM

## 2022-12-05 PROCEDURE — 74011250636 HC RX REV CODE- 250/636: Performed by: INTERNAL MEDICINE

## 2022-12-05 PROCEDURE — 85730 THROMBOPLASTIN TIME PARTIAL: CPT

## 2022-12-05 PROCEDURE — 93306 TTE W/DOPPLER COMPLETE: CPT

## 2022-12-05 PROCEDURE — 94762 N-INVAS EAR/PLS OXIMTRY CONT: CPT

## 2022-12-05 PROCEDURE — 65270000046 HC RM TELEMETRY

## 2022-12-05 PROCEDURE — 83880 ASSAY OF NATRIURETIC PEPTIDE: CPT

## 2022-12-05 PROCEDURE — 85025 COMPLETE CBC W/AUTO DIFF WBC: CPT

## 2022-12-05 PROCEDURE — 74011250637 HC RX REV CODE- 250/637: Performed by: INTERNAL MEDICINE

## 2022-12-05 PROCEDURE — 87210 SMEAR WET MOUNT SALINE/INK: CPT

## 2022-12-05 PROCEDURE — 81001 URINALYSIS AUTO W/SCOPE: CPT

## 2022-12-05 RX ORDER — HEPARIN SODIUM 1000 [USP'U]/ML
60 INJECTION, SOLUTION INTRAVENOUS; SUBCUTANEOUS ONCE
Status: COMPLETED | OUTPATIENT
Start: 2022-12-05 | End: 2022-12-05

## 2022-12-05 RX ORDER — AMLODIPINE BESYLATE 5 MG/1
5 TABLET ORAL DAILY
Status: DISCONTINUED | OUTPATIENT
Start: 2022-12-05 | End: 2022-12-07 | Stop reason: HOSPADM

## 2022-12-05 RX ORDER — HEPARIN SODIUM 1000 [USP'U]/ML
3000 INJECTION, SOLUTION INTRAVENOUS; SUBCUTANEOUS
Status: COMPLETED | OUTPATIENT
Start: 2022-12-05 | End: 2022-12-05

## 2022-12-05 RX ORDER — HEPARIN SODIUM 10000 [USP'U]/100ML
12-25 INJECTION, SOLUTION INTRAVENOUS
Status: DISCONTINUED | OUTPATIENT
Start: 2022-12-05 | End: 2022-12-07

## 2022-12-05 RX ORDER — PROPRANOLOL HYDROCHLORIDE 60 MG/1
60 CAPSULE, EXTENDED RELEASE ORAL DAILY
Status: DISCONTINUED | OUTPATIENT
Start: 2022-12-05 | End: 2022-12-07 | Stop reason: HOSPADM

## 2022-12-05 RX ADMIN — HEPARIN SODIUM 3000 UNITS: 1000 INJECTION INTRAVENOUS; SUBCUTANEOUS at 20:20

## 2022-12-05 RX ADMIN — HEPARIN SODIUM 12 UNITS/KG/HR: 10000 INJECTION, SOLUTION INTRAVENOUS at 09:38

## 2022-12-05 RX ADMIN — HEPARIN SODIUM 14 UNITS/KG/HR: 10000 INJECTION, SOLUTION INTRAVENOUS at 20:19

## 2022-12-05 RX ADMIN — HEPARIN SODIUM 2.42 UNITS: 1000 INJECTION INTRAVENOUS; SUBCUTANEOUS at 09:35

## 2022-12-05 RX ADMIN — AMLODIPINE BESYLATE 5 MG: 5 TABLET ORAL at 18:15

## 2022-12-05 NOTE — ED TRIAGE NOTES
Patient c/o vaginal pain. States that she has been able to pee for a day or two, although she states that it has not been hurting when she pees, patient also states that after she took her trazodone and tylenol, she started to have worsening tremors. NAD noted.

## 2022-12-05 NOTE — Clinical Note
Status[de-identified] INPATIENT [101]   Type of Bed: Telemetry [19]   Cardiac Monitoring Required?: Yes   Inpatient Hospitalization Certified Necessary for the Following Reasons: 3.  Patient receiving treatment that can only be provided in an inpatient setting (further clarification in H&P documentation)   Admitting Diagnosis: Chest pain [255960]   Admitting Physician: Neil Cranker [[id number]]   Attending Physician: Seema Araiza   Estimated Length of Stay: 2 Midnights   Discharge Plan[de-identified] Home with Office Follow-up

## 2022-12-05 NOTE — ED NOTES
Report received. Pts pain 10/10 at this time. MD paged. Call bell is within reach. Will continue to monitor.  Pt medicated per [facility]

## 2022-12-05 NOTE — PROGRESS NOTES
INTERIM UPDATE - [id number] EST on 12/05/2022    Despite Patient's PCP being listed as Nai Morel M.D., members of that service were contacted and report that this is not their Patient. Therefore, Patient should be admitted to [facility] Hospitalist service and not the service of Nai Morel M.D..        [facility] ER Physician calls requesting admission for 80year-old Patient complaining of Vaginal Pain and Chest Pain. Patient reports that she has been having episodic chest pain for 2 (?) weeks and that she had an episode of chest pain last night. Patient's chest pain has reportedly improved while she was here. Patient was noted to have an Elevated Repeat Troponin of 399 ng/L (initial was 87 ng/L). Patient also reportedly had Bacterial Vaginosis. Plan: Will erase Dr. Jason Osorio as Patient's PCP. Will admit Patient to [facility] Telemetry Unit for management of Chest Pain with Elevated Troponin and Bacterial Vaginosis.

## 2022-12-05 NOTE — CONSULTS
Cardiology Initial Patient Referral Note    Cardiology referral request from Dr. Nora Painter for evaluation and management/treatment of CP     Date of  Admission: 12/5/2022 12:18 AM   Primary Care Physician:  No primary care provider on file. Attending Cardiologist: Dr. Hector Berg:     Hospital Problems  Date Reviewed: 11/8/2022            Codes Class Noted POA    Chest pain ICD-10-CM: R07.9  ICD-9-CM: 786.50  12/14/2021 Unknown         -Bacterial Vaginosis, presented with 10/10 vaginal pain with increased urinary frequency and dysuria.   -Chest pain, atypical and reproducible on exam. No acute ischemic changes on ECG. Echo 2018, normal LVEF. -Indeterminate troponin, do not suspect primary ACS. -pAFib. Initially documented > 20 yrs ago. Maintaining NSR.   -HTN, uncontrolled. Max /166 mmHg. -COPD. -Resting tremor, parkinsonian-like features with memory loss. -Deconditioning        Primary cardiologist is Dr. Baig Body      Plan:     Addendum: Independently seen and evaluated. Agree with below. Her presenting chest pain is quite atypical for ACS. Her major complaint is vaginal discomfort. Would trend cardiac biomarkers. No hemodynamic compromise at this time. Atypical CP which is reproducible on exam. Currently her main complaint is extreme vaginal discomfort. Continue to trend cardiac enyzymes. Reasonable to continue heparin gtt while trending troponins and awaiting Echo results. Echo to measure LVEF and assess for RWMA ordered and pending. Will resume outpatient Inderal.   Further recommendations pending test results/hospital course. History of Present Illness: This is a 80 y.o. female admitted for Chest pain [R07.9]. Patient complains of: vaginal pain, chest pain   Jeanne Foster is a 80 y.o. female, pmhx as stated above, who we are seeing for CP and elevated troponin. Patient reports worsening vaginal pain and increased urine frequency for the last 2-3 days.  She also reports left sided chest pain that is very reproducible on exam. She states she has had left sided chest and breast pain for the last few months. She has \"lumps\" in her breast with worsening pain over the last week. She reports chronic SOB. Denies diaphoresis, orthopnea, dizziness, near syncope or syncope, orthopnea, PND. Cardiac risk factors: sedentary life style, hypertension, post-menopausal    Review of Symptoms:  Except as stated above include:  Constitutional:  negative  Respiratory:  negative  Cardiovascular:  as per HPI  Gastrointestinal: negative  Genitourinary:  as per HPI   Musculoskeletal:  as per HPI   Neurological:  Negative  Dermatological:  Negative  Endocrinological: Negative  Psychological:  Negative    A comprehensive review of systems was negative except for that written in the HPI.      Past Medical History:     Past Medical History:   Diagnosis Date    Anemia NEC     Asthma     Coarse tremors     Colitis     DDD (degenerative disc disease), cervical     Fibrocystic breast     GERD (gastroesophageal reflux disease)     Headache     Hematuria     HTN (hypertension)     Hyperlipidemia     OA (osteoarthritis)     Osteoporosis     PAF (paroxysmal atrial fibrillation) ([facility] [address] 1995    Pneumonia     Renal cyst     Thyroid nodule     UTI (urinary tract infection)     Vitamin D deficiency 02/07/2011         Social History:     Social History     Socioeconomic History    Marital status:    Tobacco Use    Smoking status: Never    Smokeless tobacco: Never   Vaping Use    Vaping Use: Never used   Substance and Sexual Activity    Alcohol use: No    Drug use: No        Family History:     Family History   Problem Relation Age of Onset    Hypertension Other     Diabetes Other     Heart Disease Other     Cancer Other         stomach & colon    Diabetes Mother     Heart Attack Mother     Stroke Mother     Heart Attack Father     Heart Failure Father     Arthritis-rheumatoid Father     Other Brother aneuysm-ruptured    Parkinson's Disease Brother         Medications: Allergies   Allergen Reactions    Latex, Natural Rubber Unknown (comments)    Asa-Acetaminophen-Caff-Potass Shortness of Breath     Patient is only allergic to ASA    Aspirin Shortness of Breath and Other (comments)     Patient states this caused her stomach to bleed internal    Erythromycin Other (comments)     bleeding    Iodine And Iodide Containing Products Unknown (comments)    Lemon Unknown (comments)    Other Medication Unknown (comments)     Pt not sure of allergies states \"I'm allergic to more but not sure of name\"    Pcn [Penicillins] Swelling    Shellfish Containing Products Unknown (comments)    Sulfa (Sulfonamide Antibiotics) Unknown (comments)        Current Facility-Administered Medications   Medication Dose Route Frequency    heparin (porcine) 1,000 unit/mL injection 2,420 Units  60 Units/kg IntraVENous ONCE    heparin (porcine) 25,000 units in 0.45% saline 250 ml infusion  12-25 Units/kg/hr IntraVENous TITRATE     Current Outpatient Medications   Medication Sig    nitrofurantoin, macrocrystal-monohydrate, (MACROBID) 100 mg capsule Take 100 mg by mouth two (2) times a day. sodium chloride 1,000 mg soluble tablet Take 2 Tablets by mouth daily. metoprolol tartrate (LOPRESSOR) 25 mg tablet Take 25 mg by mouth two (2) times a day. dicyclomine (BENTYL) 10 mg capsule Take 1 Capsule by mouth two (2) times a day. escitalopram oxalate (LEXAPRO) 5 mg tablet Take 5 mg by mouth daily. primidone (MYSOLINE) 50 mg tablet Take 25 mg by mouth nightly. propranolol LA (INDERAL LA) 60 mg SR capsule Take 1 Capsule by mouth daily. (Patient not taking: Reported on 1/15/2022)    ondansetron (ZOFRAN ODT) 4 mg disintegrating tablet Take 1 Tablet by mouth every eight (8) hours as needed for Nausea or Vomiting.    sodium chloride 1 gram tablet Take 2 g by mouth daily. ezetimibe (ZETIA) 10 mg tablet Take 10 mg by mouth daily. lubiPROStone (AMITIZA) 8 mcg capsule Take 8 mcg by mouth two (2) times a day. omeprazole (PRILOSEC) 20 mg capsule Take 20 mg by mouth daily. senna (Senna) 8.6 mg tablet Take 1 Tab by mouth daily. polyethylene glycol (Miralax) 17 gram/dose powder Take 17 g by mouth two (2) times a day. 1 tablespoon with 8 oz of water daily    acetaminophen (TYLENOL EX STR ARTHRITIS PAIN) 500 mg tablet Take 1 Tab by mouth every six (6) hours as needed. diphenhydrAMINE (BENADRYL ALLERGY) 25 mg tablet Take 1 Tab by mouth every six (6) hours as needed. (Patient not taking: Reported on 1/15/2022)         Physical Exam:   Visit Vitals  BP (!) 138/54   Pulse 66   Resp 14   Wt 40.4 kg (89 lb)   SpO2 97%   BMI 16.82 kg/m²       TELE: normal sinus rhythm    BP Readings from Last 3 Encounters:   12/05/22 (!) 138/54   10/16/22 (!) 130/106   10/09/22 (!) 144/78     Pulse Readings from Last 3 Encounters:   12/05/22 66   10/16/22 71   10/09/22 60     Wt Readings from Last 3 Encounters:   12/05/22 40.4 kg (89 lb)   11/08/22 41.3 kg (91 lb)   10/14/22 43.1 kg (95 lb)       General:  alert, cooperative, no distress, appears stated age  Neck:  no JVD   Chest: TTP left chest wall   Lungs:  clear to auscultation bilaterally  Heart:  regular rate and rhythm, S1, S2 normal, no murmur, click, rub or gallop  Abdomen:  abdomen is soft without significant tenderness, masses, organomegaly or guarding  Extremities:  extremities normal, atraumatic, no cyanosis or edema  Skin: Warm and dry.  no hyperpigmentation, vitiligo, or suspicious lesions  Neuro: alert, oriented x3, affect appropriate, no focal neurological deficits, moves all extremities well  Psych: non focal     Data Review:     Recent Labs     12/05/22  0110   WBC 5.6   HGB 9.0*   HCT 28.0*        Recent Labs     12/05/22  0110      K 3.9      CO2 28   *   BUN 23*   CREA 0.75   CA 8.8   ALB 3.3*   ALT 17       Results for orders placed or performed during the hospital encounter of 22   EKG, 12 LEAD, INITIAL   Result Value Ref Range    Ventricular Rate 72 BPM    Atrial Rate 72 BPM    P-R Interval 148 ms    QRS Duration 70 ms    Q-T Interval 400 ms    QTC Calculation (Bezet) 438 ms    Calculated P Axis 104 degrees    Calculated R Axis 138 degrees    Calculated T Axis 111 degrees    Diagnosis       Suspect arm lead reversal, interpretation assumes no reversal  Normal sinus rhythm  Septal infarct (cited on or before 29-MAY-2022)  Lateral infarct (cited on or before 2022)  Abnormal ECG  When compared with ECG of 08-OCT-2022 21:00,  QRS axis shifted right     Results for orders placed or performed in visit on 06/10/22   AMB POC EKG ROUTINE W/ 12 LEADS, INTER & REP    Impression    See progress note. Results for orders placed or performed during the hospital encounter of 05/10/18   ECG HOLTER MONITOR, UP TO [address], [address]                                         Test Date:    2018  [facility] Name:     [facility]             Department:     Patient ID:   [id number]                Room:           Gender:       Female                   Technician:     :          1934               Requested By: Dr. Mona Morales Number:                          Reading MD:   Balwinder Avery MD                    Interpretive Statements  Cristela Lopez was in Sinus Rhythm. The average heart rate, excluding ectopy, was 78 BPM with a minimum of 58 BPM   at  09:42 D2 and a maximum of 115 BPM at  13:58 D1. Heart beats, including ectopy, totaled 100985 beats. VENTRICULAR ECTOPICS totaled 7  averaging  0.2 per hour  with 7 single, 0   paired, 0 trigeminy and 0 R on T.    SUPRAVENTRICULAR ECTOPICS totaled 140  averaging  3.4 per hour  ,with 121   single and 6 paired beats.    SUPRAVENTRICULAR TACHYCARDIA occurred 4 times. The fastest run was at 163 BPM and occurred at 14:26 D1 with 4 beats. The longest run was 4 beats at 14:26 D1 at a rate of 163 BPM.    Interpretation:    1. Rhythm is sinus with average heart rate of 78 and range of 58 to 115.  2. ND and QRS are within normal limits. 3. (7) single VEs.  4. (121) single SVEs, (6) paired, (4) runs of SVT. 5. Patient diary was submitted, no patient triggered events noted.                 Signed:____________________________________ Date: ____________________    Electronically signed by Deepti Jason MD on May 20 2018  2:07PM CDT       All Cardiac Markers in the last 24 hours:  No results found for: CPK, CK, CKMMB, CKMB, RCK3, CKMBT, CKNDX, CKND1, RAKESH, TROPT, TROIQ, GO, TROPT, TNIPOC, BNP, BNPP    Last Lipid:    Lab Results   Component Value Date/Time    Cholesterol, total 238 (H) 03/06/2018 12:15 PM    HDL Cholesterol 48 03/06/2018 12:15 PM    LDL, calculated 148.2 (H) 03/06/2018 12:15 PM    Triglyceride 209 (H) 03/06/2018 12:15 PM    CHOL/HDL Ratio 5.0 03/06/2018 12:15 PM       Cardiographics:     EKG Results       Procedure 720 Value Units Date/Time    EKG, 12 LEAD, INITIAL [[id number]] Collected: 12/05/22 0256    Order Status: Completed Updated: 12/05/22 0256     Ventricular Rate 72 BPM      Atrial Rate 72 BPM      P-R Interval 148 ms      QRS Duration 70 ms      Q-T Interval 400 ms      QTC Calculation (Bezet) 438 ms      Calculated P Axis 104 degrees      Calculated R Axis 138 degrees      Calculated T Axis 111 degrees      Diagnosis --     Suspect arm lead reversal, interpretation assumes no reversal  Normal sinus rhythm  Septal infarct (cited on or before 29-MAY-2022)  Lateral infarct (cited on or before 12-JUN-2022)  Abnormal ECG  When compared with ECG of 08-OCT-2022 21:00,  QRS axis shifted right                      XR Results (most recent):  Results from Hospital Encounter encounter on 12/05/22    XR CHEST PORT    Narrative  EXAM:  XR CHEST PORT    INDICATION:   SOB, chest discomfort x1 day    COMPARISON: 7/25/2022. FINDINGS:  Hyperinflation and stable mildly enlarged cardiac silhouette. Otic  atherosclerosis. . Pulmonary vasculature is within normal limits. No pneumothorax  or pleural effusions. No air space opacity. No acute osseous abnormality. Impression  No radiographic evidence of acute cardiopulmonary process.         Signed By: Juli Marshall PA-C     December 5, 2022

## 2022-12-05 NOTE — H&P
History and Physical    Patient: Silvina Alcocer MRN: [id number]  SSN: [id number]    YOB: 1934  Age: 80 y.o. Sex: female      No primary care provider on file. C/C : Chest pain    Subjective:      Silvina Alcocer is a 80 y.o. 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 ( Seen on MRI Brain in past ) , Diverticulosis coli , Chronic tremors - Non Specific ( Not seen during My interview ) , ? H/o Afib admitted with Chest pain , substernal , left chest area , reproducible . No SOB , No nVD ,No Radiation assoiciated with CP . In ed mild increase in Trop     Patient also has abdominal pain and lower abdominal pain radiating to vagina and into his upper thigh area. At the time it was excruciating but now completely gone according to patient. Patient denies any dysuria but not very specific about the patient's. She has no problem passing urine no history of constipation no hematuria no fever chills rigors.     In ED patient was found to have mild elevation of troponin, patient started on heparin, cardiology consulted  Patient urinalysis was negative      Past Medical History:   Diagnosis Date    Anemia NEC     Asthma     Coarse tremors     Colitis     DDD (degenerative disc disease), cervical     Fibrocystic breast     GERD (gastroesophageal reflux disease)     Headache     Hematuria     HTN (hypertension)     Hyperlipidemia     OA (osteoarthritis)     Osteoporosis     PAF (paroxysmal atrial fibrillation) ([facility] [address] 1995    Pneumonia     Renal cyst     Thyroid nodule     UTI (urinary tract infection)     Vitamin D deficiency 02/07/2011     Past Surgical History:   Procedure Laterality Date    HX GYN      hysterectomy    HX HEENT      cataract surgery bilaterally    HX TOTAL ABDOMINAL HYSTERECTOMY      OH BREAST SURGERY PROCEDURE UNLISTED      cysts removed    OH CARDIAC SURG PROCEDURE UNLIST      cardiac catherization      Family History   Problem Relation Age of Onset    Hypertension Other     Diabetes Other     Heart Disease Other     Cancer Other         stomach & colon    Diabetes Mother     Heart Attack Mother     Stroke Mother     Heart Attack Father     Heart Failure Father     Arthritis-rheumatoid Father     Other Brother         aneuysm-ruptured    Parkinson's Disease Brother      Social History     Tobacco Use    Smoking status: Never    Smokeless tobacco: Never   Substance Use Topics    Alcohol use: No      Prior to Admission medications    Medication Sig Start Date End Date Taking? Authorizing Provider   nitrofurantoin, macrocrystal-monohydrate, (MACROBID) 100 mg capsule Take 100 mg by mouth two (2) times a day. 8/23/22   Provider, Historical   sodium chloride 1,000 mg soluble tablet Take 2 Tablets by mouth daily. 9/26/22   Provider, Historical   metoprolol tartrate (LOPRESSOR) 25 mg tablet Take 25 mg by mouth two (2) times a day. Provider, Historical   dicyclomine (BENTYL) 10 mg capsule Take 1 Capsule by mouth two (2) times a day. 10/14/22   Terrence Joseph, DO   escitalopram oxalate (LEXAPRO) 5 mg tablet Take 5 mg by mouth daily. 4/11/22   Provider, Historical   primidone (MYSOLINE) 50 mg tablet Take 25 mg by mouth nightly. Other, MD Bo   propranolol LA (INDERAL LA) 60 mg SR capsule Take 1 Capsule by mouth daily. Patient not taking: Reported on 1/15/2022 12/18/21   Shahid Ramsay, DO   ondansetron (ZOFRAN ODT) 4 mg disintegrating tablet Take 1 Tablet by mouth every eight (8) hours as needed for Nausea or Vomiting. 12/18/21   Shahid Ramsay DO   sodium chloride 1 gram tablet Take 2 g by mouth daily. 11/23/21   Provider, Historical   ezetimibe (ZETIA) 10 mg tablet Take 10 mg by mouth daily. 8/30/21   Bo Vance MD   lubiPROStone (AMITIZA) 8 mcg capsule Take 8 mcg by mouth two (2) times a day. 8/24/21   Bo Vance MD   omeprazole (PRILOSEC) 20 mg capsule Take 20 mg by mouth daily. 10/11/21   Bo Vance MD   senna (Senna) 8.6 mg tablet Take 1 Tab by mouth daily. 1/15/21   REHAN Holloway   polyethylene glycol (Miralax) 17 gram/dose powder Take 17 g by mouth two (2) times a day. 1 tablespoon with 8 oz of water daily 1/5/21   Nilsa Duff MD   acetaminophen (TYLENOL EX STR ARTHRITIS PAIN) 500 mg tablet Take 1 Tab by mouth every six (6) hours as needed. 11/18/16   Abdifatah Patterson DO   diphenhydrAMINE (BENADRYL ALLERGY) 25 mg tablet Take 1 Tab by mouth every six (6) hours as needed. Patient not taking: Reported on 1/15/2022 11/18/16   Abdifatah Patterson DO        Allergies   Allergen Reactions    Latex, Natural Rubber Unknown (comments)    Asa-Acetaminophen-Caff-Potass Shortness of Breath     Patient is only allergic to ASA    Aspirin Shortness of Breath and Other (comments)     Patient states this caused her stomach to bleed internal    Erythromycin Other (comments)     bleeding    Iodine And Iodide Containing Products Unknown (comments)    Lemon Unknown (comments)    Other Medication Unknown (comments)     Pt not sure of allergies states \"I'm allergic to more but not sure of name\"    Pcn [Penicillins] Swelling    Shellfish Containing Products Unknown (comments)    Sulfa (Sulfonamide Antibiotics) Unknown (comments)           Review of Systems:  positive responses in bold type   Constitutional: Negative for fever, chills, diaphoresis and unexpected weight change. HENT: Negative for ear pain, congestion, sore throat, rhinorrhea, drooling, trouble swallowing, neck pain and tinnitus. Eyes: Negative for photophobia, pain, redness and visual disturbance. Respiratory: negative for shortness of breath, cough, choking, chest tightness, wheezing or stridor. Cardiovascular: Positive for chest pain, palpitations and leg swelling. Gastrointestinal: Negative for nausea, vomiting, abdominal pain, diarrhea, constipation, blood in stool, abdominal distention and anal bleeding. Genitourinary: Negative for dysuria, urgency, frequency, hematuria, flank pain and difficulty urinating. Musculoskeletal: Negative for back pain and arthralgias. Skin: Negative for color change, rash and wound. Neurological: Negative for dizziness, seizures, syncope, speech difficulty, light-headedness or headaches. Hematological: Does not bruise/bleed easily. Psychiatric/Behavioral: Negative for suicidal ideas, hallucinations, behavioral problems, self-injury or agitation         Objective: Body mass index is 16.63 kg/m².   Vitals:    12/05/22 0950 12/05/22 0958 12/05/22 1005 12/05/22 1102   BP: (!) 184/118 (!) 183/86 (!) 183/86 (!) 184/118   Pulse: 79 84 92    Resp: 21 16 22    Temp:  98.5 °F (36.9 °C)     SpO2: 100% 100% 99%    Weight:    39.9 kg (88 lb)   Height:    5' 1\" (1.549 m)        Physical Exam:  General appearance - alert, well appearing, and in no distress  Mental status - alert, oriented to person, place, and time  Lymphatics - no palpable lymphadenopathy, no hepatosplenomegaly  Chest - clear to auscultation, no wheezes, rales or rhonchi, symmetric air entry  Heart - normal rate, regular rhythm, normal S1, S2, no murmurs, rubs, clicks or gallops  Abdomen - soft, nontender, nondistended, no masses or organomegaly  Pelvic -external exam- normal genitalia / no rash, some supra pubic tenderness   Extremities - peripheral pulses normal, no pedal edema, no clubbing or cyanosis          Hospital Problems  Date Reviewed: 11/8/2022            Codes Class Noted POA    Chest pain ICD-10-CM: R07.9  ICD-9-CM: 786.50  12/14/2021 Unknown           CBC:  Lab Results   Component Value Date/Time    WBC 4.9 12/05/2022 09:34 AM    HGB 11.1 (L) 12/05/2022 09:34 AM    HCT 35.1 12/05/2022 09:34 AM    PLATELET 733 94/07/2545 09:34 AM    MCV 91.9 12/05/2022 09:34 AM        CMP:  Lab Results   Component Value Date/Time    Sodium 140 12/05/2022 01:10 AM    Potassium 3.9 12/05/2022 01:10 AM    Chloride 108 12/05/2022 01:10 AM    CO2 28 12/05/2022 01:10 AM    Anion gap 4 12/05/2022 01:10 AM    Glucose 116 (H) 12/05/2022 01:10 AM    BUN 23 (H) 12/05/2022 01:10 AM    Creatinine 0.75 12/05/2022 01:10 AM    BUN/Creatinine ratio 31 (H) 12/05/2022 01:10 AM    GFR est AA >60 09/16/2022 08:43 PM    GFR est non-AA >60 09/16/2022 08:43 PM    Calcium 8.8 12/05/2022 01:10 AM    Alk. phosphatase 107 12/05/2022 01:10 AM    Protein, total 6.1 (L) 12/05/2022 01:10 AM    Albumin 3.3 (L) 12/05/2022 01:10 AM    Globulin 2.8 12/05/2022 01:10 AM    A-G Ratio 1.2 12/05/2022 01:10 AM    ALT (SGPT) 17 12/05/2022 01:10 AM        PT/INR  Lab Results   Component Value Date/Time    INR 0.9 07/25/2022 01:05 AM    INR 1.0 01/16/2022 12:34 AM    INR 0.9 12/13/2021 09:40 AM    Prothrombin time 13.0 07/25/2022 01:05 AM    Prothrombin time 13.2 01/16/2022 12:34 AM    Prothrombin time 12.0 12/13/2021 09:40 AM            EKG:   Results for orders placed or performed in visit on 06/10/22   AMB POC EKG ROUTINE W/ 12 LEADS, INTER & REP     Status: None    Impression    See progress note. Assessment And  Plan:     1 Chest pain   2 Lower abd pain   3 Anemia - chronic   4 H/o Diverticulosis   5 HTN -   6 Essential tremors - on Propranolol     PLAN   - Chest pain - on Heparin gtt.  Trend trop , if no elevation then dc   - D/w patient till her understanding   - in past abnormal wall thickness of urinary bladder - needs follow up with Urology - can be done as out patient   - Add Norvasc for BP control     Signed By: Jorge Luis Tejada MD     December 5, 2022

## 2022-12-05 NOTE — ED PROVIDER NOTES
EMERGENCY DEPARTMENT HISTORY AND PHYSICAL EXAM    1:15 AM    Date: 12/5/2022  Patient Name: Freedom Garnett    History of Presenting Illness     Chief Complaint   Patient presents with    Vaginal Pain     Patient c/o vaginal pain       History Provided By: Patient and EMS    Vaginal Pain  Primary symptoms include dysuria. Associated symptoms include frequency. Pertinent negatives include no abdominal pain, no constipation, no diarrhea, no nausea, no vomiting, no light-headedness, no dizziness, no flank pain and no fever. 80-year-old female with past medical history of UTI, HTN, HLD, pAFib, asthma who presents via EMS from NH for \"burning and stinging\" vaginal pain. Pain began this evening, occurs with urination. Also endorsing urinary frequency, urgency, decreased UOP. Denies pruritus. No fevers, chills, nausea, vomiting. Having bilateral lower abdominal pain that she reports has been occurring for months, worse over the last couple of days. Normal BMs. Of note, on chart review patient was seen for similar c/o vaginal and lower abdominal pain 3 months ago. Evaluated in ED and dx with UTI. Additionally, patient reports shortness of breath over the last few days. Not positional, denies orthopnea. States she does not walk so is unsure if this will be worse with exertion. Endorsing discomfort to her left chest described as \"hard to breathe\", no other chest pain. PCP: Matt Alonso MD    Current Outpatient Medications   Medication Sig Dispense Refill    nitrofurantoin, macrocrystal-monohydrate, (MACROBID) 100 mg capsule Take 100 mg by mouth two (2) times a day.  sodium chloride 1,000 mg soluble tablet Take 2 Tablets by mouth daily.  metoprolol tartrate (LOPRESSOR) 25 mg tablet Take 25 mg by mouth two (2) times a day.  dicyclomine (BENTYL) 10 mg capsule Take 1 Capsule by mouth two (2) times a day.  14 Capsule 0    escitalopram oxalate (LEXAPRO) 5 mg tablet Take 5 mg by mouth daily.      primidone (MYSOLINE) 50 mg tablet Take 25 mg by mouth nightly.  propranolol LA (INDERAL LA) 60 mg SR capsule Take 1 Capsule by mouth daily. (Patient not taking: Reported on 1/15/2022) 30 Capsule 0    ondansetron (ZOFRAN ODT) 4 mg disintegrating tablet Take 1 Tablet by mouth every eight (8) hours as needed for Nausea or Vomiting. 30 Tablet 0    sodium chloride 1 gram tablet Take 2 g by mouth daily.  ezetimibe (ZETIA) 10 mg tablet Take 10 mg by mouth daily.  lubiPROStone (AMITIZA) 8 mcg capsule Take 8 mcg by mouth two (2) times a day.  omeprazole (PRILOSEC) 20 mg capsule Take 20 mg by mouth daily.  senna (Senna) 8.6 mg tablet Take 1 Tab by mouth daily. 30 Tab 0    polyethylene glycol (Miralax) 17 gram/dose powder Take 17 g by mouth two (2) times a day. 1 tablespoon with 8 oz of water daily 507 g 0    acetaminophen (TYLENOL EX STR ARTHRITIS PAIN) 500 mg tablet Take 1 Tab by mouth every six (6) hours as needed. 60 Tab 1    diphenhydrAMINE (BENADRYL ALLERGY) 25 mg tablet Take 1 Tab by mouth every six (6) hours as needed.  (Patient not taking: Reported on 1/15/2022) 30 Tab 2       Past History     Past Medical History:  Past Medical History:   Diagnosis Date    Anemia NEC     Asthma     Coarse tremors     Colitis     DDD (degenerative disc disease), cervical     Fibrocystic breast     GERD (gastroesophageal reflux disease)     Headache     Hematuria     HTN (hypertension)     Hyperlipidemia     OA (osteoarthritis)     Osteoporosis     PAF (paroxysmal atrial fibrillation) ([facility] [address] 1995    Pneumonia     Renal cyst     Thyroid nodule     UTI (urinary tract infection)     Vitamin D deficiency 02/07/2011       Past Surgical History:  Past Surgical History:   Procedure Laterality Date    HX GYN      hysterectomy    HX HEENT      cataract surgery bilaterally    HX TOTAL ABDOMINAL HYSTERECTOMY      [facility] BREAST SURGERY PROCEDURE UNLISTED      cysts removed    [facility] CARDIAC SURG PROCEDURE UNLIST      cardiac catherization       Family History:  Family History   Problem Relation Age of Onset    Hypertension Other     Diabetes Other     Heart Disease Other     Cancer Other         stomach & colon    Diabetes Mother     Heart Attack Mother     Stroke Mother     Heart Attack Father     Heart Failure Father    Leticia Palafox Arthritis-rheumatoid Father     Other Brother         aneuysm-ruptured    Parkinson's Disease Brother        Social History:  Social History     Tobacco Use    Smoking status: Never    Smokeless tobacco: Never   Vaping Use    Vaping Use: Never used   Substance Use Topics    Alcohol use: No    Drug use: No       Allergies: Allergies   Allergen Reactions    Latex, Natural Rubber Unknown (comments)    Asa-Acetaminophen-Caff-Potass Shortness of Breath     Patient is only allergic to ASA    Aspirin Shortness of Breath and Other (comments)     Patient states this caused her stomach to bleed internal    Erythromycin Other (comments)     bleeding    Iodine And Iodide Containing Products Unknown (comments)    Lemon Unknown (comments)    Other Medication Unknown (comments)     Pt not sure of allergies states \"I'm allergic to more but not sure of name\"    Pcn [Penicillins] Swelling    Shellfish Containing Products Unknown (comments)    Sulfa (Sulfonamide Antibiotics) Unknown (comments)       Review of Systems       Review of Systems   Constitutional:  Negative for activity change, appetite change, chills, fatigue and fever. HENT:  Negative for congestion, rhinorrhea and sore throat. Eyes: Negative. Respiratory:  Positive for shortness of breath. Negative for cough, chest tightness and wheezing. Cardiovascular:  Positive for chest pain. Negative for palpitations and leg swelling. Gastrointestinal:  Negative for abdominal pain, blood in stool, constipation, diarrhea, nausea and vomiting.    Genitourinary:  Positive for decreased urine volume, difficulty urinating, dysuria, frequency, urgency and vaginal pain. Negative for flank pain and hematuria. Musculoskeletal:  Negative for arthralgias, back pain, myalgias and neck pain. Skin:  Negative for rash and wound. Neurological:  Negative for dizziness, syncope, weakness, light-headedness, numbness and headaches. Psychiatric/Behavioral: Negative. Physical Exam   There were no vitals taken for this visit. Physical Exam  Vitals and nursing note reviewed. Constitutional:       General: She is not in acute distress. Comments: Coarse tremor noted to B/L UEs with movement. HENT:      Head: Normocephalic and atraumatic. Nose: No rhinorrhea. Mouth/Throat:      Mouth: Mucous membranes are dry. Pharynx: Oropharynx is clear. Eyes:      General: No scleral icterus. Right eye: No discharge. Left eye: No discharge. Extraocular Movements: Extraocular movements intact. Pupils: Pupils are equal, round, and reactive to light. Cardiovascular:      Rate and Rhythm: Normal rate. Rhythm irregular. Pulses: Normal pulses. Heart sounds: Normal heart sounds. Pulmonary:      Effort: Pulmonary effort is normal. No respiratory distress. Breath sounds: Rales (L mid lobe) present. Comments: Slightly coarse breath sounds diffusely  Chest:      Chest wall: No tenderness. Abdominal:      General: There is no distension. Palpations: Abdomen is soft. Tenderness: There is abdominal tenderness. There is guarding. There is no right CVA tenderness, left CVA tenderness or rebound. Hernia: Hernia: voluntary guarding B/L lower quadrants. Genitourinary:     General: Normal vulva. Vagina: No vaginal discharge. Comments: No vulvar lesions, excoriations, rashes, erythema. No vaginal discharge or bleeding noted externally. Wet prep obtained. Musculoskeletal:      Cervical back: Normal range of motion and neck supple. No tenderness.       Right lower leg: Edema (1+ pitting edema to knee) present. Left lower leg: Edema (1+ pitting edema to knee) present. Lymphadenopathy:      Cervical: No cervical adenopathy. Skin:     General: Skin is warm and dry. Capillary Refill: Capillary refill takes less than 2 seconds. Neurological:      Mental Status: She is alert and oriented to person, place, and time. Diagnostic Study Results     Labs -  Recent Results (from the past 12 hour(s))   CBC WITH AUTOMATED DIFF    Collection Time: 12/05/22  1:10 AM   Result Value Ref Range    WBC 5.6 4.6 - 13.2 K/uL    RBC 3.04 (L) 4.20 - 5.30 M/uL    HGB 9.0 (L) 12.0 - 16.0 g/dL    HCT 28.0 (L) 35.0 - 45.0 %    MCV 92.1 78.0 - 100.0 FL    MCH 29.6 24.0 - 34.0 PG    MCHC 32.1 31.0 - 37.0 g/dL    RDW 13.8 11.6 - 14.5 %    PLATELET 835 238 - 270 K/uL    MPV 10.1 9.2 - 11.8 FL    NRBC 0.0 0  WBC    ABSOLUTE NRBC 0.00 0.00 - 0.01 K/uL    NEUTROPHILS 63 40 - 73 %    LYMPHOCYTES 24 21 - 52 %    MONOCYTES 11 (H) 3 - 10 %    EOSINOPHILS 1 0 - 5 %    BASOPHILS 1 0 - 2 %    IMMATURE GRANULOCYTES 0 0.0 - 0.5 %    ABS. NEUTROPHILS 3.5 1.8 - 8.0 K/UL    ABS. LYMPHOCYTES 1.3 0.9 - 3.6 K/UL    ABS. MONOCYTES 0.6 0.05 - 1.2 K/UL    ABS. EOSINOPHILS 0.1 0.0 - 0.4 K/UL    ABS. BASOPHILS 0.0 0.0 - 0.1 K/UL    ABS. IMM. GRANS. 0.0 0.00 - 0.04 K/UL    DF AUTOMATED     METABOLIC PANEL, COMPREHENSIVE    Collection Time: 12/05/22  1:10 AM   Result Value Ref Range    Sodium 140 136 - 145 mmol/L    Potassium 3.9 3.5 - 5.5 mmol/L    Chloride 108 100 - 111 mmol/L    CO2 28 21 - 32 mmol/L    Anion gap 4 3.0 - 18 mmol/L    Glucose 116 (H) 74 - 99 mg/dL    BUN 23 (H) 7.0 - 18 MG/DL    Creatinine 0.75 0.6 - 1.3 MG/DL    BUN/Creatinine ratio 31 (H) 12 - 20      eGFR >60 >60 ml/min/1.73m2    Calcium 8.8 8.5 - 10.1 MG/DL    Bilirubin, total 0.2 0.2 - 1.0 MG/DL    ALT (SGPT) 17 13 - 56 U/L    AST (SGOT) 14 10 - 38 U/L    Alk.  phosphatase 107 45 - 117 U/L    Protein, total 6.1 (L) 6.4 - 8.2 g/dL    Albumin 3.3 (L) 3.4 - 5.0 g/dL    Globulin 2.8 2.0 - 4.0 g/dL    A-G Ratio 1.2 0.8 - 1.7     TROPONIN-HIGH SENSITIVITY    Collection Time: 12/05/22  1:10 AM   Result Value Ref Range    Troponin-High Sensitivity 87 (H) 0 - 54 ng/L   LIPASE    Collection Time: 12/05/22  1:10 AM   Result Value Ref Range    Lipase 88 73 - 393 U/L   NT-PRO BNP    Collection Time: 12/05/22  1:10 AM   Result Value Ref Range    NT pro-BNP 1,279 0 - 1,800 PG/ML   WET PREP    Collection Time: 12/05/22  2:30 AM    Specimen: Genital specimen   Result Value Ref Range    Special Requests: NO SPECIAL REQUESTS      Wet prep NO YEAST SEEN      Wet prep NO TRICHOMONAS SEEN      Wet prep FEW  CLUE CELLS PRESENT       EKG, 12 LEAD, INITIAL    Collection Time: 12/05/22  2:56 AM   Result Value Ref Range    Ventricular Rate 72 BPM    Atrial Rate 72 BPM    P-R Interval 148 ms    QRS Duration 70 ms    Q-T Interval 400 ms    QTC Calculation (Bezet) 438 ms    Calculated P Axis 104 degrees    Calculated R Axis 138 degrees    Calculated T Axis 111 degrees    Diagnosis       Suspect arm lead reversal, interpretation assumes no reversal  Normal sinus rhythm  Septal infarct (cited on or before 29-MAY-2022)  Lateral infarct (cited on or before 12-JUN-2022)  Abnormal ECG  When compared with ECG of 08-OCT-2022 21:00,  QRS axis shifted right         Radiologic Studies -   XR CHEST PORT    (Results Pending)       Medical Decision Making   I am the first provider for this patient. I reviewed the vital signs, available nursing notes, past medical history, past surgical history, family history and social history. Vital Signs-Reviewed the patient's vital signs. EKG: Sinus rhythm at 72 bpm. Question RAD vs lead reversal. Intervals WNL. No acute ST or T wave changes. As compared to EKG dated 10/8/22, RAD would be new.      Records Reviewed: Nursing Notes and Old Medical Records (Time of Review: 1:15 AM)    ED Course: Progress Notes, Reevaluation, and Consults:         Provider Notes (Medical Decision Making):   MDM  81 yo female who presents to ED from NH for urinary symptoms and SOB. VSS on arrival per EMS. PE as above. DDX: UTI vs yeast infection. Less likely pyelo given no fevers or n/v. No vaginal lesions noted. SOB/chest discomfort ddx: acute MI vs PNA vs heart failure vs COPD exacerbation (less likely given no wheezes, satting well). Considered angioedema/anaphylaxis given worsening of symptoms following medication administration, however oropharynx clear, no mucosal swelling noted, no hives, hypotension, or n/v.     CXR, EKG, labwork, wet prep pending. Will reassess. Ultimate dispo pending results. UPDATE: trop 87 -> will repeat. Stable chronic anemia. BNP stable compared to prior. COVID/flu and UA pending. Ultimate dispo pending results. Further testing will be followed up on by attending supervisor. Diagnosis     Clinical Impression: No diagnosis found. - pending results. Disposition: Pending test results. Follow-up Information    None          Patient's Medications   Start Taking    No medications on file   Continue Taking    ACETAMINOPHEN (TYLENOL EX STR ARTHRITIS PAIN) 500 MG TABLET    Take 1 Tab by mouth every six (6) hours as needed. DICYCLOMINE (BENTYL) 10 MG CAPSULE    Take 1 Capsule by mouth two (2) times a day. DIPHENHYDRAMINE (BENADRYL ALLERGY) 25 MG TABLET    Take 1 Tab by mouth every six (6) hours as needed. ESCITALOPRAM OXALATE (LEXAPRO) 5 MG TABLET    Take 5 mg by mouth daily. EZETIMIBE (ZETIA) 10 MG TABLET    Take 10 mg by mouth daily. LUBIPROSTONE (AMITIZA) 8 MCG CAPSULE    Take 8 mcg by mouth two (2) times a day. METOPROLOL TARTRATE (LOPRESSOR) 25 MG TABLET    Take 25 mg by mouth two (2) times a day. NITROFURANTOIN, MACROCRYSTAL-MONOHYDRATE, (MACROBID) 100 MG CAPSULE    Take 100 mg by mouth two (2) times a day. OMEPRAZOLE (PRILOSEC) 20 MG CAPSULE    Take 20 mg by mouth daily. ONDANSETRON (ZOFRAN ODT) 4 MG DISINTEGRATING TABLET    Take 1 Tablet by mouth every eight (8) hours as needed for Nausea or Vomiting. POLYETHYLENE GLYCOL (MIRALAX) 17 GRAM/DOSE POWDER    Take 17 g by mouth two (2) times a day. 1 tablespoon with 8 oz of water daily    PRIMIDONE (MYSOLINE) 50 MG TABLET    Take 25 mg by mouth nightly. PROPRANOLOL LA (INDERAL LA) 60 MG SR CAPSULE    Take 1 Capsule by mouth daily. SENNA (SENNA) 8.6 MG TABLET    Take 1 Tab by mouth daily. SODIUM CHLORIDE 1 GRAM TABLET    Take 2 g by mouth daily. SODIUM CHLORIDE 1,000 MG SOLUBLE TABLET    Take 2 Tablets by mouth daily. These Medications have changed    No medications on file   Stop Taking    No medications on file     Disclaimer: Sections of this note are dictated using utilizing voice recognition software. Minor typographical errors may be present. If questions arise, please do not hesitate to contact me or call our department.

## 2022-12-06 PROBLEM — I10 HYPERTENSION: Status: ACTIVE | Noted: 2022-12-06

## 2022-12-06 LAB
ANION GAP SERPL CALC-SCNC: 4 MMOL/L (ref 3–18)
APTT PPP: 88.1 SEC (ref 23–36.4)
ATRIAL RATE: 59 BPM
BASOPHILS # BLD: 0 K/UL (ref 0–0.1)
BASOPHILS # BLD: 0.1 K/UL (ref 0–0.1)
BASOPHILS NFR BLD: 1 % (ref 0–2)
BASOPHILS NFR BLD: 1 % (ref 0–2)
BUN SERPL-MCNC: 22 MG/DL (ref 7–18)
BUN/CREAT SERPL: 29 (ref 12–20)
CALCIUM SERPL-MCNC: 8.3 MG/DL (ref 8.5–10.1)
CALCULATED P AXIS, ECG09: 70 DEGREES
CALCULATED R AXIS, ECG10: 19 DEGREES
CALCULATED T AXIS, ECG11: 60 DEGREES
CHLORIDE SERPL-SCNC: 104 MMOL/L (ref 100–111)
CHOLEST SERPL-MCNC: 190 MG/DL
CO2 SERPL-SCNC: 28 MMOL/L (ref 21–32)
CREAT SERPL-MCNC: 0.76 MG/DL (ref 0.6–1.3)
DIAGNOSIS, 93000: NORMAL
DIFFERENTIAL METHOD BLD: ABNORMAL
DIFFERENTIAL METHOD BLD: ABNORMAL
EOSINOPHIL # BLD: 0.1 K/UL (ref 0–0.4)
EOSINOPHIL # BLD: 0.1 K/UL (ref 0–0.4)
EOSINOPHIL NFR BLD: 1 % (ref 0–5)
EOSINOPHIL NFR BLD: 1 % (ref 0–5)
ERYTHROCYTE [DISTWIDTH] IN BLOOD BY AUTOMATED COUNT: 13.9 % (ref 11.6–14.5)
ERYTHROCYTE [DISTWIDTH] IN BLOOD BY AUTOMATED COUNT: 14 % (ref 11.6–14.5)
GLUCOSE SERPL-MCNC: 151 MG/DL (ref 74–99)
HCT VFR BLD AUTO: 26.8 % (ref 35–45)
HCT VFR BLD AUTO: 28.3 % (ref 35–45)
HDLC SERPL-MCNC: 59 MG/DL (ref 40–60)
HDLC SERPL: 3.2 {RATIO} (ref 0–5)
HGB BLD-MCNC: 8.4 G/DL (ref 12–16)
HGB BLD-MCNC: 8.9 G/DL (ref 12–16)
IMM GRANULOCYTES # BLD AUTO: 0 K/UL (ref 0–0.04)
IMM GRANULOCYTES # BLD AUTO: 0 K/UL (ref 0–0.04)
IMM GRANULOCYTES NFR BLD AUTO: 0 % (ref 0–0.5)
IMM GRANULOCYTES NFR BLD AUTO: 0 % (ref 0–0.5)
LDLC SERPL CALC-MCNC: 100.4 MG/DL (ref 0–100)
LIPID PROFILE,FLP: ABNORMAL
LYMPHOCYTES # BLD: 1.4 K/UL (ref 0.9–3.6)
LYMPHOCYTES # BLD: 1.8 K/UL (ref 0.9–3.6)
LYMPHOCYTES NFR BLD: 30 % (ref 21–52)
LYMPHOCYTES NFR BLD: 36 % (ref 21–52)
MCH RBC QN AUTO: 28.9 PG (ref 24–34)
MCH RBC QN AUTO: 29.5 PG (ref 24–34)
MCHC RBC AUTO-ENTMCNC: 31.3 G/DL (ref 31–37)
MCHC RBC AUTO-ENTMCNC: 31.4 G/DL (ref 31–37)
MCV RBC AUTO: 92.1 FL (ref 78–100)
MCV RBC AUTO: 93.7 FL (ref 78–100)
MONOCYTES # BLD: 0.4 K/UL (ref 0.05–1.2)
MONOCYTES # BLD: 0.5 K/UL (ref 0.05–1.2)
MONOCYTES NFR BLD: 11 % (ref 3–10)
MONOCYTES NFR BLD: 8 % (ref 3–10)
NEUTS SEG # BLD: 2.5 K/UL (ref 1.8–8)
NEUTS SEG # BLD: 2.8 K/UL (ref 1.8–8)
NEUTS SEG NFR BLD: 51 % (ref 40–73)
NEUTS SEG NFR BLD: 60 % (ref 40–73)
NRBC # BLD: 0 K/UL (ref 0–0.01)
NRBC # BLD: 0 K/UL (ref 0–0.01)
NRBC BLD-RTO: 0 PER 100 WBC
NRBC BLD-RTO: 0 PER 100 WBC
P-R INTERVAL, ECG05: 146 MS
PLATELET # BLD AUTO: 191 K/UL (ref 135–420)
PLATELET # BLD AUTO: 196 K/UL (ref 135–420)
PMV BLD AUTO: 10.3 FL (ref 9.2–11.8)
PMV BLD AUTO: 10.7 FL (ref 9.2–11.8)
POTASSIUM SERPL-SCNC: 3.6 MMOL/L (ref 3.5–5.5)
Q-T INTERVAL, ECG07: 508 MS
QRS DURATION, ECG06: 76 MS
QTC CALCULATION (BEZET), ECG08: 502 MS
RBC # BLD AUTO: 2.91 M/UL (ref 4.2–5.3)
RBC # BLD AUTO: 3.02 M/UL (ref 4.2–5.3)
SODIUM SERPL-SCNC: 136 MMOL/L (ref 136–145)
TRIGL SERPL-MCNC: 153 MG/DL (ref ?–150)
VENTRICULAR RATE, ECG03: 59 BPM
VLDLC SERPL CALC-MCNC: 30.6 MG/DL
WBC # BLD AUTO: 4.7 K/UL (ref 4.6–13.2)
WBC # BLD AUTO: 4.9 K/UL (ref 4.6–13.2)

## 2022-12-06 PROCEDURE — 2709999900 HC NON-CHARGEABLE SUPPLY

## 2022-12-06 PROCEDURE — 85025 COMPLETE CBC W/AUTO DIFF WBC: CPT

## 2022-12-06 PROCEDURE — 99232 SBSQ HOSP IP/OBS MODERATE 35: CPT | Performed by: INTERNAL MEDICINE

## 2022-12-06 PROCEDURE — 36415 COLL VENOUS BLD VENIPUNCTURE: CPT

## 2022-12-06 PROCEDURE — 77030038269 HC DRN EXT URIN PURWCK BARD -A

## 2022-12-06 PROCEDURE — APPSS15 APP SPLIT SHARED TIME 0-15 MINUTES: Performed by: NURSE PRACTITIONER

## 2022-12-06 PROCEDURE — 74011250637 HC RX REV CODE- 250/637: Performed by: INTERNAL MEDICINE

## 2022-12-06 PROCEDURE — 65270000046 HC RM TELEMETRY

## 2022-12-06 PROCEDURE — 99233 SBSQ HOSP IP/OBS HIGH 50: CPT | Performed by: INTERNAL MEDICINE

## 2022-12-06 PROCEDURE — 74011250636 HC RX REV CODE- 250/636: Performed by: INTERNAL MEDICINE

## 2022-12-06 PROCEDURE — 80048 BASIC METABOLIC PNL TOTAL CA: CPT

## 2022-12-06 PROCEDURE — 93005 ELECTROCARDIOGRAM TRACING: CPT

## 2022-12-06 PROCEDURE — 74011250637 HC RX REV CODE- 250/637: Performed by: NURSE PRACTITIONER

## 2022-12-06 PROCEDURE — 74011000250 HC RX REV CODE- 250: Performed by: INTERNAL MEDICINE

## 2022-12-06 PROCEDURE — 80061 LIPID PANEL: CPT

## 2022-12-06 PROCEDURE — 85730 THROMBOPLASTIN TIME PARTIAL: CPT

## 2022-12-06 RX ORDER — EZETIMIBE 10 MG/1
10 TABLET ORAL DAILY
Status: DISCONTINUED | OUTPATIENT
Start: 2022-12-06 | End: 2022-12-07 | Stop reason: HOSPADM

## 2022-12-06 RX ORDER — CLOPIDOGREL BISULFATE 75 MG/1
75 TABLET ORAL DAILY
Status: DISCONTINUED | OUTPATIENT
Start: 2022-12-06 | End: 2022-12-07 | Stop reason: HOSPADM

## 2022-12-06 RX ORDER — SODIUM CHLORIDE 0.9 % (FLUSH) 0.9 %
5-40 SYRINGE (ML) INJECTION EVERY 8 HOURS
Status: DISCONTINUED | OUTPATIENT
Start: 2022-12-06 | End: 2022-12-07 | Stop reason: HOSPADM

## 2022-12-06 RX ORDER — POLYETHYLENE GLYCOL 3350 17 G/17G
17 POWDER, FOR SOLUTION ORAL DAILY PRN
Status: DISCONTINUED | OUTPATIENT
Start: 2022-12-06 | End: 2022-12-07 | Stop reason: HOSPADM

## 2022-12-06 RX ORDER — SODIUM CHLORIDE 0.9 % (FLUSH) 0.9 %
5-40 SYRINGE (ML) INJECTION AS NEEDED
Status: DISCONTINUED | OUTPATIENT
Start: 2022-12-06 | End: 2022-12-07 | Stop reason: HOSPADM

## 2022-12-06 RX ORDER — POLYETHYLENE GLYCOL 3350 17 G/17G
17 POWDER, FOR SOLUTION ORAL 2 TIMES DAILY
Status: DISCONTINUED | OUTPATIENT
Start: 2022-12-06 | End: 2022-12-07 | Stop reason: HOSPADM

## 2022-12-06 RX ORDER — ATORVASTATIN CALCIUM 40 MG/1
40 TABLET, FILM COATED ORAL
Status: DISCONTINUED | OUTPATIENT
Start: 2022-12-06 | End: 2022-12-07 | Stop reason: HOSPADM

## 2022-12-06 RX ORDER — LUBIPROSTONE 8 UG/1
8 CAPSULE ORAL 2 TIMES DAILY
Status: DISCONTINUED | OUTPATIENT
Start: 2022-12-06 | End: 2022-12-07 | Stop reason: HOSPADM

## 2022-12-06 RX ORDER — PROMETHAZINE HYDROCHLORIDE 12.5 MG/1
12.5 TABLET ORAL
Status: DISCONTINUED | OUTPATIENT
Start: 2022-12-06 | End: 2022-12-07 | Stop reason: HOSPADM

## 2022-12-06 RX ORDER — OXYBUTYNIN CHLORIDE 5 MG/1
5 TABLET ORAL 3 TIMES DAILY
Status: DISCONTINUED | OUTPATIENT
Start: 2022-12-06 | End: 2022-12-07 | Stop reason: HOSPADM

## 2022-12-06 RX ORDER — METRONIDAZOLE 500 MG/1
500 TABLET ORAL 2 TIMES DAILY
Status: DISCONTINUED | OUTPATIENT
Start: 2022-12-06 | End: 2022-12-07 | Stop reason: HOSPADM

## 2022-12-06 RX ORDER — ESCITALOPRAM OXALATE 5 MG/1
5 TABLET ORAL DAILY
Status: DISCONTINUED | OUTPATIENT
Start: 2022-12-06 | End: 2022-12-07 | Stop reason: HOSPADM

## 2022-12-06 RX ORDER — ACETAMINOPHEN 325 MG/1
650 TABLET ORAL
Status: DISCONTINUED | OUTPATIENT
Start: 2022-12-06 | End: 2022-12-07 | Stop reason: HOSPADM

## 2022-12-06 RX ORDER — THERA TABS 400 MCG
1 TAB ORAL DAILY
Status: DISCONTINUED | OUTPATIENT
Start: 2022-12-07 | End: 2022-12-07 | Stop reason: HOSPADM

## 2022-12-06 RX ORDER — ONDANSETRON 2 MG/ML
4 INJECTION INTRAMUSCULAR; INTRAVENOUS
Status: DISCONTINUED | OUTPATIENT
Start: 2022-12-06 | End: 2022-12-07 | Stop reason: HOSPADM

## 2022-12-06 RX ORDER — PANTOPRAZOLE SODIUM 20 MG/1
20 TABLET, DELAYED RELEASE ORAL
Status: DISCONTINUED | OUTPATIENT
Start: 2022-12-06 | End: 2022-12-07 | Stop reason: HOSPADM

## 2022-12-06 RX ADMIN — METRONIDAZOLE 500 MG: 500 TABLET ORAL at 17:40

## 2022-12-06 RX ADMIN — AMLODIPINE BESYLATE 5 MG: 5 TABLET ORAL at 08:36

## 2022-12-06 RX ADMIN — LUBIPROSTONE 8 MCG: 8 CAPSULE, GELATIN COATED ORAL at 08:37

## 2022-12-06 RX ADMIN — SODIUM CHLORIDE, PRESERVATIVE FREE 10 ML: 5 INJECTION INTRAVENOUS at 08:39

## 2022-12-06 RX ADMIN — OXYBUTYNIN CHLORIDE 5 MG: 5 TABLET ORAL at 23:10

## 2022-12-06 RX ADMIN — EZETIMIBE 10 MG: 10 TABLET ORAL at 08:36

## 2022-12-06 RX ADMIN — SODIUM CHLORIDE, PRESERVATIVE FREE 10 ML: 5 INJECTION INTRAVENOUS at 23:11

## 2022-12-06 RX ADMIN — PANTOPRAZOLE SODIUM 20 MG: 20 TABLET, DELAYED RELEASE ORAL at 08:36

## 2022-12-06 RX ADMIN — SODIUM CHLORIDE, PRESERVATIVE FREE 10 ML: 5 INJECTION INTRAVENOUS at 16:06

## 2022-12-06 RX ADMIN — POLYETHYLENE GLYCOL 3350 17 G: 17 POWDER, FOR SOLUTION ORAL at 17:40

## 2022-12-06 RX ADMIN — OXYBUTYNIN CHLORIDE 5 MG: 5 TABLET ORAL at 16:05

## 2022-12-06 RX ADMIN — LUBIPROSTONE 8 MCG: 8 CAPSULE, GELATIN COATED ORAL at 17:40

## 2022-12-06 RX ADMIN — CLOPIDOGREL BISULFATE 75 MG: 75 TABLET ORAL at 11:45

## 2022-12-06 RX ADMIN — ACETAMINOPHEN 650 MG: 325 TABLET, FILM COATED ORAL at 10:28

## 2022-12-06 RX ADMIN — ATORVASTATIN CALCIUM 40 MG: 40 TABLET, FILM COATED ORAL at 11:45

## 2022-12-06 RX ADMIN — ONDANSETRON 4 MG: 2 INJECTION INTRAMUSCULAR; INTRAVENOUS at 20:43

## 2022-12-06 RX ADMIN — ESCITALOPRAM OXALATE 5 MG: 5 TABLET, FILM COATED ORAL at 10:26

## 2022-12-06 RX ADMIN — PROPRANOLOL HYDROCHLORIDE 60 MG: 60 CAPSULE, EXTENDED RELEASE ORAL at 08:36

## 2022-12-06 RX ADMIN — POLYETHYLENE GLYCOL 3350 17 G: 17 POWDER, FOR SOLUTION ORAL at 08:37

## 2022-12-06 NOTE — PROGRESS NOTES
completed the initial Spiritual Assessment of the patient, and offered Pastoral Care support to the patient. sThere is an advance directive present in Chart. Patient does not have any Denominational/cultural needs that will affect patients preferences in health care. Chaplains will continue to follow and will provide pastoral care on an as needed/requested basis.     Sandee Dewey  Spiritual Care Department  [phone]

## 2022-12-06 NOTE — PROGRESS NOTES
Problem: Falls - Risk of  Goal: *Absence of Falls  Description: Document Black Erendira Fall Risk and appropriate interventions in the flowsheet.   Outcome: Progressing Towards Goal  Note: Fall Risk Interventions:  Mobility Interventions: Bed/chair exit alarm, Strengthening exercises (ROM-active/passive)         Medication Interventions: Patient to call before getting OOB, Teach patient to arise slowly, Bed/chair exit alarm         History of Falls Interventions: Bed/chair exit alarm, Door open when patient unattended         Problem: Patient Education: Go to Patient Education Activity  Goal: Patient/Family Education  Outcome: Progressing Towards Goal

## 2022-12-06 NOTE — PROGRESS NOTES
Comprehensive Nutrition Assessment    Type and Reason for Visit: Initial, Positive nutrition screen    Nutrition Recommendations/Plan:   Add oral nutrition supplement to optimize nutrition intake opportunity: Ensure Enlive (each provides 350 kcal, 20g protein) TID  Continue current diet- will add allergies to diet order. Will add MVI r/t likely poor PO intake. Monitor PO intake, compliance with oral supplement, weight, and POC while admited. Malnutrition Assessment:  Malnutrition Status: At risk for malnutrition (specify) (r/t underweight BMI and predicted inaddequate PO intake.) (12/06/22 1233)      Nutrition History and Allergies:   PMHx  of 1 cm length aneurysm, diverticulosis coli, chronic tremors (not specific), Afib, DDD, HTN, HLD, GERD. Does not walk at baseline. Pt admitted with chest pain, substernal. Also with abdominal pain radiating to vagina and upper thigh area. Wt hx reviewed: current wt- 90 lb 3.2 oz; x30 days (11/8)- 91 lb; x60 days (10/8)- 95 lb (-5.2%); x6 months (6/12)- 90 lb; x1 year (12/13/21)- 100 lb (-10%). Food allergies include shelfish. Nutrition Assessment:    Pt without significant wt loss over the past 1 year. No PO data for pt during current admission. Pt asleep upon visit. Likely to benefit from addition of supplement- will trial Ensure. Nutrition Related Findings:    + BM 12/05. BUN- 23 H. Pertinent meds: phenergan. Wound Type: None      Current Nutrition Intake & Therapies:  Average Meal Intake: Unable to assess  Average Supplement Intake: None ordered  ADULT DIET Easy to Chew    Anthropometric Measures:  Height: 5' 1\" (154.9 cm)  Ideal Body Weight (IBW): 105 lbs (48 kg)  Admission Body Weight: 89 lb (bed scale)  Current Body Wt:  40.9 kg (90 lb 3.2 oz), 85.9 % IBW.  Not specified  Current BMI (kg/m2): 17.1  Usual Body Weight: 41.3 kg (91 lb) (11/08/22)  % Weight Change (Calculated): -0.9  Weight Adjustment: No adjustment  BMI Category: Underweight (BMI less than 25) age over 72    Estimated Daily Nutrient Needs:  Energy Requirements Based On: Kcal/kg (30-35)  Weight Used for Energy Requirements: Current  Energy (kcal/day): 1227 - 1432  Weight Used for Protein Requirements: Current (1-1.3)  Protein (g/day): 41 - 53  Method Used for Fluid Requirements: 1 ml/kcal  Fluid (ml/day): 1227 - 1432    Nutrition Diagnosis:   Underweight related to inadequate protein-energy intake (predicted) as evidenced by weight loss, BMI    Nutrition Interventions:   Food and/or Nutrient Delivery: Continue current diet, Start oral nutrition supplement  Nutrition Education/Counseling: No recommendations at this time  Coordination of Nutrition Care: Continue to monitor while inpatient       Goals:     Goals: by next RD assessment, PO intake 50% or greater       Nutrition Monitoring and Evaluation:      Food/Nutrient Intake Outcomes: Food and nutrient intake, Supplement intake  Physical Signs/Symptoms Outcomes: Meal time behavior, Nutrition focused physical findings, Weight    Discharge Planning:     Too soon to determine    Janneth Husain, [address]  Contact: [phone]

## 2022-12-06 NOTE — PROGRESS NOTES
Has been at [facility] since mid [place] and planning SNF to LTC,. Needs UAI since first admitted to SNF from ED      Reason for Admission:  Chest pain [R07.9]  NSTEMI (non-ST elevated myocardial infarction) ([facility] [address] [I21.4]                 RUR Score:    12            Plan for utilizing home health:    n/a                      Likelihood of Readmission:   Moderate                         Do you (patient/family) have any concerns for transition/discharge?  no    Transition of Care Plan:       Initial assessment completed with patient. Cognitive status of patient: oriented to time, place, person and situation. Face sheet information confirmed:  yes. The patient designates sons to participate in her discharge plan and to receive any needed information. This patient lives in a nursing facility. Patient is not able to navigate steps as needed. Prior to hospitalization, patient was considered to be independent with ADLs/IADLS : no . If not independent,  patient needs assist with : dressing, bathing, food preparation, cooking, toileting, and grooming    Patient has a current ACP document on file: yes      Healthcare Decision Maker:   Primary Decision Maker: Vale Reeves Son - [phone]    Secondary Decision Maker: Sundeep Rhodes - [phone]    Click here to complete [address] including selection of the Healthcare Decision Maker Relationship (ie \"Primary\")    The other:  medical transport  will be available to transport patient home upon discharge. The patient already has  all needed equipment at facility  ,   Patient is not currently active with home health. .  Patient has stayed in a skilled nursing facility or rehab. Was  stay within last 60 days : yes. This patient is on dialysis :no        List of available SNF agencies were provided and reviewed with the patient prior to discharge. Freedom of choice signed: yes, for [facility] .  Currently, the discharge plan is SNF. The patient states that she can obtain her medications from the pharmacy, and take her medications as directed. Patient's current insurance is Medicare and Medicaid       Care Management Interventions  PCP Verified by CM:  Yes  Mode of Transport at Discharge: BLS  Transition of Care Consult (CM Consult): Discharge Planning, SNF, Long Term Care  Support Systems: Other Family Member(s)  Confirm Follow Up Transport: Other (see comment)  The Plan for Transition of Care is Related to the Following Treatment Goals : snf to ltc  The Patient and/or Patient Representative was Provided with a Choice of Provider and Agrees with the Discharge Plan?: Yes  Freedom of Choice List was Provided with Basic Dialogue that Supports the Patient's Individualized Plan of Care/Goals, Treatment Preferences and Shares the Quality Data Associated with the Providers?: Yes  Discharge Location  Patient Expects to be Discharged to[de-identified] Skilled nursing facility

## 2022-12-06 NOTE — PROGRESS NOTES
[facility] Hospitalist Group  Progress Note    Patient: Denia Cerda Age: 80 y.o. : 1934 MR#: [id number] SSN: [id number]  Date: 2022     Subjective:   Alert and oriented x4. Complains of lower pubic pain and vaginal pain/discomfort. Burning of the vagina with urination. Bladder spasms. Denies chest pain this afternoon. Assessment/Plan:   1. Chest pain, indeterminate troponin. ACS ruled out   2. Lower abdominal pain/pubic pain  3. Vaginal pain due to bacterial vaginosis   4. Elevated BP  5. Resting tremors with parkinsonian like features   6. Deconditioning   7. Bladder wall thickening seen on CT. Plan  Cardiology recommendations continue heparin gtt, NSTEMI may be related to uncontrolled HTN on admission with underlying CAD, medical treatment for CAD, review echo, initiate Plavix and statin, and check lipid profile. Started metronidazole for bacterial vaginosis and add oxybutynin for bladder spasms. Need for urology consult. Attempted to page the on call urologist for consults at [phone]. Will try to consult tomorrow for bladder wall thickening seen on CT. Further recommendations pending hospital course. Patient is alert and oriented x4 and of sound mind to update family. 20 minutes in total spent today in preparation for visit, review of external notes and test results, review of test results, order placement, obtaining history, physical exam of the patient, and/or counseling the patient concerning diagnosis, test results, treatment plan and speaking with physicians and nurses involved in patient's care. Additional time spent in review and independent interpretation of external notes, imaging, labs via hospital EMR and/or Care Everywhere, as well as pre-charting activity all of which occur on the day of service.     5 minutes were spent in Preparation to see Patient and Obtaining and Reviewing Separate History  5 minutes were spent in Examination, Counseling, & Educating Patient  5 minutes were spent (if any) in [place] [id number] with Nursing Staff and Physicians  5 minutes were spent Documenting in EHR and Interpreting Tests Independently    A Total of 20 minutes were spent seeing this Patient. Case discussed with:  [x]Patient  []Family  []Nursing  []Case Management  DVT Prophylaxis:  []Lovenox  []Hep SQ  [x]SCDs  []Coumadin   []On Heparin gtt    Objective:   VS: Visit Vitals  /68   Pulse 60   Temp 98.1 °F (36.7 °C)   Resp 18   Ht 5' 1\" (1.549 m)   Wt 40.9 kg (90 lb 3.2 oz)   SpO2 97%   BMI 17.04 kg/m²      Tmax/24hrs: Temp (24hrs), Av.4 °F (36.9 °C), Min:98.1 °F (36.7 °C), Max:98.8 °F (37.1 °C)  No intake or output data in the 24 hours ending 22 1559    General:         Alert, cooperative, no acute distress    HEENT:           NC, Atraumatic. PERRLA, anicteric sclerae. Lungs:            CTA bilaterally. No Wheezing/Rhonchi/Rales  Heart:              RRR, No murmur, No Rubs, No Gallops  Abdomen:      Soft, Non distended, Non tender. +Bowel sounds, no HSM  Extremities:   No c/c/e  Psych:              Good insight. Not anxious or agitated. Neurologic:     Alert and oriented X 4.   No acute neurological deficits     Labs:    Recent Results (from the past 24 hour(s))   TROPONIN-HIGH SENSITIVITY    Collection Time: 22  6:22 PM   Result Value Ref Range    Troponin-High Sensitivity 929 (HH) 0 - 54 ng/L   PTT    Collection Time: 22  6:22 PM   Result Value Ref Range    aPTT 63.4 (H) 23.0 - 36.4 SEC   PTT    Collection Time: 22  3:32 AM   Result Value Ref Range    aPTT 88.1 (H) 23.0 - 36.4 SEC   CBC WITH AUTOMATED DIFF    Collection Time: 22  5:34 AM   Result Value Ref Range    WBC 4.9 4.6 - 13.2 K/uL    RBC 3.02 (L) 4.20 - 5.30 M/uL    HGB 8.9 (L) 12.0 - 16.0 g/dL    HCT 28.3 (L) 35.0 - 45.0 %    MCV 93.7 78.0 - 100.0 FL    MCH 29.5 24.0 - 34.0 PG    MCHC 31.4 31.0 - 37.0 g/dL    RDW 13.9 11.6 - 14.5 %    PLATELET 915 135 - 420 K/uL    MPV 10.7 9.2 - 11.8 FL    NRBC 0.0 0  WBC    ABSOLUTE NRBC 0.00 0.00 - 0.01 K/uL    NEUTROPHILS 51 40 - 73 %    LYMPHOCYTES 36 21 - 52 %    MONOCYTES 11 (H) 3 - 10 %    EOSINOPHILS 1 0 - 5 %    BASOPHILS 1 0 - 2 %    IMMATURE GRANULOCYTES 0 0.0 - 0.5 %    ABS. NEUTROPHILS 2.5 1.8 - 8.0 K/UL    ABS. LYMPHOCYTES 1.8 0.9 - 3.6 K/UL    ABS. MONOCYTES 0.5 0.05 - 1.2 K/UL    ABS. EOSINOPHILS 0.1 0.0 - 0.4 K/UL    ABS. BASOPHILS 0.0 0.0 - 0.1 K/UL    ABS. IMM. GRANS. 0.0 0.00 - 0.04 K/UL    DF AUTOMATED     EKG, 12 LEAD, SUBSEQUENT    Collection Time: 12/06/22 11:30 AM   Result Value Ref Range    Ventricular Rate 59 BPM    Atrial Rate 59 BPM    P-R Interval 146 ms    QRS Duration 76 ms    Q-T Interval 508 ms    QTC Calculation (Bezet) 502 ms    Calculated P Axis 70 degrees    Calculated R Axis 19 degrees    Calculated T Axis 60 degrees    Diagnosis       Sinus bradycardia  Cannot rule out Anterior infarct (cited on or before 29-MAY-2022)  Abnormal ECG  When compared with ECG of 05-DEC-2022 02:56,  QRS axis shifted left  Questionable change in initial forces of Lateral leads  T wave inversion no longer evident in Lateral leads  QT has lengthened     CBC WITH AUTOMATED DIFF    Collection Time: 12/06/22  3:02 PM   Result Value Ref Range    WBC 4.7 4.6 - 13.2 K/uL    RBC 2.91 (L) 4.20 - 5.30 M/uL    HGB 8.4 (L) 12.0 - 16.0 g/dL    HCT 26.8 (L) 35.0 - 45.0 %    MCV 92.1 78.0 - 100.0 FL    MCH 28.9 24.0 - 34.0 PG    MCHC 31.3 31.0 - 37.0 g/dL    RDW 14.0 11.6 - 14.5 %    PLATELET 059 970 - 758 K/uL    MPV 10.3 9.2 - 11.8 FL    NRBC 0.0 0  WBC    ABSOLUTE NRBC 0.00 0.00 - 0.01 K/uL    NEUTROPHILS 60 40 - 73 %    LYMPHOCYTES 30 21 - 52 %    MONOCYTES 8 3 - 10 %    EOSINOPHILS 1 0 - 5 %    BASOPHILS 1 0 - 2 %    IMMATURE GRANULOCYTES 0 0.0 - 0.5 %    ABS. NEUTROPHILS 2.8 1.8 - 8.0 K/UL    ABS. LYMPHOCYTES 1.4 0.9 - 3.6 K/UL    ABS. MONOCYTES 0.4 0.05 - 1.2 K/UL    ABS.  EOSINOPHILS 0.1 0.0 - 0.4 K/UL ABS. BASOPHILS 0.1 0.0 - 0.1 K/UL    ABS. IMM.  GRANS. 0.0 0.00 - 0.04 K/UL    DF AUTOMATED         Signed By: Lorna Arreguin NP     December 6, 2022

## 2022-12-06 NOTE — PROGRESS NOTES
Cardiology Progress Note    Admit Date: 12/5/2022  Attending Cardiologist: Dr. Cyndi Tipton:     Hospital Problems  Date Reviewed: 12/6/2022            Codes Class Noted POA    NSTEMI (non-ST elevated myocardial infarction) [facility]) ICD-10-CM: I21.4  ICD-9-CM: 410.70  12/5/2022 Unknown        Chest pain ICD-10-CM: R07.9  ICD-9-CM: 786.50  12/14/2021 Unknown        Hyperlipidemia ICD-10-CM: E78.5  ICD-9-CM: 272.4  2/7/2011 Yes           -Bacterial Vaginosis, presented with 10/10 vaginal pain with increased urinary frequency and dysuria.   -Chest pain, atypical and reproducible on exam. No acute ischemic changes on ECG. Echo 2018, normal LVEF. -Indeterminate troponin, do not suspect primary ACS. -pAFib. Initially documented > 20 yrs ago. Maintaining NSR.   -HTN, uncontrolled. Max /166 mmHg. -COPD. -Resting tremor, parkinsonian-like features with memory loss. -Deconditioning          Primary cardiologist is Dr. Lio Montoya:     Chest pain is significantly improved since yesterday. Positive enzymes consistent with acute NSTEMI which may be related to severe hypertension on admission but underlying CAD is definitely possible. Would recommend medical treatment for CAD as patient is bedridden. She told me she can barely stand to go from bed to wheelchair. Echo has shown preserved ejection fraction. Would add Plavix. She is allergic to aspirin so will not use that. Add statins and check a lipid profile. Treatment for vaginal pain per medical service. Watch for any A. fib on the telemetry. Blood pressure is now controlled. Watch closely and adjust medications if needed. Resting tremors which appear to be intentional.    Will follow up with you.     Subjective:     Chest pain is better  Vaginal pain and nausea continue    Objective:      Patient Vitals for the past 8 hrs:   Temp Pulse Resp BP SpO2   12/06/22 0818 98.4 °F (36.9 °C) 62 17 130/64 97 %   12/06/22 0800 -- 63 -- -- -- 12/06/22 0407 98.3 °F (36.8 °C) 61 18 131/66 98 %         Patient Vitals for the past 96 hrs:   Weight   12/06/22 0000 40.9 kg (90 lb 3.2 oz)   12/05/22 1102 39.9 kg (88 lb)   12/05/22 0621 40.4 kg (89 lb)       TELE: normal sinus rhythm               Current Facility-Administered Medications   Medication Dose Route Frequency Last Admin    escitalopram oxalate (LEXAPRO) tablet 5 mg  5 mg Oral DAILY 5 mg at 12/06/22 1026    ezetimibe (ZETIA) tablet 10 mg  10 mg Oral DAILY 10 mg at 12/06/22 0836    lubiPROStone (AMITIZA) capsule 8 mcg  8 mcg Oral BID 8 mcg at 12/06/22 0837    pantoprazole (PROTONIX) tablet 20 mg  20 mg Oral ACB 20 mg at 12/06/22 0836    polyethylene glycol (MIRALAX) packet 17 g  17 g Oral BID 17 g at 12/06/22 0837    sodium chloride (NS) flush 5-40 mL  5-40 mL IntraVENous Q8H 10 mL at 12/06/22 0839    sodium chloride (NS) flush 5-40 mL  5-40 mL IntraVENous PRN      acetaminophen (TYLENOL) tablet 650 mg  650 mg Oral Q6H  mg at 12/06/22 1028    Or    acetaminophen (TYLENOL) suppository 650 mg  650 mg Rectal Q6H PRN      polyethylene glycol (MIRALAX) packet 17 g  17 g Oral DAILY PRN      promethazine (PHENERGAN) tablet 12.5 mg  12.5 mg Oral Q6H PRN      Or    ondansetron (ZOFRAN) injection 4 mg  4 mg IntraVENous Q6H PRN      heparin (porcine) 25,000 units in 0.45% saline 250 ml infusion  12-25 Units/kg/hr IntraVENous TITRATE 14 Units/kg/hr at 12/06/22 0746    propranolol LA (INDERAL LA) capsule 60 mg  60 mg Oral DAILY 60 mg at 12/06/22 0836    amLODIPine (NORVASC) tablet 5 mg  5 mg Oral DAILY 5 mg at 12/06/22 0836       No intake or output data in the 24 hours ending 12/06/22 1103    Physical Exam:  General:  alert, cooperative, no distress, appears stated age  Neck:  no carotid bruit, no JVD  Lungs:  clear to auscultation bilaterally  Heart:  regular rate and rhythm, S1, S2 normal, no murmur, click, rub or gallop  Abdomen:  abdomen is soft without significant tenderness, masses, organomegaly or guarding  Extremities:  extremities normal, atraumatic, no cyanosis or edema    Visit Vitals  /64   Pulse 62   Temp 98.4 °F (36.9 °C)   Resp 17   Ht 5' 1\" (1.549 m)   Wt 40.9 kg (90 lb 3.2 oz)   SpO2 97%   BMI 17.04 kg/m²       Data Review:     Labs: Results:       Chemistry Recent Labs     12/05/22  0110   *      K 3.9      CO2 28   BUN 23*   CREA 0.75   CA 8.8   AGAP 4   BUCR 31*      TP 6.1*   ALB 3.3*   GLOB 2.8   AGRAT 1.2      CBC w/Diff Recent Labs     12/06/22  0534 12/05/22  0934 12/05/22  0110   WBC 4.9 4.9 5.6   RBC 3.02* 3.82* 3.04*   HGB 8.9* 11.1* 9.0*   HCT 28.3* 35.1 28.0*    224 184   GRANS 51 56 63   LYMPH 36 33 24   EOS 1 1 1      Cardiac Enzymes No results found for: CPK, CK, CKMMB, CKMB, RCK3, CKMBT, CKNDX, CKND1, RAKESH, TROPT, TROIQ, GO, TROPT, TNIPOC, BNP, BNPP   Coagulation Recent Labs     12/06/22  0332 12/05/22  1822   APTT 88.1* 63.4*       Lipid Panel Lab Results   Component Value Date/Time    Cholesterol, total 238 (H) 03/06/2018 12:15 PM    HDL Cholesterol 48 03/06/2018 12:15 PM    LDL, calculated 148.2 (H) 03/06/2018 12:15 PM    VLDL, calculated 41.8 03/06/2018 12:15 PM    Triglyceride 209 (H) 03/06/2018 12:15 PM    CHOL/HDL Ratio 5.0 03/06/2018 12:15 PM      BNP No results found for: BNP, BNPP, XBNPT   Liver Enzymes Recent Labs     12/05/22  0110   TP 6.1*   ALB 3.3*         Thyroid Studies Lab Results   Component Value Date/Time    TSH 0.84 09/02/2022 11:50 AM          Signed By: Kd Pierre MD     December 6, 2022

## 2022-12-06 NOTE — PROGRESS NOTES
Granddaughter came to see patient wants to be listed for calling her for any updated. Patient agreed. Granddaughter is Dilshad [phone]

## 2022-12-06 NOTE — PROGRESS NOTES
[facility]    Patient: Denise Barrios Age: 80 y.o. : 1934 MR#: [id number] SSN: [id number]  Date/Time: 2022     C/C: Chest pain      Subjective:   HPI : Patient with history of hypertension admitted with chest pain elevation of troponin suspected NSTEMI patient is ongtt. Patient also complains of spasmodic pain lower pelvic area  For exam by my APC no obvious pathology urine negative          Review of Systems:  positive responses in bold type   Constitutional: Negative for fever, chills, diaphoresis and unexpected weight change. HENT: Negative for ear pain, congestion, sore throat, rhinorrhea, drooling, trouble swallowing, neck pain and tinnitus. Eyes: Negative for photophobia, pain, redness and visual disturbance. Respiratory: negative for shortness of breath, cough, choking, chest tightness, wheezing or stridor. Cardiovascular: Negative for chest pain, palpitations and leg swelling. Gastrointestinal: Negative for nausea, vomiting, abdominal pain, diarrhea, constipation, blood in stool, abdominal distention and anal bleeding. Genitourinary: Negative for dysuria, urgency, frequency, hematuria, flank pain and difficulty urinating. Musculoskeletal: Negative for back pain and arthralgias. Skin: Negative for color change, rash and wound. Neurological: Negative for dizziness, seizures, syncope, speech difficulty, light-headedness or headaches. Hematological: Does not bruise/bleed easily. Psychiatric/Behavioral: Negative for suicidal ideas, hallucinations, behavioral problems, self-injury or agitation     Assessment/Plan:     1. Chest pain  2 NSTEMI  3 hypertension  4 pelvic pain-off-and-on-yesterday during my exam in the evening patient had no pain no other symptoms? Reliability? Bladder pathology?   Uterine pathology-outpatient GYN and urology follow-up  5 essential tremors    Plan    -Continue heparin, statin, monitor blood pressure  -Echocardiogram unremarkable for any acute decompensation  -Follow cardiology recommendations  -Case discussed with APC      Objective:       General:  Alert, cooperative, no acute distress   HEENT: No facial asymmetry, JUANA Keagan, External ears - WNL    Cardiovascular: S1S2 - regular , No Murmur   Pulmonary: Equal expansion , No Use of accessory muscles , No Rales No Rhonchi    GI:  +BS in all four quadrants, soft, non-tender  Extremities:  No edema; 2+ dorsalis pedis pulses bilaterally  Neuro: Alert and oriented X 2.        DVT Prophylaxis:  []Lovenox  []Hep SQ  []SCDs  []Coumadin   []On Heparin gtt    [] Eliquis [] Xarelto     Vitals:         VS: Visit Vitals  /68   Pulse 60   Temp 98.1 °F (36.7 °C)   Resp 18   Ht 5' 1\" (1.549 m)   Wt 40.9 kg (90 lb 3.2 oz)   SpO2 97%   BMI 17.04 kg/m²      Tmax/24hrs: Temp (24hrs), Av.4 °F (36.9 °C), Min:98.1 °F (36.7 °C), Max:98.8 °F (37.1 °C)        Medications:   Current Facility-Administered Medications   Medication Dose Route Frequency    escitalopram oxalate (LEXAPRO) tablet 5 mg  5 mg Oral DAILY    ezetimibe (ZETIA) tablet 10 mg  10 mg Oral DAILY    lubiPROStone (AMITIZA) capsule 8 mcg  8 mcg Oral BID    pantoprazole (PROTONIX) tablet 20 mg  20 mg Oral ACB    polyethylene glycol (MIRALAX) packet 17 g  17 g Oral BID    sodium chloride (NS) flush 5-40 mL  5-40 mL IntraVENous Q8H    sodium chloride (NS) flush 5-40 mL  5-40 mL IntraVENous PRN    acetaminophen (TYLENOL) tablet 650 mg  650 mg Oral Q6H PRN    Or    acetaminophen (TYLENOL) suppository 650 mg  650 mg Rectal Q6H PRN    polyethylene glycol (MIRALAX) packet 17 g  17 g Oral DAILY PRN    promethazine (PHENERGAN) tablet 12.5 mg  12.5 mg Oral Q6H PRN    Or    ondansetron (ZOFRAN) injection 4 mg  4 mg IntraVENous Q6H PRN    clopidogreL (PLAVIX) tablet 75 mg  75 mg Oral DAILY    atorvastatin (LIPITOR) tablet 40 mg  40 mg Oral QHS    [START ON 2022] therapeutic multivitamin (THERAGRAN) tablet 1 Tablet  1 Tablet Oral DAILY    metroNIDAZOLE (FLAGYL) tablet 500 mg  500 mg Oral BID    oxybutynin (DITROPAN) tablet 5 mg  5 mg Oral TID    heparin (porcine) 25,000 units in 0.45% saline 250 ml infusion  12-25 Units/kg/hr IntraVENous TITRATE    propranolol LA (INDERAL LA) capsule 60 mg  60 mg Oral DAILY    amLODIPine (NORVASC) tablet 5 mg  5 mg Oral DAILY       Labs:    Recent Labs     12/06/22  0534 12/05/22  0934 12/05/22  0110   WBC 4.9 4.9 5.6   HGB 8.9* 11.1* 9.0*   HCT 28.3* 35.1 28.0*    224 184     Recent Labs     12/05/22  0110      K 3.9      CO2 28   *   BUN 23*   CREA 0.75   CA 8.8   ALB 3.3*   ALT 17         Time spent on direct patient care >30 mints     Complexity : High complex - due to multiple medical issues outlined above. CODE Status : Full code    Case discussed with:  [x]Patient  [] Family  []Nursing  []Case Management         Disclaimer: Sections of this note are dictated utilizing voice recognition software, which may have resulted in some phonetic based errors in grammar and contents. Even though attempts were made to correct all the mistakes, some may have been missed, and remained in the body of the document. If questions arise, please contact our department.     Signed By: Emily Bedolla MD     December 6, 2022

## 2022-12-06 NOTE — PROGRESS NOTES
completed the initial Spiritual Assessment of the patient, and offered Pastoral Care support. There is an advance directive present on file. Patient does not have any Jewish/cultural needs that will affect patients preferences in health care. Chaplains will continue to follow and will provide pastoral care on an as needed/requested basis.     Jodi Mt  Spiritual Care Department  [phone]

## 2022-12-06 NOTE — ED NOTES
Attempted to call report. Informed that the nurse is in a room right now and will call back when available.

## 2022-12-06 NOTE — ACP (ADVANCE CARE PLANNING)
Advance Care Planning   Advance Care Planning Inpatient Note  [address] [place] [address]   Spiritual Care Department    Today's Date: 12/6/2022  Unit: [facility] 2S TELEMETRY    Received request from . Upon review of chart and communication with care team, patient's decision making abilities are not in question. Patient was/were present in the room during visit. Goals of ACP Conversation:  Discuss Advance Care planning documents    Health Care Decision Makers:      Primary Decision Maker: Burgess Singh son - [phone]    Secondary Decision Maker: C[facility] - [phone]    Summary:  No Decision Maker named by patient at this time    Advance Care Planning Documents (Patient Wishes) on file:  None     Assessment:    Patient seen as a new admit to the hospital from bed 15 this morning. Patient is not responsive at present. There is an advance directive on file.     Interventions:      Care Preferences Communicated:  No    Outcomes/Plan:  Existing Advance Directive reviewed with patient; no changes to patient's previously recorded wishes    Harini Manzo [facility] on 12/6/2022 at 5:51 AM

## 2022-12-07 VITALS
DIASTOLIC BLOOD PRESSURE: 54 MMHG | RESPIRATION RATE: 18 BRPM | HEIGHT: 61 IN | WEIGHT: 90.2 LBS | TEMPERATURE: 97.6 F | OXYGEN SATURATION: 96 % | BODY MASS INDEX: 17.03 KG/M2 | SYSTOLIC BLOOD PRESSURE: 115 MMHG | HEART RATE: 52 BPM

## 2022-12-07 LAB
ANION GAP SERPL CALC-SCNC: 3 MMOL/L (ref 3–18)
APTT PPP: 59.5 SEC (ref 23–36.4)
ATRIAL RATE: 52 BPM
BASOPHILS # BLD: 0.1 K/UL (ref 0–0.1)
BASOPHILS NFR BLD: 1 % (ref 0–2)
BUN SERPL-MCNC: 25 MG/DL (ref 7–18)
BUN/CREAT SERPL: 34 (ref 12–20)
CALCIUM SERPL-MCNC: 8.7 MG/DL (ref 8.5–10.1)
CALCULATED P AXIS, ECG09: 74 DEGREES
CALCULATED R AXIS, ECG10: -7 DEGREES
CALCULATED T AXIS, ECG11: 56 DEGREES
CHLORIDE SERPL-SCNC: 107 MMOL/L (ref 100–111)
CO2 SERPL-SCNC: 29 MMOL/L (ref 21–32)
CREAT SERPL-MCNC: 0.74 MG/DL (ref 0.6–1.3)
DIAGNOSIS, 93000: NORMAL
DIFFERENTIAL METHOD BLD: ABNORMAL
EOSINOPHIL # BLD: 0.1 K/UL (ref 0–0.4)
EOSINOPHIL NFR BLD: 2 % (ref 0–5)
ERYTHROCYTE [DISTWIDTH] IN BLOOD BY AUTOMATED COUNT: 13.8 % (ref 11.6–14.5)
GLUCOSE SERPL-MCNC: 108 MG/DL (ref 74–99)
HCT VFR BLD AUTO: 26.7 % (ref 35–45)
HGB BLD-MCNC: 8.3 G/DL (ref 12–16)
IMM GRANULOCYTES # BLD AUTO: 0 K/UL (ref 0–0.04)
IMM GRANULOCYTES NFR BLD AUTO: 0 % (ref 0–0.5)
LYMPHOCYTES # BLD: 1.7 K/UL (ref 0.9–3.6)
LYMPHOCYTES NFR BLD: 35 % (ref 21–52)
MCH RBC QN AUTO: 28.7 PG (ref 24–34)
MCHC RBC AUTO-ENTMCNC: 31.1 G/DL (ref 31–37)
MCV RBC AUTO: 92.4 FL (ref 78–100)
MONOCYTES # BLD: 0.5 K/UL (ref 0.05–1.2)
MONOCYTES NFR BLD: 10 % (ref 3–10)
NEUTS SEG # BLD: 2.5 K/UL (ref 1.8–8)
NEUTS SEG NFR BLD: 53 % (ref 40–73)
NRBC # BLD: 0 K/UL (ref 0–0.01)
NRBC BLD-RTO: 0 PER 100 WBC
P-R INTERVAL, ECG05: 144 MS
PLATELET # BLD AUTO: 177 K/UL (ref 135–420)
PMV BLD AUTO: 10.1 FL (ref 9.2–11.8)
POTASSIUM SERPL-SCNC: 4.3 MMOL/L (ref 3.5–5.5)
Q-T INTERVAL, ECG07: 490 MS
QRS DURATION, ECG06: 76 MS
QTC CALCULATION (BEZET), ECG08: 455 MS
RBC # BLD AUTO: 2.89 M/UL (ref 4.2–5.3)
SODIUM SERPL-SCNC: 139 MMOL/L (ref 136–145)
VENTRICULAR RATE, ECG03: 52 BPM
WBC # BLD AUTO: 4.8 K/UL (ref 4.6–13.2)

## 2022-12-07 PROCEDURE — 74011250637 HC RX REV CODE- 250/637: Performed by: INTERNAL MEDICINE

## 2022-12-07 PROCEDURE — 99232 SBSQ HOSP IP/OBS MODERATE 35: CPT | Performed by: INTERNAL MEDICINE

## 2022-12-07 PROCEDURE — 77030038269 HC DRN EXT URIN PURWCK BARD -A

## 2022-12-07 PROCEDURE — 99239 HOSP IP/OBS DSCHRG MGMT >30: CPT | Performed by: INTERNAL MEDICINE

## 2022-12-07 PROCEDURE — 74011250636 HC RX REV CODE- 250/636: Performed by: INTERNAL MEDICINE

## 2022-12-07 PROCEDURE — 85730 THROMBOPLASTIN TIME PARTIAL: CPT

## 2022-12-07 PROCEDURE — 94762 N-INVAS EAR/PLS OXIMTRY CONT: CPT

## 2022-12-07 PROCEDURE — 97530 THERAPEUTIC ACTIVITIES: CPT

## 2022-12-07 PROCEDURE — 85025 COMPLETE CBC W/AUTO DIFF WBC: CPT

## 2022-12-07 PROCEDURE — 2709999900 HC NON-CHARGEABLE SUPPLY

## 2022-12-07 PROCEDURE — 80048 BASIC METABOLIC PNL TOTAL CA: CPT

## 2022-12-07 PROCEDURE — 74011000250 HC RX REV CODE- 250: Performed by: INTERNAL MEDICINE

## 2022-12-07 PROCEDURE — 93005 ELECTROCARDIOGRAM TRACING: CPT

## 2022-12-07 PROCEDURE — 97162 PT EVAL MOD COMPLEX 30 MIN: CPT

## 2022-12-07 PROCEDURE — 36415 COLL VENOUS BLD VENIPUNCTURE: CPT

## 2022-12-07 PROCEDURE — 74011250637 HC RX REV CODE- 250/637: Performed by: NURSE PRACTITIONER

## 2022-12-07 RX ORDER — HEPARIN SODIUM 1000 [USP'U]/ML
3000 INJECTION, SOLUTION INTRAVENOUS; SUBCUTANEOUS ONCE
Status: COMPLETED | OUTPATIENT
Start: 2022-12-07 | End: 2022-12-07

## 2022-12-07 RX ORDER — OXYCODONE AND ACETAMINOPHEN 5; 325 MG/1; MG/1
1 TABLET ORAL
Qty: 5 TABLET | Refills: 0 | Status: SHIPPED | OUTPATIENT
Start: 2022-12-07 | End: 2022-12-10

## 2022-12-07 RX ORDER — AMLODIPINE BESYLATE 5 MG/1
5 TABLET ORAL
Qty: 30 TABLET | Refills: 0 | Status: SHIPPED
Start: 2022-12-07

## 2022-12-07 RX ORDER — ATORVASTATIN CALCIUM 40 MG/1
40 TABLET, FILM COATED ORAL
Qty: 20 TABLET | Refills: 0 | Status: SHIPPED
Start: 2022-12-07

## 2022-12-07 RX ORDER — OXYCODONE AND ACETAMINOPHEN 5; 325 MG/1; MG/1
1 TABLET ORAL
Status: DISCONTINUED | OUTPATIENT
Start: 2022-12-07 | End: 2022-12-07 | Stop reason: HOSPADM

## 2022-12-07 RX ORDER — CLOPIDOGREL BISULFATE 75 MG/1
75 TABLET ORAL DAILY
Qty: 20 TABLET | Refills: 0 | Status: SHIPPED
Start: 2022-12-08

## 2022-12-07 RX ADMIN — OXYBUTYNIN CHLORIDE 5 MG: 5 TABLET ORAL at 09:33

## 2022-12-07 RX ADMIN — PANTOPRAZOLE SODIUM 20 MG: 20 TABLET, DELAYED RELEASE ORAL at 09:33

## 2022-12-07 RX ADMIN — AMLODIPINE BESYLATE 5 MG: 5 TABLET ORAL at 09:32

## 2022-12-07 RX ADMIN — METRONIDAZOLE 500 MG: 500 TABLET ORAL at 09:33

## 2022-12-07 RX ADMIN — ESCITALOPRAM OXALATE 5 MG: 5 TABLET, FILM COATED ORAL at 09:33

## 2022-12-07 RX ADMIN — THERA TABS 1 TABLET: TAB at 09:33

## 2022-12-07 RX ADMIN — HEPARIN SODIUM 3000 UNITS: 1000 INJECTION INTRAVENOUS; SUBCUTANEOUS at 07:11

## 2022-12-07 RX ADMIN — EZETIMIBE 10 MG: 10 TABLET ORAL at 09:33

## 2022-12-07 RX ADMIN — POLYETHYLENE GLYCOL 3350 17 G: 17 POWDER, FOR SOLUTION ORAL at 09:33

## 2022-12-07 RX ADMIN — OXYBUTYNIN CHLORIDE 5 MG: 5 TABLET ORAL at 16:30

## 2022-12-07 RX ADMIN — PROPRANOLOL HYDROCHLORIDE 60 MG: 60 CAPSULE, EXTENDED RELEASE ORAL at 09:32

## 2022-12-07 RX ADMIN — SODIUM CHLORIDE, PRESERVATIVE FREE 10 ML: 5 INJECTION INTRAVENOUS at 16:31

## 2022-12-07 RX ADMIN — OXYCODONE HYDROCHLORIDE AND ACETAMINOPHEN 1 TABLET: 5; 325 TABLET ORAL at 11:54

## 2022-12-07 RX ADMIN — CLOPIDOGREL BISULFATE 75 MG: 75 TABLET ORAL at 09:33

## 2022-12-07 RX ADMIN — HEPARIN SODIUM 16 UNITS/KG/HR: 10000 INJECTION, SOLUTION INTRAVENOUS at 07:17

## 2022-12-07 RX ADMIN — SODIUM CHLORIDE, PRESERVATIVE FREE 10 ML: 5 INJECTION INTRAVENOUS at 06:18

## 2022-12-07 RX ADMIN — LUBIPROSTONE 8 MCG: 8 CAPSULE, GELATIN COATED ORAL at 09:33

## 2022-12-07 NOTE — PROGRESS NOTES
Physician Progress Note      PATIENTLulu Reveal  CSN #:                  [id number]  :                       1934  ADMIT DATE:       2022 12:18 AM  DISCH DATE:  RESPONDING  PROVIDER #:        Liam MCDUFFIE MD          QUERY TEXT:    Patient admitted with chest pain. Noted documentation of NSTEMI per Nirav Cedeno NP in PN on 2022 and chest pain reproducible on exam.  Elevated troponin level. Suspect due to demand ischemia per Dr. Ольга Landrum, PN, 2022. If possible, please document in progress notes and discharge summary if you are evaluating and /or treating any of the following: The medical record reflects the following:  Risk Factors: female over age 76 years, HTN, paroxysmal atrial fibrillation    Clinical Indicators:  2022, PN, Dr. Ольга Landrum:  \"Chest pain, atypical and reproducible on exam. Elevated troponin level. Suspect due to demand ischemia. Echocardiogram this admission shows preserved LV function, EF 55 to 60% with no regional wall motion abnormalities. \"  2022, PN, Nirav Cedeno NP:  \"Cardiology recommendations continue heparin gtt, NSTEMI may be related to uncontrolled HTN on admission with underlying CAD, medical treatment for CAD, review echo, initiate Plavix and statin, and check lipid profile. \"    Treatment: IV heparin, clopidogrel    Thank you,  MERCEDEZ Mora RN, CDS  [email or web address]  Options provided:  -- NSTEMI confirmed and Atypical chest pain ruled out  -- Atypical chest pain confirmed and NSTEMI ruled out  -- Other - I will add my own diagnosis  -- Disagree - Not applicable / Not valid  -- Disagree - Clinically unable to determine / Unknown  -- Refer to Clinical Documentation Reviewer    PROVIDER RESPONSE TEXT:    After study, NSTEMI confirmed and Atypical chest pain ruled out. Query created by:  Gayatri Cuevas on 2022 2:48 PM      QUERY TEXT:    Patient admitted with chest pain, noted to have paroxysmal atrial fibrillation and is maintained on *** (anticoagulation medication specified). If possible, please document in progress notes and discharge summary if you are evaluating and/or treating any of the following: The medical record reflects the following:  Risk Factors: female over age76 years, HTN    Clinical Indicators:  12/7/2022, PN, Dr. Rupa Zavala:  \"-pAFib. Initially documented > 20 yrs ago. Maintaining NSR. No recurrence. No need for anticoagulation. Reasonable to use clopidogrel due to her aspirin allergy. \"    Treatment: clopidogrel    Thank you,  MERCEDEZ Mora RN, CDS  [email or web address]  Options provided:  -- Secondary hypercoagulable state in a patient with atrial fibrillation  -- Other - I will add my own diagnosis  -- Disagree - Not applicable / Not valid  -- Disagree - Clinically unable to determine / Unknown  -- Refer to Clinical Documentation Reviewer    PROVIDER RESPONSE TEXT:    This patient has secondary hypercoagulable state in a patient with atrial fibrillation. Query created by:  Caden Peck on 12/7/2022 2:53 PM      Electronically signed by:  Andrew Freedman MD 12/7/2022 3:28 PM

## 2022-12-07 NOTE — PROGRESS NOTES
Completed UAI screening Screening ID # : [id number]    Sent perfect serve requesting Dr Melinda Alvarado [facility]

## 2022-12-07 NOTE — PROGRESS NOTES
Transition of Care Plan to SNF/Rehab    SNF/Rehab Transition:  Patient has been accepted to [facility] and meets criteria for admission. Patient will transported by [facility]  and expected to leave at 1730. CALL REPORT [phone]  Communication to Patient/Family:  Met with patient ) and they are agreeable to the transition plan. Communication to SNF/Rehab:  Bedside RN, , has been notified to update the transition plan to the facility and call report   Discharge information has been updated on the AVS.           Nursing Please include all hard scripts for controlled substances, med rec and dc summary, and AVS in packet. Reviewed and confirmed with facility, [facility] , can manage the patient care needs for the following:     Brina March with (X) only those applicable:    Medication:  [x]  Medications will be available at the facility  []  IV Antibiotics   []  Controlled Substance - hard copy to be sent with patient   []  Weekly Labs   Documents:  [] Hard RX  [] MAR  [] Kardex  [] AVS  []Transfer Summary  []Discharge   Equipment:  []  CPAP/BiPAP  []  Wound Vacuum  []  Falk or Urinary Device  []  PICC/Central Line  []  Nebulizer  []  Ventilator   Treatment:  []Isolation (for MRSA, VRE, etc.)  []Surgical Drain Management  []Tracheostomy Care  []Dressing Changes  []Dialysis with transportation and chair time   []PEG Care  []Oxygen  []Daily Weights for Heart Failure   Dietary:  []Any diet limitations  []Tube Feedings   []Total Parenteral Management (TPN)   Eligible for Medicaid Long Term Services and Supports  Yes:  [] Eligible for medical assistance or will become eligible within 180 days and UAI completed. [] Provider/Patient and/or support system has requested screening. [x] UAI copy provided to patient or responsible party, [address]    [] UAI unavailable at discharge will send once processed to SNF provider.   [] UAI unavailable at discharged mailed to patient  No:   [] Private pay and is not financially eligible for Medicaid within the next 180 days. [] Reside out-of-state.   [] A residents of a state owned/operated facility that is licensed  by [address] [facility] or [facility]  [] Enrollment in [facility] hospice services  [] [address] [place] [address]  [] Patient /Family declines to have screening completed or provide financial information for screening     Financial Resources:  Medicaid    [] Initiated and application pending   [x] Full coverage     Advanced Care Plan:  []Surrogate Decision Maker of Care  []POA  [x]Communicated Code Status full (DDNR\", \"Full\")    Other

## 2022-12-07 NOTE — PROGRESS NOTES
[facility]

## 2022-12-07 NOTE — DISCHARGE SUMMARY
Discharge Summary     Patient ID:  Silvina Alcocer  [id number]  61 y.o.  1934  Body mass index is 17.04 kg/m². PCP on record: No primary care provider on file. Admit date: 2022  Discharge date and time: 2022      Reason for admission: Chest pain     Discharge Diagnoses:                                           1.  Chest pain  2 NSTEMI  3 hypertension  4 pelvic pain-off-and-on-yesterday during my exam in the evening patient had no pain no other symptoms? Reliability? Bladder pathology? Uterine pathology-outpatient GYN and urology follow-up  5 essential tremors        Consults: Cardiology          Hospital Course by problems:    Admitted with CP - NSTEMI - S/b Cardiology , Now On Plavix ( Allergy to ASA ) , Statin and BB , patient asymptomatic     Pelvic pain chronic - pain management   Patient needs Out patient GYN follow up from Nursing home for her GYN Check up - Pelvic pain     HTN - Continue current meds       Essential tremors - at times very discomforting - on propranolol     Chronic iron deficiency anemia - Further work up out patient     On Admission d/w patient's care taker and attempted to patient's son ,could not get him on first day but he called back at our office and suggested to talk to patient \" as she makes her own decisions \"         Patient seen and examined by me on discharge day.   Pertinent Findings:  Stable   Visit Vitals  BP (!) 134/52 (BP 1 Location: Left upper arm, BP Patient Position: At rest)   Pulse 60   Temp 97.9 °F (36.6 °C)   Resp 18   Ht 5' 1\" (1.549 m)   Wt 40.9 kg (90 lb 3.2 oz)   SpO2 94%   BMI 17.04 kg/m²        Significant Diagnostic Studies:  Silvina Balloon  ECHO ADULT COMPLETE  Order# [id number]  Reading physician: Tad Rouse MD Ordering physician: Lelo Finnegan PA-C Study date: 22     Patient Information    Patient Name   Silvina Alcocer MRN   [id number] Legal Sex   Female      1934 (80 y.o.)     Reason for Exam  Priority: STAT  CP     Vitals    Weight Height BSA (calculated - sq m) BP Pulse (Heart Rate)   39.9 kg (88 lb) 5' 1\" (1.549 m) 1.31 sq meters 184/118      Interpretation Summary    Result status: Final result       Left Ventricle: Normal left ventricular systolic function with a visually estimated EF of 55 - 60%. Left ventricle size is normal. Mildly increased wall thickness. Normal wall motion. Diastolic dysfunction present with normal LV EF. Aortic Valve: Tricuspid valve. Mild sclerosis of the aortic valve cusp. Mild regurgitation. Mitral Valve: Mild regurgitation. Comparison Study Information      Prior Study    There is a prior study available for comparison. Prior study date: 5/10/2018. As compared to the previous study, there are no significant changes. Echo Findings    Left Ventricle Normal left ventricular systolic function with a visually estimated EF of 55 - 60%. Left ventricle size is normal. Mildly increased wall thickness. Normal wall motion. Diastolic dysfunction present with normal LV EF. Left Atrium Left atrium size is normal. Left atrial volume index is normal (16-34 mL/m2). LA Vol Index A/L is 27 mL/m2. Interatrial Septum No interatrial shunt visualized with color Doppler. Right Ventricle Right ventricle size is normal. Normal wall thickness. Normal systolic function. Right Atrium Right atrium size is normal.   Aortic Valve Tricuspid valve. Mild sclerosis of the aortic valve cusp. Mild regurgitation. No stenosis. Mitral Valve Valve structure is normal. Mild regurgitation. No stenosis noted. Tricuspid Valve Valve structure is normal. Trace regurgitation. No stenosis noted. Pulmonic Valve Valve structure is normal. No regurgitation. No stenosis noted. Pulmonary Artery Main pulmonary artery is normal in size. Pulmonary hypertension not present. Aorta Normal sized aortic root, ascending aorta and aorta.    IVC/Hepatic Veins IVC diameter is less than or equal to 21 mm and decreases greater than 50% during inspiration; therefore the estimated right atrial pressure is normal (~3 mmHg). IVC size is normal.   Pericardium No pericardial effusion. Wall Scoring    Baseline Score Index: 1.00              The left ventricular wall motion is normal.                 Study Details    Image quality: adequate. The view(s) performed were parasternal, apical, subcostal and suprasternal. Color flow Doppler was performed and pulse wave and/or continuous wave Doppler was performed. No contrast was given. Procedure Staff    Technologist/Clinician: Baudilio Armstrong  Supporting Staff: None  Performing Physician/Midlevel: None     Exam Completion Date/Time: 12/5/22 12:05 PM      Volumes and Function (Range)     ESV ESV Index   LA Area-Length 36 mL        27 mL/m2  (16 - 34)         LA A4C 28 mL  (22 - 52)       21 mL/m2  (16 - 34)         LA A2C 39 mL  (22 - 52)       29 mL/m2  (16 - 34)           Left Ventricle Measurements    Dimensions   Fractional Shortening 2D 40 %  (Range: 28 - 44)         LVIDd 4 cm  (Range: 3.9 - 5.3)         LVIDd Index 3.01 cm/m2          LVIDs 2.4 cm          LVIDs Index 1.8 cm/m2          IVSd 1.1 cm  (Range: 0.6 - 0.9) Important          LVPWd 1.2 cm  (Range: 0.6 - 0.9) Important          LV RWT Ratio 0.6          LV Mass 2D 155.4 g  (Range: 67 - 162)         LV Mass 2D Index 116.8 g/m2  (Range: 43 - 95) Important                 Right Ventricle Measurements    Dimensions   RV Longitudinal Dimension 2.6 cm          TAPSE 1.7 cm  (Range: 1. 7)          Function   RV Free Wall Peak S' 10 cm/s               Tricuspid Valve Measurements    Regurgitation   TR Max Velocity 2.65 m/s          TR Peak Gradient 28 mmHg                 Aortic Valve Measurements    Stenosis   AV Mean Gradient 4 mmHg          AV Peak Gradient 8 mmHg          AV Peak Velocity 1.4 m/s          AV VTI 29.3 cm          AV Mean Velocity 1 m/s           LVOT   LVOT Diameter 1.7 cm          LVOT Area 2.3 cm2                 Pulmonary Measurements    Stenosis   PV Max Velocity 0.8 m/s          PV Peak Gradient 3 mmHg                   Aorta Measurements    Dimensions   Measurement Value (Range)   Aortic Root 2.6 cm          Ao Root Index 1.95 cm/m2          Ascending Aorta 2.6 cm          Ascending Aorta Index 1.95 cm/m2                Diastolic Filling/Shunts    Diastolic Filling   MV E Velocity 0.75 m/s         MV A Velocity 0.83 m/s         MV E/A 0.9          E/E' Ratio (Averaged) 13.75          LV E' Lateral Velocity 6 cm/s         E/E' Lateral 12.5          LV E' Septal Velocity 5 cm/s         E/E' Septal 15          MV E Wave Deceleration Time 202.9 ms               PACS Images     Show images for ECHO ADULT COMPLETE    Signed    Electronically signed by Hampton Brittle, MD on 12/5/22 at 1240 EST     Printable Results Report    Open Printable Results Report    Encounter    View Encounter                 Pertinent Lab Data:  Recent Labs     12/07/22  0156 12/06/22  1502 12/06/22  0534   WBC 4.8 4.7 4.9   HGB 8.3* 8.4* 8.9*   HCT 26.7* 26.8* 28.3*    196 191     Recent Labs     12/07/22  0156 12/06/22  1502 12/05/22  0110    136 140   K 4.3 3.6 3.9    104 108   CO2 29 28 28   * 151* 116*   BUN 25* 22* 23*   CREA 0.74 0.76 0.75   CA 8.7 8.3* 8.8   ALB  --   --  3.3*   ALT  --   --  17       DISCHARGE MEDICATIONS:   @  Current Discharge Medication List        START taking these medications    Details   amLODIPine (NORVASC) 5 mg tablet Take 1 Tablet by mouth nightly. Qty: 30 Tablet, Refills: 0  Start date: 12/7/2022      atorvastatin (LIPITOR) 40 mg tablet Take 1 Tablet by mouth nightly. Qty: 20 Tablet, Refills: 0  Start date: 12/7/2022      clopidogreL (PLAVIX) 75 mg tab Take 1 Tablet by mouth daily.   Qty: 20 Tablet, Refills: 0  Start date: 12/8/2022      oxyCODONE-acetaminophen (PERCOCET) 5-325 mg per tablet Take 1 Tablet by mouth every eight (8) hours as needed for Pain for up to 3 days. Max Daily Amount: 3 Tablets. Indications: pain  Qty: 5 Tablet, Refills: 0  Start date: 12/7/2022, End date: 12/10/2022    Associated Diagnoses: Pelvic pain           CONTINUE these medications which have NOT CHANGED    Details   dicyclomine (BENTYL) 10 mg capsule Take 1 Capsule by mouth two (2) times a day. Qty: 14 Capsule, Refills: 0      escitalopram oxalate (LEXAPRO) 5 mg tablet Take 5 mg by mouth daily. propranolol LA (INDERAL LA) 60 mg SR capsule Take 1 Capsule by mouth daily. Qty: 30 Capsule, Refills: 0      ondansetron (ZOFRAN ODT) 4 mg disintegrating tablet Take 1 Tablet by mouth every eight (8) hours as needed for Nausea or Vomiting. Qty: 30 Tablet, Refills: 0      sodium chloride 1 gram tablet Take 2 g by mouth daily. ezetimibe (ZETIA) 10 mg tablet Take 10 mg by mouth daily. lubiPROStone (AMITIZA) 8 mcg capsule Take 8 mcg by mouth two (2) times a day. omeprazole (PRILOSEC) 20 mg capsule Take 20 mg by mouth daily. polyethylene glycol (Miralax) 17 gram/dose powder Take 17 g by mouth two (2) times a day. 1 tablespoon with 8 oz of water daily  Qty: 507 g, Refills: 0      acetaminophen (TYLENOL EX STR ARTHRITIS PAIN) 500 mg tablet Take 1 Tab by mouth every six (6) hours as needed. Qty: 60 Tab, Refills: 1      diphenhydrAMINE (BENADRYL ALLERGY) 25 mg tablet Take 1 Tab by mouth every six (6) hours as needed.   Qty: 30 Tab, Refills: 2           STOP taking these medications       nitrofurantoin, macrocrystal-monohydrate, (MACROBID) 100 mg capsule Comments:   Reason for Stopping:         sodium chloride 1,000 mg soluble tablet Comments:   Reason for Stopping:         metoprolol tartrate (LOPRESSOR) 25 mg tablet Comments:   Reason for Stopping:         primidone (MYSOLINE) 50 mg tablet Comments:   Reason for Stopping:         senna (Senna) 8.6 mg tablet Comments:   Reason for Stopping:                 My Recommended Diet, Activity, Wound Care, and follow-up labs are listed in the patient's Discharge Insturctions which I have personally completed and reviewed. Disposition:     [x] Home with family     [] [facility] PT/RN   [] SNF/NH   [] Inpatient Rehab/DON  Condition at Discharge:  Stable    Follow up with:   PCP : No primary care provider on file. Please follow-up tests/labs that are still pendin. None  2.    >30 minutes spent coordinating this discharge (review instructions/follow-up, prescriptions, preparing report for sign off)    Disclaimer: Sections of this note are dictated utilizing voice recognition software, which may have resulted in some phonetic based errors in grammar and contents. Even though attempts were made to correct all the mistakes, some may have been missed, and remained in the body of the document. If questions arise, please contact our department.     Signed:  Juanita Marie MD

## 2022-12-07 NOTE — ROUTINE PROCESS
Bedside and verbal report received from [address] (offgoing nurse). Report included the following information; SBAR, MAR, LABS, Intake/output, Kardex, and summary of care. Patient resting in bed. Call bell in reach.

## 2022-12-07 NOTE — ROUTINE PROCESS
Bedside and Verbal shift change report given to [address] (oncoming nurse) by Deric Jewell RN (offgoing nurse). Report included the following information SBAR, Kardex, Intake/Output, MAR, Recent Results, and Med Rec Status. Patient sleeping at this time. Call bell in reach.

## 2022-12-07 NOTE — DISCHARGE INSTRUCTIONS
DISCHARGE SUMMARY from Nurse    PATIENT INSTRUCTIONS:    After general anesthesia or intravenous sedation, for 24 hours or while taking prescription Narcotics:  Limit your activities  Do not drive and operate hazardous machinery  Do not make important personal or business decisions  Do  not drink alcoholic beverages  If you have not urinated within 8 hours after discharge, please contact your surgeon on call. Report the following to your surgeon:  Excessive pain, swelling, redness or odor of or around the surgical area  Temperature over 100.5  Nausea and vomiting lasting longer than 4 hours or if unable to take medications  Any signs of decreased circulation or nerve impairment to extremity: change in color, persistent  numbness, tingling, coldness or increase pain  Any questions    What to do at Home:  Recommended activity: Activity as tolerated,     If you experience any of the following symptoms chest pain, shortness of breath, fever greater than 100.5, nausea ,vomiting, pain unrelieved by medication, please follow up with [facility]. *  Please give a list of your current medications to your Primary Care Provider. *  Please update this list whenever your medications are discontinued, doses are      changed, or new medications (including over-the-counter products) are added. *  Please carry medication information at all times in case of emergency situations. These are general instructions for a healthy lifestyle:    No smoking/ No tobacco products/ Avoid exposure to second hand smoke  Surgeon General's Warning:  Quitting smoking now greatly reduces serious risk to your health.     Obesity, smoking, and sedentary lifestyle greatly increases your risk for illness    A healthy diet, regular physical exercise & weight monitoring are important for maintaining a healthy lifestyle    You may be retaining fluid if you have a history of heart failure or if you experience any of the following symptoms: Weight gain of 3 pounds or more overnight or 5 pounds in a week, increased swelling in our hands or feet or shortness of breath while lying flat in bed. Please call your doctor as soon as you notice any of these symptoms; do not wait until your next office visit. Patient armband removed and shredded   MyChart Activation    Thank you for requesting access to [facility]. Please follow the instructions below to securely access and download your online medical record. [employer] allows you to send messages to your doctor, view your test results, renew your prescriptions, schedule appointments, and more. How Do I Sign Up? In your internet browser, go to [email or web address]  Click on the First Time User? Click Here link in the Sign In box. You will be redirect to the New Member Sign Up page. Enter your [facility] Access Code exactly as it appears below. You will not need to use this code after youve completed the sign-up process. If you do not sign up before the expiration date, you must request a new code. [employer]on Access Code: [email or web address]  Expires: 2023  4:56 PM (This is the date your P[employer]on access code will )    Enter the last four digits of your Social Security Number (xxxx) and Date of Birth (mm/dd/yyyy) as indicated and click Submit. You will be taken to the next sign-up page. Create a [facility]on ID. This will be your [facility]on login ID and cannot be changed, so think of one that is secure and easy to remember. Create a [facility] password. You can change your password at any time. Enter your Password Reset Question and Answer. This can be used at a later time if you forget your password. Enter your e-mail address. You will receive e-mail notification when new information is available in [address] Click Sign Up. You can now view and download portions of your medical record. Click the [place] link to download a portable copy of your medical information.     Additional Information    If you have questions, please visit the Frequently Asked Questions section of the getuppt website at [email or web address] "[employer] Virtual Computer/mychart/. Remember, [employer] is NOT to be used for urgent needs. For medical emergencies, dial 911. The discharge information has been reviewed with the {PATIENT PARENT GUARDIAN:96446}. The {PATIENT PARENT GUARDIAN:95169} verbalized understanding. Discharge medications reviewed with the {Dishcar meds reviewed FDDL:70812} and appropriate educational materials and side effects teaching were provided.   ___________________________________________________________________________________________________________________________________

## 2022-12-07 NOTE — PROGRESS NOTES
Problem: Mobility Impaired (Adult and Pediatric)  Goal: *Acute Goals and Plan of Care (Insert Text)  Description: Physical Therapy Goals  Initiated 12/7/2022 and to be accomplished within 7 day(s)  1. Patient will 7 in bed with modified independence. 2.  Patient will transfer from bed to chair and chair to bed with modified independence using the least restrictive device. 3.  Patient will perform sit to stand with modified independence. 4.  Patient will ambulate with minimal assistance/contact guard assist for 50 feet with the least restrictive device. PLOF: Patient is coming from [facility]. She was independently transferring to a  and working on ambulation with PT. Outcome: Progressing Towards Goal    PHYSICAL THERAPY EVALUATION    Patient: Elizabeth Patrick (72 y.o. female)  Date: 12/7/2022  Primary Diagnosis: Chest pain [R07.9]  NSTEMI (non-ST elevated myocardial infarction) [facility]) [I21.4]       Precautions:   Fall        ASSESSMENT :  Based on the objective data described below, the patient presents with generalized weakness, decreased balance reactions, and decreased independence with functional mobility. Patient is pleasantly confused. B UE tremors are baseline. CGA supine to sit transfer. She completes SPT to the [facility] with CG/min A for safety. Patient needs BUE support in standing with CGA for hygiene. She returns back to bed and able to scoot up in supine on her own. Call bell left in reach and bed alarm activated. Patient will benefit from skilled intervention to address the above impairments.   Patient's rehabilitation potential is considered to be Good  Factors which may influence rehabilitation potential include:   []         None noted  []         Mental ability/status  []         Medical condition  [x]         Home/family situation and support systems  [x]         Safety awareness  []         Pain tolerance/management  []         Other:      PLAN :  Recommendations and Planned Interventions:   [x]           Bed Mobility Training             [x]    Neuromuscular Re-Education  [x]           Transfer Training                   []    Orthotic/Prosthetic Training  [x]           Gait Training                          []    Modalities  [x]           Therapeutic Exercises           []    Edema Management/Control  [x]           Therapeutic Activities            [x]    Family Training/Education  [x]           Patient Education  []           Other (comment):    Frequency/Duration: Patient will be followed by physical therapy 1-2 times per day/4-7 days per week to address goals. Further Equipment Recommendations for Discharge: rolling walker    AMPAC: Current research shows that an AM-PAC score of 17 (13 without stairs) or less is not associated with a discharge to the patient's home setting. Based on an AM-PAC score of 17/24 (**/20 if omitting stairs) and their current functional mobility deficits, it is recommended that the patient have 3-5 sessions per week of Physical Therapy at d/c to increase the patient's independence. This AMPAC score should be considered in conjunction with interdisciplinary team recommendations to determine the most appropriate discharge setting. Patient's social support, diagnosis, medical stability, and prior level of function should also be taken into consideration. SUBJECTIVE:   Patient stated I would get to the wheel chair on my own .     OBJECTIVE DATA SUMMARY:     Past Medical History:   Diagnosis Date    Anemia NEC     Asthma     Coarse tremors     Colitis     DDD (degenerative disc disease), cervical     Fibrocystic breast     GERD (gastroesophageal reflux disease)     Headache     Hematuria     HTN (hypertension)     Hyperlipidemia     OA (osteoarthritis)     Osteoporosis     PAF (paroxysmal atrial fibrillation) ([facility] [address] 1995    Pneumonia     Renal cyst     Thyroid nodule     UTI (urinary tract infection)     Vitamin D deficiency 02/07/2011     Past Surgical History:   Procedure Laterality Date    HX GYN      hysterectomy    HX HEENT      cataract surgery bilaterally    HX TOTAL ABDOMINAL HYSTERECTOMY      UT BREAST SURGERY PROCEDURE UNLISTED      cysts removed    UT CARDIAC SURG PROCEDURE UNLIST      cardiac catherization     Barriers to Learning/Limitations: yes;  altered mental status (i.e.Sedation, Confusion)  Compensate with: Visual Cues, Verbal Cues, and Tactile Cues  Home Situation:  Home Situation  Home Environment: [address] [place] [address] Name: 0  # Steps to Enter: 0  One/Two Story Residence: One story  Living Alone: No  Support Systems: Other Family Member(s)  Patient Expects to be Discharged to[de-identified] Skilled nursing facility  Current DME Used/Available at Home: None  Critical Behavior:              Psychosocial  Patient Behaviors: Calm; Cooperative;Confused  Purposeful Interaction: (P) Yes  Pt Identified Daily Priority: (P) Clinical issues (comment); Social issues (comment)  Caritas Process: (P) Enable jayant/hope;Establish trust;Nurture loving kindness  Caring Interventions: (P) Reassure  Reassure: (P) Quiet presence                 Strength:    Strength: Generally decreased, functional                    Tone & Sensation:   Tone: Normal              Sensation: Intact               Range Of Motion:  AROM: Within functional limits                      Functional Mobility:  Bed Mobility:     Supine to Sit: Contact guard assistance  Sit to Supine: Contact guard assistance     Transfers:  Sit to Stand: Minimum assistance;Contact guard assistance  Stand to Sit: Contact guard assistance  Stand Pivot Transfers: Minimum assistance;Contact guard assistance                  Balance:   Sitting: Intact  Standing: Impaired; With support  Standing - Static: Fair  Standing - Dynamic : Fair      Pain:  Pain level pre-treatment: 8/10 vaginal pain   Pain level post-treatment: 8/10   Pain Intervention(s) : Medication (see MAR);  Rest, Ice, Repositioning  Response to intervention: Nurse notified, See doc flow    Activity Tolerance:   Fair  Please refer to the flowsheet for vital signs taken during this treatment. After treatment:   []         Patient left in no apparent distress sitting up in chair  [x]         Patient left in no apparent distress in bed  [x]         Call bell left within reach  [x]         Nursing notified  []         Caregiver present  [x]         Bed alarm activated  []         SCDs applied    COMMUNICATION/EDUCATION:   [x]         Role of Physical Therapy in the acute care setting. [x]         Fall prevention education was provided and the patient/caregiver indicated understanding. [x]         Patient/family have participated as able in goal setting and plan of care. [x]         Patient/family agree to work toward stated goals and plan of care. []         Patient understands intent and goals of therapy, but is neutral about his/her participation. []         Patient is unable to participate in goal setting/plan of care: ongoing with therapy staff.  []         Other:     Thank you for this referral.  Cristhian Avila, PT   Time Calculation: 30 mins      Eval Complexity: History: MEDIUM  Complexity : 1-2 comorbidities / personal factors will impact the outcome/ POC Exam:MEDIUM Complexity : 3 Standardized tests and measures addressing body structure, function, activity limitation and / or participation in recreation  Presentation: MEDIUM Complexity : Evolving with changing characteristics  Clinical Decision Making:Medium Complexity    Overall Complexity:MEDIUM    May Pouch AM-PAC® Basic Mobility Inpatient Short Form (6-Clicks) Version 2    How much HELP from another person does the patient currently need    (If the patient hasn't done an activity recently, how much help from another person do you think he/she would need if he/she tried?)   Total (Total A or Dep)   A Lot  (Mod to Max A)   A Little (Sup or Min A)   None (Mod I to I)   Turning from your back to your side while in a flat bed without using bedrails? [] 1 [] 2 [x] 3 [] 4   2. Moving from lying on your back to sitting on the side of a flat bed without using bedrails? [] 1 [] 2 [x] 3 [] 4   3. Moving to and from a bed to a chair (including a wheelchair)? [] 1 [] 2 [x] 3 [] 4   4. Standing up from a chair using your arms (e.g., wheelchair, or bedside chair)? [] 1 [] 2 [x] 3 [] 4   5. Walking in hospital room? [] 1 [] 2 [x] 3 [] 4   6. Climbing 3-5 steps with a railing?+   [] 1 [x] 2 [] 3 [] 4   +If stair climbing cannot be assessed, skip item #6. Sum responses from items 1-5.

## 2022-12-07 NOTE — PROGRESS NOTES
Call made to [facility] [phone], spoke with Kylie Martino, dispatch will call the nurses station with ALEX. Trip # [id number].

## 2022-12-07 NOTE — PROGRESS NOTES
Cardiology Progress Note    Admit Date: 12/5/2022      Assessment:     -Bacterial Vaginosis, presented with 10/10 vaginal pain with increased urinary frequency and dysuria.   -Chest pain, atypical and reproducible on exam.   -Elevated troponin level. Suspect due to demand ischemia. Echocardiogram this admission shows preserved LV function, EF 55 to 60% with no regional wall motion abnormalities. -pAFib. Initially documented > 20 yrs ago. Maintaining NSR. No recurrence. No need for anticoagulation. -HTN, initially uncontrolled. Max /166 mmHg. there is now appears to be well controlled on her current medical regimen. -COPD. -Resting tremor, parkinsonian-like features with memory loss. -Deconditioning          Primary cardiologist is Dr. Daniel Mitchell:     Reasonable to use clopidogrel due to her aspirin allergy. Would continue the remainder of her cardiac medical regimen. Stable for discharge today from a cardiac standpoint. Will arrange for outpatient follow-up in 4 weeks. Subjective:     No new complaints. Feeling back to baseline.     Objective:      Patient Vitals for the past 8 hrs:   Temp Pulse Resp BP SpO2   12/07/22 1226 98.9 °F (37.2 °C) (!) 55 18 133/69 100 %   12/07/22 0829 97.9 °F (36.6 °C) 60 18 (!) 134/52 94 %         Patient Vitals for the past 96 hrs:   Weight   12/06/22 0000 40.9 kg (90 lb 3.2 oz)   12/05/22 1102 39.9 kg (88 lb)   12/05/22 0621 40.4 kg (89 lb)       TELE: normal sinus rhythm               Current Facility-Administered Medications   Medication Dose Route Frequency Last Admin    oxyCODONE-acetaminophen (PERCOCET) 5-325 mg per tablet 1 Tablet  1 Tablet Oral Q8H PRN 1 Tablet at 12/07/22 1154    escitalopram oxalate (LEXAPRO) tablet 5 mg  5 mg Oral DAILY 5 mg at 12/07/22 0933    ezetimibe (ZETIA) tablet 10 mg  10 mg Oral DAILY 10 mg at 12/07/22 0933    lubiPROStone (AMITIZA) capsule 8 mcg  8 mcg Oral BID 8 mcg at 12/07/22 0933    pantoprazole (PROTONIX) tablet 20 mg  20 mg Oral ACB 20 mg at 12/07/22 0933    polyethylene glycol (MIRALAX) packet 17 g  17 g Oral BID 17 g at 12/07/22 0933    sodium chloride (NS) flush 5-40 mL  5-40 mL IntraVENous Q8H 10 mL at 12/07/22 0618    sodium chloride (NS) flush 5-40 mL  5-40 mL IntraVENous PRN      acetaminophen (TYLENOL) tablet 650 mg  650 mg Oral Q6H  mg at 12/06/22 1028    Or    acetaminophen (TYLENOL) suppository 650 mg  650 mg Rectal Q6H PRN      polyethylene glycol (MIRALAX) packet 17 g  17 g Oral DAILY PRN      promethazine (PHENERGAN) tablet 12.5 mg  12.5 mg Oral Q6H PRN      Or    ondansetron (ZOFRAN) injection 4 mg  4 mg IntraVENous Q6H PRN 4 mg at 12/06/22 2043    clopidogreL (PLAVIX) tablet 75 mg  75 mg Oral DAILY 75 mg at 12/07/22 0933    atorvastatin (LIPITOR) tablet 40 mg  40 mg Oral QHS 40 mg at 12/06/22 1145    therapeutic multivitamin [facility]) tablet 1 Tablet  1 Tablet Oral DAILY 1 Tablet at 12/07/22 0933    metroNIDAZOLE (FLAGYL) tablet 500 mg  500 mg Oral  mg at 12/07/22 0933    oxybutynin (DITROPAN) tablet 5 mg  5 mg Oral TID 5 mg at 12/07/22 0933    propranolol LA (INDERAL LA) capsule 60 mg  60 mg Oral DAILY 60 mg at 12/07/22 0932    amLODIPine (NORVASC) tablet 5 mg  5 mg Oral DAILY 5 mg at 12/07/22 0932         Intake/Output Summary (Last 24 hours) at 12/7/2022 1258  Last data filed at 12/7/2022 0453  Gross per 24 hour   Intake --   Output 700 ml   Net -700 ml       Physical Exam:  General:  alert, cooperative, no distress, appears stated age  Neck:  no JVD  Lungs:  clear to auscultation bilaterally  Heart:  regular rate and rhythm, no appreciable or gallop  Abdomen:  abdomen is soft without significant tenderness, masses, organomegaly or guarding  Extremities:  extremities normal, atraumatic, no cyanosis or edema    Visit Vitals  /69 (BP 1 Location: Left upper arm, BP Patient Position: At rest)   Pulse (!) 55   Temp 98.9 °F (37.2 °C)   Resp 18   Ht 5' 1\" (1.549 m)   Wt 40.9 kg (90 lb 3.2 oz)   SpO2 100%   BMI 17.04 kg/m²       Data Review:     Labs: Results:       Chemistry Recent Labs     12/07/22  0156 12/06/22  1502 12/05/22  0110   * 151* 116*    136 140   K 4.3 3.6 3.9    104 108   CO2 29 28 28   BUN 25* 22* 23*   CREA 0.74 0.76 0.75   CA 8.7 8.3* 8.8   AGAP 3 4 4   BUCR 34* 29* 31*   AP  --   --  107   TP  --   --  6.1*   ALB  --   --  3.3*   GLOB  --   --  2.8   AGRAT  --   --  1.2      CBC w/Diff Recent Labs     12/07/22  0156 12/06/22  1502 12/06/22  0534   WBC 4.8 4.7 4.9   RBC 2.89* 2.91* 3.02*   HGB 8.3* 8.4* 8.9*   HCT 26.7* 26.8* 28.3*    196 191   GRANS 53 60 51   LYMPH 35 30 36   EOS 2 1 1      Cardiac Enzymes No results found for: CPK, CK, CKMMB, CKMB, RCK3, CKMBT, CKNDX, CKND1, RAKESH, TROPT, TROIQ, GO, TROPT, TNIPOC, BNP, BNPP   Coagulation Recent Labs     12/07/22  0156 12/06/22  0332   APTT 59.5* 88.1*       Lipid Panel Lab Results   Component Value Date/Time    Cholesterol, total 190 12/06/2022 03:12 PM    HDL Cholesterol 59 12/06/2022 03:12 PM    LDL, calculated 100.4 (H) 12/06/2022 03:12 PM    VLDL, calculated 30.6 12/06/2022 03:12 PM    Triglyceride 153 (H) 12/06/2022 03:12 PM    CHOL/HDL Ratio 3.2 12/06/2022 03:12 PM      BNP No results found for: BNP, BNPP, XBNPT   Liver Enzymes Recent Labs     12/05/22  0110   TP 6.1*   ALB 3.3*         Thyroid Studies Lab Results   Component Value Date/Time    TSH 0.84 09/02/2022 11:50 AM          Signed By: Carla Beaulieu MD     December 7, 2022

## 2022-12-07 NOTE — PROGRESS NOTES
Requested Case Management specialist to assist with transportation to [facility]. Address is [address] [place], [address] and phone number is [phone]  Patient will require   Pt requires Stretcher If stretcher, reason: admitted for NSTEMI. Patient max assist to sit. Not able to ambulate       Patient is currently requiring oxygen No       Height:5 feet 1 in    Weight: 90lb      Pt is on isolation: No      Is the pt ready now? yes  Requested time: Next Available  PCS Faxed: no  Insurance verified on face sheet: yes  Auth needed for transport: yes  CM completed PCS/ Envelope and placed on chart.

## 2022-12-18 NOTE — PROGRESS NOTES
Physician Progress Note      PATIENT:               Rhoda Hernández  CSN #:                  [id number]  :                       1934  ADMIT DATE:       2022 12:18 AM  DISCH DATE:        2022 6:29 PM  RESPONDING  PROVIDER #:        Bill MCDUFFIE MD          QUERY TEXT:    Pt admitted with chest pain. Noted documentation of BMI of 17.04 kg/m2 in discharge summary on 2022 and Underweight documented by RD on  . If possible, please document in progress notes and discharge summary:    The medical record reflects the following:  Risk Factors: COPD, chronic pain, essential tremor    Clinical Indicators:  2022, PN, Melanie Donovan, RD:   \"Pt without significant wt loss over the past 1 year. No PO data for pt during current admission. Pt asleep upon visit. Likely to benefit from addition of supplement- will trial Ensure. Current BMI (kg/m2): 17.1. Underweight related to inadequate protein-energy intake (predicted) as evidenced by weight loss. \"    Treatment: Ensure Enlive (each provides 350 kcal, 20g protein) TILINDA RD consult    Thank you,  MERCEDEZ Mora RN, CDS  [email or web address]  Options provided:  -- Underweight with BMI of 17.04 kg/m2 is  clinical significant  -- Normal body habitus with BMI of 17.04 kg/m2 is not clinically significant  -- Other - I will add my own diagnosis  -- Disagree - Not applicable / Not valid  -- Disagree - Clinically unable to determine / Unknown  -- Refer to Clinical Documentation Reviewer    PROVIDER RESPONSE TEXT:    Underweight with BMI of 17.04 kg/m2 is clinically significant. Query created by: Citlalli Maldonado on 2022 1:31 PM      QUERY TEXT:    Patient admitted with chest pain. Noted documentation of NSTEMI in Discharge summary however final progress note by Cardiology states \"Elevated troponin level. Suspect due to demand ischemia\" Patient also had uncontrolled HTN on admission with Max /166.   If possible, please document in the progress notes and discharge summary if you are evaluating and/or treating any of the following: The medical record reflects the following:  Risk Factors: HTN, sedentary lifestyle  Clinical Indicators: Patient initially presented with vaginal pain, chest pain. BP up to 207/166  Trop 87, 399, 929  12/5 EKG: Abnormal ECG  When compared with ECG of 08-OCT-2022 21:00,   QRS axis shifted right  12/5 H&P: Chest pain - on Heparin gtt. Trend trop , if no elevation then dc . HTN- add Norvasc for BP control  12/5 Cardiology consult: Atypical CP which is reproducible on exam.. Bessy Gip Continue to trend cardiac enyzymes. Reasonable to continue heparin gtt while trending troponins and awaiting Echo results  12/6 Cardiology: Indeterminate troponin, do not suspect primary ACS. Positive enzymes consistent with acute NSTEMI which may be related to severe hypertension on admission but underlying CAD is definitely possible. Would recommend medical treatment for CAD as patient is bedridden  12/6 Hospitalist NP: Chest pain, indeterminate troponin. ACS ruled out . NSTEMI may be related to uncontrolled HTN on admission with underlying CAD, medical treatment for CAD, review echo, initiate Plavix and statin, and check lipid profile. DC summary: Admitted with CP - NSTEMI - S/b Cardiology , Now On Plavix ( Allergy to ASA ) , Statin and BB , patient asymptomatic  12/7 Cardiology note: Elevated troponin level. Suspect due to demand ischemia. Echocardiogram this admission shows preserved LV function, EF 55 to 60% with no regional wall motion abnormalities. ... HTN, initially uncontrolled.  Max /166 mmHg  Treatment: Heparin gtt, Cardiology consult, Clopidogrel, statin, BB    Elizabeth Lion RN, BSN, CCDS  [email or web address]  Options provided:  -- NSTEMI  -- Type 2 MI likely due to uncontrolled HTN  -- Demand Ischemia with MI  -- Demand Ischemia only, no MI  -- Unstable Angina  -- Other - I will add my own diagnosis  -- Disagree - Not applicable / Not valid  -- Disagree - Clinically unable to determine / Unknown  -- Refer to Clinical Documentation Reviewer    PROVIDER RESPONSE TEXT:    This patient has a Type 2 MI likely due to uncontrolled HTN.     Query created by: Remigio Ruiz on 12/14/2022 12:16 PM      Electronically signed by:  Kelly Louise MD 12/18/2022 1:33 PM

## 2023-02-27 NOTE — ED NOTES
The patient was signed out to me by Dr. Chikis Brown. Initial plan was to discharge her as she had been medically cleared for her original complaint of chest pain. However she does have some shaking which appeared volitional on Dr. Rosalie Martinez exam.  She was provided Valium. Attempted ambulating her but was unable to walk forward falling backwards into the bed. Given this I do not think that she is entirely safe to be discharged home. Case management has been involved. Physical therapy consult has been placed.  Admit for placement Show Applicator Variable?: Yes Detail Level: Detailed Post-Care Instructions: I reviewed with the patient in detail post-care instructions. Patient is to wear sunprotection, and avoid picking at any of the treated lesions. Pt may apply Vaseline to crusted or scabbing areas. Render Note In Bullet Format When Appropriate: No Consent: The patient's consent was obtained including but not limited to risks of crusting, scabbing, blistering, scarring, darker or lighter pigmentary change, recurrence, incomplete removal and infection. Duration Of Freeze Thaw-Cycle (Seconds): 30 Number Of Freeze-Thaw Cycles: 1 freeze-thaw cycle

## 2023-05-02 ENCOUNTER — HOSPITAL ENCOUNTER (EMERGENCY)
Facility: HOSPITAL | Age: 88
Discharge: HOME | End: 2023-05-03
Attending: EMERGENCY MEDICINE
Payer: MEDICARE

## 2023-05-02 ENCOUNTER — APPOINTMENT (OUTPATIENT)
Facility: HOSPITAL | Age: 88
End: 2023-05-02
Payer: MEDICARE

## 2023-05-02 DIAGNOSIS — R07.9 CHEST PAIN, UNSPECIFIED TYPE: Primary | ICD-10-CM

## 2023-05-02 DIAGNOSIS — N39.0 ACUTE UTI: ICD-10-CM

## 2023-05-02 LAB
ALBUMIN SERPL-MCNC: 3.6 G/DL (ref 3.4–5)
ALBUMIN/GLOB SERPL: 1.1 (ref 0.8–1.7)
ALP SERPL-CCNC: 149 U/L (ref 45–117)
ALT SERPL-CCNC: 17 U/L (ref 13–56)
ANION GAP SERPL CALC-SCNC: 6 MMOL/L (ref 3–18)
APPEARANCE UR: ABNORMAL
AST SERPL-CCNC: 16 U/L (ref 10–38)
BACTERIA URNS QL MICRO: ABNORMAL /HPF
BASOPHILS # BLD: 0.1 K/UL (ref 0–0.1)
BASOPHILS NFR BLD: 1 % (ref 0–2)
BILIRUB SERPL-MCNC: 0.5 MG/DL (ref 0.2–1)
BILIRUB UR QL: NEGATIVE
BUN SERPL-MCNC: 23 MG/DL (ref 7–18)
BUN/CREAT SERPL: 23 (ref 12–20)
CALCIUM SERPL-MCNC: 8.8 MG/DL (ref 8.5–10.1)
CHLORIDE SERPL-SCNC: 104 MMOL/L (ref 100–111)
CO2 SERPL-SCNC: 25 MMOL/L (ref 21–32)
COLOR UR: YELLOW
CREAT SERPL-MCNC: 1 MG/DL (ref 0.6–1.3)
DIFFERENTIAL METHOD BLD: ABNORMAL
ECHO BSA: 1.44 M2
EOSINOPHIL # BLD: 0 K/UL (ref 0–0.4)
EOSINOPHIL NFR BLD: 0 % (ref 0–5)
EPITH CASTS URNS QL MICRO: ABNORMAL /LPF (ref 0–5)
ERYTHROCYTE [DISTWIDTH] IN BLOOD BY AUTOMATED COUNT: 15.8 % (ref 11.6–14.5)
GLOBULIN SER CALC-MCNC: 3.3 G/DL (ref 2–4)
GLUCOSE SERPL-MCNC: 113 MG/DL (ref 74–99)
GLUCOSE UR STRIP.AUTO-MCNC: NEGATIVE MG/DL
HCT VFR BLD AUTO: 29.2 % (ref 35–45)
HGB BLD-MCNC: 9.2 G/DL (ref 12–16)
HGB UR QL STRIP: ABNORMAL
IMM GRANULOCYTES # BLD AUTO: 0 K/UL (ref 0–0.04)
IMM GRANULOCYTES NFR BLD AUTO: 0 % (ref 0–0.5)
KETONES UR QL STRIP.AUTO: NEGATIVE MG/DL
LEUKOCYTE ESTERASE UR QL STRIP.AUTO: ABNORMAL
LYMPHOCYTES # BLD: 1.1 K/UL (ref 0.9–3.6)
LYMPHOCYTES NFR BLD: 22 % (ref 21–52)
MCH RBC QN AUTO: 26.4 PG (ref 24–34)
MCHC RBC AUTO-ENTMCNC: 31.5 G/DL (ref 31–37)
MCV RBC AUTO: 83.9 FL (ref 78–100)
MONOCYTES # BLD: 0.5 K/UL (ref 0.05–1.2)
MONOCYTES NFR BLD: 10 % (ref 3–10)
MUCOUS THREADS URNS QL MICRO: ABNORMAL /LPF
NEUTS SEG # BLD: 3.5 K/UL (ref 1.8–8)
NEUTS SEG NFR BLD: 67 % (ref 40–73)
NITRITE UR QL STRIP.AUTO: NEGATIVE
NRBC # BLD: 0 K/UL (ref 0–0.01)
NRBC BLD-RTO: 0 PER 100 WBC
PH UR STRIP: 7 (ref 5–8)
PLATELET # BLD AUTO: 240 K/UL (ref 135–420)
PMV BLD AUTO: 9.4 FL (ref 9.2–11.8)
POTASSIUM SERPL-SCNC: 4.5 MMOL/L (ref 3.5–5.5)
PROT SERPL-MCNC: 6.9 G/DL (ref 6.4–8.2)
PROT UR STRIP-MCNC: ABNORMAL MG/DL
RBC # BLD AUTO: 3.48 M/UL (ref 4.2–5.3)
RBC #/AREA URNS HPF: ABNORMAL /HPF (ref 0–5)
SODIUM SERPL-SCNC: 135 MMOL/L (ref 136–145)
SP GR UR REFRACTOMETRY: 1.01 (ref 1–1.03)
TROPONIN I SERPL HS-MCNC: 5 NG/L (ref 0–54)
TROPONIN I SERPL HS-MCNC: 6 NG/L (ref 0–54)
UROBILINOGEN UR QL STRIP.AUTO: 0.2 EU/DL (ref 0.2–1)
WBC # BLD AUTO: 5.2 K/UL (ref 4.6–13.2)
WBC URNS QL MICRO: ABNORMAL /HPF (ref 0–4)

## 2023-05-02 PROCEDURE — 99285 EMERGENCY DEPT VISIT HI MDM: CPT

## 2023-05-02 PROCEDURE — 87186 SC STD MICRODIL/AGAR DIL: CPT

## 2023-05-02 PROCEDURE — 80053 COMPREHEN METABOLIC PANEL: CPT

## 2023-05-02 PROCEDURE — 71045 X-RAY EXAM CHEST 1 VIEW: CPT

## 2023-05-02 PROCEDURE — 93970 EXTREMITY STUDY: CPT

## 2023-05-02 PROCEDURE — 84484 ASSAY OF TROPONIN QUANT: CPT

## 2023-05-02 PROCEDURE — 81001 URINALYSIS AUTO W/SCOPE: CPT

## 2023-05-02 PROCEDURE — 87086 URINE CULTURE/COLONY COUNT: CPT

## 2023-05-02 PROCEDURE — 85025 COMPLETE CBC W/AUTO DIFF WBC: CPT

## 2023-05-02 PROCEDURE — 87077 CULTURE AEROBIC IDENTIFY: CPT

## 2023-05-02 PROCEDURE — 93005 ELECTROCARDIOGRAM TRACING: CPT | Performed by: EMERGENCY MEDICINE

## 2023-05-02 RX ORDER — NITROFURANTOIN 25; 75 MG/1; MG/1
100 CAPSULE ORAL 2 TIMES DAILY
Qty: 14 CAPSULE | Refills: 0 | Status: SHIPPED | OUTPATIENT
Start: 2023-05-02 | End: 2023-05-09

## 2023-05-02 ASSESSMENT — ENCOUNTER SYMPTOMS
GASTROINTESTINAL NEGATIVE: 1
COUGH: 0
EYES NEGATIVE: 1
ALLERGIC/IMMUNOLOGIC NEGATIVE: 1
SHORTNESS OF BREATH: 0

## 2023-05-02 ASSESSMENT — PAIN - FUNCTIONAL ASSESSMENT: PAIN_FUNCTIONAL_ASSESSMENT: 0-10

## 2023-05-02 ASSESSMENT — PAIN DESCRIPTION - LOCATION: LOCATION: CHEST

## 2023-05-02 NOTE — ED PROVIDER NOTES
[facility] EMERGENCY DEPT  EMERGENCY DEPARTMENT ENCOUNTER      Pt Name: Sandra Abarca  MRN: [id number]  [place] 11/22/1934  Date of evaluation: 5/2/2023  Provider: CONOR Virk    CHIEF COMPLAINT       Chief Complaint   Patient presents with    Chest Pain    Abdominal Pain         HISTORY OF PRESENT ILLNESS   (Location/Symptom, Timing/Onset, Context/Setting, Quality, Duration, Modifying Factors, Severity)  Note limiting factors. Sandra Abarca is a 80 y.o. female who presents to the emergency department complaint of intermittent left-sided chest pain radiating from left sternal border underneath her left breast.  Began again this morning after having a bowel movement decided to come on in. Has a cardiologist but cannot remember who that person is. Took 2 sublingual nitros without pain relief. Does not take daily aspirin. She is also been reporting that her legs feel more numb and painful particularly in the left leg above the knee. She says the pain in both of her legs below the knee is new. Denies any swelling or injury cough fever or shortness of breath. Patient is a resident at [facility]. HPI    Nursing Notes were reviewed. REVIEW OF SYSTEMS    (2-9 systems for level 4, 10 or more for level 5)     Review of Systems   Constitutional: Negative. HENT: Negative. Eyes: Negative. Respiratory:  Negative for cough and shortness of breath. Cardiovascular:  Positive for chest pain. Gastrointestinal: Negative. Endocrine: Negative. Genitourinary: Negative. Musculoskeletal:  Positive for myalgias. Skin: Negative. Allergic/Immunologic: Negative. Neurological: Negative. Hematological:  Does not bruise/bleed easily. Psychiatric/Behavioral: Negative. Except as noted above the remainder of the review of systems was reviewed and negative.        PAST MEDICAL HISTORY     Past Medical History:   Diagnosis Date    Asthma     Coarse tremors     Colitis     DDD

## 2023-05-02 NOTE — ED TRIAGE NOTES
Pt arrives to ED from [address] [place] [address] via EMS c/o chest pain that started last night, worse this AM. Was given one 0.4mg SL Nitro by facility staff, no relief of symptoms. Given an additional 0.4mg SL nitro by EMS with no relief of pain. On arrival pt rates pain 8/10. Hx of afib and MI in December 2022.

## 2023-05-02 NOTE — ED NOTES
Pt now endorsing suprapubic abd pain, nausea/vomiting, and dysuria x1 week. Pt placed on bedpan for urine sample. Pt unable to provide sample at this time, states it feels as if she needs to urinate but is unable to do so. Bladder scan done, 0mL urine in bladder at this time.       Maria D Booth RN  05/02/23 0143

## 2023-05-02 NOTE — ED NOTES
Verbal shift change report given to Tigre Menjivar (oncoming nurse) by Meghana Penaloza (offgoing nurse). Report included the following information ED SBAR and MAR.        Sandeep Dacosta, RN  05/02/23 4000 Jc Speasr, RN  05/02/23 1768

## 2023-05-03 VITALS
OXYGEN SATURATION: 100 % | SYSTOLIC BLOOD PRESSURE: 120 MMHG | HEIGHT: 62 IN | BODY MASS INDEX: 19.32 KG/M2 | HEART RATE: 58 BPM | DIASTOLIC BLOOD PRESSURE: 75 MMHG | RESPIRATION RATE: 16 BRPM | TEMPERATURE: 98.5 F | WEIGHT: 105 LBS

## 2023-05-03 LAB
EKG ATRIAL RATE: 68 BPM
EKG DIAGNOSIS: NORMAL
EKG P AXIS: 72 DEGREES
EKG P-R INTERVAL: 164 MS
EKG Q-T INTERVAL: 434 MS
EKG QRS DURATION: 70 MS
EKG QTC CALCULATION (BAZETT): 461 MS
EKG R AXIS: 3 DEGREES
EKG T AXIS: 60 DEGREES
EKG VENTRICULAR RATE: 68 BPM

## 2023-05-03 PROCEDURE — 93010 ELECTROCARDIOGRAM REPORT: CPT | Performed by: INTERNAL MEDICINE

## 2023-05-03 NOTE — ED NOTES
Pt discharged via stretcher to Nursing Home. Pt Verbalized understanding of discharge instructions. Vital signs stable.           Shasta Gomez RN  05/03/23 3169

## 2023-05-05 LAB
BACTERIA SPEC CULT: ABNORMAL
CC UR VC: ABNORMAL
SERVICE CMNT-IMP: ABNORMAL

## 2023-05-13 ENCOUNTER — APPOINTMENT (OUTPATIENT)
Facility: HOSPITAL | Age: 88
End: 2023-05-13
Payer: MEDICARE

## 2023-05-13 ENCOUNTER — HOSPITAL ENCOUNTER (EMERGENCY)
Facility: HOSPITAL | Age: 88
Discharge: NURSING, REHAB OR LONG-TERM CARE | End: 2023-05-13
Attending: EMERGENCY MEDICINE
Payer: MEDICARE

## 2023-05-13 VITALS
SYSTOLIC BLOOD PRESSURE: 132 MMHG | HEART RATE: 60 BPM | BODY MASS INDEX: 18.4 KG/M2 | HEIGHT: 62 IN | WEIGHT: 100 LBS | RESPIRATION RATE: 12 BRPM | DIASTOLIC BLOOD PRESSURE: 53 MMHG | OXYGEN SATURATION: 97 % | TEMPERATURE: 98.7 F

## 2023-05-13 DIAGNOSIS — R07.9 CHEST PAIN, UNSPECIFIED TYPE: Primary | ICD-10-CM

## 2023-05-13 LAB
ALBUMIN SERPL-MCNC: 2.7 G/DL (ref 3.4–5)
ALBUMIN/GLOB SERPL: 0.9 (ref 0.8–1.7)
ALP SERPL-CCNC: 103 U/L (ref 45–117)
ALT SERPL-CCNC: 17 U/L (ref 13–56)
ANION GAP SERPL CALC-SCNC: 7 MMOL/L (ref 3–18)
AST SERPL-CCNC: 16 U/L (ref 10–38)
BASOPHILS # BLD: 0 K/UL (ref 0–0.1)
BASOPHILS NFR BLD: 1 % (ref 0–2)
BILIRUB SERPL-MCNC: 0.4 MG/DL (ref 0.2–1)
BUN SERPL-MCNC: 15 MG/DL (ref 7–18)
BUN/CREAT SERPL: 20 (ref 12–20)
CALCIUM SERPL-MCNC: 8.4 MG/DL (ref 8.5–10.1)
CHLORIDE SERPL-SCNC: 106 MMOL/L (ref 100–111)
CO2 SERPL-SCNC: 27 MMOL/L (ref 21–32)
CREAT SERPL-MCNC: 0.74 MG/DL (ref 0.6–1.3)
DIFFERENTIAL METHOD BLD: ABNORMAL
EKG ATRIAL RATE: 58 BPM
EKG DIAGNOSIS: NORMAL
EKG P AXIS: 67 DEGREES
EKG P-R INTERVAL: 174 MS
EKG Q-T INTERVAL: 432 MS
EKG QRS DURATION: 88 MS
EKG QTC CALCULATION (BAZETT): 424 MS
EKG R AXIS: -4 DEGREES
EKG T AXIS: 49 DEGREES
EKG VENTRICULAR RATE: 58 BPM
EOSINOPHIL # BLD: 0.1 K/UL (ref 0–0.4)
EOSINOPHIL NFR BLD: 2 % (ref 0–5)
ERYTHROCYTE [DISTWIDTH] IN BLOOD BY AUTOMATED COUNT: 15.7 % (ref 11.6–14.5)
GLOBULIN SER CALC-MCNC: 3 G/DL (ref 2–4)
GLUCOSE SERPL-MCNC: 111 MG/DL (ref 74–99)
HCT VFR BLD AUTO: 28.9 % (ref 35–45)
HGB BLD-MCNC: 9.2 G/DL (ref 12–16)
IMM GRANULOCYTES # BLD AUTO: 0.1 K/UL (ref 0–0.04)
IMM GRANULOCYTES NFR BLD AUTO: 1 % (ref 0–0.5)
LYMPHOCYTES # BLD: 1.6 K/UL (ref 0.9–3.6)
LYMPHOCYTES NFR BLD: 27 % (ref 21–52)
MCH RBC QN AUTO: 26.9 PG (ref 24–34)
MCHC RBC AUTO-ENTMCNC: 31.8 G/DL (ref 31–37)
MCV RBC AUTO: 84.5 FL (ref 78–100)
MONOCYTES # BLD: 0.4 K/UL (ref 0.05–1.2)
MONOCYTES NFR BLD: 7 % (ref 3–10)
NEUTS SEG # BLD: 3.7 K/UL (ref 1.8–8)
NEUTS SEG NFR BLD: 63 % (ref 40–73)
NRBC # BLD: 0 K/UL (ref 0–0.01)
NRBC BLD-RTO: 0 PER 100 WBC
PLATELET # BLD AUTO: 295 K/UL (ref 135–420)
PMV BLD AUTO: 9.2 FL (ref 9.2–11.8)
POTASSIUM SERPL-SCNC: 3.5 MMOL/L (ref 3.5–5.5)
PROT SERPL-MCNC: 5.7 G/DL (ref 6.4–8.2)
RBC # BLD AUTO: 3.42 M/UL (ref 4.2–5.3)
SODIUM SERPL-SCNC: 140 MMOL/L (ref 136–145)
TROPONIN I SERPL HS-MCNC: 7 NG/L (ref 0–54)
TROPONIN I SERPL HS-MCNC: 7 NG/L (ref 0–54)
WBC # BLD AUTO: 5.9 K/UL (ref 4.6–13.2)

## 2023-05-13 PROCEDURE — 99285 EMERGENCY DEPT VISIT HI MDM: CPT

## 2023-05-13 PROCEDURE — 84484 ASSAY OF TROPONIN QUANT: CPT

## 2023-05-13 PROCEDURE — 93005 ELECTROCARDIOGRAM TRACING: CPT | Performed by: EMERGENCY MEDICINE

## 2023-05-13 PROCEDURE — 71045 X-RAY EXAM CHEST 1 VIEW: CPT

## 2023-05-13 PROCEDURE — 93010 ELECTROCARDIOGRAM REPORT: CPT | Performed by: INTERNAL MEDICINE

## 2023-05-13 PROCEDURE — 85025 COMPLETE CBC W/AUTO DIFF WBC: CPT

## 2023-05-13 PROCEDURE — 80053 COMPREHEN METABOLIC PANEL: CPT

## 2023-05-13 ASSESSMENT — PAIN - FUNCTIONAL ASSESSMENT: PAIN_FUNCTIONAL_ASSESSMENT: NONE - DENIES PAIN

## 2023-05-13 NOTE — ED TRIAGE NOTES
Per patient she woke up having chest pain, per the protocol at facility she is to take percocet due to ASA allergy. Patient states she is no longer having any chest pain.

## 2023-05-13 NOTE — ED NOTES
Pt placed on pure [facility]       [facility]  05/13/23 9459

## 2023-05-13 NOTE — ED PROVIDER NOTES
[facility] EMERGENCY DEPT  EMERGENCY DEPARTMENT ENCOUNTER    Patient Name: Salazar Muñiz  MRN: [id number]  YOB: 1934  Provider: Janice Lizarraga MD  PCP: Ryan Grossman MD   Time/Date of evaluation: 6:14 AM EDT on 5/13/23    History of Presenting Illness     History Provided by: Patient  History is limited by: Nothing and Age     HISTORY:   Salazar Muñiz is a 80 y.o. female presenting via EMS due to chest pain. She presents from [facility]. She is alert and oriented x2-3 on arrival.  She woke up this morning with \"pain\" all over at the front of her chest which was resolved after a few minutes after taking her Percocet. She had no associated shortness of breath or diaphoresis. She denies any fevers or chills. She denies any cough. She denies any nausea vomiting diarrhea or abdominal pain. Nursing Notes were all reviewed and agreed with or any disagreements were addressed in the HPI.     Past History     PAST MEDICAL HISTORY:  Past Medical History:   Diagnosis Date    Asthma     Coarse tremors     Colitis     DDD (degenerative disc disease), cervical     Fibrocystic breast     GERD (gastroesophageal reflux disease)     Headache     Hematuria     HTN (hypertension)     Hyperlipidemia     OA (osteoarthritis)     Osteoporosis     PAF (paroxysmal atrial fibrillation) ([facility] [address] 1995    Pneumonia     Renal cyst     Thyroid nodule     UTI (urinary tract infection)     Vitamin D deficiency 02/07/2011       PAST SURGICAL HISTORY:  Past Surgical History:   Procedure Laterality Date    BREAST SURGERY      cysts removed    GYN      hysterectomy    HEENT      cataract surgery bilaterally    HYSTERECTOMY, TOTAL ABDOMINAL (CERVIX REMOVED)      ID UNLISTED PROCEDURE CARDIAC SURGERY      cardiac catherization       FAMILY HISTORY:  Family History   Problem Relation Age of Onset    Diabetes Other     Hypertension Other     Heart Disease Other     Cancer Other         stomach & colon    Diabetes

## 2023-08-08 NOTE — ED PROVIDER NOTES
EMERGENCY DEPARTMENT HISTORY AND PHYSICAL EXAM  This was created with voice recognition software and transcription errors may be present. 9:40 AM  Date: (Not on file)  Patient Name: Holley Plascencia    History of Presenting Illness     Chief Complaint:    History Provided By:     HPI: Holley Plascencia is a 80 y.o. female medical history of anemia asthma colitis degenerative disc disease fibrocystic breast change reflux headache hypertension hyperlipidemia osteoarthritis PAF who presents with chest pain. Patient states it started last night has been going on since then. The pain lasts about a second or 2 and then resolved without intervention. This is associated with bilateral arm shaking which is controllable with the patient tries to control her arms. No nausea no vomiting no diaphoresis no fevers no chills no radiation. No cough.     PCP: Fidela Sandifer, NP      Past History     Past Medical History:  Past Medical History:   Diagnosis Date    Anemia NEC     Asthma     Colitis     DDD (degenerative disc disease), cervical     Fibrocystic breast     GERD (gastroesophageal reflux disease)     Headache     HTN (hypertension)     Hyperlipidemia     OA (osteoarthritis)     Osteoporosis     PAF (paroxysmal atrial fibrillation) ([facility] [address] 1995    Pneumonia     Thyroid nodule     Vitamin D deficiency 2/7/2011       Past Surgical History:  Past Surgical History:   Procedure Laterality Date    BREAST SURGERY PROCEDURE UNLISTED      cysts removed    CARDIAC SURG PROCEDURE UNLIST      cardiac catherization    HX GYN      hysterectomy    HX HEENT      cataract surgery bilaterally    HX TOTAL ABDOMINAL HYSTERECTOMY         Family History:  Family History   Problem Relation Age of Onset    Hypertension Other     Diabetes Other     Heart Disease Other     Cancer Other         stomach & colon    Diabetes Mother     Heart Attack Mother     Stroke Mother     Heart Attack Father     Heart Failure Father     Arthritis-rheumatoid Father     Other Brother         aneuysm-ruptured    Parkinson's Disease Brother        Social History:  Social History     Tobacco Use    Smoking status: Never Smoker    Smokeless tobacco: Never Used   Substance Use Topics    Alcohol use: No    Drug use: No       Allergies: Allergies   Allergen Reactions    Latex, Natural Rubber Unknown (comments)    Asa-Acetaminophen-Caff-Potass Shortness of Breath     Patient is only allergic to ASA    Aspirin Shortness of Breath and Other (comments)     Patient states this caused her stomach to bleed internal    Erythromycin Other (comments)     bleeding    Iodine And Iodide Containing Products Unknown (comments)    Lemon Unknown (comments)    Other Medication Unknown (comments)     Pt not sure of allergies states \"I'm allergic to more but not sure of name\"    Pcn [Penicillins] Swelling    Shellfish Containing Products Unknown (comments)    Sulfa (Sulfonamide Antibiotics) Unknown (comments)       Review of Systems     Review of Systems  10 point review of systems otherwise negative unless noted in HPI. Physical Exam       Physical Exam  Constitutional:       Appearance: She is well-developed. HENT:      Head: Normocephalic and atraumatic. Eyes:      Pupils: Pupils are equal, round, and reactive to light. Cardiovascular:      Rate and Rhythm: Normal rate and regular rhythm. Heart sounds: Normal heart sounds. No murmur heard. No friction rub. Pulmonary:      Effort: Pulmonary effort is normal. No respiratory distress. Breath sounds: Normal breath sounds. No wheezing. Abdominal:      General: There is no distension. Palpations: Abdomen is soft. Tenderness: There is no abdominal tenderness. There is no guarding or rebound. Musculoskeletal:         General: Normal range of motion. Cervical back: Normal range of motion and neck supple. Skin:     General: Skin is warm and dry.    Neurological: Mental Status: She is alert and oriented to person, place, and time. Comments: Bilateral arm tremulousness which is controllable the patient or by staff with full resolution of shaking   Psychiatric:         Behavior: Behavior normal.         Thought Content: Thought content normal.         Diagnostic Study Results     Vital Signs  EKG: EKG shows sinus at 77 with a normal axis normal intervals there is no ST elevation or depression no hypertrophy this is a 42-year-old female presents with seconds of chest pain in the time no suspicion of PE or dissection very low suspicion of ACS will check troponin x2 likely discharge for outpatient follow-up in addition she has some volitional shaking which I think can be referred to neurology for outpatient follow-up I do not think he needs admission  Labs: trop neg x.2  Imaging: IMPRESSION  No radiographic evidence of acute cardiopulmonary process. No interval change     Shamika Julien MD   R1 Radiology Resident     All findings discussed with attending radiologist. I have personally reviewed  all images and agree with the interpretation above.           Medical Decision Making     ED Course: Progress Notes, Reevaluation, and Consults:    I will be the provider of record for this patient. Provider Notes (Medical Decision Making):     Patient with troponin negative x2 requested something for her nerves are give her a dose of Versed discharge for outpatient follow-up       Diagnosis     Clinical Impression: No diagnosis found. Disposition:        Patient's Medications   Start Taking    No medications on file   Continue Taking    ACETAMINOPHEN (TYLENOL EX STR ARTHRITIS PAIN) 500 MG TABLET    Take 1 Tab by mouth every six (6) hours as needed. DIPHENHYDRAMINE (BENADRYL ALLERGY) 25 MG TABLET    Take 1 Tab by mouth every six (6) hours as needed. EZETIMIBE (ZETIA) 10 MG TABLET    Take 10 mg by mouth daily.     LUBIPROSTONE (AMITIZA) 8 MCG CAPSULE    Take 8 mcg by mouth two (2) times a day. OMEPRAZOLE (PRILOSEC) 20 MG CAPSULE    Take 20 mg by mouth daily. POLYETHYLENE GLYCOL (MIRALAX) 17 GRAM/DOSE POWDER    Take 17 g by mouth two (2) times a day. 1 tablespoon with 8 oz of water daily    RANITIDINE (ZANTAC) 150 MG TABLET    Take 1 Tab by mouth two (2) times a day. SENNA (SENNA) 8.6 MG TABLET    Take 1 Tab by mouth daily.    These Medications have changed    No medications on file   Stop Taking    No medications on file Thalidomide Counseling: I discussed with the patient the risks of thalidomide including but not limited to birth defects, anxiety, weakness, chest pain, dizziness, cough and severe allergy.

## 2023-08-31 ENCOUNTER — HOSPITAL ENCOUNTER (EMERGENCY)
Facility: HOSPITAL | Age: 88
Discharge: HOME | End: 2023-09-01
Attending: EMERGENCY MEDICINE
Payer: MEDICARE

## 2023-08-31 ENCOUNTER — APPOINTMENT (OUTPATIENT)
Facility: HOSPITAL | Age: 88
End: 2023-08-31
Payer: MEDICARE

## 2023-08-31 DIAGNOSIS — R07.9 CHEST PAIN, UNSPECIFIED TYPE: Primary | ICD-10-CM

## 2023-08-31 DIAGNOSIS — R10.13 ABDOMINAL PAIN, EPIGASTRIC: ICD-10-CM

## 2023-08-31 DIAGNOSIS — R11.2 NAUSEA AND VOMITING, UNSPECIFIED VOMITING TYPE: ICD-10-CM

## 2023-08-31 LAB
ALBUMIN SERPL-MCNC: 3.4 G/DL (ref 3.4–5)
ALBUMIN/GLOB SERPL: 1.2 (ref 0.8–1.7)
ALP SERPL-CCNC: 196 U/L (ref 45–117)
ALT SERPL-CCNC: 20 U/L (ref 13–56)
ANION GAP SERPL CALC-SCNC: 7 MMOL/L (ref 3–18)
AST SERPL-CCNC: 17 U/L (ref 10–38)
BASOPHILS # BLD: 0.1 K/UL (ref 0–0.1)
BASOPHILS NFR BLD: 1 % (ref 0–2)
BILIRUB SERPL-MCNC: 0.2 MG/DL (ref 0.2–1)
BUN SERPL-MCNC: 19 MG/DL (ref 7–18)
BUN/CREAT SERPL: 20 (ref 12–20)
CALCIUM SERPL-MCNC: 8.5 MG/DL (ref 8.5–10.1)
CHLORIDE SERPL-SCNC: 104 MMOL/L (ref 100–111)
CO2 SERPL-SCNC: 25 MMOL/L (ref 21–32)
CREAT SERPL-MCNC: 0.95 MG/DL (ref 0.6–1.3)
D DIMER PPP FEU-MCNC: 0.71 UG/ML(FEU)
DIFFERENTIAL METHOD BLD: ABNORMAL
EOSINOPHIL # BLD: 0.1 K/UL (ref 0–0.4)
EOSINOPHIL NFR BLD: 3 % (ref 0–5)
ERYTHROCYTE [DISTWIDTH] IN BLOOD BY AUTOMATED COUNT: 14.6 % (ref 11.6–14.5)
GLOBULIN SER CALC-MCNC: 2.9 G/DL (ref 2–4)
GLUCOSE SERPL-MCNC: 114 MG/DL (ref 74–99)
HCT VFR BLD AUTO: 27.6 % (ref 35–45)
HGB BLD-MCNC: 8.9 G/DL (ref 12–16)
IMM GRANULOCYTES # BLD AUTO: 0 K/UL (ref 0–0.04)
IMM GRANULOCYTES NFR BLD AUTO: 0 % (ref 0–0.5)
LYMPHOCYTES # BLD: 1.4 K/UL (ref 0.9–3.6)
LYMPHOCYTES NFR BLD: 28 % (ref 21–52)
MCH RBC QN AUTO: 28.2 PG (ref 24–34)
MCHC RBC AUTO-ENTMCNC: 32.2 G/DL (ref 31–37)
MCV RBC AUTO: 87.3 FL (ref 78–100)
MONOCYTES # BLD: 0.5 K/UL (ref 0.05–1.2)
MONOCYTES NFR BLD: 10 % (ref 3–10)
NEUTS SEG # BLD: 2.9 K/UL (ref 1.8–8)
NEUTS SEG NFR BLD: 59 % (ref 40–73)
NRBC # BLD: 0 K/UL (ref 0–0.01)
NRBC BLD-RTO: 0 PER 100 WBC
NT PRO BNP: 722 PG/ML (ref 0–1800)
PLATELET # BLD AUTO: 228 K/UL (ref 135–420)
PMV BLD AUTO: 9.3 FL (ref 9.2–11.8)
POTASSIUM SERPL-SCNC: 4.5 MMOL/L (ref 3.5–5.5)
PROT SERPL-MCNC: 6.3 G/DL (ref 6.4–8.2)
RBC # BLD AUTO: 3.16 M/UL (ref 4.2–5.3)
SODIUM SERPL-SCNC: 136 MMOL/L (ref 136–145)
TROPONIN I SERPL HS-MCNC: 5 NG/L (ref 0–54)
WBC # BLD AUTO: 4.9 K/UL (ref 4.6–13.2)

## 2023-08-31 PROCEDURE — 85379 FIBRIN DEGRADATION QUANT: CPT

## 2023-08-31 PROCEDURE — 83690 ASSAY OF LIPASE: CPT

## 2023-08-31 PROCEDURE — 71045 X-RAY EXAM CHEST 1 VIEW: CPT

## 2023-08-31 PROCEDURE — 96375 TX/PRO/DX INJ NEW DRUG ADDON: CPT

## 2023-08-31 PROCEDURE — 93005 ELECTROCARDIOGRAM TRACING: CPT | Performed by: EMERGENCY MEDICINE

## 2023-08-31 PROCEDURE — 83880 ASSAY OF NATRIURETIC PEPTIDE: CPT

## 2023-08-31 PROCEDURE — 84484 ASSAY OF TROPONIN QUANT: CPT

## 2023-08-31 PROCEDURE — 85025 COMPLETE CBC W/AUTO DIFF WBC: CPT

## 2023-08-31 PROCEDURE — 80053 COMPREHEN METABOLIC PANEL: CPT

## 2023-08-31 PROCEDURE — 96374 THER/PROPH/DIAG INJ IV PUSH: CPT

## 2023-08-31 PROCEDURE — 99285 EMERGENCY DEPT VISIT HI MDM: CPT

## 2023-09-01 ENCOUNTER — APPOINTMENT (OUTPATIENT)
Facility: HOSPITAL | Age: 88
End: 2023-09-01
Payer: MEDICARE

## 2023-09-01 VITALS
SYSTOLIC BLOOD PRESSURE: 153 MMHG | BODY MASS INDEX: 20.12 KG/M2 | OXYGEN SATURATION: 98 % | WEIGHT: 110 LBS | TEMPERATURE: 98.4 F | HEART RATE: 55 BPM | RESPIRATION RATE: 18 BRPM | DIASTOLIC BLOOD PRESSURE: 61 MMHG

## 2023-09-01 LAB
APPEARANCE UR: CLEAR
BACTERIA URNS QL MICRO: NEGATIVE /HPF
BILIRUB UR QL: NEGATIVE
COLOR UR: YELLOW
EKG ATRIAL RATE: 62 BPM
EKG DIAGNOSIS: NORMAL
EKG P AXIS: 75 DEGREES
EKG P-R INTERVAL: 166 MS
EKG Q-T INTERVAL: 426 MS
EKG QRS DURATION: 74 MS
EKG QTC CALCULATION (BAZETT): 432 MS
EKG R AXIS: 57 DEGREES
EKG T AXIS: 67 DEGREES
EKG VENTRICULAR RATE: 62 BPM
EPITH CASTS URNS QL MICRO: NORMAL /LPF (ref 0–5)
GLUCOSE UR STRIP.AUTO-MCNC: NEGATIVE MG/DL
HGB UR QL STRIP: ABNORMAL
KETONES UR QL STRIP.AUTO: NEGATIVE MG/DL
LEUKOCYTE ESTERASE UR QL STRIP.AUTO: ABNORMAL
LIPASE SERPL-CCNC: 94 U/L (ref 73–393)
MUCOUS THREADS URNS QL MICRO: NEGATIVE /LPF
NITRITE UR QL STRIP.AUTO: NEGATIVE
PH UR STRIP: 7 (ref 5–8)
PROT UR STRIP-MCNC: NEGATIVE MG/DL
RBC #/AREA URNS HPF: NORMAL /HPF (ref 0–5)
SP GR UR REFRACTOMETRY: <1.005 (ref 1–1.03)
TROPONIN I SERPL HS-MCNC: 6 NG/L (ref 0–54)
UROBILINOGEN UR QL STRIP.AUTO: 0.2 EU/DL (ref 0.2–1)
WBC URNS QL MICRO: NORMAL /HPF (ref 0–4)

## 2023-09-01 PROCEDURE — 81001 URINALYSIS AUTO W/SCOPE: CPT

## 2023-09-01 PROCEDURE — 93010 ELECTROCARDIOGRAM REPORT: CPT | Performed by: INTERNAL MEDICINE

## 2023-09-01 PROCEDURE — 71250 CT THORAX DX C-: CPT

## 2023-09-01 PROCEDURE — 6360000002 HC RX W HCPCS: Performed by: EMERGENCY MEDICINE

## 2023-09-01 PROCEDURE — 96375 TX/PRO/DX INJ NEW DRUG ADDON: CPT

## 2023-09-01 PROCEDURE — 96374 THER/PROPH/DIAG INJ IV PUSH: CPT

## 2023-09-01 PROCEDURE — 84484 ASSAY OF TROPONIN QUANT: CPT

## 2023-09-01 RX ORDER — MORPHINE SULFATE 4 MG/ML
4 INJECTION, SOLUTION INTRAMUSCULAR; INTRAVENOUS
Status: COMPLETED | OUTPATIENT
Start: 2023-09-01 | End: 2023-09-01

## 2023-09-01 RX ORDER — ONDANSETRON 4 MG/1
4 TABLET, ORALLY DISINTEGRATING ORAL 3 TIMES DAILY PRN
Qty: 21 TABLET | Refills: 0 | Status: SHIPPED | OUTPATIENT
Start: 2023-09-01

## 2023-09-01 RX ORDER — ONDANSETRON 2 MG/ML
4 INJECTION INTRAMUSCULAR; INTRAVENOUS
Status: COMPLETED | OUTPATIENT
Start: 2023-09-01 | End: 2023-09-01

## 2023-09-01 RX ORDER — SUCRALFATE ORAL 1 G/10ML
1 SUSPENSION ORAL 4 TIMES DAILY
Qty: 1200 ML | Refills: 3 | Status: SHIPPED | OUTPATIENT
Start: 2023-09-01

## 2023-09-01 RX ADMIN — MORPHINE SULFATE 4 MG: 4 INJECTION, SOLUTION INTRAMUSCULAR; INTRAVENOUS at 01:15

## 2023-09-01 RX ADMIN — ONDANSETRON 4 MG: 2 INJECTION INTRAMUSCULAR; INTRAVENOUS at 01:14

## 2023-09-01 NOTE — ED NOTES
Patient alert, discharged by provider awaiting transport to return to facility     [place] [facility], [address]  09/01/23 0492

## 2023-09-01 NOTE — ED TRIAGE NOTES
Patient comes from [facility] via EMS c/o chest pain 10/10 given 3 nitro by EMS with some relief of 6/10 at this time  Patient is alert x4 on room air

## 2023-09-01 NOTE — ED PROVIDER NOTES
Critical Care Time:     ***    Vikas Dacosta MD    Diagnosis and Disposition     I Shama Mccartney MD am the primary clinician of record. DIAGNOSIS: No diagnosis found. DISPOSITION        PATIENT REFERRED TO:  No follow-up provider specified. DISCHARGE MEDICATIONS:  New Prescriptions    No medications on file       DISCONTINUED MEDICATIONS:  Discontinued Medications    No medications on file              (Please note that portions of this note were completed with a voice recognition program.  Efforts were made to edit the dictations but occasionally words are mis-transcribed.)    Shama Mccartney MD (electronically signed)        Dragon Disclaimer     Please note that this dictation was completed with [employer], the computer voice recognition software. Quite often unanticipated grammatical, syntax, homophones, and other interpretive errors are inadvertently transcribed by the computer software. Please disregard these errors. Please excuse any errors that have escaped final proofreading.

## 2023-11-17 ENCOUNTER — TRANSCRIBE ORDERS (OUTPATIENT)
Facility: HOSPITAL | Age: 88
End: 2023-11-17

## 2023-11-17 DIAGNOSIS — R10.9 ABDOMINAL PAIN, UNSPECIFIED ABDOMINAL LOCATION: ICD-10-CM

## 2023-11-17 DIAGNOSIS — K21.9 GASTROESOPHAGEAL REFLUX DISEASE WITHOUT ESOPHAGITIS: Primary | ICD-10-CM

## 2023-11-27 ENCOUNTER — HOSPITAL ENCOUNTER (OUTPATIENT)
Facility: HOSPITAL | Age: 88
Discharge: HOME | End: 2023-11-30
Payer: MEDICARE

## 2023-11-27 DIAGNOSIS — K21.9 GASTROESOPHAGEAL REFLUX DISEASE WITHOUT ESOPHAGITIS: ICD-10-CM

## 2023-11-27 DIAGNOSIS — R10.9 ABDOMINAL PAIN, UNSPECIFIED ABDOMINAL LOCATION: ICD-10-CM

## 2023-11-27 PROCEDURE — 74240 X-RAY XM UPR GI TRC 1CNTRST: CPT

## 2023-11-27 PROCEDURE — 2500000003 HC RX 250 WO HCPCS: Performed by: NURSE PRACTITIONER

## 2023-11-27 RX ADMIN — BARIUM SULFATE 176 G: 960 POWDER, FOR SUSPENSION ORAL at 10:22

## 2023-12-23 ENCOUNTER — HOSPITAL ENCOUNTER (INPATIENT)
Facility: HOSPITAL | Age: 88
LOS: 2 days | DRG: 871 | End: 2023-12-25
Attending: EMERGENCY MEDICINE | Admitting: INTERNAL MEDICINE
Payer: MEDICARE

## 2023-12-23 ENCOUNTER — APPOINTMENT (OUTPATIENT)
Facility: HOSPITAL | Age: 88
DRG: 871 | End: 2023-12-23
Payer: MEDICARE

## 2023-12-23 DIAGNOSIS — R11.2 NAUSEA AND VOMITING, UNSPECIFIED VOMITING TYPE: Primary | ICD-10-CM

## 2023-12-23 DIAGNOSIS — K55.059 ACUTE INTESTINAL ISCHEMIA (HCC): ICD-10-CM

## 2023-12-23 DIAGNOSIS — E87.20 LACTIC ACIDOSIS: ICD-10-CM

## 2023-12-23 DIAGNOSIS — N17.9 AKI (ACUTE KIDNEY INJURY) (HCC): ICD-10-CM

## 2023-12-23 DIAGNOSIS — D72.825 BANDEMIA: ICD-10-CM

## 2023-12-23 PROBLEM — Z86.79 HISTORY OF ANGINA PECTORIS: Status: ACTIVE | Noted: 2023-12-23

## 2023-12-23 PROBLEM — A41.9 SEVERE SEPSIS (HCC): Status: ACTIVE | Noted: 2023-12-23

## 2023-12-23 PROBLEM — K59.31 TOXIC MEGACOLON (HCC): Status: ACTIVE | Noted: 2023-12-23

## 2023-12-23 PROBLEM — R65.20 SEVERE SEPSIS (HCC): Status: ACTIVE | Noted: 2023-12-23

## 2023-12-23 PROBLEM — K55.9 ISCHEMIA, BOWEL (HCC): Status: ACTIVE | Noted: 2023-12-23

## 2023-12-23 LAB
ALBUMIN SERPL-MCNC: 2.2 G/DL (ref 3.4–5)
ALBUMIN SERPL-MCNC: 3 G/DL (ref 3.4–5)
ALBUMIN/GLOB SERPL: 0.9 (ref 0.8–1.7)
ALBUMIN/GLOB SERPL: 1 (ref 0.8–1.7)
ALP SERPL-CCNC: 107 U/L (ref 45–117)
ALP SERPL-CCNC: 72 U/L (ref 45–117)
ALT SERPL-CCNC: 14 U/L (ref 13–56)
ALT SERPL-CCNC: 15 U/L (ref 13–56)
ANION GAP SERPL CALC-SCNC: 14 MMOL/L (ref 3–18)
ANION GAP SERPL CALC-SCNC: 9 MMOL/L (ref 3–18)
APPEARANCE UR: CLEAR
APTT PPP: 84.8 SEC (ref 23–36.4)
AST SERPL-CCNC: 24 U/L (ref 10–38)
AST SERPL-CCNC: 50 U/L (ref 10–38)
B PERT DNA SPEC QL NAA+PROBE: NOT DETECTED
BACTERIA URNS QL MICRO: ABNORMAL /HPF
BASOPHILS # BLD: 0 K/UL (ref 0–0.1)
BASOPHILS NFR BLD: 0 % (ref 0–2)
BILIRUB SERPL-MCNC: 0.3 MG/DL (ref 0.2–1)
BILIRUB SERPL-MCNC: 0.5 MG/DL (ref 0.2–1)
BILIRUB UR QL: ABNORMAL
BORDETELLA PARAPERTUSSIS BY PCR: NOT DETECTED
BUN SERPL-MCNC: 24 MG/DL (ref 7–18)
BUN SERPL-MCNC: 28 MG/DL (ref 7–18)
BUN/CREAT SERPL: 14 (ref 12–20)
BUN/CREAT SERPL: 17 (ref 12–20)
C PNEUM DNA SPEC QL NAA+PROBE: NOT DETECTED
CALCIUM SERPL-MCNC: 7.6 MG/DL (ref 8.5–10.1)
CALCIUM SERPL-MCNC: 9.4 MG/DL (ref 8.5–10.1)
CHLORIDE SERPL-SCNC: 112 MMOL/L (ref 100–111)
CHLORIDE SERPL-SCNC: 119 MMOL/L (ref 100–111)
CO2 SERPL-SCNC: 14 MMOL/L (ref 21–32)
CO2 SERPL-SCNC: 15 MMOL/L (ref 21–32)
COLOR UR: YELLOW
CREAT SERPL-MCNC: 1.68 MG/DL (ref 0.6–1.3)
CREAT SERPL-MCNC: 1.71 MG/DL (ref 0.6–1.3)
DIFFERENTIAL METHOD BLD: ABNORMAL
EKG ATRIAL RATE: 100 BPM
EKG DIAGNOSIS: NORMAL
EKG P AXIS: 85 DEGREES
EKG P-R INTERVAL: 148 MS
EKG Q-T INTERVAL: 350 MS
EKG QRS DURATION: 70 MS
EKG QTC CALCULATION (BAZETT): 451 MS
EKG R AXIS: -7 DEGREES
EKG T AXIS: 75 DEGREES
EKG VENTRICULAR RATE: 100 BPM
EOSINOPHIL # BLD: 0 K/UL (ref 0–0.4)
EOSINOPHIL NFR BLD: 0 % (ref 0–5)
EPITH CASTS URNS QL MICRO: ABNORMAL /LPF (ref 0–5)
ERYTHROCYTE [DISTWIDTH] IN BLOOD BY AUTOMATED COUNT: 15.2 % (ref 11.6–14.5)
FLUAV SUBTYP SPEC NAA+PROBE: NOT DETECTED
FLUBV RNA SPEC QL NAA+PROBE: NOT DETECTED
GLOBULIN SER CALC-MCNC: 2.5 G/DL (ref 2–4)
GLOBULIN SER CALC-MCNC: 3 G/DL (ref 2–4)
GLUCOSE BLD STRIP.AUTO-MCNC: 80 MG/DL (ref 70–110)
GLUCOSE SERPL-MCNC: 121 MG/DL (ref 74–99)
GLUCOSE SERPL-MCNC: 152 MG/DL (ref 74–99)
GLUCOSE UR STRIP.AUTO-MCNC: NEGATIVE MG/DL
HADV DNA SPEC QL NAA+PROBE: NOT DETECTED
HCOV 229E RNA SPEC QL NAA+PROBE: NOT DETECTED
HCOV HKU1 RNA SPEC QL NAA+PROBE: NOT DETECTED
HCOV NL63 RNA SPEC QL NAA+PROBE: NOT DETECTED
HCOV OC43 RNA SPEC QL NAA+PROBE: NOT DETECTED
HCT VFR BLD AUTO: 49.4 % (ref 35–45)
HGB BLD-MCNC: 15 G/DL (ref 12–16)
HGB UR QL STRIP: NEGATIVE
HMPV RNA SPEC QL NAA+PROBE: NOT DETECTED
HPIV1 RNA SPEC QL NAA+PROBE: NOT DETECTED
HPIV2 RNA SPEC QL NAA+PROBE: NOT DETECTED
HPIV3 RNA SPEC QL NAA+PROBE: NOT DETECTED
HPIV4 RNA SPEC QL NAA+PROBE: NOT DETECTED
IMM GRANULOCYTES # BLD AUTO: 0 K/UL
IMM GRANULOCYTES NFR BLD AUTO: 0 %
INR PPP: 1.7 (ref 0.9–1.1)
KETONES UR QL STRIP.AUTO: ABNORMAL MG/DL
LACTATE SERPL-SCNC: 3.9 MMOL/L (ref 0.4–2)
LACTATE SERPL-SCNC: 4.7 MMOL/L (ref 0.4–2)
LACTATE SERPL-SCNC: 6.3 MMOL/L (ref 0.4–2)
LEUKOCYTE ESTERASE UR QL STRIP.AUTO: ABNORMAL
LIPASE SERPL-CCNC: 19 U/L (ref 13–75)
LYMPHOCYTES # BLD: 1.4 K/UL (ref 0.9–3.6)
LYMPHOCYTES NFR BLD: 20 % (ref 21–52)
M PNEUMO DNA SPEC QL NAA+PROBE: NOT DETECTED
MAGNESIUM SERPL-MCNC: 2.3 MG/DL (ref 1.6–2.6)
MCH RBC QN AUTO: 27.5 PG (ref 24–34)
MCHC RBC AUTO-ENTMCNC: 30.4 G/DL (ref 31–37)
MCV RBC AUTO: 90.6 FL (ref 78–100)
MONOCYTES # BLD: 1 K/UL (ref 0.05–1.2)
MONOCYTES NFR BLD: 15 % (ref 3–10)
NEUTS BAND NFR BLD MANUAL: 12 % (ref 0–5)
NEUTS SEG # BLD: 4.4 K/UL (ref 1.8–8)
NEUTS SEG NFR BLD: 53 % (ref 40–73)
NITRITE UR QL STRIP.AUTO: NEGATIVE
NRBC # BLD: 0 K/UL (ref 0–0.01)
NRBC BLD-RTO: 0 PER 100 WBC
NT PRO BNP: 5800 PG/ML (ref 0–1800)
PH BLDV: 7.11 (ref 7.32–7.42)
PH UR STRIP: 6 (ref 5–8)
PHOSPHATE SERPL-MCNC: 3.6 MG/DL (ref 2.5–4.9)
PLATELET # BLD AUTO: 346 K/UL (ref 135–420)
PLATELET COMMENT: ABNORMAL
PMV BLD AUTO: 9.8 FL (ref 9.2–11.8)
POTASSIUM SERPL-SCNC: 3.8 MMOL/L (ref 3.5–5.5)
POTASSIUM SERPL-SCNC: 5.4 MMOL/L (ref 3.5–5.5)
PROCALCITONIN SERPL-MCNC: 61.92 NG/ML
PROT SERPL-MCNC: 4.7 G/DL (ref 6.4–8.2)
PROT SERPL-MCNC: 6 G/DL (ref 6.4–8.2)
PROT UR STRIP-MCNC: 100 MG/DL
PROTHROMBIN TIME: 19.7 SEC (ref 11.9–14.7)
RBC # BLD AUTO: 5.45 M/UL (ref 4.2–5.3)
RBC #/AREA URNS HPF: NEGATIVE /HPF (ref 0–5)
RBC MORPH BLD: ABNORMAL
RBC MORPH BLD: ABNORMAL
RSV RNA SPEC QL NAA+PROBE: NOT DETECTED
RV+EV RNA SPEC QL NAA+PROBE: NOT DETECTED
SARS-COV-2 RNA RESP QL NAA+PROBE: NOT DETECTED
SODIUM SERPL-SCNC: 141 MMOL/L (ref 136–145)
SODIUM SERPL-SCNC: 142 MMOL/L (ref 136–145)
SP GR UR REFRACTOMETRY: 1.02 (ref 1–1.03)
TROPONIN I SERPL HS-MCNC: 237 NG/L (ref 0–54)
TROPONIN I SERPL HS-MCNC: 259 NG/L (ref 0–54)
UROBILINOGEN UR QL STRIP.AUTO: 0.2 EU/DL (ref 0.2–1)
WBC # BLD AUTO: 6.8 K/UL (ref 4.6–13.2)
WBC URNS QL MICRO: ABNORMAL /HPF (ref 0–4)

## 2023-12-23 PROCEDURE — 83735 ASSAY OF MAGNESIUM: CPT

## 2023-12-23 PROCEDURE — 84145 PROCALCITONIN (PCT): CPT

## 2023-12-23 PROCEDURE — 82330 ASSAY OF CALCIUM: CPT

## 2023-12-23 PROCEDURE — 85730 THROMBOPLASTIN TIME PARTIAL: CPT

## 2023-12-23 PROCEDURE — 86850 RBC ANTIBODY SCREEN: CPT

## 2023-12-23 PROCEDURE — 6360000002 HC RX W HCPCS

## 2023-12-23 PROCEDURE — 2000000000 HC ICU R&B

## 2023-12-23 PROCEDURE — 93010 ELECTROCARDIOGRAM REPORT: CPT | Performed by: INTERNAL MEDICINE

## 2023-12-23 PROCEDURE — 99285 EMERGENCY DEPT VISIT HI MDM: CPT

## 2023-12-23 PROCEDURE — 99291 CRITICAL CARE FIRST HOUR: CPT | Performed by: REGISTERED NURSE

## 2023-12-23 PROCEDURE — 84100 ASSAY OF PHOSPHORUS: CPT

## 2023-12-23 PROCEDURE — 2580000003 HC RX 258

## 2023-12-23 PROCEDURE — C9113 INJ PANTOPRAZOLE SODIUM, VIA: HCPCS

## 2023-12-23 PROCEDURE — 85014 HEMATOCRIT: CPT

## 2023-12-23 PROCEDURE — 84484 ASSAY OF TROPONIN QUANT: CPT

## 2023-12-23 PROCEDURE — 83690 ASSAY OF LIPASE: CPT

## 2023-12-23 PROCEDURE — 74176 CT ABD & PELVIS W/O CONTRAST: CPT

## 2023-12-23 PROCEDURE — 85025 COMPLETE CBC W/AUTO DIFF WBC: CPT

## 2023-12-23 PROCEDURE — 2580000003 HC RX 258: Performed by: EMERGENCY MEDICINE

## 2023-12-23 PROCEDURE — 87449 NOS EACH ORGANISM AG IA: CPT

## 2023-12-23 PROCEDURE — 96360 HYDRATION IV INFUSION INIT: CPT

## 2023-12-23 PROCEDURE — 83880 ASSAY OF NATRIURETIC PEPTIDE: CPT

## 2023-12-23 PROCEDURE — 93005 ELECTROCARDIOGRAM TRACING: CPT | Performed by: INTERNAL MEDICINE

## 2023-12-23 PROCEDURE — A4216 STERILE WATER/SALINE, 10 ML: HCPCS

## 2023-12-23 PROCEDURE — 2500000003 HC RX 250 WO HCPCS: Performed by: REGISTERED NURSE

## 2023-12-23 PROCEDURE — 2580000003 HC RX 258: Performed by: INTERNAL MEDICINE

## 2023-12-23 PROCEDURE — 6360000002 HC RX W HCPCS: Performed by: PHYSICIAN ASSISTANT

## 2023-12-23 PROCEDURE — 86901 BLOOD TYPING SEROLOGIC RH(D): CPT

## 2023-12-23 PROCEDURE — 81001 URINALYSIS AUTO W/SCOPE: CPT

## 2023-12-23 PROCEDURE — 84295 ASSAY OF SERUM SODIUM: CPT

## 2023-12-23 PROCEDURE — 86900 BLOOD TYPING SEROLOGIC ABO: CPT

## 2023-12-23 PROCEDURE — 93005 ELECTROCARDIOGRAM TRACING: CPT | Performed by: EMERGENCY MEDICINE

## 2023-12-23 PROCEDURE — 87040 BLOOD CULTURE FOR BACTERIA: CPT

## 2023-12-23 PROCEDURE — 99223 1ST HOSP IP/OBS HIGH 75: CPT

## 2023-12-23 PROCEDURE — 6360000002 HC RX W HCPCS: Performed by: EMERGENCY MEDICINE

## 2023-12-23 PROCEDURE — 83605 ASSAY OF LACTIC ACID: CPT

## 2023-12-23 PROCEDURE — 0202U NFCT DS 22 TRGT SARS-COV-2: CPT

## 2023-12-23 PROCEDURE — 87324 CLOSTRIDIUM AG IA: CPT

## 2023-12-23 PROCEDURE — 82800 BLOOD PH: CPT

## 2023-12-23 PROCEDURE — 2500000003 HC RX 250 WO HCPCS: Performed by: INTERNAL MEDICINE

## 2023-12-23 PROCEDURE — 6360000002 HC RX W HCPCS: Performed by: REGISTERED NURSE

## 2023-12-23 PROCEDURE — 99223 1ST HOSP IP/OBS HIGH 75: CPT | Performed by: COLON & RECTAL SURGERY

## 2023-12-23 PROCEDURE — 82962 GLUCOSE BLOOD TEST: CPT

## 2023-12-23 PROCEDURE — 71045 X-RAY EXAM CHEST 1 VIEW: CPT

## 2023-12-23 PROCEDURE — 80053 COMPREHEN METABOLIC PANEL: CPT

## 2023-12-23 PROCEDURE — 85610 PROTHROMBIN TIME: CPT

## 2023-12-23 PROCEDURE — 2580000003 HC RX 258: Performed by: REGISTERED NURSE

## 2023-12-23 PROCEDURE — 96361 HYDRATE IV INFUSION ADD-ON: CPT

## 2023-12-23 PROCEDURE — 96374 THER/PROPH/DIAG INJ IV PUSH: CPT

## 2023-12-23 PROCEDURE — 82803 BLOOD GASES ANY COMBINATION: CPT

## 2023-12-23 RX ORDER — ONDANSETRON 2 MG/ML
4 INJECTION INTRAMUSCULAR; INTRAVENOUS EVERY 6 HOURS PRN
Status: DISCONTINUED | OUTPATIENT
Start: 2023-12-23 | End: 2023-12-25 | Stop reason: HOSPADM

## 2023-12-23 RX ORDER — 0.9 % SODIUM CHLORIDE 0.9 %
1000 INTRAVENOUS SOLUTION INTRAVENOUS ONCE
Status: COMPLETED | OUTPATIENT
Start: 2023-12-23 | End: 2023-12-23

## 2023-12-23 RX ORDER — HYDROMORPHONE HYDROCHLORIDE 1 MG/ML
1 INJECTION, SOLUTION INTRAMUSCULAR; INTRAVENOUS; SUBCUTANEOUS EVERY 4 HOURS PRN
Status: DISCONTINUED | OUTPATIENT
Start: 2023-12-23 | End: 2023-12-24

## 2023-12-23 RX ORDER — POLYETHYLENE GLYCOL 3350 17 G/17G
17 POWDER, FOR SOLUTION ORAL DAILY PRN
Status: DISCONTINUED | OUTPATIENT
Start: 2023-12-23 | End: 2023-12-25 | Stop reason: HOSPADM

## 2023-12-23 RX ORDER — SODIUM CHLORIDE 0.9 % (FLUSH) 0.9 %
5-40 SYRINGE (ML) INJECTION EVERY 12 HOURS SCHEDULED
Status: DISCONTINUED | OUTPATIENT
Start: 2023-12-23 | End: 2023-12-25 | Stop reason: HOSPADM

## 2023-12-23 RX ORDER — NOREPINEPHRINE BITARTRATE 0.06 MG/ML
1-100 INJECTION, SOLUTION INTRAVENOUS CONTINUOUS
Status: DISCONTINUED | OUTPATIENT
Start: 2023-12-23 | End: 2023-12-25 | Stop reason: HOSPADM

## 2023-12-23 RX ORDER — METRONIDAZOLE 500 MG/100ML
500 INJECTION, SOLUTION INTRAVENOUS EVERY 8 HOURS
Status: DISCONTINUED | OUTPATIENT
Start: 2023-12-24 | End: 2023-12-25 | Stop reason: HOSPADM

## 2023-12-23 RX ORDER — SODIUM CHLORIDE 9 MG/ML
INJECTION, SOLUTION INTRAVENOUS PRN
Status: DISCONTINUED | OUTPATIENT
Start: 2023-12-23 | End: 2023-12-25 | Stop reason: HOSPADM

## 2023-12-23 RX ORDER — SODIUM CHLORIDE, SODIUM LACTATE, POTASSIUM CHLORIDE, AND CALCIUM CHLORIDE .6; .31; .03; .02 G/100ML; G/100ML; G/100ML; G/100ML
1000 INJECTION, SOLUTION INTRAVENOUS ONCE
Status: COMPLETED | OUTPATIENT
Start: 2023-12-23 | End: 2023-12-23

## 2023-12-23 RX ORDER — ONDANSETRON 4 MG/1
4 TABLET, ORALLY DISINTEGRATING ORAL EVERY 8 HOURS PRN
Status: DISCONTINUED | OUTPATIENT
Start: 2023-12-23 | End: 2023-12-25 | Stop reason: HOSPADM

## 2023-12-23 RX ORDER — SODIUM CHLORIDE 9 MG/ML
INJECTION, SOLUTION INTRAVENOUS CONTINUOUS
Status: DISCONTINUED | OUTPATIENT
Start: 2023-12-23 | End: 2023-12-23

## 2023-12-23 RX ORDER — METRONIDAZOLE 500 MG/100ML
500 INJECTION, SOLUTION INTRAVENOUS EVERY 8 HOURS
Status: DISCONTINUED | OUTPATIENT
Start: 2023-12-23 | End: 2023-12-23

## 2023-12-23 RX ORDER — SODIUM CHLORIDE 0.9 % (FLUSH) 0.9 %
5-40 SYRINGE (ML) INJECTION PRN
Status: DISCONTINUED | OUTPATIENT
Start: 2023-12-23 | End: 2023-12-25 | Stop reason: HOSPADM

## 2023-12-23 RX ORDER — 0.9 % SODIUM CHLORIDE 0.9 %
30 INTRAVENOUS SOLUTION INTRAVENOUS ONCE
Status: COMPLETED | OUTPATIENT
Start: 2023-12-23 | End: 2023-12-23

## 2023-12-23 RX ORDER — ONDANSETRON 2 MG/ML
4 INJECTION INTRAMUSCULAR; INTRAVENOUS
Status: COMPLETED | OUTPATIENT
Start: 2023-12-23 | End: 2023-12-23

## 2023-12-23 RX ORDER — ACETAMINOPHEN 325 MG/1
650 TABLET ORAL EVERY 6 HOURS PRN
Status: DISCONTINUED | OUTPATIENT
Start: 2023-12-23 | End: 2023-12-25 | Stop reason: HOSPADM

## 2023-12-23 RX ORDER — ACETAMINOPHEN 650 MG/1
650 SUPPOSITORY RECTAL EVERY 6 HOURS PRN
Status: DISCONTINUED | OUTPATIENT
Start: 2023-12-23 | End: 2023-12-25 | Stop reason: HOSPADM

## 2023-12-23 RX ADMIN — WATER 2000 MG: 1 INJECTION INTRAMUSCULAR; INTRAVENOUS; SUBCUTANEOUS at 13:45

## 2023-12-23 RX ADMIN — SODIUM CHLORIDE 40 MG: 9 INJECTION INTRAMUSCULAR; INTRAVENOUS; SUBCUTANEOUS at 18:54

## 2023-12-23 RX ADMIN — SODIUM CHLORIDE, PRESERVATIVE FREE 10 ML: 5 INJECTION INTRAVENOUS at 21:17

## 2023-12-23 RX ADMIN — HYDROMORPHONE HYDROCHLORIDE 0.5 MG: 1 INJECTION, SOLUTION INTRAMUSCULAR; INTRAVENOUS; SUBCUTANEOUS at 20:17

## 2023-12-23 RX ADMIN — ONDANSETRON 4 MG: 2 INJECTION INTRAMUSCULAR; INTRAVENOUS at 16:55

## 2023-12-23 RX ADMIN — VANCOMYCIN HYDROCHLORIDE 500 MG: 5 INJECTION, POWDER, LYOPHILIZED, FOR SOLUTION INTRAVENOUS at 21:42

## 2023-12-23 RX ADMIN — SODIUM BICARBONATE: 84 INJECTION, SOLUTION INTRAVENOUS at 21:17

## 2023-12-23 RX ADMIN — SODIUM CHLORIDE 1500 ML: 9 INJECTION, SOLUTION INTRAVENOUS at 13:20

## 2023-12-23 RX ADMIN — HYDROMORPHONE HYDROCHLORIDE 1 MG: 1 INJECTION, SOLUTION INTRAMUSCULAR; INTRAVENOUS; SUBCUTANEOUS at 22:35

## 2023-12-23 RX ADMIN — METRONIDAZOLE 500 MG: 500 INJECTION, SOLUTION INTRAVENOUS at 16:30

## 2023-12-23 RX ADMIN — SODIUM CHLORIDE, POTASSIUM CHLORIDE, SODIUM LACTATE AND CALCIUM CHLORIDE 1000 ML: 600; 310; 30; 20 INJECTION, SOLUTION INTRAVENOUS at 18:56

## 2023-12-23 RX ADMIN — Medication 5 MCG/MIN: at 18:54

## 2023-12-23 RX ADMIN — SODIUM CHLORIDE 1000 ML: 9 INJECTION, SOLUTION INTRAVENOUS at 15:15

## 2023-12-23 NOTE — H&P
History and Physical          Subjective     HPI: Angie Mosquera is a 80 y.o. female with a PMHx of asthma, colitis, GERD, HTN, HLD, OA, PAF who presented to the ED from [facility] with complaints of nausea, vomiting and abdominal pain since 2 days which is progressively worsening. Patient also states she has been having diarrhea since a couple days as well. But also states she has been having abdominal  pain since September. Has not been able to tolerate PO intake. Upon my evaluation, abdomen increasingly tender and distended. Denies associated fever, chills, headache, cough, dizziness. Denies smoking, drinking, illicit drug use. In the ED, Temp 97.5, Resp 17-34, -112, BP 81/66--> 104/82, 100 % 2 L NC. Bicarb 15, Cr 1.71. Lactic acid 3.9-->4. 7. Procal 61.92. Troponin 237. Lipase negative. Glucose 152. Hemoglobin 15.0, hematocrit 49.4. Otherwise CBC without abnormality. Blood cultures obtained. Respiratory viral panel negative. Type and screen done in the ED. Chest x-ray negative. 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 Surgery consulted by the ED. EKG with normal sinus rhythm. Received Cefepime, 2.5 L NS bolus in the ED.       Home meds reconciled using paperwork sent from the facility   Code status discussed- FULL code        PMHx:  Past Medical History:   Diagnosis Date    Asthma     Coarse tremors     Colitis     DDD (degenerative disc disease), cervical     Fibrocystic breast     GERD (gastroesophageal reflux disease)     Headache     Hematuria     HTN (hypertension)     Hyperlipidemia     OA (osteoarthritis)     Osteoporosis     PAF (paroxysmal atrial fibrillation) ([address] 1995    Pneumonia     Renal cyst     Thyroid nodule     UTI (urinary tract infection)     Vitamin D deficiency 02/07/2011       PSurgHx:  Past Surgical History:   Procedure Laterality Date    BREAST SURGERY      cysts removed    GYN      hysterectomy    HEENT

## 2023-12-23 NOTE — ED TRIAGE NOTES
Patient arrived via EMS from [address] [place] [address] with chief complaint of diarrhea for the past two days and vomiting since September. Patient arrived with cyanotic nail beds and cold to the touch. Patient is A & O x4. Oswald Cano   Past Medical History:   Diagnosis Date    Asthma     Coarse tremors     Colitis     DDD (degenerative disc disease), cervical     Fibrocystic breast     GERD (gastroesophageal reflux disease)     Headache     Hematuria     HTN (hypertension)     Hyperlipidemia     OA (osteoarthritis)     Osteoporosis     PAF (paroxysmal atrial fibrillation) ([address]) 1995    Pneumonia     Renal cyst     Thyroid nodule     UTI (urinary tract infection)     Vitamin D deficiency 02/07/2011

## 2023-12-23 NOTE — ED NOTES
Tried to call patient's son (Mr. Marbin Hope) but no answer. Called [facility] ([facility]); she said she will contact patient's son.

## 2023-12-24 ENCOUNTER — APPOINTMENT (OUTPATIENT)
Facility: HOSPITAL | Age: 88
DRG: 871 | End: 2023-12-24
Payer: MEDICARE

## 2023-12-24 PROBLEM — E87.20 LACTIC ACIDOSIS: Status: ACTIVE | Noted: 2023-12-24

## 2023-12-24 PROBLEM — K55.059 ACUTE INTESTINAL ISCHEMIA (HCC): Status: ACTIVE | Noted: 2023-12-24

## 2023-12-24 PROBLEM — N17.9 AKI (ACUTE KIDNEY INJURY) (HCC): Status: ACTIVE | Noted: 2023-12-24

## 2023-12-24 LAB
ABO + RH BLD: NORMAL
ANION GAP SERPL CALC-SCNC: 11 MMOL/L (ref 3–18)
ANION GAP SERPL CALC-SCNC: 14 MMOL/L (ref 3–18)
ANION GAP SERPL CALC-SCNC: 14 MMOL/L (ref 3–18)
ANION GAP SERPL CALC-SCNC: 15 MMOL/L (ref 3–18)
BASOPHILS # BLD: 0 K/UL (ref 0–0.1)
BASOPHILS NFR BLD: 0 % (ref 0–2)
BLOOD GROUP ANTIBODIES SERPL: NORMAL
BUN SERPL-MCNC: 30 MG/DL (ref 7–18)
BUN SERPL-MCNC: 31 MG/DL (ref 7–18)
BUN SERPL-MCNC: 37 MG/DL (ref 7–18)
BUN SERPL-MCNC: 43 MG/DL (ref 7–18)
BUN/CREAT SERPL: 14 (ref 12–20)
BUN/CREAT SERPL: 15 (ref 12–20)
C DIFF GDH STL QL: NEGATIVE
C DIFF TOX A+B STL QL IA: NEGATIVE
C DIFF TOXIN INTERPRETATION: NORMAL
CA-I SERPL-SCNC: 0.96 MMOL/L (ref 1.15–1.33)
CALCIUM SERPL-MCNC: 6.9 MG/DL (ref 8.5–10.1)
CALCIUM SERPL-MCNC: 7.1 MG/DL (ref 8.5–10.1)
CALCIUM SERPL-MCNC: 7.1 MG/DL (ref 8.5–10.1)
CALCIUM SERPL-MCNC: 7.4 MG/DL (ref 8.5–10.1)
CHLORIDE SERPL-SCNC: 107 MMOL/L (ref 100–111)
CHLORIDE SERPL-SCNC: 109 MMOL/L (ref 100–111)
CHLORIDE SERPL-SCNC: 110 MMOL/L (ref 100–111)
CHLORIDE SERPL-SCNC: 111 MMOL/L (ref 100–111)
CO2 SERPL-SCNC: 16 MMOL/L (ref 21–32)
CO2 SERPL-SCNC: 16 MMOL/L (ref 21–32)
CO2 SERPL-SCNC: 17 MMOL/L (ref 21–32)
CO2 SERPL-SCNC: 17 MMOL/L (ref 21–32)
CREAT SERPL-MCNC: 2.04 MG/DL (ref 0.6–1.3)
CREAT SERPL-MCNC: 2.15 MG/DL (ref 0.6–1.3)
CREAT SERPL-MCNC: 2.41 MG/DL (ref 0.6–1.3)
CREAT SERPL-MCNC: 2.82 MG/DL (ref 0.6–1.3)
DIFFERENTIAL METHOD BLD: ABNORMAL
ECHO AO ASC DIAM: 2.7 CM
ECHO AO ASCENDING AORTA INDEX: 1.74 CM/M2
ECHO AO ROOT DIAM: 2 CM
ECHO AO ROOT INDEX: 1.29 CM/M2
ECHO AO SINUS VALSALVA DIAM: 2.7 CM
ECHO AO SINUS VALSALVA INDEX: 1.74 CM/M2
ECHO AV AREA PEAK VELOCITY: 2.1 CM2
ECHO AV AREA PEAK VELOCITY: 28.2 CM2
ECHO AV AREA VTI: 2.3 CM2
ECHO AV AREA/BSA VTI: 1.5 CM2/M2
ECHO AV MEAN GRADIENT: 4 MMHG
ECHO AV MEAN VELOCITY: 1 M/S
ECHO AV PEAK GRADIENT: 0 MMHG
ECHO AV PEAK GRADIENT: 7 MMHG
ECHO AV PEAK VELOCITY: 0.1 M/S
ECHO AV PEAK VELOCITY: 1.3 M/S
ECHO AV VTI: 25.3 CM
ECHO BSA: 1.55 M2
ECHO EST RA PRESSURE: 8 MMHG
ECHO LA DIAMETER INDEX: 2.45 CM/M2
ECHO LA DIAMETER: 3.8 CM
ECHO LA TO AORTIC ROOT RATIO: 1.9
ECHO LA VOL A-L A2C: 35 ML (ref 22–52)
ECHO LA VOL A-L A4C: 48 ML (ref 22–52)
ECHO LA VOL BP: 39 ML (ref 22–52)
ECHO LA VOL MOD A2C: 33 ML (ref 22–52)
ECHO LA VOL MOD A4C: 44 ML (ref 22–52)
ECHO LA VOL/BSA BIPLANE: 25 ML/M2 (ref 16–34)
ECHO LA VOLUME AREA LENGTH: 42 ML
ECHO LA VOLUME INDEX A-L A2C: 23 ML/M2 (ref 16–34)
ECHO LA VOLUME INDEX A-L A4C: 31 ML/M2 (ref 16–34)
ECHO LA VOLUME INDEX AREA LENGTH: 27 ML/M2 (ref 16–34)
ECHO LA VOLUME INDEX MOD A2C: 21 ML/M2 (ref 16–34)
ECHO LA VOLUME INDEX MOD A4C: 28 ML/M2 (ref 16–34)
ECHO LV E' LATERAL VELOCITY: 9 CM/S
ECHO LV E' SEPTAL VELOCITY: 8 CM/S
ECHO LV FRACTIONAL SHORTENING: 28 % (ref 28–44)
ECHO LV INTERNAL DIMENSION DIASTOLE INDEX: 2.52 CM/M2
ECHO LV INTERNAL DIMENSION DIASTOLIC: 3.9 CM (ref 3.9–5.3)
ECHO LV INTERNAL DIMENSION SYSTOLIC INDEX: 1.81 CM/M2
ECHO LV INTERNAL DIMENSION SYSTOLIC: 2.8 CM
ECHO LV IVSD: 1.1 CM (ref 0.6–0.9)
ECHO LV MASS 2D: 131 G (ref 67–162)
ECHO LV MASS INDEX 2D: 84.5 G/M2 (ref 43–95)
ECHO LV POSTERIOR WALL DIASTOLIC: 1 CM (ref 0.6–0.9)
ECHO LV RELATIVE WALL THICKNESS RATIO: 0.51
ECHO LVOT AREA: 3.1 CM2
ECHO LVOT AV VTI INDEX: 0.75
ECHO LVOT DIAM: 2 CM
ECHO LVOT MEAN GRADIENT: 2 MMHG
ECHO LVOT PEAK GRADIENT: 3 MMHG
ECHO LVOT PEAK VELOCITY: 0.9 M/S
ECHO LVOT STROKE VOLUME INDEX: 38.3 ML/M2
ECHO LVOT SV: 59.3 ML
ECHO LVOT VTI: 18.9 CM
ECHO MV A VELOCITY: 0.45 M/S
ECHO MV AREA VTI: 2.7 CM2
ECHO MV E DECELERATION TIME (DT): 108.8 MS
ECHO MV E VELOCITY: 0.59 M/S
ECHO MV E/A RATIO: 1.31
ECHO MV E/E' LATERAL: 6.56
ECHO MV E/E' RATIO (AVERAGED): 6.97
ECHO MV LVOT VTI INDEX: 1.17
ECHO MV MAX VELOCITY: 1.2 M/S
ECHO MV MEAN GRADIENT: 2 MMHG
ECHO MV MEAN VELOCITY: 0.6 M/S
ECHO MV PEAK GRADIENT: 5 MMHG
ECHO MV REGURGITANT PEAK GRADIENT: 104 MMHG
ECHO MV REGURGITANT PEAK VELOCITY: 5.1 M/S
ECHO MV REGURGITANT VTIA: 145.6 CM
ECHO MV VTI: 22.1 CM
ECHO PULMONARY ARTERY END DIASTOLIC PRESSURE: 11 MMHG
ECHO PV MAX VELOCITY: 0.8 M/S
ECHO PV PEAK GRADIENT: 3 MMHG
ECHO PV REGURGITANT MAX VELOCITY: 1.7 M/S
ECHO PVEIN A DURATION: 97.1 MS
ECHO RIGHT VENTRICULAR SYSTOLIC PRESSURE (RVSP): 54 MMHG
ECHO RV BASAL DIMENSION: 3.3 CM
ECHO RV MID DIMENSION: 2.7 CM
ECHO RV TAPSE: 1.9 CM (ref 1.7–?)
ECHO RVOT PEAK GRADIENT: 1 MMHG
ECHO RVOT PEAK VELOCITY: 0.6 M/S
ECHO TV REGURGITANT MAX VELOCITY: 3.4 M/S
ECHO TV REGURGITANT PEAK GRADIENT: 46 MMHG
EKG ATRIAL RATE: 103 BPM
EKG DIAGNOSIS: NORMAL
EKG P AXIS: 72 DEGREES
EKG P-R INTERVAL: 154 MS
EKG Q-T INTERVAL: 350 MS
EKG QRS DURATION: 78 MS
EKG QTC CALCULATION (BAZETT): 458 MS
EKG R AXIS: -28 DEGREES
EKG T AXIS: 69 DEGREES
EKG VENTRICULAR RATE: 103 BPM
EOSINOPHIL # BLD: 0 K/UL (ref 0–0.4)
EOSINOPHIL NFR BLD: 0 % (ref 0–5)
ERYTHROCYTE [DISTWIDTH] IN BLOOD BY AUTOMATED COUNT: 15.7 % (ref 11.6–14.5)
GLUCOSE BLD STRIP.AUTO-MCNC: 193 MG/DL (ref 70–110)
GLUCOSE BLD STRIP.AUTO-MCNC: 201 MG/DL (ref 70–110)
GLUCOSE BLD STRIP.AUTO-MCNC: 59 MG/DL (ref 70–110)
GLUCOSE BLD STRIP.AUTO-MCNC: 70 MG/DL (ref 70–110)
GLUCOSE BLD STRIP.AUTO-MCNC: 96 MG/DL (ref 70–110)
GLUCOSE SERPL-MCNC: 290 MG/DL (ref 74–99)
GLUCOSE SERPL-MCNC: 337 MG/DL (ref 74–99)
GLUCOSE SERPL-MCNC: 83 MG/DL (ref 74–99)
GLUCOSE SERPL-MCNC: 87 MG/DL (ref 74–99)
HCT VFR BLD AUTO: 27 % (ref 35–45)
HGB BLD-MCNC: 8.3 G/DL (ref 12–16)
IMM GRANULOCYTES # BLD AUTO: 0 K/UL
IMM GRANULOCYTES NFR BLD AUTO: 0 %
INR PPP: 3 (ref 0.9–1.1)
LACTATE SERPL-SCNC: 7.6 MMOL/L (ref 0.4–2)
LACTATE SERPL-SCNC: 9.1 MMOL/L (ref 0.4–2)
LACTATE SERPL-SCNC: 9.2 MMOL/L (ref 0.4–2)
LACTATE SERPL-SCNC: 9.6 MMOL/L (ref 0.4–2)
LACTATE SERPL-SCNC: 9.9 MMOL/L (ref 0.4–2)
LYMPHOCYTES # BLD: 0.6 K/UL (ref 0.9–3.6)
LYMPHOCYTES NFR BLD: 15 % (ref 21–52)
MAGNESIUM SERPL-MCNC: 2.5 MG/DL (ref 1.6–2.6)
MCH RBC QN AUTO: 28.6 PG (ref 24–34)
MCHC RBC AUTO-ENTMCNC: 30.7 G/DL (ref 31–37)
MCV RBC AUTO: 93.1 FL (ref 78–100)
MONOCYTES # BLD: 0.5 K/UL (ref 0.05–1.2)
MONOCYTES NFR BLD: 13 % (ref 3–10)
NEUTS BAND NFR BLD MANUAL: 18 % (ref 0–5)
NEUTS SEG # BLD: 3 K/UL (ref 1.8–8)
NEUTS SEG NFR BLD: 54 % (ref 40–73)
NRBC # BLD: 0 K/UL (ref 0–0.01)
NRBC BLD-RTO: 0 PER 100 WBC
PHOSPHATE SERPL-MCNC: 6.8 MG/DL (ref 2.5–4.9)
PLATELET # BLD AUTO: 139 K/UL (ref 135–420)
PLATELET COMMENT: ABNORMAL
PMV BLD AUTO: 10.4 FL (ref 9.2–11.8)
POTASSIUM SERPL-SCNC: 4.4 MMOL/L (ref 3.5–5.5)
POTASSIUM SERPL-SCNC: 4.9 MMOL/L (ref 3.5–5.5)
POTASSIUM SERPL-SCNC: 5 MMOL/L (ref 3.5–5.5)
POTASSIUM SERPL-SCNC: 5.4 MMOL/L (ref 3.5–5.5)
PROTHROMBIN TIME: 31.4 SEC (ref 11.9–14.7)
RBC # BLD AUTO: 2.9 M/UL (ref 4.2–5.3)
RBC MORPH BLD: ABNORMAL
SODIUM SERPL-SCNC: 138 MMOL/L (ref 136–145)
SODIUM SERPL-SCNC: 139 MMOL/L (ref 136–145)
SODIUM SERPL-SCNC: 140 MMOL/L (ref 136–145)
SODIUM SERPL-SCNC: 140 MMOL/L (ref 136–145)
SPECIMEN EXP DATE BLD: NORMAL
TROPONIN I SERPL HS-MCNC: 121 NG/L (ref 0–54)
TROPONIN I SERPL HS-MCNC: 182 NG/L (ref 0–54)
TROPONIN I SERPL HS-MCNC: 231 NG/L (ref 0–54)
WBC # BLD AUTO: 4.1 K/UL (ref 4.6–13.2)

## 2023-12-24 PROCEDURE — 99291 CRITICAL CARE FIRST HOUR: CPT | Performed by: INTERNAL MEDICINE

## 2023-12-24 PROCEDURE — 86850 RBC ANTIBODY SCREEN: CPT

## 2023-12-24 PROCEDURE — 83605 ASSAY OF LACTIC ACID: CPT

## 2023-12-24 PROCEDURE — A4216 STERILE WATER/SALINE, 10 ML: HCPCS

## 2023-12-24 PROCEDURE — P9041 ALBUMIN (HUMAN),5%, 50ML: HCPCS | Performed by: PHYSICIAN ASSISTANT

## 2023-12-24 PROCEDURE — APPSS30 APP SPLIT SHARED TIME 16-30 MINUTES: Performed by: REGISTERED NURSE

## 2023-12-24 PROCEDURE — 86901 BLOOD TYPING SEROLOGIC RH(D): CPT

## 2023-12-24 PROCEDURE — 84484 ASSAY OF TROPONIN QUANT: CPT

## 2023-12-24 PROCEDURE — 2500000003 HC RX 250 WO HCPCS: Performed by: REGISTERED NURSE

## 2023-12-24 PROCEDURE — 2580000003 HC RX 258

## 2023-12-24 PROCEDURE — 36569 INSJ PICC 5 YR+ W/O IMAGING: CPT

## 2023-12-24 PROCEDURE — 80048 BASIC METABOLIC PNL TOTAL CA: CPT

## 2023-12-24 PROCEDURE — 02HV33Z INSERTION OF INFUSION DEVICE INTO SUPERIOR VENA CAVA, PERCUTANEOUS APPROACH: ICD-10-PCS | Performed by: COLON & RECTAL SURGERY

## 2023-12-24 PROCEDURE — 93306 TTE W/DOPPLER COMPLETE: CPT

## 2023-12-24 PROCEDURE — 36430 TRANSFUSION BLD/BLD COMPNT: CPT

## 2023-12-24 PROCEDURE — 85610 PROTHROMBIN TIME: CPT

## 2023-12-24 PROCEDURE — 86900 BLOOD TYPING SEROLOGIC ABO: CPT

## 2023-12-24 PROCEDURE — 93010 ELECTROCARDIOGRAM REPORT: CPT | Performed by: INTERNAL MEDICINE

## 2023-12-24 PROCEDURE — 6360000002 HC RX W HCPCS: Performed by: REGISTERED NURSE

## 2023-12-24 PROCEDURE — 30233N1 TRANSFUSION OF NONAUTOLOGOUS RED BLOOD CELLS INTO PERIPHERAL VEIN, PERCUTANEOUS APPROACH: ICD-10-PCS | Performed by: INTERNAL MEDICINE

## 2023-12-24 PROCEDURE — 2580000003 HC RX 258: Performed by: PHYSICIAN ASSISTANT

## 2023-12-24 PROCEDURE — 82330 ASSAY OF CALCIUM: CPT

## 2023-12-24 PROCEDURE — P9059 PLASMA, FRZ BETWEEN 8-24HOUR: HCPCS

## 2023-12-24 PROCEDURE — 2580000003 HC RX 258: Performed by: REGISTERED NURSE

## 2023-12-24 PROCEDURE — 2000000000 HC ICU R&B

## 2023-12-24 PROCEDURE — C9113 INJ PANTOPRAZOLE SODIUM, VIA: HCPCS

## 2023-12-24 PROCEDURE — 30233L1 TRANSFUSION OF NONAUTOLOGOUS FRESH PLASMA INTO PERIPHERAL VEIN, PERCUTANEOUS APPROACH: ICD-10-PCS | Performed by: INTERNAL MEDICINE

## 2023-12-24 PROCEDURE — 93306 TTE W/DOPPLER COMPLETE: CPT | Performed by: INTERNAL MEDICINE

## 2023-12-24 PROCEDURE — 94761 N-INVAS EAR/PLS OXIMETRY MLT: CPT

## 2023-12-24 PROCEDURE — 85025 COMPLETE CBC W/AUTO DIFF WBC: CPT

## 2023-12-24 PROCEDURE — 84100 ASSAY OF PHOSPHORUS: CPT

## 2023-12-24 PROCEDURE — 99233 SBSQ HOSP IP/OBS HIGH 50: CPT | Performed by: COLON & RECTAL SURGERY

## 2023-12-24 PROCEDURE — 6360000002 HC RX W HCPCS: Performed by: PHYSICIAN ASSISTANT

## 2023-12-24 PROCEDURE — 82962 GLUCOSE BLOOD TEST: CPT

## 2023-12-24 PROCEDURE — 2500000003 HC RX 250 WO HCPCS: Performed by: PHYSICIAN ASSISTANT

## 2023-12-24 PROCEDURE — 83735 ASSAY OF MAGNESIUM: CPT

## 2023-12-24 PROCEDURE — 2700000000 HC OXYGEN THERAPY PER DAY

## 2023-12-24 PROCEDURE — 6360000002 HC RX W HCPCS

## 2023-12-24 RX ORDER — ALBUMIN, HUMAN INJ 5% 5 %
25 SOLUTION INTRAVENOUS ONCE
Status: COMPLETED | OUTPATIENT
Start: 2023-12-24 | End: 2023-12-24

## 2023-12-24 RX ORDER — HYDROMORPHONE HYDROCHLORIDE 1 MG/ML
1 INJECTION, SOLUTION INTRAMUSCULAR; INTRAVENOUS; SUBCUTANEOUS
Status: DISCONTINUED | OUTPATIENT
Start: 2023-12-24 | End: 2023-12-25 | Stop reason: HOSPADM

## 2023-12-24 RX ADMIN — SODIUM BICARBONATE: 84 INJECTION, SOLUTION INTRAVENOUS at 04:42

## 2023-12-24 RX ADMIN — METRONIDAZOLE 500 MG: 500 INJECTION, SOLUTION INTRAVENOUS at 23:57

## 2023-12-24 RX ADMIN — Medication 13 MCG/MIN: at 17:48

## 2023-12-24 RX ADMIN — CEFEPIME 1000 MG: 1 INJECTION, POWDER, FOR SOLUTION INTRAMUSCULAR; INTRAVENOUS at 13:07

## 2023-12-24 RX ADMIN — CALCIUM GLUCONATE 4000 MG: 98 INJECTION, SOLUTION INTRAVENOUS at 20:01

## 2023-12-24 RX ADMIN — VANCOMYCIN HYDROCHLORIDE 500 MG: 5 INJECTION, POWDER, LYOPHILIZED, FOR SOLUTION INTRAVENOUS at 08:51

## 2023-12-24 RX ADMIN — METRONIDAZOLE 500 MG: 500 INJECTION, SOLUTION INTRAVENOUS at 15:04

## 2023-12-24 RX ADMIN — HYDROMORPHONE HYDROCHLORIDE 1 MG: 1 INJECTION, SOLUTION INTRAMUSCULAR; INTRAVENOUS; SUBCUTANEOUS at 11:12

## 2023-12-24 RX ADMIN — SODIUM CHLORIDE 40 MG: 9 INJECTION INTRAMUSCULAR; INTRAVENOUS; SUBCUTANEOUS at 08:32

## 2023-12-24 RX ADMIN — HYDROMORPHONE HYDROCHLORIDE 1 MG: 1 INJECTION, SOLUTION INTRAMUSCULAR; INTRAVENOUS; SUBCUTANEOUS at 17:53

## 2023-12-24 RX ADMIN — ALBUMIN (HUMAN) 25 G: 12.5 INJECTION, SOLUTION INTRAVENOUS at 03:12

## 2023-12-24 RX ADMIN — SODIUM BICARBONATE: 84 INJECTION, SOLUTION INTRAVENOUS at 23:56

## 2023-12-24 RX ADMIN — VANCOMYCIN HYDROCHLORIDE 500 MG: 5 INJECTION, POWDER, LYOPHILIZED, FOR SOLUTION INTRAVENOUS at 14:00

## 2023-12-24 RX ADMIN — METRONIDAZOLE 500 MG: 500 INJECTION, SOLUTION INTRAVENOUS at 00:18

## 2023-12-24 RX ADMIN — SODIUM CHLORIDE, PRESERVATIVE FREE 10 ML: 5 INJECTION INTRAVENOUS at 08:33

## 2023-12-24 RX ADMIN — SODIUM CHLORIDE, PRESERVATIVE FREE 10 ML: 5 INJECTION INTRAVENOUS at 20:01

## 2023-12-24 RX ADMIN — SODIUM BICARBONATE: 84 INJECTION, SOLUTION INTRAVENOUS at 14:27

## 2023-12-24 RX ADMIN — METRONIDAZOLE 500 MG: 500 INJECTION, SOLUTION INTRAVENOUS at 08:32

## 2023-12-24 NOTE — CONSULTS
Remote chart review, call received from admitting MD and discussed. VS/Notes/Labs and imaging reports reviewed     Acutely sick patient with toxic megacolon finding on CT, bandemia, morelia--80years old  C.diff testing sent out    Suggest to keep on PO vanco 500mg PO QID, flagyl 500 mg IV QID, Cefepime for enterobacercia coverage until culures available. Supportive IVF/daily monitor of cbcb with diff, cmp, lactic acidosis  Surgical team on board per primary team    FU culture and testing result and adjustment of treatment accordingly      Thanks  Dr Greg Abbasi will resume care from Monday, 12/25/23. I will be covering the weekend. Please call  if questions/concerns until then. Thanks. Preethi Lopez MD  [facility])  Office: [phone] -Option #8  Office fax:  [phone]
Vascular access assessment completed. 5 Fr. Triple  PICC line placed in right brachial vein and confirmed with 3CG. External length is 0 cm, internal length is 34 cm and arm circumference is 27 cm. Lot # is [id number], REF# is [id number] and expiration is 12-. Local lidocaine utilized during procedure and patient did not present with any negative reactions to injection. Patient tolerated procedure well and CHG dressing applied to site. Initial dressing should be changed no later than 16 Nov. 2023.
this facility are performed using dose optimization technique as appropriate to the performed exam, to include automated exposure control, adjustment of the mA and/or kV according to patient's size (Including appropriate matching for site-specific examinations), or use of iterative reconstruction technique. XR CHEST PORTABLE    Result Date: 12/23/2023  No active or acute cardiopulmonary process is evident. 05/02/23    VAS DUP LOWER EXTREMITY VENOUS BILATERAL 05/02/2023  5:00 PM (Final)    Interpretation Summary    No evidence of deep vein or superficial vein thrombosis in the right lower extremity. Vessels demonstrate normal compressibility, color filling, and phasic and spontaneous flow. No evidence of deep vein or superficial vein thrombosis in the left lower extremity. Vessels demonstrate normal compressibility, color filling, and phasic and spontaneous flow. Signed by: Elizabeth Fall MD on 5/2/2023  5:00 PM     Ultrasound Result (most recent):  US HEAD NECK SOFT TISSUE THYROID 04/10/2018    Narrative  Ultrasound thyroid    History: Mass, lump, swelling of the neck. Comparison: None available. Findings:    Overall the thyroid is enlarged and heterogeneous with multiple nodules. Right thyroid:   The right lobe is [4.2 x 1.5 x 1.0 cm.] It demonstrates  heterogeneous background echotexture. There is a 0.6 x 0.3 x 0.3 cm almost mixed  cystic and solid, hypoechoic, wider than tall and smoothly marginated lesion  within the mid superior right thyroid lobe (TR 3). Within the inferior right  thyroid lobe is a 0.7 x 0.3 x 0.6 cm cystic, anechoic and wider than tall,  smoothly marginated lesion (TR 1). Within the medial inferior right thyroid lobe  there is a 0.6 x 0.4 x 0.5 cm solid appearing, hypoechoic, wider than tall and  smoothly marginated nodule with punctate echogenic foci (TR 5).     Left thyroid:  The left lobe is [4.1 x 1.4 x 1.7 cm.] It demonstrates  heterogeneous background

## 2023-12-24 NOTE — ED NOTES
Started Norepinephrine drip and 1 Liter of LR. As per Shay Raygoza, She will wait for Venous PH before starting sodium bicarbonate drip.

## 2023-12-25 ENCOUNTER — ANESTHESIA (OUTPATIENT)
Dept: ICU | Facility: HOSPITAL | Age: 88
DRG: 871 | End: 2023-12-25
Payer: MEDICARE

## 2023-12-25 ENCOUNTER — ANESTHESIA EVENT (OUTPATIENT)
Dept: ICU | Facility: HOSPITAL | Age: 88
DRG: 871 | End: 2023-12-25
Payer: MEDICARE

## 2023-12-25 VITALS
WEIGHT: 119 LBS | DIASTOLIC BLOOD PRESSURE: 37 MMHG | RESPIRATION RATE: 20 BRPM | TEMPERATURE: 97.2 F | BODY MASS INDEX: 21.09 KG/M2 | OXYGEN SATURATION: 93 % | SYSTOLIC BLOOD PRESSURE: 119 MMHG | HEIGHT: 63 IN | HEART RATE: 67 BPM

## 2023-12-25 LAB
ABO + RH BLD: NORMAL
ANION GAP BLD CALC-SCNC: ABNORMAL MMOL/L (ref 10–20)
ANION GAP SERPL CALC-SCNC: 13 MMOL/L (ref 3–18)
BASE DEFICIT BLD-SCNC: 12.6 MMOL/L
BLD PROD TYP BPU: NORMAL
BLOOD BANK DISPENSE STATUS: NORMAL
BLOOD GROUP ANTIBODIES SERPL: NORMAL
BPU ID: NORMAL
BUN SERPL-MCNC: 33 MG/DL (ref 7–18)
BUN/CREAT SERPL: 16 (ref 12–20)
CA-I BLD-MCNC: 1.07 MMOL/L (ref 1.12–1.32)
CALCIUM SERPL-MCNC: 6.2 MG/DL (ref 8.5–10.1)
CALLED TO: NORMAL
CALLED TO:: NORMAL
CHLORIDE BLD-SCNC: 112 MMOL/L (ref 98–107)
CHLORIDE SERPL-SCNC: 110 MMOL/L (ref 100–111)
CO2 BLD-SCNC: 15 MMOL/L (ref 19–24)
CO2 SERPL-SCNC: 18 MMOL/L (ref 21–32)
CREAT BLD-MCNC: 1.28 MG/DL (ref 0.6–1.3)
CREAT SERPL-MCNC: 2.1 MG/DL (ref 0.6–1.3)
GLUCOSE BLD STRIP.AUTO-MCNC: 285 MG/DL (ref 70–110)
GLUCOSE BLD-MCNC: 158 MG/DL (ref 65–100)
GLUCOSE SERPL-MCNC: 284 MG/DL (ref 74–99)
HCO3 BLD-SCNC: 14.4 MMOL/L (ref 22–26)
LACTATE BLD-SCNC: 7.29 MMOL/L (ref 0.4–2)
LACTATE SERPL-SCNC: 10 MMOL/L (ref 0.4–2)
PCO2 BLDV: 36.2 MMHG (ref 41–51)
PH BLDV: 7.21 (ref 7.32–7.42)
PO2 BLDV: 30 MMHG (ref 25–40)
POTASSIUM BLD-SCNC: 3.4 MMOL/L (ref 3.5–5.1)
POTASSIUM SERPL-SCNC: 3.5 MMOL/L (ref 3.5–5.5)
SAO2 % BLD: 45 %
SERVICE CMNT-IMP: ABNORMAL
SODIUM BLD-SCNC: 141 MMOL/L (ref 136–145)
SODIUM SERPL-SCNC: 141 MMOL/L (ref 136–145)
SPECIMEN EXP DATE BLD: NORMAL
SPECIMEN SITE: ABNORMAL
UNIT DIVISION: 0

## 2023-12-25 PROCEDURE — 99238 HOSP IP/OBS DSCHRG MGMT 30/<: CPT | Performed by: PHYSICIAN ASSISTANT

## 2023-12-25 PROCEDURE — 92950 HEART/LUNG RESUSCITATION CPR: CPT | Performed by: PHYSICIAN ASSISTANT

## 2023-12-25 PROCEDURE — 0BH17EZ INSERTION OF ENDOTRACHEAL AIRWAY INTO TRACHEA, VIA NATURAL OR ARTIFICIAL OPENING: ICD-10-PCS | Performed by: PHYSICIAN ASSISTANT

## 2023-12-25 PROCEDURE — 31500 INSERT EMERGENCY AIRWAY: CPT | Performed by: NURSE ANESTHETIST, CERTIFIED REGISTERED

## 2023-12-25 PROCEDURE — 83605 ASSAY OF LACTIC ACID: CPT

## 2023-12-25 PROCEDURE — 5A12012 PERFORMANCE OF CARDIAC OUTPUT, SINGLE, MANUAL: ICD-10-PCS | Performed by: PHYSICIAN ASSISTANT

## 2023-12-25 PROCEDURE — 6360000002 HC RX W HCPCS: Performed by: PHYSICIAN ASSISTANT

## 2023-12-25 PROCEDURE — 84132 ASSAY OF SERUM POTASSIUM: CPT

## 2023-12-25 PROCEDURE — 80048 BASIC METABOLIC PNL TOTAL CA: CPT

## 2023-12-25 PROCEDURE — 82947 ASSAY GLUCOSE BLOOD QUANT: CPT

## 2023-12-25 PROCEDURE — 31500 INSERT EMERGENCY AIRWAY: CPT

## 2023-12-25 PROCEDURE — 82962 GLUCOSE BLOOD TEST: CPT

## 2023-12-25 RX ORDER — MINERAL OIL AND WHITE PETROLATUM 150; 830 MG/G; MG/G
OINTMENT OPHTHALMIC EVERY 4 HOURS
Status: DISCONTINUED | OUTPATIENT
Start: 2023-12-25 | End: 2023-12-25 | Stop reason: HOSPADM

## 2023-12-25 RX ORDER — DEXTROSE MONOHYDRATE 100 MG/ML
INJECTION, SOLUTION INTRAVENOUS CONTINUOUS PRN
Status: DISCONTINUED | OUTPATIENT
Start: 2023-12-25 | End: 2023-12-25 | Stop reason: HOSPADM

## 2023-12-25 RX ORDER — INSULIN LISPRO 100 [IU]/ML
0-16 INJECTION, SOLUTION INTRAVENOUS; SUBCUTANEOUS EVERY 6 HOURS
Status: DISCONTINUED | OUTPATIENT
Start: 2023-12-25 | End: 2023-12-25 | Stop reason: HOSPADM

## 2023-12-25 RX ORDER — IPRATROPIUM BROMIDE AND ALBUTEROL SULFATE 2.5; .5 MG/3ML; MG/3ML
1 SOLUTION RESPIRATORY (INHALATION)
Status: DISCONTINUED | OUTPATIENT
Start: 2023-12-25 | End: 2023-12-25 | Stop reason: HOSPADM

## 2023-12-25 RX ORDER — CHLORHEXIDINE GLUCONATE ORAL RINSE 1.2 MG/ML
15 SOLUTION DENTAL 2 TIMES DAILY
Status: DISCONTINUED | OUTPATIENT
Start: 2023-12-25 | End: 2023-12-25 | Stop reason: HOSPADM

## 2023-12-25 RX ORDER — FENTANYL CITRATE 50 UG/ML
100 INJECTION, SOLUTION INTRAMUSCULAR; INTRAVENOUS
Status: DISCONTINUED | OUTPATIENT
Start: 2023-12-25 | End: 2023-12-25 | Stop reason: HOSPADM

## 2023-12-25 RX ADMIN — HYDROMORPHONE HYDROCHLORIDE 1 MG: 1 INJECTION, SOLUTION INTRAMUSCULAR; INTRAVENOUS; SUBCUTANEOUS at 01:09

## 2023-12-25 RX ADMIN — HYDROMORPHONE HYDROCHLORIDE 1 MG: 1 INJECTION, SOLUTION INTRAMUSCULAR; INTRAVENOUS; SUBCUTANEOUS at 04:27

## 2023-12-25 NOTE — PLAN OF CARE
Problem: Discharge Planning  Goal: Discharge to home or other facility with appropriate resources  12/24/2023 2108 by Alisha Cardenas RN  Outcome: Not Progressing  12/24/2023 1045 by Elham Sanders RN  Outcome: Progressing     Problem: Pain  Goal: Verbalizes/displays adequate comfort level or baseline comfort level  12/24/2023 2108 by Alisha Cardenas RN  Outcome: Progressing  12/24/2023 1045 by Elham Sanders RN  Outcome: Progressing     Problem: Safety - Adult  Goal: Free from fall injury  12/24/2023 2108 by Alisha Cardenas RN  Outcome: Progressing  12/24/2023 1045 by Elham Sanders RN  Outcome: Progressing     Problem: Discharge Planning  Goal: Discharge to home or other facility with appropriate resources  12/24/2023 2108 by Alisha Cardenas RN  Outcome: Not Progressing  12/24/2023 1045 by Elham Sanders RN  Outcome: Progressing

## 2023-12-25 NOTE — DISCHARGE SUMMARY
Death Pronouncement Note    Patient lying in bed, mouth closed, eyes open. Pupils fixed and equal bilaterally. No response to verbal or painful stimuli. No heart or lung sounds auscultated. No carotid or peripheral pulses.     Death officially pronounced at , 12/25/2023    Medical Examiner notified: No, does not meet criteria    Family Notified: NO, VM left with Jane Jean (son)      Major Graft, Charlotte Callahan  12/25/23  Pulmonary, Critical Care Medicine  [facility]
Cause of death:   Immediate Cause: ischemic bowel  Underlying Causes, contributors to death: advanced age, debility, heart failure, resp failure, septic shock      Was the ME contacted? no  Was the family notified: no, VM left with JAME to return call to ICU, reportedly other son dying of colon cancer   Name of notified        Varghese Blackwood  12/25/23  Pulmonary, [facility]

## 2023-12-25 NOTE — ANESTHESIA PROCEDURE NOTES
Airway  Date/Time: 12/25/2023 6:08 AM  Urgency: emergent    Airway not difficult    General Information and Staff    Patient location during procedure: ICU  Resident/CRNA: WILLIE Ortega CRNA  Performed: resident/CRNA   Performed by: WILLIE Ortega CRNA  Authorized by: WILLIE Ortega CRNA      Consent for Airway (if performed for an anesthetic, see related documentation for consents)  Patient identity confirmed: per hospital policy  Consent: The procedure was performed in an emergent situation. Verbal consent not obtained. Written consent not obtained. Risks and benefits: risks, benefits and alternatives were not discussed      Code status verified:yes  Indications and Patient Condition  Indications for airway management: cardio/pulmonary arrest  Spontaneous Ventilation: absent  Sedation level: no sedation  Preoxygenated: yes  Patient position: sniffing  Mask difficulty assessment: vent by bag mask    Final Airway Details  Final airway type: endotracheal airway      Successful airway: ETT  Cuffed: yes   Successful intubation technique: direct laryngoscopy  Facilitating devices/methods: intubating stylet  Endotracheal tube insertion site: oral  Blade: Kate  Blade size: #3  ETT size (mm): 7.0  Cormack-Lehane Classification: grade I - full view of glottis  Placement verified by: chest auscultation and capnometry   Measured from: lips  ETT to lips (cm): 21  Number of attempts at approach: 1    Additional Comments  Arrived to code blue, compressions and bag mask ventilation already taking place. Patient with significant amount of red tinged fluid pooled in mouth. Mouth suctioned and patient easily intubated with grade I view. Airway handed off to RT.

## 2023-12-29 LAB
BACTERIA SPEC CULT: NORMAL
BACTERIA SPEC CULT: NORMAL
SERVICE CMNT-IMP: NORMAL
SERVICE CMNT-IMP: NORMAL